# Patient Record
Sex: FEMALE | Race: OTHER | Employment: UNEMPLOYED | URBAN - METROPOLITAN AREA
[De-identification: names, ages, dates, MRNs, and addresses within clinical notes are randomized per-mention and may not be internally consistent; named-entity substitution may affect disease eponyms.]

---

## 2017-01-28 ENCOUNTER — OFFICE VISIT (OUTPATIENT)
Dept: FAMILY MEDICINE CLINIC | Age: 62
End: 2017-01-28

## 2017-01-28 VITALS
WEIGHT: 200 LBS | TEMPERATURE: 97 F | HEIGHT: 65 IN | HEART RATE: 80 BPM | RESPIRATION RATE: 16 BRPM | BODY MASS INDEX: 33.32 KG/M2 | DIASTOLIC BLOOD PRESSURE: 78 MMHG | SYSTOLIC BLOOD PRESSURE: 120 MMHG

## 2017-01-28 DIAGNOSIS — H92.03 OTALGIA, BILATERAL: Primary | ICD-10-CM

## 2017-01-28 PROCEDURE — 99213 OFFICE O/P EST LOW 20 MIN: CPT | Performed by: FAMILY MEDICINE

## 2017-01-28 RX ORDER — CEFUROXIME AXETIL 250 MG/1
250 TABLET ORAL 2 TIMES DAILY
Qty: 14 TABLET | Refills: 0 | Status: SHIPPED | OUTPATIENT
Start: 2017-01-28 | End: 2017-02-04

## 2017-01-28 ASSESSMENT — ENCOUNTER SYMPTOMS
VOICE CHANGE: 0
NAUSEA: 0
TROUBLE SWALLOWING: 0

## 2017-03-20 RX ORDER — ONDANSETRON 4 MG/1
TABLET, FILM COATED ORAL
Qty: 14 TABLET | Refills: 0 | Status: SHIPPED | OUTPATIENT
Start: 2017-03-20 | End: 2018-12-11 | Stop reason: ALTCHOICE

## 2017-06-09 ENCOUNTER — OFFICE VISIT (OUTPATIENT)
Dept: CARDIOLOGY | Age: 62
End: 2017-06-09

## 2017-06-09 VITALS
BODY MASS INDEX: 34.26 KG/M2 | WEIGHT: 205.6 LBS | DIASTOLIC BLOOD PRESSURE: 72 MMHG | RESPIRATION RATE: 14 BRPM | HEIGHT: 65 IN | OXYGEN SATURATION: 98 % | HEART RATE: 64 BPM | SYSTOLIC BLOOD PRESSURE: 116 MMHG

## 2017-06-09 DIAGNOSIS — I10 ESSENTIAL HYPERTENSION: Chronic | ICD-10-CM

## 2017-06-09 DIAGNOSIS — I48.0 PAF (PAROXYSMAL ATRIAL FIBRILLATION) (HCC): Chronic | ICD-10-CM

## 2017-06-09 DIAGNOSIS — Z01.810 PREOP CARDIOVASCULAR EXAM: Primary | ICD-10-CM

## 2017-06-09 PROCEDURE — 99214 OFFICE O/P EST MOD 30 MIN: CPT | Performed by: INTERNAL MEDICINE

## 2017-06-09 ASSESSMENT — ENCOUNTER SYMPTOMS
CHEST TIGHTNESS: 0
SHORTNESS OF BREATH: 0

## 2017-06-20 ENCOUNTER — OFFICE VISIT (OUTPATIENT)
Dept: FAMILY MEDICINE CLINIC | Age: 62
End: 2017-06-20

## 2017-06-20 VITALS
SYSTOLIC BLOOD PRESSURE: 112 MMHG | BODY MASS INDEX: 34.37 KG/M2 | RESPIRATION RATE: 16 BRPM | HEART RATE: 84 BPM | HEIGHT: 65 IN | TEMPERATURE: 98.3 F | WEIGHT: 206.3 LBS | OXYGEN SATURATION: 99 % | DIASTOLIC BLOOD PRESSURE: 76 MMHG

## 2017-06-20 DIAGNOSIS — K58.8 OTHER IRRITABLE BOWEL SYNDROME: ICD-10-CM

## 2017-06-20 DIAGNOSIS — Z01.818 PRE-OP EXAM: Primary | ICD-10-CM

## 2017-06-20 DIAGNOSIS — M54.41 CHRONIC BILATERAL LOW BACK PAIN WITH BILATERAL SCIATICA: ICD-10-CM

## 2017-06-20 DIAGNOSIS — I48.0 PAF (PAROXYSMAL ATRIAL FIBRILLATION) (HCC): ICD-10-CM

## 2017-06-20 DIAGNOSIS — G89.29 CHRONIC BILATERAL LOW BACK PAIN WITH BILATERAL SCIATICA: ICD-10-CM

## 2017-06-20 DIAGNOSIS — I10 ESSENTIAL HYPERTENSION: ICD-10-CM

## 2017-06-20 DIAGNOSIS — M54.42 CHRONIC BILATERAL LOW BACK PAIN WITH BILATERAL SCIATICA: ICD-10-CM

## 2017-06-20 PROCEDURE — 99242 OFF/OP CONSLTJ NEW/EST SF 20: CPT | Performed by: FAMILY MEDICINE

## 2017-06-20 RX ORDER — HYDROCODONE BITARTRATE AND ACETAMINOPHEN 5; 325 MG/1; MG/1
1 TABLET ORAL
COMMUNITY
Start: 2017-06-19 | End: 2017-11-27 | Stop reason: ALTCHOICE

## 2017-06-20 ASSESSMENT — ENCOUNTER SYMPTOMS
ABDOMINAL PAIN: 0
SHORTNESS OF BREATH: 0

## 2017-07-10 ENCOUNTER — OFFICE VISIT (OUTPATIENT)
Dept: FAMILY MEDICINE CLINIC | Age: 62
End: 2017-07-10

## 2017-07-10 VITALS
DIASTOLIC BLOOD PRESSURE: 78 MMHG | RESPIRATION RATE: 12 BRPM | TEMPERATURE: 98.1 F | SYSTOLIC BLOOD PRESSURE: 114 MMHG | HEART RATE: 66 BPM

## 2017-07-10 DIAGNOSIS — R31.29 MICROSCOPIC HEMATURIA: ICD-10-CM

## 2017-07-10 DIAGNOSIS — R94.31 ABNORMAL EKG: Primary | ICD-10-CM

## 2017-07-10 DIAGNOSIS — R79.1 ELEVATED INR: ICD-10-CM

## 2017-07-10 LAB
BILIRUBIN, POC: 17
BLOOD URINE, POC: 10
CLARITY, POC: ABNORMAL
COLOR, POC: ABNORMAL
GLUCOSE URINE, POC: ABNORMAL
INR BLD: 1
KETONES, POC: ABNORMAL
LEUKOCYTE EST, POC: 70
NITRITE, POC: ABNORMAL
PH, POC: 5
PROTEIN, POC: 0.15
PROTHROMBIN TIME: 10.5 SEC (ref 8.1–13.7)
SPECIFIC GRAVITY, POC: 1.03
UROBILINOGEN, POC: 17

## 2017-07-10 PROCEDURE — 99213 OFFICE O/P EST LOW 20 MIN: CPT | Performed by: FAMILY MEDICINE

## 2017-07-10 PROCEDURE — 81002 URINALYSIS NONAUTO W/O SCOPE: CPT | Performed by: FAMILY MEDICINE

## 2017-07-10 RX ORDER — NITROFURANTOIN 25; 75 MG/1; MG/1
100 CAPSULE ORAL 2 TIMES DAILY
Qty: 14 CAPSULE | Refills: 0 | Status: SHIPPED | OUTPATIENT
Start: 2017-07-10 | End: 2017-07-17

## 2017-07-10 ASSESSMENT — PATIENT HEALTH QUESTIONNAIRE - PHQ9
1. LITTLE INTEREST OR PLEASURE IN DOING THINGS: 0
SUM OF ALL RESPONSES TO PHQ QUESTIONS 1-9: 0
SUM OF ALL RESPONSES TO PHQ9 QUESTIONS 1 & 2: 0
2. FEELING DOWN, DEPRESSED OR HOPELESS: 0

## 2017-07-10 ASSESSMENT — ENCOUNTER SYMPTOMS: ABDOMINAL PAIN: 0

## 2017-07-12 ENCOUNTER — TELEPHONE (OUTPATIENT)
Dept: FAMILY MEDICINE CLINIC | Age: 62
End: 2017-07-12

## 2017-07-12 LAB — URINE CULTURE, ROUTINE: NORMAL

## 2017-07-24 ENCOUNTER — HOSPITAL ENCOUNTER (OUTPATIENT)
Dept: GENERAL RADIOLOGY | Age: 62
Discharge: HOME | End: 2017-07-24
Payer: COMMERCIAL

## 2017-07-24 DIAGNOSIS — M54.5 LOW BACK PAIN, UNSPECIFIED BACK PAIN LATERALITY, UNSPECIFIED CHRONICITY, WITH SCIATICA PRESENCE UNSPECIFIED: ICD-10-CM

## 2017-07-24 PROCEDURE — 72100 X-RAY EXAM L-S SPINE 2/3 VWS: CPT

## 2017-09-25 ENCOUNTER — HOSPITAL ENCOUNTER (OUTPATIENT)
Dept: GENERAL RADIOLOGY | Age: 62
Discharge: HOME | End: 2017-09-25
Payer: MEDICARE

## 2017-09-25 DIAGNOSIS — M54.5 LOW BACK PAIN, UNSPECIFIED BACK PAIN LATERALITY, UNSPECIFIED CHRONICITY, WITH SCIATICA PRESENCE UNSPECIFIED: ICD-10-CM

## 2017-09-25 PROCEDURE — 72100 X-RAY EXAM L-S SPINE 2/3 VWS: CPT

## 2017-11-27 ENCOUNTER — OFFICE VISIT (OUTPATIENT)
Dept: CARDIOLOGY | Age: 62
End: 2017-11-27

## 2017-11-27 VITALS
WEIGHT: 209 LBS | HEART RATE: 58 BPM | RESPIRATION RATE: 16 BRPM | BODY MASS INDEX: 34.78 KG/M2 | OXYGEN SATURATION: 96 % | SYSTOLIC BLOOD PRESSURE: 120 MMHG | DIASTOLIC BLOOD PRESSURE: 72 MMHG

## 2017-11-27 DIAGNOSIS — I48.0 PAF (PAROXYSMAL ATRIAL FIBRILLATION) (HCC): Primary | ICD-10-CM

## 2017-11-27 DIAGNOSIS — Q26.8 PULMONARY VEIN STENOSIS: ICD-10-CM

## 2017-11-27 DIAGNOSIS — I10 ESSENTIAL HYPERTENSION: ICD-10-CM

## 2017-11-27 PROCEDURE — 3017F COLORECTAL CA SCREEN DOC REV: CPT | Performed by: INTERNAL MEDICINE

## 2017-11-27 PROCEDURE — G8417 CALC BMI ABV UP PARAM F/U: HCPCS | Performed by: INTERNAL MEDICINE

## 2017-11-27 PROCEDURE — 99214 OFFICE O/P EST MOD 30 MIN: CPT | Performed by: INTERNAL MEDICINE

## 2017-11-27 PROCEDURE — 1036F TOBACCO NON-USER: CPT | Performed by: INTERNAL MEDICINE

## 2017-11-27 PROCEDURE — G8484 FLU IMMUNIZE NO ADMIN: HCPCS | Performed by: INTERNAL MEDICINE

## 2017-11-27 PROCEDURE — 3014F SCREEN MAMMO DOC REV: CPT | Performed by: INTERNAL MEDICINE

## 2017-11-27 PROCEDURE — G8427 DOCREV CUR MEDS BY ELIG CLIN: HCPCS | Performed by: INTERNAL MEDICINE

## 2017-11-27 ASSESSMENT — ENCOUNTER SYMPTOMS
APNEA: 0
CHEST TIGHTNESS: 0
SHORTNESS OF BREATH: 0

## 2017-11-27 NOTE — PROGRESS NOTES
Reason For Visit:  Chief Complaint   Patient presents with    Follow-Up from [facility]       HPI:  11/27/2017: Patient presents today for follow-up of recent hospitalization at [facility] for atrial fibrillation and then chest pain. By the time she arrived ER,AF resolved she has undergone atrial fibrillation ablation by Jaxson chi. at [facility]. Prior to that she was on flecainide. Flecainide was stopped after the ablation. She denies excessive caffeine abuse she denies smoking. She also had chest discomfort this time. There was some radiation to the jaw. No nausea no vomiting no diaphoresis. No fever or cough or chills. We'll set her up for [employer] and then  see me. Also needs Holter monitor. 6/9/2017: For preop evaluation. Needs to get back surgery by Dr. Nicole Pierce. He is scheduled for 7/14/2017 at [facility]. She has a history of atrial fibrillation. Has seen dressing in the past currently in sinus rhythm with Toprol and Xarelto. No chest pain no syncope no dizziness. Moderately obese. She acceptable risk for surgery. Xarelto on 4 July. See me in one year.     6/10/16:for fu of AF. Seen Dressing before. He started tambocor On BID. Also on toprol 100. On xarelto. no bleeding. Every now and then some short episodes. No syncopy. No HA. See in 1 year     7/17/15: Doing well. No AF episodes. Stay on [facility] 54. see in 6M     11/17/14: Was admitted to [address] [facility] with AF. Saw Tunde. Started on sotalol. On it for few weeks. In SR. Had PAF few months prior. See me in Jan.On xarelto.     9/29/14: She got recurrent episode of AF. We need to increase lopressor to 25 TID. See me in Oct.     9/19/14: DISCONTINUED HCTZ AND DECREASED LOPRESSOR TO 25MG TWICE DAILY. See me in 3 weeks.     8/19/14: Was seen by me at [id number] [place] [address] with AF.she was going to see her sister in [facility] by [email or web address] on lopressor. Had few spells of palpitation,nothing like what she had. Echo mild elevation of RVSP. We will get stress cardiolite and 24 hour holter. See me after.     Problem List:  Patient Active Problem List   Diagnosis    Irritable bowel syndrome    Hx of colonic polyps    PAF (paroxysmal atrial fibrillation) (HCC)    Hypertension    Pulmonary vein stenosis    History of open heart surgery    Idiopathic scoliosis of lumbar spine    Degenerative disc disease, lumbar    Lumbar radiculopathy       Allergies: Allergies   Allergen Reactions    Codeine     Demerol     Morphine     Sulfa Antibiotics Hives and Itching    Levofloxacin Itching and Rash       Personal History:   has a past medical history of Atrial fibrillation ([address] Chest pain; Degenerative disc disease, lumbar; Hx of colonic polyps; Hypertension; Irritable bowel syndrome; Lumbar radiculopathy; and Rapid heart rate. Social History:   reports that she has never smoked. She has never used smokeless tobacco. She reports that she does not drink alcohol or use drugs. Surgical History:  Past Surgical History:   Procedure Laterality Date    APPENDECTOMY      BACK SURGERY  07/14/2017    CARDIAC CATHETERIZATION  11/2/15    DR. [address]    HYSTERECTOMY      SPINE SURGERY      TONSILLECTOMY      TOTAL KNEE ARTHROPLASTY  2009    UPPER GASTROINTESTINAL ENDOSCOPY  1/15/13    DR. MEEKS       Family History:  family history includes Cancer in her father; Heart Disease in her father; High Blood Pressure in her father.       Current Medications:    Current Outpatient Prescriptions:     metoprolol succinate (TOPROL XL) 100 MG extended release tablet, TAKE 1 TABLET DAILY, Disp: 90 tablet, Rfl: 3    ondansetron (ZOFRAN) 4 MG tablet, TAKE ONE TABLET BY MOUTH EVERY 8 HOURS AS NEEDED FOR NAUSEA OR VOMITING, Disp: 14 tablet, Rfl: 0    Cholecalciferol (VITAMIN D3) 2000 UNITS CAPS, Take 1 capsule by mouth daily, Disp: , Rfl:     hyoscyamine (LEVSIN/SL) 0.125 MG SL tablet, PLACE ONE TABLET UNDER THE TONGUE EVERY 6 HOURS AS NEEDED FOR CRAMPING, Disp: 20 tablet, Rfl: 0    triamcinolone (KENALOG) 0.025 % has a normal mood and affect. Her behavior is normal. Judgment and thought content normal.           Assessment/Orders:     ICD-10-CM ICD-9-CM    1. PAF (paroxysmal atrial fibrillation) ([facility]) I48.0 427.31 NM Myocardial Spect Rest Exercise or Rx      Holter Monitor 24 Hour   2. Essential hypertension I10 401.9 NM Myocardial Spect Rest Exercise or Rx   3. Pulmonary vein stenosis Q26.8 747.49 NM Myocardial Spect Rest Exercise or Rx   4. History of open heart surgery Z98.890 V45.89 NM Myocardial Spect Rest Exercise or Rx       No orders of the defined types were placed in this encounter. Medications Discontinued During This Encounter   Medication Reason    HYDROcodone-acetaminophen ([address]) 5-325 MG per tablet Therapy completed       Orders Placed This Encounter   Procedures    NM Myocardial Spect Rest Exercise or Rx     Standing Status:   Future     Standing Expiration Date:   11/27/2018     Order Specific Question:   Reason for Exam?     Answer: Other     Order Specific Question:   Procedure Type     Answer:   Exercise     Order Specific Question:   Reason for exam:     Answer:   CARDIOLITE - TO BE DONE AT [facility] 12/5/17    Holter Monitor 24 Hour     TO BE DONE AT [facility] 12/5/17     Standing Status:   Future     Standing Expiration Date:   11/27/2018     Scheduling Instructions:      TO BE DONE AT [facility] 12/5/17     Order Specific Question:   Reason for Exam?     Answer:   Irregular heart rate         Plan:  1. Patient to get cardiolite stress test and 24 hour holter at [facility] 12/5/17  2. See me in 3 weeks. > I will continue to monitor patient clinically, if symptoms develop or worsen, they are to let me know ASAP or head to the nearest emergeny room. > Follow up as discussed or call office sooner if needed.  > If refills are needed after appointment contact pharmacy.       Electronically signed by: Zechariah Ang MD  11/27/2017 3:24 PM

## 2017-12-14 ENCOUNTER — HOSPITAL ENCOUNTER (OUTPATIENT)
Dept: NON INVASIVE DIAGNOSTICS | Age: 62
Discharge: HOME | End: 2017-12-14
Payer: COMMERCIAL

## 2017-12-14 DIAGNOSIS — I48.0 PAF (PAROXYSMAL ATRIAL FIBRILLATION) (HCC): ICD-10-CM

## 2017-12-14 PROCEDURE — 93225 XTRNL ECG REC<48 HRS REC: CPT

## 2017-12-14 PROCEDURE — 93226 XTRNL ECG REC<48 HR SCAN A/R: CPT

## 2017-12-15 ENCOUNTER — HOSPITAL ENCOUNTER (OUTPATIENT)
Dept: NUCLEAR MEDICINE | Age: 62
Discharge: HOME | End: 2017-12-15
Payer: COMMERCIAL

## 2017-12-15 DIAGNOSIS — I48.0 PAF (PAROXYSMAL ATRIAL FIBRILLATION) (HCC): ICD-10-CM

## 2017-12-15 DIAGNOSIS — Q26.8 PULMONARY VEIN STENOSIS: ICD-10-CM

## 2017-12-15 DIAGNOSIS — I10 ESSENTIAL HYPERTENSION: ICD-10-CM

## 2017-12-15 PROCEDURE — 93017 CV STRESS TEST TRACING ONLY: CPT

## 2017-12-15 PROCEDURE — 2580000003 HC RX 258: Performed by: INTERNAL MEDICINE

## 2017-12-15 PROCEDURE — 78452 HT MUSCLE IMAGE SPECT MULT: CPT

## 2017-12-15 PROCEDURE — A9502 TC99M TETROFOSMIN: HCPCS | Performed by: INTERNAL MEDICINE

## 2017-12-15 PROCEDURE — 6360000002 HC RX W HCPCS: Performed by: INTERNAL MEDICINE

## 2017-12-15 PROCEDURE — 3430000000 HC RX DIAGNOSTIC RADIOPHARMACEUTICAL: Performed by: INTERNAL MEDICINE

## 2017-12-15 RX ORDER — SODIUM CHLORIDE 0.9 % (FLUSH) 0.9 %
10 SYRINGE (ML) INJECTION PRN
Status: DISCONTINUED | OUTPATIENT
Start: 2017-12-15 | End: 2017-12-18 | Stop reason: HOSPADM

## 2017-12-15 RX ADMIN — TETROFOSMIN 11.6 MILLICURIE: 0.23 INJECTION, POWDER, LYOPHILIZED, FOR SOLUTION INTRAVENOUS at 11:32

## 2017-12-15 RX ADMIN — Medication 20 ML: at 13:51

## 2017-12-15 RX ADMIN — Medication 10 ML: at 11:32

## 2017-12-15 RX ADMIN — REGADENOSON 0.4 MG: 0.08 INJECTION, SOLUTION INTRAVENOUS at 13:51

## 2017-12-15 RX ADMIN — TETROFOSMIN 29.6 MILLICURIE: 0.23 INJECTION, POWDER, LYOPHILIZED, FOR SOLUTION INTRAVENOUS at 13:51

## 2017-12-15 NOTE — PROCEDURES
[address]                       [address], [address] [facility]                                CARDIAC STRESS TEST    PATIENT NAME: Anjel Verma              :        1955  MED REC NO:   [id number]                            ROOM:  ACCOUNT NO:   [de-identified]                           ADMIT DATE: 12/15/2017  PROVIDER:     Sarah Aragon MD    CARDIOVASCULAR DIAGNOSTIC DEPARTMENT    DATE OF STUDY:  12/15/2017    PHARMACOLOGICAL SPECT IMAGING    INDICATION FOR PROCEDURE:  Paroxysmal atrial fibrillation. TECHNIQUE:  An 11.6 mCi of Myoview injected. Resting images were obtained. The patient was then stressed according to standard Lexiscan protocol. A  29.6 mCi of Myoview injected and post-stress imaging was obtained. Underlying electrocardiogram is sinus rhythm. Heart rate 59 beats per  minute, blood pressure 189/79. Peak heart rate is 108. Peak blood  pressure 219/88. The patient had mild shortness of breath and facial  flushing. No chest pains. EKG demonstrates no ischemic ST-segment changes nor arrhythmias. Tomographic images reveal normal tracer uptake. TID score is 0.85. Bustillos  images reveal ejection fraction is 83%. NOTE:  This test was initially scheduled as a treadmill myocardial  perfusion; however, switched to pharmacologic as the patient could not  attain targeted heart rate due to knee pain. IMPRESSION:  1. Normal myocardial perfusion imaging with no evidence of stress-induced  ischemia. There is no evidence of prior myocardial infarction. 2.  Normal left ventricular systolic function.       Ara Florentino MD    D: 12/15/2017 17:37:16       T: 12/15/2017 18:54:14     [email or web address]  Job#: [id number]     Doc#: [id number]    CC:

## 2017-12-19 ENCOUNTER — OFFICE VISIT (OUTPATIENT)
Dept: CARDIOLOGY | Age: 62
End: 2017-12-19

## 2017-12-19 VITALS
WEIGHT: 207.4 LBS | DIASTOLIC BLOOD PRESSURE: 78 MMHG | HEART RATE: 61 BPM | OXYGEN SATURATION: 98 % | RESPIRATION RATE: 16 BRPM | SYSTOLIC BLOOD PRESSURE: 132 MMHG | BODY MASS INDEX: 34.51 KG/M2

## 2017-12-19 DIAGNOSIS — I10 ESSENTIAL HYPERTENSION: ICD-10-CM

## 2017-12-19 DIAGNOSIS — Z98.890 HISTORY OF OPEN HEART SURGERY: ICD-10-CM

## 2017-12-19 DIAGNOSIS — R07.9 ACUTE CHEST PAIN: ICD-10-CM

## 2017-12-19 DIAGNOSIS — I48.0 PAF (PAROXYSMAL ATRIAL FIBRILLATION) (HCC): Primary | ICD-10-CM

## 2017-12-19 PROCEDURE — G8427 DOCREV CUR MEDS BY ELIG CLIN: HCPCS | Performed by: INTERNAL MEDICINE

## 2017-12-19 PROCEDURE — 3017F COLORECTAL CA SCREEN DOC REV: CPT | Performed by: INTERNAL MEDICINE

## 2017-12-19 PROCEDURE — G8484 FLU IMMUNIZE NO ADMIN: HCPCS | Performed by: INTERNAL MEDICINE

## 2017-12-19 PROCEDURE — G8417 CALC BMI ABV UP PARAM F/U: HCPCS | Performed by: INTERNAL MEDICINE

## 2017-12-19 PROCEDURE — 1036F TOBACCO NON-USER: CPT | Performed by: INTERNAL MEDICINE

## 2017-12-19 PROCEDURE — 3014F SCREEN MAMMO DOC REV: CPT | Performed by: INTERNAL MEDICINE

## 2017-12-19 PROCEDURE — 99213 OFFICE O/P EST LOW 20 MIN: CPT | Performed by: INTERNAL MEDICINE

## 2017-12-19 ASSESSMENT — ENCOUNTER SYMPTOMS
SHORTNESS OF BREATH: 0
CHEST TIGHTNESS: 0
APNEA: 0

## 2017-12-19 NOTE — PROGRESS NOTES
Reason For Visit:  Chief Complaint   Patient presents with    Results     STRESS TEST/HOLTER 12/15/2017    Atrial Fibrillation       HPI:  12/19/2017: Patient presents today for Follow-up of recent hospitalization for atrial fibrillation and chest pain. Stress is negative for ischemia. She had ablation done by Dr. Ginna Laura one year ago. She used to be on flecainide and that  this was later discontinued. We will continue with the other medications except flecainide. See me in 6 months. 11/27/2017: Patient presents today for follow-up of recent hospitalization at [facility] for atrial fibrillation and then chest pain. By the time she arrived ER,AF resolved she has undergone atrial fibrillation ablation by Jaxson chi. at [facility]. Prior to that she was on flecainide. Flecainide was stopped after the ablation. She denies excessive caffeine abuse she denies smoking. She also had chest discomfort this time. There was some radiation to the jaw. No nausea no vomiting no diaphoresis. No fever or cough or chills. We'll set her up for Reginaldo Maya and then  see me. Also needs Holter monitor.     6/9/2017: For preop evaluation. Needs to get back surgery by Dr. Abigail Howard. He is scheduled for 7/14/2017 at [facility]. She has a history of atrial fibrillation. Has seen dressing in the past currently in sinus rhythm with Toprol and Xarelto. No chest pain no syncope no dizziness. Moderately obese. She acceptable risk for surgery. Xarelto on 4 July. See me in one year.     6/10/16:for fu of AF. Seen Dressing before. He started tambocor On BID. Also on toprol 100. On xarelto. no bleeding. Every now and then some short episodes. No syncopy. No HA. See in 1 year     7/17/15: Doing well. No AF episodes. Stay on [address]. see in 6M     11/17/14: Was admitted to [facility] with AF. Saw Tunde. Started on sotalol. On it for few weeks. In SR. Had PAF few months prior. See me in Jan.On xarelto.     9/29/14: She got recurrent episode of AF. We need to increase lopressor to 25 tablet, TAKE ONE TABLET BY MOUTH EVERY 8 HOURS AS NEEDED FOR NAUSEA OR VOMITING, Disp: 14 tablet, Rfl: 0    Cholecalciferol (VITAMIN D3) 2000 UNITS CAPS, Take 1 capsule by mouth daily, Disp: , Rfl:     hyoscyamine (LEVSIN/SL) 0.125 MG SL tablet, PLACE ONE TABLET UNDER THE TONGUE EVERY 6 HOURS AS NEEDED FOR CRAMPING, Disp: 20 tablet, Rfl: 0    triamcinolone (KENALOG) 0.025 % cream, Apply Topically, Disp: 1 Tube, Rfl: 0    XIFAXAN 550 MG tablet, , Disp: , Rfl:     rivaroxaban (XARELTO) 20 MG TABS tablet, Take 1 tablet by mouth daily, Disp: 90 tablet, Rfl: 3    venlafaxine (EFFEXOR-XR) 75 MG XR capsule, Take 75 mg by mouth daily. , Disp: , Rfl:       Review of Systems:  Review of Systems   Constitutional: Negative for appetite change and fatigue. Respiratory: Negative for apnea, chest tightness and shortness of breath. Cardiovascular: Positive for palpitations. Negative for chest pain and leg swelling. Neurological: Negative for dizziness, syncope, weakness, light-headedness and headaches. Hematological: Does not bruise/bleed easily. Psychiatric/Behavioral: Negative for agitation, behavioral problems and confusion. The patient is not nervous/anxious and is not hyperactive. All other systems reviewed and are negative. Objective  Vitals:    12/19/17 1355   BP: 132/78   Site: Right Arm   Position: Sitting   Cuff Size: Large Adult   Pulse: 61   Resp: 16   SpO2: 98%   Weight: 207 lb 6.4 oz (94.1 kg)         Physical Exam:  Physical Exam   Constitutional: She is oriented to person, place, and time. She appears well-developed and well-nourished. Cardiovascular: Normal rate, regular rhythm, normal heart sounds and intact distal pulses. Pulmonary/Chest: Effort normal and breath sounds normal.   Musculoskeletal: Normal range of motion. Neurological: She is alert and oriented to person, place, and time. She has normal reflexes. Skin: Skin is warm and dry.    Psychiatric: She has a normal mood and affect. Her behavior is normal. Judgment and thought content normal.           Assessment/Orders:     ICD-10-CM ICD-9-CM    1. PAF (paroxysmal atrial fibrillation) (HCC) I48.0 427.31    2. Acute chest pain R07.9 786.50 rivaroxaban (XARELTO) 20 MG TABS tablet   3. Essential hypertension I10 401.9    4. History of open heart surgery Z98.890 V45.89        No orders of the defined types were placed in this encounter. There are no discontinued medications. No orders of the defined types were placed in this encounter. Plan:  1. Patient to see me in 6 months.  > I will continue to monitor patient clinically, if symptoms develop or worsen, they are to let me know ASAP or head to the nearest emergeny room. > Follow up as discussed or call office sooner if needed.  > If refills are needed after appointment contact pharmacy.       Electronically signed by: Torito Hernandez MD  12/19/2017 2:41 PM

## 2018-03-09 RX ORDER — VENLAFAXINE HYDROCHLORIDE 75 MG/1
75 CAPSULE, EXTENDED RELEASE ORAL DAILY
Qty: 30 CAPSULE | OUTPATIENT
Start: 2018-03-09

## 2018-03-13 RX ORDER — VENLAFAXINE HYDROCHLORIDE 75 MG/1
75 CAPSULE, EXTENDED RELEASE ORAL DAILY
Qty: 7 CAPSULE | Refills: 0 | Status: SHIPPED | OUTPATIENT
Start: 2018-03-13 | End: 2020-11-02 | Stop reason: SDUPTHER

## 2018-03-20 ENCOUNTER — TELEPHONE (OUTPATIENT)
Dept: FAMILY MEDICINE CLINIC | Age: 63
End: 2018-03-20

## 2018-03-21 ENCOUNTER — TELEPHONE (OUTPATIENT)
Dept: FAMILY MEDICINE CLINIC | Age: 63
End: 2018-03-21

## 2018-04-20 ENCOUNTER — HOSPITAL ENCOUNTER (OUTPATIENT)
Dept: WOMENS IMAGING | Age: 63
Discharge: HOME | End: 2018-04-22
Payer: COMMERCIAL

## 2018-04-20 DIAGNOSIS — Z12.31 ENCOUNTER FOR SCREENING MAMMOGRAM FOR BREAST CANCER: ICD-10-CM

## 2018-04-20 PROCEDURE — 77067 SCR MAMMO BI INCL CAD: CPT

## 2018-06-02 ENCOUNTER — OFFICE VISIT (OUTPATIENT)
Dept: FAMILY MEDICINE CLINIC | Age: 63
End: 2018-06-02
Payer: COMMERCIAL

## 2018-06-02 VITALS
WEIGHT: 217.6 LBS | TEMPERATURE: 97.4 F | SYSTOLIC BLOOD PRESSURE: 116 MMHG | BODY MASS INDEX: 36.25 KG/M2 | OXYGEN SATURATION: 93 % | RESPIRATION RATE: 22 BRPM | HEART RATE: 95 BPM | HEIGHT: 65 IN | DIASTOLIC BLOOD PRESSURE: 76 MMHG

## 2018-06-02 DIAGNOSIS — L24.9 IRRITANT CONTACT DERMATITIS, UNSPECIFIED TRIGGER: Primary | ICD-10-CM

## 2018-06-02 PROCEDURE — 99213 OFFICE O/P EST LOW 20 MIN: CPT | Performed by: NURSE PRACTITIONER

## 2018-06-02 PROCEDURE — 3017F COLORECTAL CA SCREEN DOC REV: CPT | Performed by: NURSE PRACTITIONER

## 2018-06-02 PROCEDURE — G8417 CALC BMI ABV UP PARAM F/U: HCPCS | Performed by: NURSE PRACTITIONER

## 2018-06-02 PROCEDURE — 1036F TOBACCO NON-USER: CPT | Performed by: NURSE PRACTITIONER

## 2018-06-02 PROCEDURE — G8427 DOCREV CUR MEDS BY ELIG CLIN: HCPCS | Performed by: NURSE PRACTITIONER

## 2018-06-02 RX ORDER — LORATADINE 10 MG/1
10 TABLET ORAL DAILY
Qty: 30 TABLET | Refills: 0 | Status: SHIPPED | OUTPATIENT
Start: 2018-06-02 | End: 2018-07-02

## 2018-06-02 RX ORDER — RANITIDINE 150 MG/1
150 TABLET ORAL 2 TIMES DAILY
Qty: 14 TABLET | Refills: 0 | Status: SHIPPED | OUTPATIENT
Start: 2018-06-02 | End: 2018-10-08

## 2018-06-02 RX ORDER — METHYLPREDNISOLONE 4 MG/1
TABLET ORAL
Qty: 1 KIT | Refills: 0 | Status: ON HOLD | OUTPATIENT
Start: 2018-06-02 | End: 2018-07-16

## 2018-06-02 ASSESSMENT — ENCOUNTER SYMPTOMS
COUGH: 0
DIARRHEA: 0
NAUSEA: 0
SORE THROAT: 0
TROUBLE SWALLOWING: 0
NAIL CHANGES: 0
RHINORRHEA: 1
SHORTNESS OF BREATH: 0
EYE PAIN: 0
WHEEZING: 0
VOMITING: 0
ABDOMINAL DISTENTION: 0
CONSTIPATION: 0
ABDOMINAL PAIN: 0
CHEST TIGHTNESS: 0
SINUS PRESSURE: 0

## 2018-07-09 RX ORDER — ONDANSETRON 4 MG/1
TABLET, FILM COATED ORAL
Qty: 14 TABLET | Refills: 0 | OUTPATIENT
Start: 2018-07-09

## 2018-07-09 NOTE — TELEPHONE ENCOUNTER
Pt contacted Dr. Dianelys Alfred office for this, but they said to call PCP. Pharm: Alberto    Pt has OV set 7/16/18.

## 2018-07-13 ENCOUNTER — APPOINTMENT (OUTPATIENT)
Dept: CT IMAGING | Age: 63
End: 2018-07-13
Payer: COMMERCIAL

## 2018-07-13 ENCOUNTER — APPOINTMENT (OUTPATIENT)
Dept: GENERAL RADIOLOGY | Age: 63
End: 2018-07-13
Payer: COMMERCIAL

## 2018-07-13 ENCOUNTER — HOSPITAL ENCOUNTER (EMERGENCY)
Age: 63
Discharge: HOME | End: 2018-07-13
Attending: EMERGENCY MEDICINE
Payer: COMMERCIAL

## 2018-07-13 VITALS
BODY MASS INDEX: 32.78 KG/M2 | HEART RATE: 60 BPM | OXYGEN SATURATION: 98 % | WEIGHT: 197 LBS | DIASTOLIC BLOOD PRESSURE: 74 MMHG | SYSTOLIC BLOOD PRESSURE: 130 MMHG | TEMPERATURE: 97.6 F | RESPIRATION RATE: 16 BRPM

## 2018-07-13 DIAGNOSIS — T38.0X5A ADVERSE EFFECT OF CORTICOSTEROIDS, INITIAL ENCOUNTER: ICD-10-CM

## 2018-07-13 DIAGNOSIS — R10.13 ABDOMINAL PAIN, EPIGASTRIC: Primary | ICD-10-CM

## 2018-07-13 LAB
ALBUMIN SERPL-MCNC: 4.1 G/DL (ref 3.9–4.9)
ALP BLD-CCNC: 121 U/L (ref 40–130)
ALT SERPL-CCNC: 16 U/L (ref 0–33)
AMORPHOUS: ABNORMAL
AMYLASE: 36 U/L (ref 28–100)
ANION GAP SERPL CALCULATED.3IONS-SCNC: 12 MEQ/L (ref 7–13)
AST SERPL-CCNC: 18 U/L (ref 0–35)
BACTERIA: ABNORMAL /HPF
BASOPHILS ABSOLUTE: 0 K/UL (ref 0–0.2)
BASOPHILS RELATIVE PERCENT: 0.3 %
BILIRUB SERPL-MCNC: 0.4 MG/DL (ref 0–1.2)
BILIRUBIN URINE: NEGATIVE
BLOOD, URINE: ABNORMAL
BUN BLDV-MCNC: 14 MG/DL (ref 8–23)
CALCIUM SERPL-MCNC: 9.1 MG/DL (ref 8.6–10.2)
CHLORIDE BLD-SCNC: 107 MEQ/L (ref 98–107)
CLARITY: ABNORMAL
CO2: 23 MEQ/L (ref 22–29)
COLOR: YELLOW
CREAT SERPL-MCNC: 0.49 MG/DL (ref 0.5–0.9)
EOSINOPHILS ABSOLUTE: 0 K/UL (ref 0–0.7)
EOSINOPHILS RELATIVE PERCENT: 0 %
EPITHELIAL CELLS, UA: ABNORMAL /HPF
GFR AFRICAN AMERICAN: >60
GFR NON-AFRICAN AMERICAN: >60
GLOBULIN: 3 G/DL (ref 2.3–3.5)
GLUCOSE BLD-MCNC: 119 MG/DL (ref 74–109)
GLUCOSE URINE: NEGATIVE MG/DL
HCT VFR BLD CALC: 50.7 % (ref 37–47)
HEMOGLOBIN: 17.4 G/DL (ref 12–16)
KETONES, URINE: NEGATIVE MG/DL
LEUKOCYTE ESTERASE, URINE: ABNORMAL
LIPASE: 15 U/L (ref 13–60)
LYMPHOCYTES ABSOLUTE: 1.2 K/UL (ref 1–4.8)
LYMPHOCYTES RELATIVE PERCENT: 13.9 %
MCH RBC QN AUTO: 31.4 PG (ref 27–31.3)
MCHC RBC AUTO-ENTMCNC: 34.3 % (ref 33–37)
MCV RBC AUTO: 91.4 FL (ref 82–100)
MONOCYTES ABSOLUTE: 0.6 K/UL (ref 0.2–0.8)
MONOCYTES RELATIVE PERCENT: 6.5 %
NEUTROPHILS ABSOLUTE: 7.1 K/UL (ref 1.4–6.5)
NEUTROPHILS RELATIVE PERCENT: 79.3 %
NITRITE, URINE: NEGATIVE
PDW BLD-RTO: 13.2 % (ref 11.5–14.5)
PH UA: 5.5 (ref 5–9)
PLATELET # BLD: 228 K/UL (ref 130–400)
POTASSIUM SERPL-SCNC: 3.7 MEQ/L (ref 3.5–5.1)
PROTEIN UA: NEGATIVE MG/DL
RBC # BLD: 5.55 M/UL (ref 4.2–5.4)
RBC UA: ABNORMAL /HPF (ref 0–2)
RENAL EPITHELIAL, UA: ABNORMAL /HPF
SODIUM BLD-SCNC: 142 MEQ/L (ref 132–144)
SPECIFIC GRAVITY UA: 1.03 (ref 1–1.03)
TOTAL PROTEIN: 7.1 G/DL (ref 6.4–8.1)
URINE REFLEX TO CULTURE: YES
UROBILINOGEN, URINE: 0.2 E.U./DL
WBC # BLD: 8.9 K/UL (ref 4.8–10.8)
WBC UA: ABNORMAL /HPF (ref 0–5)

## 2018-07-13 PROCEDURE — 71045 X-RAY EXAM CHEST 1 VIEW: CPT

## 2018-07-13 PROCEDURE — 36415 COLL VENOUS BLD VENIPUNCTURE: CPT

## 2018-07-13 PROCEDURE — 74176 CT ABD & PELVIS W/O CONTRAST: CPT

## 2018-07-13 PROCEDURE — 6360000002 HC RX W HCPCS: Performed by: EMERGENCY MEDICINE

## 2018-07-13 PROCEDURE — 83690 ASSAY OF LIPASE: CPT

## 2018-07-13 PROCEDURE — 87086 URINE CULTURE/COLONY COUNT: CPT

## 2018-07-13 PROCEDURE — 85025 COMPLETE CBC W/AUTO DIFF WBC: CPT

## 2018-07-13 PROCEDURE — 96376 TX/PRO/DX INJ SAME DRUG ADON: CPT

## 2018-07-13 PROCEDURE — 96375 TX/PRO/DX INJ NEW DRUG ADDON: CPT

## 2018-07-13 PROCEDURE — 82150 ASSAY OF AMYLASE: CPT

## 2018-07-13 PROCEDURE — 99284 EMERGENCY DEPT VISIT MOD MDM: CPT

## 2018-07-13 PROCEDURE — 6370000000 HC RX 637 (ALT 250 FOR IP): Performed by: EMERGENCY MEDICINE

## 2018-07-13 PROCEDURE — 2580000003 HC RX 258: Performed by: EMERGENCY MEDICINE

## 2018-07-13 PROCEDURE — 80053 COMPREHEN METABOLIC PANEL: CPT

## 2018-07-13 PROCEDURE — 96374 THER/PROPH/DIAG INJ IV PUSH: CPT

## 2018-07-13 PROCEDURE — 81001 URINALYSIS AUTO W/SCOPE: CPT

## 2018-07-13 RX ORDER — ONDANSETRON 4 MG/1
4 TABLET, FILM COATED ORAL EVERY 8 HOURS PRN
Qty: 20 TABLET | Refills: 0 | Status: ON HOLD | OUTPATIENT
Start: 2018-07-13 | End: 2018-07-18

## 2018-07-13 RX ORDER — TRAMADOL HYDROCHLORIDE 50 MG/1
50 TABLET ORAL EVERY 4 HOURS PRN
Qty: 20 TABLET | Refills: 0 | Status: ON HOLD | OUTPATIENT
Start: 2018-07-13 | End: 2018-07-18

## 2018-07-13 RX ORDER — ONDANSETRON 2 MG/ML
4 INJECTION INTRAMUSCULAR; INTRAVENOUS ONCE
Status: COMPLETED | OUTPATIENT
Start: 2018-07-13 | End: 2018-07-13

## 2018-07-13 RX ORDER — SODIUM CHLORIDE 0.9 % (FLUSH) 0.9 %
3 SYRINGE (ML) INJECTION EVERY 8 HOURS
Status: DISCONTINUED | OUTPATIENT
Start: 2018-07-13 | End: 2018-07-14 | Stop reason: HOSPADM

## 2018-07-13 RX ORDER — 0.9 % SODIUM CHLORIDE 0.9 %
500 INTRAVENOUS SOLUTION INTRAVENOUS ONCE
Status: COMPLETED | OUTPATIENT
Start: 2018-07-13 | End: 2018-07-13

## 2018-07-13 RX ORDER — PANTOPRAZOLE SODIUM 20 MG/1
40 TABLET, DELAYED RELEASE ORAL DAILY
Qty: 30 TABLET | Refills: 0 | Status: SHIPPED | OUTPATIENT
Start: 2018-07-13 | End: 2018-10-08

## 2018-07-13 RX ADMIN — ONDANSETRON 4 MG: 2 INJECTION INTRAMUSCULAR; INTRAVENOUS at 21:41

## 2018-07-13 RX ADMIN — SODIUM CHLORIDE 500 ML: 9 INJECTION, SOLUTION INTRAVENOUS at 20:24

## 2018-07-13 RX ADMIN — HYDROMORPHONE HYDROCHLORIDE 0.5 MG: 1 INJECTION, SOLUTION INTRAMUSCULAR; INTRAVENOUS; SUBCUTANEOUS at 20:25

## 2018-07-13 RX ADMIN — LIDOCAINE HYDROCHLORIDE: 20 SOLUTION ORAL; TOPICAL at 21:41

## 2018-07-13 RX ADMIN — ONDANSETRON 4 MG: 2 INJECTION INTRAMUSCULAR; INTRAVENOUS at 20:24

## 2018-07-13 ASSESSMENT — PAIN DESCRIPTION - LOCATION: LOCATION: ABDOMEN

## 2018-07-13 ASSESSMENT — PAIN DESCRIPTION - PROGRESSION: CLINICAL_PROGRESSION: GRADUALLY IMPROVING

## 2018-07-13 ASSESSMENT — PAIN SCALES - GENERAL
PAINLEVEL_OUTOF10: 5
PAINLEVEL_OUTOF10: 8

## 2018-07-13 ASSESSMENT — PAIN DESCRIPTION - PAIN TYPE: TYPE: ACUTE PAIN

## 2018-07-13 NOTE — ED PROVIDER NOTES
[facility] ED  eMERGENCY dEPARTMENT eNCOUnter      Pt Name: Maggie Vergara  MRN: [id number]  [place] 1955  Date of evaluation: 7/13/2018  Provider: Lis Joshua MD    [address] [place] [address]       Chief Complaint   Patient presents with    Dizziness     nausea/feeling cold x 3 days         HISTORY OF PRESENT ILLNESS   (Location/Symptom, Timing/Onset, Context/Setting, Quality, Duration, Modifying Factors, Severity)  Note limiting factors. Maggie Vergara is a 66 y.o. female who presents to the emergency department With three-day history of lightheadedness and abdominal pain. She does have irritable bowel syndrome and has epigastric pain frequently but this is worse. She's never had pain like this before. She is status post cholecystectomy and hysterectomy. She is taking steroids and has been for over a week for poison ivy. She is nondiabetic. She does have hypertension. She's been taking fluids including water and Gatorade. She does not smoke. HPI    Nursing Notes were reviewed. REVIEW OF SYSTEMS    (2-9 systems for level 4, 10 or more for level 5)     Review of Systems     Constitutional symptoms:  no Fatigue, no fever, no chills. Lightheaded  Skin symptoms:  Negative except as documented in HPI. ENMT symptoms:  Negative except as documented in HPI. Respiratory symptoms:  Negative except as documented in HPI. Cardiovascular symptoms:  Negative except as documented in HPI. Gastrointestinal symptoms: Negative except for documented as above in the HPI, abdominal pain as above   Genitourinary symptoms:  Negative except as documented in HPI. Musculoskeletal symptoms:  Negative except as documented in HPI. Neurologic symptoms:  Negative except as documented in HPI. Remainder of 10 systems, all negative except for mentioned above      Except as noted above the remainder of the review of systems was reviewed and negative.        PAST MEDICAL HISTORY     Past Medical below findings:        Interpretation per the Radiologist below, if available at the time of this note:    XR CHEST PORTABLE    (Results Pending)   CT ABDOMEN PELVIS WO CONTRAST Additional Contrast? None    (Results Pending)         ED BEDSIDE ULTRASOUND:   Performed by ED Physician - none    LABS:  Labs Reviewed   COMPREHENSIVE METABOLIC PANEL - Abnormal; Notable for the following:        Result Value    Glucose 119 (*)     CREATININE 0.49 (*)     All other components within normal limits   CBC WITH AUTO DIFFERENTIAL - Abnormal; Notable for the following:     RBC 5.55 (*)     Hemoglobin 17.4 (*)     Hematocrit 50.7 (*)     MCH 31.4 (*)     Neutrophils # 7.1 (*)     All other components within normal limits   LIPASE   AMYLASE   URINE RT REFLEX TO CULTURE       All other labs were within normal range or not returned as of this dictation. EMERGENCY DEPARTMENT COURSE and DIFFERENTIAL DIAGNOSIS/MDM:   Vitals:    Vitals:    07/13/18 1928 07/13/18 2122   BP: (!) 140/67 130/74   Pulse: 63 58   Resp: 16 16   Temp: 97.7 °F (36.5 °C) 97.8 °F (36.6 °C)   TempSrc:  Oral   SpO2: 98% 97%   Weight: 197 lb (89.4 kg)            MDM     CT scan fails to demonstrate a acute abdominal pathology. She'll return for worsening weakening especially associated persistent fevers also close family doctor follow-up. We will go ahead and stop the steroids for now because this could be the etiology, proton pump inhibitor and Maalox also recommended. Particular attention paid to returning for fever associated with persistent abdominal pain worsening in one location    CRITICAL CARE TIME   Total Critical Care time was  minutes, excluding separately reportable procedures. There was a high probability of clinically significant/life threatening deterioration in the patient's condition which required my urgent intervention.       CONSULTS:  None    PROCEDURES:  Unless otherwise noted below, none     Procedures    FINAL IMPRESSION      1. Abdominal pain, epigastric    2. Adverse effect of corticosteroids, initial encounter          DISPOSITION/PLAN   DISPOSITION Decision To Discharge 07/13/2018 09:29:50 PM      PATIENT REFERRED TO:  Arsen Saxena MD  [facility] [phone]    In 3 days  Return for worsening abdominal pain, temp >102      DISCHARGE MEDICATIONS:  New Prescriptions    ONDANSETRON (ZOFRAN) 4 MG TABLET    Take 1 tablet by mouth every 8 hours as needed for Nausea    PANTOPRAZOLE (PROTONIX) 20 MG TABLET    Take 2 tablets by mouth daily    TRAMADOL (ULTRAM) 50 MG TABLET    Take 1 tablet by mouth every 4 hours as needed for Pain (MAY TAKE 2 TABS EVERY 6 HOURS FOR SEVERE PAIN) for up to 10 days. .          (Please note that portions of this note were completed with a voice recognition program.  Efforts were made to edit the dictations but occasionally words are mis-transcribed.)    Jhony Rodriguez MD (electronically signed)  Attending Emergency Physician          Jhony Rodriguez MD  07/13/18 7072

## 2018-07-15 LAB — URINE CULTURE, ROUTINE: NORMAL

## 2018-07-16 ENCOUNTER — APPOINTMENT (OUTPATIENT)
Dept: CT IMAGING | Age: 63
DRG: 054 | End: 2018-07-16
Payer: COMMERCIAL

## 2018-07-16 ENCOUNTER — HOSPITAL ENCOUNTER (INPATIENT)
Age: 63
LOS: 3 days | Discharge: HOME | DRG: 054 | End: 2018-07-19
Attending: INTERNAL MEDICINE | Admitting: INTERNAL MEDICINE
Payer: COMMERCIAL

## 2018-07-16 DIAGNOSIS — R51.9 ACUTE INTRACTABLE HEADACHE, UNSPECIFIED HEADACHE TYPE: ICD-10-CM

## 2018-07-16 DIAGNOSIS — R07.9 CHEST PAIN, UNSPECIFIED TYPE: Primary | ICD-10-CM

## 2018-07-16 DIAGNOSIS — G93.89 BRAIN MASS: ICD-10-CM

## 2018-07-16 LAB
ALBUMIN SERPL-MCNC: 4 G/DL (ref 3.9–4.9)
ALP BLD-CCNC: 119 U/L (ref 40–130)
ALT SERPL-CCNC: 19 U/L (ref 0–33)
ANION GAP SERPL CALCULATED.3IONS-SCNC: 13 MEQ/L (ref 7–13)
APTT: 34.3 SEC (ref 21.6–35.4)
AST SERPL-CCNC: 29 U/L (ref 0–35)
BASOPHILS ABSOLUTE: 0 K/UL (ref 0–0.2)
BASOPHILS RELATIVE PERCENT: 0.6 %
BILIRUB SERPL-MCNC: 0.8 MG/DL (ref 0–1.2)
BUN BLDV-MCNC: 13 MG/DL (ref 8–23)
CALCIUM SERPL-MCNC: 9.4 MG/DL (ref 8.6–10.2)
CHLORIDE BLD-SCNC: 104 MEQ/L (ref 98–107)
CO2: 24 MEQ/L (ref 22–29)
CREAT SERPL-MCNC: 0.59 MG/DL (ref 0.5–0.9)
EOSINOPHILS ABSOLUTE: 0.1 K/UL (ref 0–0.7)
EOSINOPHILS RELATIVE PERCENT: 0.8 %
GFR AFRICAN AMERICAN: >60
GFR NON-AFRICAN AMERICAN: >60
GLOBULIN: 3.1 G/DL (ref 2.3–3.5)
GLUCOSE BLD-MCNC: 92 MG/DL (ref 74–109)
HCT VFR BLD CALC: 54.9 % (ref 37–47)
HEMOGLOBIN: 18.6 G/DL (ref 12–16)
INR BLD: 1.2
LACTIC ACID: 1.9 MMOL/L (ref 0.5–2.2)
LIPASE: 15 U/L (ref 13–60)
LYMPHOCYTES ABSOLUTE: 2.1 K/UL (ref 1–4.8)
LYMPHOCYTES RELATIVE PERCENT: 31.5 %
MCH RBC QN AUTO: 30.9 PG (ref 27–31.3)
MCHC RBC AUTO-ENTMCNC: 34 % (ref 33–37)
MCV RBC AUTO: 90.9 FL (ref 82–100)
MONOCYTES ABSOLUTE: 0.5 K/UL (ref 0.2–0.8)
MONOCYTES RELATIVE PERCENT: 8 %
NEUTROPHILS ABSOLUTE: 3.9 K/UL (ref 1.4–6.5)
NEUTROPHILS RELATIVE PERCENT: 59.1 %
PDW BLD-RTO: 13.5 % (ref 11.5–14.5)
PLATELET # BLD: 215 K/UL (ref 130–400)
POTASSIUM SERPL-SCNC: 4.4 MEQ/L (ref 3.5–5.1)
PRO-BNP: 182 PG/ML
PROTHROMBIN TIME: 12.9 SEC (ref 9.6–12.3)
RBC # BLD: 6.04 M/UL (ref 4.2–5.4)
SODIUM BLD-SCNC: 141 MEQ/L (ref 132–144)
TOTAL CK: 55 U/L (ref 0–170)
TOTAL PROTEIN: 7.1 G/DL (ref 6.4–8.1)
TROPONIN: <0.01 NG/ML (ref 0–0.01)
WBC # BLD: 6.6 K/UL (ref 4.8–10.8)

## 2018-07-16 PROCEDURE — 6370000000 HC RX 637 (ALT 250 FOR IP): Performed by: PHYSICIAN ASSISTANT

## 2018-07-16 PROCEDURE — 96376 TX/PRO/DX INJ SAME DRUG ADON: CPT

## 2018-07-16 PROCEDURE — 83880 ASSAY OF NATRIURETIC PEPTIDE: CPT

## 2018-07-16 PROCEDURE — 96374 THER/PROPH/DIAG INJ IV PUSH: CPT

## 2018-07-16 PROCEDURE — 99285 EMERGENCY DEPT VISIT HI MDM: CPT

## 2018-07-16 PROCEDURE — 84484 ASSAY OF TROPONIN QUANT: CPT

## 2018-07-16 PROCEDURE — 82550 ASSAY OF CK (CPK): CPT

## 2018-07-16 PROCEDURE — 85730 THROMBOPLASTIN TIME PARTIAL: CPT

## 2018-07-16 PROCEDURE — 85025 COMPLETE CBC W/AUTO DIFF WBC: CPT

## 2018-07-16 PROCEDURE — 6360000002 HC RX W HCPCS: Performed by: PHYSICIAN ASSISTANT

## 2018-07-16 PROCEDURE — 87086 URINE CULTURE/COLONY COUNT: CPT

## 2018-07-16 PROCEDURE — 85610 PROTHROMBIN TIME: CPT

## 2018-07-16 PROCEDURE — 36415 COLL VENOUS BLD VENIPUNCTURE: CPT

## 2018-07-16 PROCEDURE — 96375 TX/PRO/DX INJ NEW DRUG ADDON: CPT

## 2018-07-16 PROCEDURE — 83605 ASSAY OF LACTIC ACID: CPT

## 2018-07-16 PROCEDURE — 70450 CT HEAD/BRAIN W/O DYE: CPT

## 2018-07-16 PROCEDURE — 2000000000 HC ICU R&B

## 2018-07-16 PROCEDURE — 93005 ELECTROCARDIOGRAM TRACING: CPT

## 2018-07-16 PROCEDURE — 80053 COMPREHEN METABOLIC PANEL: CPT

## 2018-07-16 PROCEDURE — 83690 ASSAY OF LIPASE: CPT

## 2018-07-16 RX ORDER — ONDANSETRON 2 MG/ML
4 INJECTION INTRAMUSCULAR; INTRAVENOUS ONCE
Status: COMPLETED | OUTPATIENT
Start: 2018-07-16 | End: 2018-07-16

## 2018-07-16 RX ORDER — SODIUM CHLORIDE 0.9 % (FLUSH) 0.9 %
10 SYRINGE (ML) INJECTION EVERY 12 HOURS SCHEDULED
Status: DISCONTINUED | OUTPATIENT
Start: 2018-07-16 | End: 2018-07-19 | Stop reason: HOSPADM

## 2018-07-16 RX ORDER — FENTANYL CITRATE 50 UG/ML
50 INJECTION, SOLUTION INTRAMUSCULAR; INTRAVENOUS ONCE
Status: COMPLETED | OUTPATIENT
Start: 2018-07-16 | End: 2018-07-16

## 2018-07-16 RX ORDER — IPRATROPIUM BROMIDE AND ALBUTEROL SULFATE 2.5; .5 MG/3ML; MG/3ML
1 SOLUTION RESPIRATORY (INHALATION) 4 TIMES DAILY
Status: DISCONTINUED | OUTPATIENT
Start: 2018-07-16 | End: 2018-07-17

## 2018-07-16 RX ORDER — SODIUM CHLORIDE 0.9 % (FLUSH) 0.9 %
10 SYRINGE (ML) INJECTION PRN
Status: DISCONTINUED | OUTPATIENT
Start: 2018-07-16 | End: 2018-07-19 | Stop reason: HOSPADM

## 2018-07-16 RX ORDER — NITROGLYCERIN 0.4 MG/1
0.4 TABLET SUBLINGUAL EVERY 5 MIN PRN
Status: DISCONTINUED | OUTPATIENT
Start: 2018-07-16 | End: 2018-07-19 | Stop reason: HOSPADM

## 2018-07-16 RX ORDER — ONDANSETRON 2 MG/ML
4 INJECTION INTRAMUSCULAR; INTRAVENOUS EVERY 6 HOURS PRN
Status: DISCONTINUED | OUTPATIENT
Start: 2018-07-16 | End: 2018-07-19 | Stop reason: HOSPADM

## 2018-07-16 RX ORDER — ASPIRIN 81 MG/1
324 TABLET, CHEWABLE ORAL ONCE
Status: COMPLETED | OUTPATIENT
Start: 2018-07-16 | End: 2018-07-16

## 2018-07-16 RX ADMIN — NITROGLYCERIN 0.4 MG: 0.4 TABLET SUBLINGUAL at 16:30

## 2018-07-16 RX ADMIN — ASPIRIN 81 MG 324 MG: 81 TABLET ORAL at 16:29

## 2018-07-16 RX ADMIN — FENTANYL CITRATE 50 MCG: 50 INJECTION, SOLUTION INTRAMUSCULAR; INTRAVENOUS at 17:26

## 2018-07-16 RX ADMIN — ONDANSETRON 4 MG: 2 INJECTION INTRAMUSCULAR; INTRAVENOUS at 17:29

## 2018-07-16 RX ADMIN — ONDANSETRON 4 MG: 2 INJECTION INTRAMUSCULAR; INTRAVENOUS at 16:58

## 2018-07-16 RX ADMIN — LIDOCAINE HYDROCHLORIDE: 20 SOLUTION ORAL; TOPICAL at 18:26

## 2018-07-16 ASSESSMENT — ENCOUNTER SYMPTOMS
COLOR CHANGE: 0
ABDOMINAL DISTENTION: 0
RHINORRHEA: 0
NAUSEA: 0
BLOOD IN STOOL: 0
EYE DISCHARGE: 0
ABDOMINAL PAIN: 0
CONSTIPATION: 0
SORE THROAT: 0
SHORTNESS OF BREATH: 0
VOMITING: 0

## 2018-07-16 ASSESSMENT — PAIN SCALES - GENERAL
PAINLEVEL_OUTOF10: 8
PAINLEVEL_OUTOF10: 0
PAINLEVEL_OUTOF10: 10

## 2018-07-16 ASSESSMENT — PAIN DESCRIPTION - ONSET: ONSET: SUDDEN

## 2018-07-16 ASSESSMENT — PAIN DESCRIPTION - FREQUENCY: FREQUENCY: CONTINUOUS

## 2018-07-16 ASSESSMENT — PAIN DESCRIPTION - LOCATION
LOCATION: CHEST;HEAD
LOCATION: HEAD;CHEST
LOCATION: HEAD;CHEST

## 2018-07-16 ASSESSMENT — PAIN DESCRIPTION - DESCRIPTORS: DESCRIPTORS: PRESSURE;HEAVINESS

## 2018-07-16 ASSESSMENT — PAIN DESCRIPTION - PAIN TYPE
TYPE: ACUTE PAIN

## 2018-07-16 NOTE — ED NOTES
Pt refused 3rd nitro at this time. Fredy advised patient c/o pain 8/10.       Kailyn Hernandez, RN  07/16/18 2236

## 2018-07-16 NOTE — H&P
TABS tablet Take 1 tablet by mouth daily 12/20/17  Yes Mely Ashraf MD   ondansetron (ZOFRAN) 4 MG tablet TAKE ONE TABLET BY MOUTH EVERY 8 HOURS AS NEEDED FOR NAUSEA OR VOMITING 3/20/17  Yes Arsen Saxena MD   hyoscyamine (LEVSIN/SL) 0.125 MG SL tablet PLACE ONE TABLET UNDER THE TONGUE EVERY 6 HOURS AS NEEDED FOR CRAMPING 10/17/16  Yes Arsen Saxena MD   pantoprazole (PROTONIX) 20 MG tablet Take 2 tablets by mouth daily 7/13/18   Jhony Rodriguez MD   ondansetron (ZOFRAN) 4 MG tablet Take 1 tablet by mouth every 8 hours as needed for Nausea 7/13/18   Jhony Rodriguez MD   hydrocortisone 2.5 % cream Apply topically 2 times daily. 6/2/18   ALETA Kathleen CNP   ranitidine (ZANTAC) 150 MG tablet Take 1 tablet by mouth 2 times daily for 7 days 6/2/18 6/9/18  ALETA Kathleen CNP   methylPREDNISolone (MEDROL DOSEPACK) 4 MG tablet Take by mouth. 6/2/18   ALETA Kathleen CNP   Cholecalciferol (VITAMIN D3) 2000 UNITS CAPS Take 1 capsule by mouth daily    Historical Provider, MD Paulino Menezes 550 MG tablet  12/15/15   Historical Provider, MD       Allergies:  Codeine; Demerol; Dye [iodides]; Morphine; Prednisone; Sulfa antibiotics; and Levofloxacin    Social History:      The patient currently lives home    TOBACCO:   reports that she has never smoked. She has never used smokeless tobacco.  ETOH:   reports that she does not drink alcohol. Family History:       Positive as follows:    Family History   Problem Relation Age of Onset    Cancer Father         COLON    High Blood Pressure Father     Heart Disease Father        REVIEW OF SYSTEMS:   Pertinent positives as noted in the HPI. All other systems reviewed and negative.     PHYSICAL EXAM:    /78   Pulse 55   Temp 98.4 °F (36.9 °C) (Oral)   Resp 12   Ht 5' 5\" (1.651 m)   Wt 197 lb (89.4 kg)   LMP  (LMP Unknown)   SpO2 96%   BMI 32.78 kg/m²     General appearance:  No apparent distress, appears stated age and cooperative. HEENT:  Normal cephalic, atraumatic without obvious deformity. Pupils equal, round, and reactive to light. Extra ocular muscles intact. Conjunctivae/corneas clear. Neck: Supple, with full range of motion. No jugular venous distention. Trachea midline. Respiratory:  Normal respiratory effort. Clear to auscultation, bilaterally without Rales/Wheezes/Rhonchi. Cardiovascular:  Regular rate and rhythm with normal S1/S2 without murmurs, rubs or gallops. Abdomen: Soft, non-tender, non-distended with normal bowel sounds. Musculoskeletal:  No clubbing, cyanosis or edema bilaterally. Full range of motion without deformity. Skin: Skin color, texture, turgor normal.  No rashes or lesions. Neurologic:  Neurovascularly intact without any focal sensory/motor deficits. Cranial nerves: II-XII intact, grossly non-focal.  Psychiatric:  Alert and oriented, thought content appropriate, normal insight  Capillary Refill: Brisk,< 3 seconds   Peripheral Pulses: +2 palpable, equal bilaterally       Labs:     Recent Labs      07/13/18 2026 07/16/18   1615   WBC  8.9  6.6   HGB  17.4*  18.6*   HCT  50.7*  54.9*   PLT  228  215     Recent Labs      07/13/18 2000 07/16/18   1615   NA  142  141   K  3.7  4.4   CL  107  104   CO2  23  24   BUN  14  13   CREATININE  0.49*  0.59   CALCIUM  9.1  9.4     Recent Labs      07/13/18 2000 07/16/18   1615   AST  18  29   ALT  16  19   BILITOT  0.4  0.8   ALKPHOS  121  119     Recent Labs      07/16/18   1615   INR  1.2     Recent Labs      07/16/18   1615   CKTOTAL  55   TROPONINI  <0.010       Urinalysis:      Lab Results   Component Value Date    NITRU Negative 07/13/2018    WBCUA 10-20 07/13/2018    BACTERIA Moderate 07/13/2018    RBCUA 0-2 07/13/2018    BLOODU SMALL 07/13/2018    SPECGRAV 1.031 07/13/2018    GLUCOSEU Negative 07/13/2018    GLUCOSEU NEG 04/11/2012       Radiology:     CXR: I have reviewed the CXR with the following interpretation: pending  EKG:   I

## 2018-07-16 NOTE — ED NOTES
Pt c/o feeling nauseas. Fredy advised. Pt states still has pain 8/10.        Steph Alanis, RN  07/16/18 0494

## 2018-07-16 NOTE — ED PROVIDER NOTES
[address] [facility] ED  eMERGENCY dEPARTMENT eNCOUnter      Pt Name: Noni Zapata  MRN: [id number]  [place] 1955  Date of evaluation: 7/16/2018  Provider: Mirna Gill PA-C    CHIEF COMPLAINT       Chief Complaint   Patient presents with    Chest Pain         HISTORY OF PRESENT ILLNESS   (Location/Symptom, Timing/Onset, Context/Setting, Quality, Duration, Modifying Factors, Severity)  Note limiting factors. Noni Zapata is a 66 y.o. female who presents to the emergency department With complaint of chest pain which patient states is been ongoing since 12:30 today. She states approximately 3:00 she was on her way to a doctor's appointment for a follow-up ER visit for allergic reaction to prednisone. She states that she began having chest pressure under her ribs which she said was circumferential around her entire chest she states she had nauseated, and felt as if something was sitting on her chest.  She states the pain started out it progressively got worse she states it never went away she's rating her current pain as a 10 out of 10 at this time that she looks very comfortable. Patient states past history of PSVT and atrial fibrillation she states she  had ablations in the past by Dr. Lakshmi Billy. HPI    Nursing Notes were reviewed. REVIEW OF SYSTEMS    (2-9 systems for level 4, 10 or more for level 5)     Review of Systems   Constitutional: Negative for activity change, appetite change, chills and fatigue. HENT: Negative for congestion, ear discharge, ear pain, nosebleeds, rhinorrhea and sore throat. Eyes: Negative for discharge. Respiratory: Negative for shortness of breath. Cardiovascular: Positive for chest pain. Negative for palpitations and leg swelling. Gastrointestinal: Negative for abdominal distention, abdominal pain, blood in stool, constipation, nausea and vomiting. Genitourinary: Negative for difficulty urinating, dysuria, flank pain and urgency. LACTIC ACID, PLASMA   TROPONIN   CK   APTT   BRAIN NATRIURETIC PEPTIDE   URINE RT REFLEX TO CULTURE       All other labs were within normal range or not returned as of this dictation. EMERGENCY DEPARTMENT COURSE and DIFFERENTIAL DIAGNOSIS/MDM:   Vitals:    Vitals:    07/16/18 1640 07/16/18 1700 07/16/18 1804 07/16/18 1929   BP: 133/78 133/68 (!) 129/55 133/78   Pulse: 54 54 60 55   Resp: 14 14 16 12   Temp:       TempSrc:       SpO2: 95% 95% 96% 96%   Weight:       Height:                 MDM  Number of Diagnoses or Management Options  Acute intractable headache, unspecified headache type:   Brain mass:   Chest pain, unspecified type:   Diagnosis management comments: Reviewed patient's she states her chest pain and headache are better. Discussed with Dr. Ely Every neurosurgeon including patient's presentation headache beginning in ED and radiology report. He wishes the patient to be admitted to ICU under hospitalist services no other orders at this time. Discussed with patient need for admission for brain lesion/mass. Definitive workup . Dr. Leal Friend and calls back discussed with Dr. Baker Asp he also agrees we should admit patient. Waiting on hospitalist pulse back. Discussed with Dr. Travis including ct report and medicatioins and discussion wiyth neurosurgery. Amount and/or Complexity of Data Reviewed  Clinical lab tests: reviewed and ordered  Tests in the radiology section of CPT®: reviewed and ordered  Discuss the patient with other providers: yes        CRITICAL CARE TIME     31 minute critical care time. CONSULTS:  IP CONSULT TO NEUROSURGERY  IP CONSULT TO HOSPITALIST  IP CONSULT TO CARDIOLOGY  IP CONSULT TO NEUROSURGERY    PROCEDURES:  Unless otherwise noted below, none     Procedures    FINAL IMPRESSION      1. Chest pain, unspecified type    2. Acute intractable headache, unspecified headache type    3.  Brain mass          DISPOSITION/PLAN   DISPOSITION Admitted 07/16/2018 07:54:55 PM      PATIENT REFERRED TO:  Jeanine Naranjo MD  [facility] [phone]            DISCHARGE MEDICATIONS:  New Prescriptions    No medications on file          (Please note that portions of this note were completed with a voice recognition program.  Efforts were made to edit the dictations but occasionally words are mis-transcribed.)    José Miguel Merritt PA-C (electronically signed)  Attending Emergency Physician         José Miguel Merritt PA-C  07/16/18 [address] [place], [place] [id number]  07/16/18 2012

## 2018-07-17 ENCOUNTER — APPOINTMENT (OUTPATIENT)
Dept: MRI IMAGING | Age: 63
DRG: 054 | End: 2018-07-17
Payer: COMMERCIAL

## 2018-07-17 PROBLEM — R07.89 OTHER CHEST PAIN: Status: ACTIVE | Noted: 2018-07-17

## 2018-07-17 LAB
ANION GAP SERPL CALCULATED.3IONS-SCNC: 9 MEQ/L (ref 7–13)
BACTERIA: ABNORMAL /HPF
BASOPHILS ABSOLUTE: 0 K/UL (ref 0–0.2)
BASOPHILS RELATIVE PERCENT: 0.6 %
BILIRUBIN URINE: NEGATIVE
BLOOD, URINE: NEGATIVE
BUN BLDV-MCNC: 17 MG/DL (ref 8–23)
CALCIUM SERPL-MCNC: 8.8 MG/DL (ref 8.6–10.2)
CHLORIDE BLD-SCNC: 107 MEQ/L (ref 98–107)
CLARITY: ABNORMAL
CO2: 26 MEQ/L (ref 22–29)
COLOR: ABNORMAL
CREAT SERPL-MCNC: 0.56 MG/DL (ref 0.5–0.9)
EOSINOPHILS ABSOLUTE: 0.1 K/UL (ref 0–0.7)
EOSINOPHILS RELATIVE PERCENT: 1.9 %
EPITHELIAL CELLS, UA: ABNORMAL /HPF
GFR AFRICAN AMERICAN: >60
GFR NON-AFRICAN AMERICAN: >60
GLUCOSE BLD-MCNC: 102 MG/DL (ref 74–109)
GLUCOSE URINE: NEGATIVE MG/DL
HCT VFR BLD CALC: 49.1 % (ref 37–47)
HEMOGLOBIN: 16.9 G/DL (ref 12–16)
KETONES, URINE: NEGATIVE MG/DL
LEUKOCYTE ESTERASE, URINE: ABNORMAL
LV EF: 60 %
LVEF MODALITY: NORMAL
LYMPHOCYTES ABSOLUTE: 1.6 K/UL (ref 1–4.8)
LYMPHOCYTES RELATIVE PERCENT: 29.1 %
MCH RBC QN AUTO: 31.4 PG (ref 27–31.3)
MCHC RBC AUTO-ENTMCNC: 34.4 % (ref 33–37)
MCV RBC AUTO: 91.2 FL (ref 82–100)
MONOCYTES ABSOLUTE: 0.5 K/UL (ref 0.2–0.8)
MONOCYTES RELATIVE PERCENT: 9.1 %
NEUTROPHILS ABSOLUTE: 3.2 K/UL (ref 1.4–6.5)
NEUTROPHILS RELATIVE PERCENT: 59.3 %
NITRITE, URINE: NEGATIVE
PDW BLD-RTO: 13.2 % (ref 11.5–14.5)
PH UA: 6 (ref 5–9)
PLATELET # BLD: 212 K/UL (ref 130–400)
POTASSIUM REFLEX MAGNESIUM: 4.1 MEQ/L (ref 3.5–5.1)
PROTEIN UA: NEGATIVE MG/DL
RBC # BLD: 5.39 M/UL (ref 4.2–5.4)
RBC UA: ABNORMAL /HPF (ref 0–2)
SODIUM BLD-SCNC: 142 MEQ/L (ref 132–144)
SPECIFIC GRAVITY UA: 1.03 (ref 1–1.03)
TROPONIN: <0.01 NG/ML (ref 0–0.01)
TROPONIN: <0.01 NG/ML (ref 0–0.01)
URINE REFLEX TO CULTURE: YES
UROBILINOGEN, URINE: 1 E.U./DL
WBC # BLD: 5.5 K/UL (ref 4.8–10.8)
WBC UA: ABNORMAL /HPF (ref 0–5)

## 2018-07-17 PROCEDURE — 70553 MRI BRAIN STEM W/O & W/DYE: CPT

## 2018-07-17 PROCEDURE — 93306 TTE W/DOPPLER COMPLETE: CPT

## 2018-07-17 PROCEDURE — 6360000002 HC RX W HCPCS: Performed by: INTERNAL MEDICINE

## 2018-07-17 PROCEDURE — 6370000000 HC RX 637 (ALT 250 FOR IP): Performed by: INTERNAL MEDICINE

## 2018-07-17 PROCEDURE — 81001 URINALYSIS AUTO W/SCOPE: CPT

## 2018-07-17 PROCEDURE — 36415 COLL VENOUS BLD VENIPUNCTURE: CPT

## 2018-07-17 PROCEDURE — 80048 BASIC METABOLIC PNL TOTAL CA: CPT

## 2018-07-17 PROCEDURE — 2580000003 HC RX 258: Performed by: PHYSICIAN ASSISTANT

## 2018-07-17 PROCEDURE — 87086 URINE CULTURE/COLONY COUNT: CPT

## 2018-07-17 PROCEDURE — 85025 COMPLETE CBC W/AUTO DIFF WBC: CPT

## 2018-07-17 PROCEDURE — 6360000004 HC RX CONTRAST MEDICATION: Performed by: NEUROLOGICAL SURGERY

## 2018-07-17 PROCEDURE — A9579 GAD-BASE MR CONTRAST NOS,1ML: HCPCS | Performed by: NEUROLOGICAL SURGERY

## 2018-07-17 PROCEDURE — 1210000000 HC MED SURG R&B

## 2018-07-17 PROCEDURE — 84484 ASSAY OF TROPONIN QUANT: CPT

## 2018-07-17 PROCEDURE — 99254 IP/OBS CNSLTJ NEW/EST MOD 60: CPT | Performed by: INTERNAL MEDICINE

## 2018-07-17 PROCEDURE — 94664 DEMO&/EVAL PT USE INHALER: CPT

## 2018-07-17 RX ORDER — ACETAMINOPHEN 325 MG/1
650 TABLET ORAL EVERY 6 HOURS PRN
Status: DISCONTINUED | OUTPATIENT
Start: 2018-07-17 | End: 2018-07-19 | Stop reason: HOSPADM

## 2018-07-17 RX ORDER — DEXAMETHASONE SODIUM PHOSPHATE 4 MG/ML
4 INJECTION, SOLUTION INTRA-ARTICULAR; INTRALESIONAL; INTRAMUSCULAR; INTRAVENOUS; SOFT TISSUE EVERY 6 HOURS
Status: DISCONTINUED | OUTPATIENT
Start: 2018-07-17 | End: 2018-07-18

## 2018-07-17 RX ORDER — IPRATROPIUM BROMIDE AND ALBUTEROL SULFATE 2.5; .5 MG/3ML; MG/3ML
1 SOLUTION RESPIRATORY (INHALATION) EVERY 4 HOURS PRN
Status: DISCONTINUED | OUTPATIENT
Start: 2018-07-17 | End: 2018-07-19 | Stop reason: HOSPADM

## 2018-07-17 RX ORDER — SODIUM CHLORIDE 0.9 % (FLUSH) 0.9 %
10 SYRINGE (ML) INJECTION 2 TIMES DAILY
Status: DISCONTINUED | OUTPATIENT
Start: 2018-07-17 | End: 2018-07-19 | Stop reason: HOSPADM

## 2018-07-17 RX ADMIN — GADOTERIDOL 20 ML: 279.3 INJECTION, SOLUTION INTRAVENOUS at 14:20

## 2018-07-17 RX ADMIN — DEXAMETHASONE SODIUM PHOSPHATE 4 MG: 4 INJECTION, SOLUTION INTRAMUSCULAR; INTRAVENOUS at 12:29

## 2018-07-17 RX ADMIN — DEXAMETHASONE SODIUM PHOSPHATE 4 MG: 4 INJECTION, SOLUTION INTRAMUSCULAR; INTRAVENOUS at 17:00

## 2018-07-17 RX ADMIN — Medication 10 ML: at 12:30

## 2018-07-17 RX ADMIN — ACETAMINOPHEN 650 MG: 325 TABLET ORAL at 12:29

## 2018-07-17 RX ADMIN — DEXAMETHASONE SODIUM PHOSPHATE 4 MG: 4 INJECTION, SOLUTION INTRAMUSCULAR; INTRAVENOUS at 23:00

## 2018-07-17 RX ADMIN — ACETAMINOPHEN 650 MG: 325 TABLET ORAL at 02:31

## 2018-07-17 ASSESSMENT — PAIN DESCRIPTION - LOCATION
LOCATION: HEAD

## 2018-07-17 ASSESSMENT — PAIN SCALES - GENERAL
PAINLEVEL_OUTOF10: 0
PAINLEVEL_OUTOF10: 6
PAINLEVEL_OUTOF10: 0
PAINLEVEL_OUTOF10: 0
PAINLEVEL_OUTOF10: 5
PAINLEVEL_OUTOF10: 0
PAINLEVEL_OUTOF10: 5
PAINLEVEL_OUTOF10: 0
PAINLEVEL_OUTOF10: 0
PAINLEVEL_OUTOF10: 6
PAINLEVEL_OUTOF10: 0

## 2018-07-17 ASSESSMENT — PAIN DESCRIPTION - ONSET
ONSET: ON-GOING
ONSET: ON-GOING

## 2018-07-17 ASSESSMENT — PAIN DESCRIPTION - PAIN TYPE
TYPE: ACUTE PAIN

## 2018-07-17 ASSESSMENT — PAIN DESCRIPTION - DESCRIPTORS
DESCRIPTORS: ACHING
DESCRIPTORS: ACHING

## 2018-07-17 ASSESSMENT — PAIN DESCRIPTION - FREQUENCY
FREQUENCY: CONTINUOUS
FREQUENCY: CONTINUOUS

## 2018-07-17 NOTE — PROGRESS NOTES
[facility] EMERGENCY [facility] AT [facility]piratory Therapy Evaluation   Current Order:  Qid DUoneb    Home Regimen: none      Ordering Physician: Oliver  Re-evaluation Date:       Diagnosis: Chest Pain   Patient Status: Stable / Unstable + Physician notified    The following MDI Criteria must be met in order to convert aerosol to MDI with spacer. If unable to meet, MDI will be converted to aerosol:  []  Patient able to demonstrate the ability to use MDI effectively  []  Patient alert and cooperative  []  Patient able to take deep breath with 5-10 second hold  []  Medication(s) available in this delivery method   []  Peak flow greater than or equal to 200 ml/min            Current Order Substituted To  (same drug, same frequency)   Aerosol to MDI [] Albuterol Sulfate 0.083% unit dose by aerosol Albuterol Sulfate MDI 2 puffs by inhalation with spacer    [] Levalbuterol 1.25 mg unit dose by aerosol Levalbuterol MDI 2 puffs by inhalation with spacer    [] Levalbuterol 0.63 mg unit dose by aerosol Levalbuterol MDI 2 puffs by inhalation with spacer    [] Ipratropium Bromide 0.02% unit dose by aerosol Ipratropium Bromide MDI 2 puffs by inhalation with spacer    [] Duoneb (Ipratropium + Albuterol) unit dose by aerosol Ipratropium MDI + Albuterol MDI 2 puffs by inhalation w/spacer   MDI to Aerosol [] Albuterol Sulfate MDI Albuterol Sulfate 0.083% unit dose by aerosol    [] Levalbuterol MDI 2 puffs by inhalation Levalbuterol 1.25 mg unit dose by aerosol    [] Ipratropium Bromide MDI by inhalation Ipratropium Bromide 0.02% unit dose by aerosol    [] Combivent (Ipratropium + Albuterol) MDI by inhalation Duoneb (Ipratropium + Albuterol) unit dose by aerosol   Treatment Assessment [Frequency/Schedule]:  Change frequency to: _______________q4prn___________________________________per Protocol, P&T, MEC      Points 0 1 2 3 4   Pulmonary Status  Non-Smoker  [x]   Smoking history   < 20 pack years  []   Smoking history  ?  20 pack years  []   Pulmonary

## 2018-07-17 NOTE — PROGRESS NOTES
2130 patient arrived in bed ICU 10 via cart, monitored on the life pack and accompanied by 1 RN and PCA. Patient transferred to bed by self without incident. Patient connected to ICU monitors. Patient maintained on room air. Patient skin intact, sllght irritation on her back from recent bout of poison ivy. Plan of care discussed with patient. Patient verbalized understanding. Orientation to room completed. Last Vitals:  Height: 5' 5\" (1.651 m), Weight: 214 lb 8.1 oz (97.3 kg), BP: 135/70    Lab Results   Component Value Date    WBC 6.6 07/16/2018    HGB 18.6 (H) 07/16/2018    HCT 54.9 (H) 07/16/2018    MCV 90.9 07/16/2018     07/16/2018     Lab Results   Component Value Date     07/16/2018    K 4.4 07/16/2018     07/16/2018    CO2 24 07/16/2018    BUN 13 07/16/2018    CREATININE 0.59 07/16/2018    GLUCOSE 92 07/16/2018    GLUCOSE 97 03/19/2018    CALCIUM 9.4 07/16/2018     2145 Dr. Rahul Schwartz in to see patient. Neuro assessment performed by physician. Negative assessment noted. Physician permitted patient to ambulate to toilet after observation of ambulation. RN to assist.      0200 patient c/o headache. Dr. Rahul Schwartz notified via electronic secure message. New orders received and implemented. 4099 patient resting with eyes closed. No distress noted.

## 2018-07-17 NOTE — CONSULTS
[facility] CONSULT NOTE    Patient: Avinash Templeton  Unit/Bed: J848/W548-36  YOB: 1955  MRN: [id number]  Acct: [de-identified]   Admitting Diagnosis: Brain mass [G93.9]  Admit Date:  7/16/2018  Hospital Day: 1    Chief Complaint: chest pressure    History of Present Illness: 66year old female with chest pain under her ribs that radiated both sides to her back. There was associated nausea and shortness of breath. These symptoms are similar to prior when she had a negative stress test.  She also had headache and was found to have a mass on CT of the head. PMHx:  Past Medical History:   Diagnosis Date    Atrial fibrillation ([address]     Chest pain     Degenerative disc disease, lumbar 1/21/2016    Hx of colonic polyps     Hypertension 2/24/2015    Irritable bowel syndrome     Lumbar radiculopathy 3/14/2016    Rapid heart rate        PSHx:  Past Surgical History:   Procedure Laterality Date    APPENDECTOMY      BACK SURGERY  07/14/2017    CARDIAC CATHETERIZATION  11/2/15    DR. [address]    HYSTERECTOMY      SPINE SURGERY      TONSILLECTOMY      TOTAL KNEE ARTHROPLASTY  2009    UPPER GASTROINTESTINAL ENDOSCOPY  1/15/13    DR. MEEKS       Social Hx:  Social History     Social History    Marital status:      Spouse name: N/A    Number of children: N/A    Years of education: N/A     Social History Main Topics    Smoking status: Never Smoker    Smokeless tobacco: Never Used    Alcohol use No    Drug use: No    Sexual activity: Not Currently     Other Topics Concern    None     Social History Narrative    None       Family Hx:  Family History   Problem Relation Age of Onset    Cancer Father         COLON    High Blood Pressure Father     Heart Disease Father        Review of Systems:   Review of Systems    Allergies:   Allergies   Allergen Reactions    Codeine     Demerol     Dye [Iodides]     Morphine     Prednisone Nausea Only     Pt states it makes her dizzy, cold sweats and nausea.       Sulfa Antibiotics Hives and Itching    Levofloxacin Itching and Rash       Current Medications:    Current Facility-Administered Medications:     acetaminophen (TYLENOL) tablet 650 mg, 650 mg, Oral, Q6H PRN, Stacey Medina MD, 650 mg at 07/17/18 1229    ipratropium-albuterol (DUONEB) nebulizer solution 1 ampule, 1 ampule, Inhalation, Q4H PRN, Linnette Guzmán MD    dexamethasone (DECADRON) injection 4 mg, 4 mg, Intravenous, Q6H, Linnette Guzmán MD, 4 mg at 07/17/18 2300    sodium chloride flush 0.9 % injection 10 mL, 10 mL, Intravenous, BID, Osvaldo Perez MD    nitroGLYCERIN (NITROSTAT) SL tablet 0.4 mg, 0.4 mg, Sublingual, Q5 Min PRN, Jack Enriquez PA-C, 0.4 mg at 07/16/18 1630    nitroglycerin (NITRO-BID) 2 % ointment 1 inch, 1 inch, Topical, Once, Elida Sheffield PA-C, Stopped at 07/16/18 1740    sodium chloride flush 0.9 % injection 10 mL, 10 mL, Intravenous, 2 times per day, SANTINO Pierce, 10 mL at 07/17/18 1230    sodium chloride flush 0.9 % injection 10 mL, 10 mL, Intravenous, PRN, SANTINO Pierce    magnesium hydroxide (MILK OF MAGNESIA) 400 MG/5ML suspension 30 mL, 30 mL, Oral, Daily PRN, SANTINO Neff    ondansetron [facility]) injection 4 mg, 4 mg, Intravenous, Q6H PRN, SANTINO Pierce    Physical Examination:    BP (!) 137/56   Pulse 83   Temp 98 °F (36.7 °C) (Oral)   Resp 19   Ht 5' 5\" (1.651 m)   Wt 213 lb 6.5 oz (96.8 kg)   LMP  (LMP Unknown)   SpO2 96%   BMI 35.51 kg/m²    Physical Exam    LABS:  CBC:   Lab Results   Component Value Date    WBC 5.5 07/17/2018    RBC 5.39 07/17/2018    RBC 5.18 03/18/2018    HGB 16.9 07/17/2018    HCT 49.1 07/17/2018    MCV 91.2 07/17/2018    MCH 31.4 07/17/2018    MCHC 34.4 07/17/2018    RDW 13.2 07/17/2018     07/17/2018    MPV 10.8 03/18/2018     CBC with Differential:    Lab Results   Component Value Date    WBC 5.5 07/17/2018    RBC 5.39 07/17/2018    RBC 5.18 03/18/2018    HGB 16.9 07/17/2018    HCT 49.1 07/17/2018     07/17/2018    MCV 91.2 07/17/2018    MCH 31.4 07/17/2018    MCHC 34.4 07/17/2018    RDW 13.2 07/17/2018    NRBC 0.0 03/18/2018    LYMPHOPCT 29.1 07/17/2018    MONOPCT 9.1 07/17/2018    EOSPCT 1.3 04/11/2012    BASOPCT 0.6 07/17/2018    MONOSABS 0.5 07/17/2018    LYMPHSABS 1.6 07/17/2018    EOSABS 0.1 07/17/2018    BASOSABS 0.0 07/17/2018     CMP:    Lab Results   Component Value Date     07/17/2018    K 4.1 07/17/2018     07/17/2018    CO2 26 07/17/2018    BUN 17 07/17/2018    CREATININE 0.56 07/17/2018    GFRAA >60.0 07/17/2018    AGRATIO 1.4 03/18/2018    LABGLOM >60.0 07/17/2018    GLUCOSE 102 07/17/2018    GLUCOSE 97 03/19/2018    PROT 7.1 07/16/2018    LABALBU 4.0 07/16/2018    LABALBU 3.8 03/18/2018    CALCIUM 8.8 07/17/2018    BILITOT 0.8 07/16/2018    ALKPHOS 119 07/16/2018    AST 29 07/16/2018    ALT 19 07/16/2018     BMP:    Lab Results   Component Value Date     07/17/2018    K 4.1 07/17/2018     07/17/2018    CO2 26 07/17/2018    BUN 17 07/17/2018    LABALBU 4.0 07/16/2018    LABALBU 3.8 03/18/2018    CREATININE 0.56 07/17/2018    CALCIUM 8.8 07/17/2018    GFRAA >60.0 07/17/2018    LABGLOM >60.0 07/17/2018    GLUCOSE 102 07/17/2018    GLUCOSE 97 03/19/2018     Magnesium:    Lab Results   Component Value Date    MG 1.7 03/18/2018     Troponin:    Lab Results   Component Value Date    TROPONINI <0.010 07/17/2018       EKG: EKG: sinus bradycardia. RBBB no ischemia    ASSESSMENT/PLAN:      Active Hospital Problems    Diagnosis Date Noted    Other chest pain [R07.89] 07/17/2018    Brain mass [G93.9] 07/16/2018   PAF     Negative enzymes and EKG, atypical chest pain. Stable rhythm currently in NSR. Holding [facility] for brain mass. Acceptable risk for surgery with no further workup. Electronically signed by Lisette Benito.  Candice Dangelo MD [facility] Director of Cardiology Services and Cardiac Catheterization

## 2018-07-17 NOTE — CONSULTS
get out of bed easily. She walks  well including regular walk, toe walk, heel walk. She has good strength in  all the major muscle groups of upper and lower extremities. NEUROLOGICAL:  Cranial nerves are intact, although I could not  her  visual fields and gross concentration, but also seem to be intact. Cranial  nerves II through XII are grossly intact, individually tested. CHEST:  Lungs are essentially clear. CARDIOVASCULAR:  Heart tones showed normal rate. There are no murmurs. EXTREMITIES:  She has good sensation in the upper and lower extremities. Deep tendon reflexes are symmetric in the biceps and knees. SKIN:  Good color. ABDOMEN:  Soft. EKG shows a new right bundle branch block, compared to EKG of 10/03/2016. CT scan of the head shows a parafalcine brain tumor with some mild  vasogenic edema, shift to the left 5 mm. I suspect this is a meningioma. IMPRESSION:  1. Chest pain, unspecified with history of atrial fibrillation and cardiac  ablations. 2.  Parafalcine left tumor, probable meningioma. The patient indicated that she may wish to be treated at the [facility], because she has had good experience with her heart treatment at  that institution. She is going to discuss this with her . In the  meantime, we should obtain MRI scan of the brain.         Lashay Gold MD    D: 07/17/2018 6:51:56       T: 07/17/2018 8:34:01     [email or web address]  Job#: [id number]     Doc#: [id number]    CC:

## 2018-07-18 LAB
EKG ATRIAL RATE: 55 BPM
EKG ATRIAL RATE: 87 BPM
EKG P AXIS: 18 DEGREES
EKG P AXIS: 69 DEGREES
EKG P-R INTERVAL: 172 MS
EKG P-R INTERVAL: 182 MS
EKG Q-T INTERVAL: 398 MS
EKG Q-T INTERVAL: 450 MS
EKG QRS DURATION: 122 MS
EKG QRS DURATION: 132 MS
EKG QTC CALCULATION (BAZETT): 430 MS
EKG QTC CALCULATION (BAZETT): 478 MS
EKG R AXIS: -10 DEGREES
EKG R AXIS: -16 DEGREES
EKG T AXIS: 23 DEGREES
EKG T AXIS: 5 DEGREES
EKG VENTRICULAR RATE: 55 BPM
EKG VENTRICULAR RATE: 87 BPM
URINE CULTURE, ROUTINE: NORMAL
URINE CULTURE, ROUTINE: NORMAL

## 2018-07-18 PROCEDURE — 2580000003 HC RX 258: Performed by: PHYSICIAN ASSISTANT

## 2018-07-18 PROCEDURE — 6360000002 HC RX W HCPCS: Performed by: INTERNAL MEDICINE

## 2018-07-18 PROCEDURE — 6360000002 HC RX W HCPCS: Performed by: NEUROLOGICAL SURGERY

## 2018-07-18 PROCEDURE — 6370000000 HC RX 637 (ALT 250 FOR IP): Performed by: INTERNAL MEDICINE

## 2018-07-18 PROCEDURE — 1210000000 HC MED SURG R&B

## 2018-07-18 PROCEDURE — 2580000003 HC RX 258: Performed by: NEUROLOGICAL SURGERY

## 2018-07-18 PROCEDURE — 93005 ELECTROCARDIOGRAM TRACING: CPT

## 2018-07-18 PROCEDURE — 99232 SBSQ HOSP IP/OBS MODERATE 35: CPT | Performed by: INTERNAL MEDICINE

## 2018-07-18 RX ORDER — FLUTICASONE PROPIONATE 50 MCG
1 SPRAY, SUSPENSION (ML) NASAL DAILY
Status: DISCONTINUED | OUTPATIENT
Start: 2018-07-18 | End: 2018-07-19 | Stop reason: HOSPADM

## 2018-07-18 RX ORDER — PANTOPRAZOLE SODIUM 40 MG/1
40 TABLET, DELAYED RELEASE ORAL DAILY
Status: DISCONTINUED | OUTPATIENT
Start: 2018-07-18 | End: 2018-07-19 | Stop reason: HOSPADM

## 2018-07-18 RX ORDER — METOPROLOL SUCCINATE 50 MG/1
100 TABLET, EXTENDED RELEASE ORAL DAILY
Status: DISCONTINUED | OUTPATIENT
Start: 2018-07-18 | End: 2018-07-19 | Stop reason: HOSPADM

## 2018-07-18 RX ORDER — ONDANSETRON 4 MG/1
4 TABLET, FILM COATED ORAL EVERY 8 HOURS PRN
Status: DISCONTINUED | OUTPATIENT
Start: 2018-07-18 | End: 2018-07-19 | Stop reason: HOSPADM

## 2018-07-18 RX ORDER — OXYCODONE HYDROCHLORIDE AND ACETAMINOPHEN 5; 325 MG/1; MG/1
1 TABLET ORAL EVERY 4 HOURS PRN
Status: DISCONTINUED | OUTPATIENT
Start: 2018-07-18 | End: 2018-07-18

## 2018-07-18 RX ORDER — CETIRIZINE HYDROCHLORIDE 10 MG/1
10 TABLET ORAL DAILY
Status: DISCONTINUED | OUTPATIENT
Start: 2018-07-18 | End: 2018-07-19 | Stop reason: HOSPADM

## 2018-07-18 RX ORDER — ACETAMINOPHEN AND CODEINE PHOSPHATE 300; 15 MG/1; MG/1
1 TABLET ORAL EVERY 6 HOURS PRN
Status: DISCONTINUED | OUTPATIENT
Start: 2018-07-18 | End: 2018-07-19 | Stop reason: HOSPADM

## 2018-07-18 RX ORDER — OXYCODONE HYDROCHLORIDE AND ACETAMINOPHEN 5; 325 MG/1; MG/1
1 TABLET ORAL EVERY 6 HOURS PRN
Status: DISCONTINUED | OUTPATIENT
Start: 2018-07-18 | End: 2018-07-19 | Stop reason: HOSPADM

## 2018-07-18 RX ORDER — VENLAFAXINE HYDROCHLORIDE 75 MG/1
75 CAPSULE, EXTENDED RELEASE ORAL DAILY
Status: DISCONTINUED | OUTPATIENT
Start: 2018-07-18 | End: 2018-07-19 | Stop reason: HOSPADM

## 2018-07-18 RX ORDER — DEXAMETHASONE SODIUM PHOSPHATE 4 MG/ML
4 INJECTION, SOLUTION INTRA-ARTICULAR; INTRALESIONAL; INTRAMUSCULAR; INTRAVENOUS; SOFT TISSUE EVERY 6 HOURS
Status: DISCONTINUED | OUTPATIENT
Start: 2018-07-18 | End: 2018-07-19 | Stop reason: HOSPADM

## 2018-07-18 RX ADMIN — METOPROLOL SUCCINATE 100 MG: 50 TABLET, EXTENDED RELEASE ORAL at 17:06

## 2018-07-18 RX ADMIN — VENLAFAXINE HYDROCHLORIDE 75 MG: 75 CAPSULE, EXTENDED RELEASE ORAL at 17:06

## 2018-07-18 RX ADMIN — HYDROCORTISONE: 25 CREAM TOPICAL at 22:08

## 2018-07-18 RX ADMIN — Medication 10 ML: at 22:09

## 2018-07-18 RX ADMIN — DEXAMETHASONE SODIUM PHOSPHATE 4 MG: 4 INJECTION, SOLUTION INTRAMUSCULAR; INTRAVENOUS at 23:23

## 2018-07-18 RX ADMIN — PANTOPRAZOLE SODIUM 40 MG: 40 TABLET, DELAYED RELEASE ORAL at 17:06

## 2018-07-18 RX ADMIN — FLUTICASONE PROPIONATE 1 SPRAY: 50 SPRAY, METERED NASAL at 22:08

## 2018-07-18 RX ADMIN — Medication 10 ML: at 10:25

## 2018-07-18 RX ADMIN — DEXAMETHASONE SODIUM PHOSPHATE 4 MG: 4 INJECTION, SOLUTION INTRAMUSCULAR; INTRAVENOUS at 06:00

## 2018-07-18 RX ADMIN — Medication 10 ML: at 22:08

## 2018-07-18 RX ADMIN — ACETAMINOPHEN 650 MG: 325 TABLET ORAL at 17:06

## 2018-07-18 RX ADMIN — CETIRIZINE HYDROCHLORIDE 10 MG: 10 TABLET, FILM COATED ORAL at 17:05

## 2018-07-18 RX ADMIN — ACETAMINOPHEN 650 MG: 325 TABLET ORAL at 00:00

## 2018-07-18 RX ADMIN — ACETAMINOPHEN 650 MG: 325 TABLET ORAL at 10:25

## 2018-07-18 RX ADMIN — DEXAMETHASONE SODIUM PHOSPHATE 4 MG: 4 INJECTION, SOLUTION INTRAMUSCULAR; INTRAVENOUS at 12:40

## 2018-07-18 RX ADMIN — HYOSCYAMINE SULFATE 125 MCG: 0.12 TABLET ORAL; SUBLINGUAL at 22:07

## 2018-07-18 RX ADMIN — DEXAMETHASONE SODIUM PHOSPHATE 4 MG: 4 INJECTION, SOLUTION INTRAMUSCULAR; INTRAVENOUS at 17:05

## 2018-07-18 ASSESSMENT — ENCOUNTER SYMPTOMS
WHEEZING: 0
STRIDOR: 0
GASTROINTESTINAL NEGATIVE: 1
NAUSEA: 0
BLOOD IN STOOL: 0
CHEST TIGHTNESS: 0
COUGH: 0
RESPIRATORY NEGATIVE: 1
SHORTNESS OF BREATH: 0
EYES NEGATIVE: 1

## 2018-07-18 ASSESSMENT — PAIN SCALES - GENERAL
PAINLEVEL_OUTOF10: 5
PAINLEVEL_OUTOF10: 0
PAINLEVEL_OUTOF10: 5
PAINLEVEL_OUTOF10: 0
PAINLEVEL_OUTOF10: 5
PAINLEVEL_OUTOF10: 8
PAINLEVEL_OUTOF10: 4
PAINLEVEL_OUTOF10: 0

## 2018-07-18 NOTE — PROGRESS NOTES
Patient: Lorrayne Cockayne  Unit/Bed: E824/E084-44  YOB: 1955  MRN: [id number] Acct: [de-identified]   Admitting Diagnosis: Brain mass [G93.9]  Admit Date:  7/16/2018  Hospital Day: 2    Current Medications:    Scheduled Meds:   dexamethasone  4 mg Intravenous Q6H    sodium chloride flush  10 mL Intravenous BID    nitroglycerin  1 inch Topical Once    sodium chloride flush  10 mL Intravenous 2 times per day     Continuous Infusions:  PRN Meds:.acetaminophen, ipratropium-albuterol, nitroGLYCERIN, sodium chloride flush, magnesium hydroxide, ondansetron  . Recent Labs      07/16/18   1615  07/17/18   0458   WBC  6.6  5.5   HGB  18.6*  16.9*   HCT  54.9*  49.1*   MCV  90.9  91.2   PLT  215  212     Recent Labs      07/16/18   1615  07/17/18   0458   NA  141  142   K  4.4  4.1   CL  104  107   CO2  24  26   BUN  13  17   CREATININE  0.59  0.56     Recent Labs      07/16/18   1615   AST  29   ALT  19   BILITOT  0.8   ALKPHOS  119     Recent Labs      07/16/18   1615   LIPASE  15     Recent Labs      07/16/18   1615   PROT  7.1   INR  1.2       Imaging Results:    Ct Abdomen Pelvis Wo Contrast Additional Contrast? None    Result Date: 7/14/2018  CT of the Abdomen and Pelvis without intravenous contrast medium History:  Abdominal pain with nausea for 3 days. Cold symptoms Technical Factors: CT imaging of the abdomen and pelvis were obtained and formatted as 5 mm contiguous axial images from the domes of the diaphragm to the symphysis pubis. Sagittal and coronal reconstructions were also obtained. Oral contrast medium:  None. Intravenous contrast medium:  None. Comparison:  None. Findings: Lungs:  Lung bases are clear. Liver:  Normal in size, and shape. 4.8 cm cyst in caudate lobe and 7 mm cyst right hepatic lobe unchanged. Bile Ducts:  Normal in caliber. Gallbladder:  Surgically absent. Pancreas:  Normal without masses, cysts, ductal dilatation or calcification.  Spleen:  Normal in size without of the adjacent right frontal lobe, with approximately 7 mL of right-to-left midline shift anteriorly. Malignant transformation is not excluded. There are no other enhancing masses identified elsewhere. Small areas of chronic appearing subcortical white matter change are present more posteriorly within the frontal lobes. There is no significant cerebral atrophy, or hemorrhage, or hydrocephalus. ENLARGING PREDOMINANTLY RIGHT FRONTAL MENINGIOMA OF THE INFERIOR ANTERIOR CRANIAL FOSSA, WITH SURROUNDING VASOGENIC EDEMA AND 7 MM OF RIGHT-TO-LEFT MIDLINE SHIFT ANTERIORLY. MALIGNANT TRANSFORMATION IS NOT EXCLUDED. /73   Pulse 76   Temp 98.2 °F (36.8 °C) (Oral)   Resp 18   Ht 5' 5\" (1.651 m)   Wt 213 lb 6.5 oz (96.8 kg)   LMP  (LMP Unknown)   SpO2 93%   BMI 35.51 kg/m²      Hospital day #2 without complaints no headaches no visual problems no weakness numbness no seizure-like activities    Afebrile vital signs stable cranial nerves II through XII nonfocal EOMIs intact no memory problems no personality changes stable more since reflex findings GCS 15. MRI of  brain reviewed        A large subfrontal meningioma with mass effect. I was asked to take over the management plan per Dr. Katharine Leija   Discussed with the patient of the MRI findings    Family meeting to occur today between 5 and 6.

## 2018-07-18 NOTE — PROGRESS NOTES
Lungs:  Lung bases are clear. Liver:  Normal in size, and shape. 4.8 cm cyst in caudate lobe and 7 mm cyst right hepatic lobe unchanged. Bile Ducts:  Normal in caliber. Gallbladder:  Surgically absent. Pancreas:  Normal without masses, cysts, ductal dilatation or calcification. Spleen:  Normal in size without masses or calcifications. No splenules. Kidneys:  Normal in size. No hydronephrosis,, or stones. 2.3 cm cyst medial upper pole left kidney unchanged. Adrenals:  Normal. Small bowel:  Normal in caliber. Appendix:  Surgically absent. Colon:  Normal in caliber. Peritoneum:  No ascites, free air, or fluid collections. Vessels: Aorta normal in course and caliber Lymph nodes:  Retroperitoneal:  No enlarged retroperitoneal lymph nodes. Mesenteric:  No enlarged mesenteric lymph nodes. Pelvic: No enlarged pelvic lymph notes. Bladder: Decompressed. Reproductive organs: Uterus surgically absent. Abdominal Wall: 10 mm fat-containing periumbilical anterior abdominal wall defect. Bones:  No bone lesions. Lumbar laminectomy with posterior placement of paired pedicle screws at the L3-L4 level. Intravertebral disc space device identified at L3-L4. Diffuse disc space narrowing with vacuum disc L5-S1, L2-L3. Cholecystectomy. Hysterectomy. Appendectomy. Stable hepatic and renal cysts. Internal fixation L3-4 as described. Other findings discussed. All CT scans at this facility use dose modulation, iterative reconstruction, and/or weight based dosing when appropriate to reduce radiation dose to as low as reasonably achievable. Ct Head Wo Contrast    Result Date: 7/16/2018  EXAMINATION: CT BRAIN WITHOUT CONTRAST CLINICAL HISTORY:  headache  nausea COMPARISONS: 10/29/2014 TECHNIQUE: Spiral scans without contrast. Multiplanar 2-D reconstructions. All CT scans at this facility use dose modulation, iterative reconstruction, and/or weight based dosing when appropriate to reduce radiation dose to as low as reasonably achievable. FINDINGS: There is a solid right frontal parafalcine 2 x 3 x 3.5 cm mass with concomitant right frontal vasogenic edema, consistent with neoplasm. There is questionably trace petechial hemorrhage within the right frontal cortical gray matter overlying the vasogenic edema. There is approximately 5 mm of right-to-left midline shift. No other brain parenchymal lesions are identified, but sensitivity of this examination is lowered without contrast. Contrast-enhanced CT scan (after premedication in this patient with reported \"iodine allergy\") or MRI may be pursued to determine if additional lesions are present. There are a few scattered nonspecific white matter hypodensities which usually are attributed to microvascular ischemic changes. The skull is unremarkable. The mastoids and middle ears are clear. The orbits and visualized portions of the paranasal sinuses show no significant pathology. RIGHT FRONTAL PARAFALCINE 3.5 CM BRAIN TUMOR WITH CONCOMITANT RIGHT FRONTAL VASOGENIC EDEMA AND RIGHT TO LEFT 5 MM MIDLINE SHIFT. Xr Chest Portable    Result Date: 7/14/2018  EXAMINATION: CHEST PORTABLE VIEW  CLINICAL HISTORY: Weakness COMPARISONS: October 29, 2016  FINDINGS: Single  views of the chest is submitted. The cardiac silhouette is of normal size configuration. The mediastinum is unremarkable. Pulmonary vascular unremarkable. Right sided trachea. No focal infiltrates. No Pneumothoraces. NO ACUTE ACTIVE CARDIOPULMONARY PROCESS    Mri Brain W Wo Contrast    Result Date: 7/17/2018  MRI BRAIN W WO CONTRAST : 7/17/2018 CLINICAL HISTORY:  MASS OR LUMP, HEAD . COMPARISON: Noncontrast head CT 7/16/2018, head without with contrast 2/17/2014.  TECHNIQUE: Multiplanar MR imaging of the pelvis performed before and after intravenous administration of approximately 20 mL of ProHance gadolinium contrast. FINDINGS: A nearly uniformly enhancing

## 2018-07-18 NOTE — PROGRESS NOTES
Hospitalist  Follow-up      Date of Service: 7/18/2018    Subjective:    Patient stated that the headache she had yesterday slightly better but she feels sinus congestion and tenderness causing her frontal headache. She has also postnasal drip no numbness or weakness on upper or lower extremities over the face           Past Medical History:   Diagnosis Date    Atrial fibrillation (HCC)     Chest pain     Degenerative disc disease, lumbar 1/21/2016    Hx of colonic polyps     Hypertension 2/24/2015    Irritable bowel syndrome     Lumbar radiculopathy 3/14/2016    Rapid heart rate      family history includes Cancer in her father; Heart Disease in her father; High Blood Pressure in her father. reports that she has never smoked. She has never used smokeless tobacco. She reports that she does not drink alcohol or use drugs. Case DW RN       Objective:  Exam:  BP (!) 153/65   Pulse 78   Temp 98.1 °F (36.7 °C) (Oral)   Resp 18   Ht 5' 5\" (1.651 m)   Wt 213 lb 6.5 oz (96.8 kg)   LMP  (LMP Unknown)   SpO2 95%   BMI 35.51 kg/m²     Physical Exam:  General appearance:  No apparent distress, appears stated age and cooperative. HEENT:  Normal cephalic, atraumatic without obvious deformity. Pupils equal, round, and reactive to light.  Extra ocular muscles intact. Conjunctivae/corneas clear. Neck: Supple, with full range of motion. No jugular venous distention. Trachea midline. Respiratory:  Normal respiratory effort. Clear to auscultation, bilaterally without Rales/Wheezes/Rhonchi. Cardiovascular:  Regular rate and rhythm with normal S1/S2 without murmurs, rubs or gallops. Abdomen: Soft, non-tender, non-distended with normal bowel sounds. Musculoskeletal:  No clubbing, cyanosis or edema bilaterally.  Full range of motion without deformity. Skin: Skin color, texture, turgor normal.  No rashes or lesions. Neurologic:  Neurovascularly intact without any focal sensory/motor deficits.  Cranial

## 2018-07-18 NOTE — PROGRESS NOTES
2015 called report to [address]. Patient on telemetry.   Called monitor back to confirm signal.

## 2018-07-18 NOTE — PROGRESS NOTES
Progress Note  Patient: Avinash Templeton  Unit/Bed: W837/X364-78  YOB: 1955  MRN: [id number]  Acct: [de-identified]   Admitting Diagnosis: Brain mass [G93.9]  Admit Date:  7/16/2018  Hospital Day: 2    Chief Complaint: Brain mass PAF    Histories:  Past Medical History:   Diagnosis Date    Atrial fibrillation ([address]     Chest pain     Degenerative disc disease, lumbar 1/21/2016    Hx of colonic polyps     Hypertension 2/24/2015    Irritable bowel syndrome     Lumbar radiculopathy 3/14/2016    Rapid heart rate      Past Surgical History:   Procedure Laterality Date    APPENDECTOMY      BACK SURGERY  07/14/2017    CARDIAC CATHETERIZATION  11/2/15     [address]    HYSTERECTOMY      SPINE SURGERY      TONSILLECTOMY      TOTAL KNEE ARTHROPLASTY  2009    UPPER GASTROINTESTINAL ENDOSCOPY  1/15/13    DR. MEEKS     Family History   Problem Relation Age of Onset    Cancer Father         COLON    High Blood Pressure Father     Heart Disease Father      Social History     Social History    Marital status:      Spouse name: N/A    Number of children: N/A    Years of education: N/A     Social History Main Topics    Smoking status: Never Smoker    Smokeless tobacco: Never Used    Alcohol use No    Drug use: No    Sexual activity: Not Currently     Other Topics Concern    None     Social History Narrative    None       Subjective/HPI no cp breathing is good. No h/a nor visula probs    EKG:    SR  Review of Systems:   Review of Systems   Constitutional: Negative. Negative for diaphoresis and fatigue. HENT: Negative. Eyes: Negative. Respiratory: Negative. Negative for cough, chest tightness, shortness of breath, wheezing and stridor. Cardiovascular: Negative. Negative for chest pain, palpitations and leg swelling. Gastrointestinal: Negative. Negative for blood in stool and nausea. Genitourinary: Negative. Musculoskeletal: Negative. Skin: Negative. EOSABS 0.1 07/17/2018    BASOSABS 0.0 07/17/2018     CMP:    Lab Results   Component Value Date     07/17/2018    K 4.1 07/17/2018     07/17/2018    CO2 26 07/17/2018    BUN 17 07/17/2018    CREATININE 0.56 07/17/2018    GFRAA >60.0 07/17/2018    AGRATIO 1.4 03/18/2018    LABGLOM >60.0 07/17/2018    GLUCOSE 102 07/17/2018    GLUCOSE 97 03/19/2018    PROT 7.1 07/16/2018    LABALBU 4.0 07/16/2018    LABALBU 3.8 03/18/2018    CALCIUM 8.8 07/17/2018    BILITOT 0.8 07/16/2018    ALKPHOS 119 07/16/2018    AST 29 07/16/2018    ALT 19 07/16/2018     BMP:    Lab Results   Component Value Date     07/17/2018    K 4.1 07/17/2018     07/17/2018    CO2 26 07/17/2018    BUN 17 07/17/2018    LABALBU 4.0 07/16/2018    LABALBU 3.8 03/18/2018    CREATININE 0.56 07/17/2018    CALCIUM 8.8 07/17/2018    GFRAA >60.0 07/17/2018    LABGLOM >60.0 07/17/2018    GLUCOSE 102 07/17/2018    GLUCOSE 97 03/19/2018     Magnesium:    Lab Results   Component Value Date    MG 1.7 03/18/2018     Troponin:    Lab Results   Component Value Date    TROPONINI <0.010 07/17/2018        Active Hospital Problems    Diagnosis Date Noted    Other chest pain [R07.89] 07/17/2018     Priority: Low    Brain mass [G93.9] 07/16/2018     Priority: Low        Assessment/Plan:  1. Brain mass-   2. PAF  3. Normal LVEF 60%  4. Pt can proceed with surgery with optimal periopertive risk  5. Resume BB  6.  Hold [address]       Electronically signed by Davin Garcia MD on 7/18/2018 at 4:42 PM

## 2018-07-19 VITALS
HEIGHT: 65 IN | OXYGEN SATURATION: 100 % | WEIGHT: 213.41 LBS | DIASTOLIC BLOOD PRESSURE: 66 MMHG | BODY MASS INDEX: 35.56 KG/M2 | HEART RATE: 61 BPM | SYSTOLIC BLOOD PRESSURE: 151 MMHG | RESPIRATION RATE: 16 BRPM | TEMPERATURE: 98.2 F

## 2018-07-19 PROBLEM — D32.0 BENIGN MENINGIOMA OF BRAIN (HCC): Status: ACTIVE | Noted: 2018-07-19

## 2018-07-19 LAB — TROPONIN: <0.01 NG/ML (ref 0–0.01)

## 2018-07-19 PROCEDURE — 6370000000 HC RX 637 (ALT 250 FOR IP): Performed by: INTERNAL MEDICINE

## 2018-07-19 PROCEDURE — 6370000000 HC RX 637 (ALT 250 FOR IP): Performed by: NEUROLOGICAL SURGERY

## 2018-07-19 PROCEDURE — 99232 SBSQ HOSP IP/OBS MODERATE 35: CPT | Performed by: INTERNAL MEDICINE

## 2018-07-19 PROCEDURE — 6360000002 HC RX W HCPCS: Performed by: NEUROLOGICAL SURGERY

## 2018-07-19 PROCEDURE — 84484 ASSAY OF TROPONIN QUANT: CPT

## 2018-07-19 PROCEDURE — 2580000003 HC RX 258: Performed by: PHYSICIAN ASSISTANT

## 2018-07-19 PROCEDURE — 36415 COLL VENOUS BLD VENIPUNCTURE: CPT

## 2018-07-19 RX ORDER — DEXAMETHASONE 0.5 MG/1
2 TABLET ORAL 3 TIMES DAILY
Qty: 30 TABLET | Refills: 0 | Status: SHIPPED | OUTPATIENT
Start: 2018-07-19 | End: 2018-07-19 | Stop reason: HOSPADM

## 2018-07-19 RX ORDER — LORAZEPAM 2 MG/ML
0.5 INJECTION INTRAMUSCULAR EVERY 4 HOURS PRN
Status: DISCONTINUED | OUTPATIENT
Start: 2018-07-19 | End: 2018-07-19

## 2018-07-19 RX ORDER — NITROGLYCERIN 0.4 MG/1
0.4 TABLET SUBLINGUAL EVERY 5 MIN PRN
Status: DISCONTINUED | OUTPATIENT
Start: 2018-07-19 | End: 2018-07-19 | Stop reason: HOSPADM

## 2018-07-19 RX ORDER — ACETAMINOPHEN AND CODEINE PHOSPHATE 300; 15 MG/1; MG/1
1 TABLET ORAL EVERY 6 HOURS PRN
Qty: 30 TABLET | Refills: 0 | Status: ON HOLD | OUTPATIENT
Start: 2018-07-19 | End: 2018-07-27 | Stop reason: HOSPADM

## 2018-07-19 RX ORDER — MORPHINE SULFATE 2 MG/ML
2 INJECTION, SOLUTION INTRAMUSCULAR; INTRAVENOUS ONCE
Status: DISCONTINUED | OUTPATIENT
Start: 2018-07-19 | End: 2018-07-19

## 2018-07-19 RX ORDER — DEXAMETHASONE 4 MG/1
4 TABLET ORAL 2 TIMES DAILY WITH MEALS
Qty: 10 TABLET | Refills: 0 | Status: ON HOLD | OUTPATIENT
Start: 2018-07-19 | End: 2018-07-27 | Stop reason: HOSPADM

## 2018-07-19 RX ORDER — FLUTICASONE PROPIONATE 50 MCG
1 SPRAY, SUSPENSION (ML) NASAL DAILY
Qty: 1 BOTTLE | Refills: 3 | Status: SHIPPED | OUTPATIENT
Start: 2018-07-20 | End: 2019-08-14 | Stop reason: SDUPTHER

## 2018-07-19 RX ADMIN — VENLAFAXINE HYDROCHLORIDE 75 MG: 75 CAPSULE, EXTENDED RELEASE ORAL at 09:27

## 2018-07-19 RX ADMIN — FLUTICASONE PROPIONATE 1 SPRAY: 50 SPRAY, METERED NASAL at 09:32

## 2018-07-19 RX ADMIN — OXYCODONE HYDROCHLORIDE AND ACETAMINOPHEN 1 TABLET: 5; 325 TABLET ORAL at 09:32

## 2018-07-19 RX ADMIN — HYOSCYAMINE SULFATE 125 MCG: 0.12 TABLET ORAL; SUBLINGUAL at 09:27

## 2018-07-19 RX ADMIN — METOPROLOL SUCCINATE 100 MG: 50 TABLET, EXTENDED RELEASE ORAL at 09:27

## 2018-07-19 RX ADMIN — PANTOPRAZOLE SODIUM 40 MG: 40 TABLET, DELAYED RELEASE ORAL at 09:27

## 2018-07-19 RX ADMIN — HYDROCORTISONE: 25 CREAM TOPICAL at 10:00

## 2018-07-19 RX ADMIN — CETIRIZINE HYDROCHLORIDE 10 MG: 10 TABLET, FILM COATED ORAL at 07:02

## 2018-07-19 RX ADMIN — Medication 10 ML: at 09:36

## 2018-07-19 RX ADMIN — DEXAMETHASONE SODIUM PHOSPHATE 4 MG: 4 INJECTION, SOLUTION INTRAMUSCULAR; INTRAVENOUS at 06:01

## 2018-07-19 ASSESSMENT — ENCOUNTER SYMPTOMS
GASTROINTESTINAL NEGATIVE: 1
NAUSEA: 0
EYES NEGATIVE: 1
BLOOD IN STOOL: 0
SHORTNESS OF BREATH: 0
RESPIRATORY NEGATIVE: 1
COUGH: 0
WHEEZING: 0
STRIDOR: 0
CHEST TIGHTNESS: 0

## 2018-07-19 ASSESSMENT — PAIN SCALES - GENERAL: PAINLEVEL_OUTOF10: 5

## 2018-07-19 NOTE — DISCHARGE SUMMARY
Hospital Medicine Discharge Summary    Patient: Carolina Moon     MRN: [id number]  Gender: female  : 1955   Age: 66 y.o. Code Status: Full Code     Admit Date: 2018   Discharge Date:   2018  Disposition:  Home or Self Care     Primary Care Provider: Srinivas Lucero MD    Admitting Physician: Samuel Subramanian MD  Discharge Physician: Dennise Walls MD       Discharge Diagnoses:  . Melissa Grebe and new finding on CAT scan 3.5 cm brain tumor with concomitant right frontal vasogenic edema and a right to left 5 mm midline shift.  No finding of neurologic deficits on clinical exam.  On IV dexamethasone  .  Chest pain and palpitation.  Troponins are negative.  History of PSVT and atrial fibrillation status post ablation therapy.  She is sinus rhythm on telemetry.  EKG showed right bundle branch block with no acute changes from previous EKG. .  Hypertension  .  Irritable bowel syndrome  . Asymptomatic bacertiuria. Urine culture is negative      Hospital Course: The patient was found to have a brain mass with some edema. She was started on dexamethasone. Neurosurgery had seen the patient and he recommended to do a brain tumor resection which is scheduled on Tuesday    Patient has history of PSVT and atrial fibrillation status post ablation therapy. Oral anticoagulation was held for the brain surgery. Patient was seen by cardiology who agreed with the plan. The patient for surgery. Patient has to continue beta blocker    Patient was complaining of headache which got better on dexamethasone. In addition, she has sinus headache and she was started on Zyrtec and Flonase and headache is better.     The patient needs to follow-up with neurosurgery to schedule her for the brain surgery on Tuesday      Discharge Medications:   Current Discharge Medication List      START taking these medications    Details   acetaminophen-codeine (TYLENOL #2) 300-15 MG per tablet Take 1 tablet by mouth every 6 hours caliber. Gallbladder:  Surgically absent. Pancreas:  Normal without masses, cysts, ductal dilatation or calcification. Spleen:  Normal in size without masses or calcifications. No splenules. Kidneys:  Normal in size. No hydronephrosis,, or stones. 2.3 cm cyst medial upper pole left kidney unchanged. Adrenals:  Normal. Small bowel:  Normal in caliber. Appendix:  Surgically absent. Colon:  Normal in caliber. Peritoneum:  No ascites, free air, or fluid collections. Vessels: Aorta normal in course and caliber Lymph nodes:  Retroperitoneal:  No enlarged retroperitoneal lymph nodes. Mesenteric:  No enlarged mesenteric lymph nodes. Pelvic: No enlarged pelvic lymph notes. Bladder: Decompressed. Reproductive organs: Uterus surgically absent. Abdominal Wall: 10 mm fat-containing periumbilical anterior abdominal wall defect. Bones:  No bone lesions. Lumbar laminectomy with posterior placement of paired pedicle screws at the L3-L4 level. Intravertebral disc space device identified at L3-L4. Diffuse disc space narrowing with vacuum disc L5-S1, L2-L3. Cholecystectomy. Hysterectomy. Appendectomy. Stable hepatic and renal cysts. Internal fixation L3-4 as described. Other findings discussed. All CT scans at this facility use dose modulation, iterative reconstruction, and/or weight based dosing when appropriate to reduce radiation dose to as low as reasonably achievable. Ct Head Wo Contrast    Result Date: 7/16/2018  EXAMINATION: CT BRAIN WITHOUT CONTRAST CLINICAL HISTORY:  headache  nausea COMPARISONS: 10/29/2014 TECHNIQUE: Spiral scans without contrast. Multiplanar 2-D reconstructions. All CT scans at this facility use dose modulation, iterative reconstruction, and/or weight based dosing when appropriate to reduce radiation dose to as low as reasonably achievable. FINDINGS: There is a solid right frontal parafalcine 2 x 3 x 3.5 cm mass with concomitant right frontal vasogenic edema, consistent with neoplasm.  There is

## 2018-07-19 NOTE — PROGRESS NOTES
07/17/2018    EOSABS 0.1 07/17/2018    BASOSABS 0.0 07/17/2018     CMP:    Lab Results   Component Value Date     07/17/2018    K 4.1 07/17/2018     07/17/2018    CO2 26 07/17/2018    BUN 17 07/17/2018    CREATININE 0.56 07/17/2018    GFRAA >60.0 07/17/2018    AGRATIO 1.4 03/18/2018    LABGLOM >60.0 07/17/2018    GLUCOSE 102 07/17/2018    GLUCOSE 97 03/19/2018    PROT 7.1 07/16/2018    LABALBU 4.0 07/16/2018    LABALBU 3.8 03/18/2018    CALCIUM 8.8 07/17/2018    BILITOT 0.8 07/16/2018    ALKPHOS 119 07/16/2018    AST 29 07/16/2018    ALT 19 07/16/2018     BMP:    Lab Results   Component Value Date     07/17/2018    K 4.1 07/17/2018     07/17/2018    CO2 26 07/17/2018    BUN 17 07/17/2018    LABALBU 4.0 07/16/2018    LABALBU 3.8 03/18/2018    CREATININE 0.56 07/17/2018    CALCIUM 8.8 07/17/2018    GFRAA >60.0 07/17/2018    LABGLOM >60.0 07/17/2018    GLUCOSE 102 07/17/2018    GLUCOSE 97 03/19/2018     Magnesium:    Lab Results   Component Value Date    MG 1.7 03/18/2018     Troponin:    Lab Results   Component Value Date    TROPONINI <0.010 07/17/2018        Active Hospital Problems    Diagnosis Date Noted    Benign meningioma of brain ([facility] [address] [D32.0] 07/19/2018     Priority: Low    Other chest pain [R07.89] 07/17/2018     Priority: Low    Brain mass [G93.9] 07/16/2018     Priority: Low        Assessment/Plan:  1. Brain mass- Home today and return for out pt surgery Next Tuesday is the plan  2. PAF - currently remains in SR. Off [address] for now until several weeks post op per Dr. Jaqueline Driscoll. Continue rate control  3. Normal LVEF 60%  4.  Resume BB       Electronically signed by Deyanira Devine MD on 7/19/2018 at 11:37 AM

## 2018-07-20 ENCOUNTER — TELEPHONE (OUTPATIENT)
Dept: FAMILY MEDICINE CLINIC | Age: 63
End: 2018-07-20

## 2018-07-20 NOTE — TELEPHONE ENCOUNTER
[facility] Transitions Initial Follow Up Call    Outreach made within 2 business days of discharge: Yes    Patient: Raman Green Patient : 1955   MRN: [id number]  Reason for Admission: chest pain unspecified  Discharge Date: 18       Spoke with: Jenny Hernandez is doing well taking her medications and thankful to be alive. She goes in 2018 to have the tumor removed from her brain by Dr. Ursula Rm. She seems in good spirits and is focusing on getting her affairs in order priror to her surgery. She has stopped her Xarelto due to the upcoming surgery. Discharge department/facility:[facility] Interactive Patient Contact:  Was patient able to fill all prescriptions: Yes  Was patient instructed to bring all medications to the follow-up visit: Yes  Is patient taking all medications as directed in the discharge summary?  Yes  Does patient understand their discharge instructions: Yes  Does patient have questions or concerns that need addressed prior to 7-14 day follow up office visit: no    Scheduled appointment with PCP within 7-14 days    Follow Up  Future Appointments  Date Time Provider Carlyn Rodriguez   2018 2:00 PM Digna Qiu  [place] [facility]   2018 9:00 AM Myles Puentes MD [facility]       Aura Khalil, [address] [place] [address] Kay Eason

## 2018-07-23 ENCOUNTER — OFFICE VISIT (OUTPATIENT)
Dept: FAMILY MEDICINE CLINIC | Age: 63
End: 2018-07-23
Payer: COMMERCIAL

## 2018-07-23 VITALS
BODY MASS INDEX: 36.15 KG/M2 | DIASTOLIC BLOOD PRESSURE: 80 MMHG | HEIGHT: 65 IN | SYSTOLIC BLOOD PRESSURE: 136 MMHG | HEART RATE: 60 BPM | WEIGHT: 217 LBS | RESPIRATION RATE: 16 BRPM | TEMPERATURE: 98.1 F

## 2018-07-23 DIAGNOSIS — I48.0 PAF (PAROXYSMAL ATRIAL FIBRILLATION) (HCC): Chronic | ICD-10-CM

## 2018-07-23 DIAGNOSIS — D32.9 MENINGIOMA (HCC): Primary | ICD-10-CM

## 2018-07-23 DIAGNOSIS — H93.11 TINNITUS OF RIGHT EAR: ICD-10-CM

## 2018-07-23 DIAGNOSIS — R23.2 HOT FLASHES: ICD-10-CM

## 2018-07-23 PROCEDURE — G8417 CALC BMI ABV UP PARAM F/U: HCPCS | Performed by: INTERNAL MEDICINE

## 2018-07-23 PROCEDURE — 1036F TOBACCO NON-USER: CPT | Performed by: INTERNAL MEDICINE

## 2018-07-23 PROCEDURE — 99213 OFFICE O/P EST LOW 20 MIN: CPT | Performed by: INTERNAL MEDICINE

## 2018-07-23 PROCEDURE — G8427 DOCREV CUR MEDS BY ELIG CLIN: HCPCS | Performed by: INTERNAL MEDICINE

## 2018-07-23 PROCEDURE — 3017F COLORECTAL CA SCREEN DOC REV: CPT | Performed by: INTERNAL MEDICINE

## 2018-07-23 PROCEDURE — 1111F DSCHRG MED/CURRENT MED MERGE: CPT | Performed by: INTERNAL MEDICINE

## 2018-07-23 ASSESSMENT — PATIENT HEALTH QUESTIONNAIRE - PHQ9
SUM OF ALL RESPONSES TO PHQ9 QUESTIONS 1 & 2: 0
2. FEELING DOWN, DEPRESSED OR HOPELESS: 0
1. LITTLE INTEREST OR PLEASURE IN DOING THINGS: 0
SUM OF ALL RESPONSES TO PHQ QUESTIONS 1-9: 0

## 2018-07-23 ASSESSMENT — ENCOUNTER SYMPTOMS
ABDOMINAL PAIN: 0
EYE PAIN: 0
SHORTNESS OF BREATH: 0
BACK PAIN: 0

## 2018-07-23 NOTE — PATIENT INSTRUCTIONS
Patient Education        Benign Brain Tumor: Care Instructions  Your Care Instructions    A benign brain tumor is the increased growth of abnormal cells inside the brain or spinal cord. A benign tumor is not cancer. It usually grows slowly and does not spread to other parts of the body. Once removed, these tumors may not come back. But a tumor can cause serious problems if it presses on areas in the brain. There are many types of benign brain tumors. Treatment depends on tumor type and location in the brain. Your doctor may want to watch a small tumor to see how fast it grows. You may get regular tests to watch the growth of the tumor. Benign brain tumors that cause problems may be treated with radiation, surgery, medicines, or a combination of these treatments. Follow-up care is a key part of your treatment and safety. Be sure to make and go to all appointments, and call your doctor if you are having problems. It's also a good idea to know your test results and keep a list of the medicines you take. How can you care for yourself at home? · Take your medicines exactly as prescribed. Call your doctor if you have any problems with your medicine. · Get some physical activity every day, but do not get too tired. · Share your feelings. Stress and tension affect our emotions. By expressing your feelings to others, you may be able to understand and cope with them. · Consider joining a support group. Sharing your experiences with other people who have the same problem may help you learn more and cope better. · Get help if you need it. Discuss your concerns with your doctor, counselor, or other health professional.  · Be kind to your body and mind. Getting enough sleep, eating a healthy diet, and taking time to do things you enjoy can contribute to an overall feeling of balance in your life and help reduce stress. When should you call for help? Call 911 anytime you think you may need emergency care.  For example,

## 2018-07-24 ENCOUNTER — ANESTHESIA EVENT (OUTPATIENT)
Dept: OPERATING ROOM | Age: 63
DRG: 025 | End: 2018-07-24
Payer: COMMERCIAL

## 2018-07-24 ENCOUNTER — HOSPITAL ENCOUNTER (INPATIENT)
Age: 63
LOS: 4 days | Discharge: HOME | DRG: 025 | End: 2018-07-28
Attending: NEUROLOGICAL SURGERY | Admitting: NEUROLOGICAL SURGERY
Payer: COMMERCIAL

## 2018-07-24 ENCOUNTER — APPOINTMENT (OUTPATIENT)
Dept: CT IMAGING | Age: 63
DRG: 025 | End: 2018-07-24
Attending: NEUROLOGICAL SURGERY
Payer: COMMERCIAL

## 2018-07-24 ENCOUNTER — ANESTHESIA (OUTPATIENT)
Dept: OPERATING ROOM | Age: 63
DRG: 025 | End: 2018-07-24
Payer: COMMERCIAL

## 2018-07-24 VITALS — SYSTOLIC BLOOD PRESSURE: 132 MMHG | TEMPERATURE: 94.1 F | DIASTOLIC BLOOD PRESSURE: 65 MMHG | OXYGEN SATURATION: 98 %

## 2018-07-24 DIAGNOSIS — G93.89 BRAIN MASS: ICD-10-CM

## 2018-07-24 DIAGNOSIS — D32.0 BENIGN MENINGIOMA OF BRAIN (HCC): Primary | ICD-10-CM

## 2018-07-24 PROBLEM — D32.9 MENINGIOMA (HCC): Status: ACTIVE | Noted: 2018-07-24

## 2018-07-24 LAB
ABO/RH: NORMAL
ALBUMIN SERPL-MCNC: 3.5 G/DL (ref 3.9–4.9)
ALP BLD-CCNC: 101 U/L (ref 40–130)
ALT SERPL-CCNC: 62 U/L (ref 0–33)
ANION GAP SERPL CALCULATED.3IONS-SCNC: 15 MEQ/L (ref 7–13)
ANTIBODY SCREEN: NORMAL
APTT: 20.8 SEC (ref 21.6–35.4)
AST SERPL-CCNC: 78 U/L (ref 0–35)
BASOPHILS ABSOLUTE: 0 K/UL (ref 0–0.2)
BASOPHILS RELATIVE PERCENT: 0.1 %
BILIRUB SERPL-MCNC: 0.8 MG/DL (ref 0–1.2)
BUN BLDV-MCNC: 18 MG/DL (ref 8–23)
CALCIUM SERPL-MCNC: 8.6 MG/DL (ref 8.6–10.2)
CHLORIDE BLD-SCNC: 99 MEQ/L (ref 98–107)
CO2: 24 MEQ/L (ref 22–29)
CREAT SERPL-MCNC: 0.62 MG/DL (ref 0.5–0.9)
EOSINOPHILS ABSOLUTE: 0 K/UL (ref 0–0.7)
EOSINOPHILS RELATIVE PERCENT: 0 %
GFR AFRICAN AMERICAN: >60
GFR NON-AFRICAN AMERICAN: >60
GLOBULIN: 2.4 G/DL (ref 2.3–3.5)
GLUCOSE BLD-MCNC: 194 MG/DL (ref 74–109)
HCT VFR BLD CALC: 48.5 % (ref 37–47)
HEMOGLOBIN: 16.2 G/DL (ref 12–16)
INR BLD: 1.1
LYMPHOCYTES ABSOLUTE: 1 K/UL (ref 1–4.8)
LYMPHOCYTES RELATIVE PERCENT: 5.1 %
MCH RBC QN AUTO: 30.9 PG (ref 27–31.3)
MCHC RBC AUTO-ENTMCNC: 33.3 % (ref 33–37)
MCV RBC AUTO: 92.9 FL (ref 82–100)
MONOCYTES ABSOLUTE: 1.1 K/UL (ref 0.2–0.8)
MONOCYTES RELATIVE PERCENT: 6.1 %
NEUTROPHILS ABSOLUTE: 16.6 K/UL (ref 1.4–6.5)
NEUTROPHILS RELATIVE PERCENT: 88.7 %
PDW BLD-RTO: 13.9 % (ref 11.5–14.5)
PLATELET # BLD: 233 K/UL (ref 130–400)
POTASSIUM SERPL-SCNC: 4.6 MEQ/L (ref 3.5–5.1)
PROTHROMBIN TIME: 11.1 SEC (ref 9.6–12.3)
RBC # BLD: 5.23 M/UL (ref 4.2–5.4)
SODIUM BLD-SCNC: 138 MEQ/L (ref 132–144)
TOTAL PROTEIN: 5.9 G/DL (ref 6.4–8.1)
WBC # BLD: 18.7 K/UL (ref 4.8–10.8)

## 2018-07-24 PROCEDURE — 00N00ZZ RELEASE BRAIN, OPEN APPROACH: ICD-10-PCS | Performed by: NEUROLOGICAL SURGERY

## 2018-07-24 PROCEDURE — 00B10ZZ EXCISION OF CEREBRAL MENINGES, OPEN APPROACH: ICD-10-PCS | Performed by: NEUROLOGICAL SURGERY

## 2018-07-24 PROCEDURE — C1762 CONN TISS, HUMAN(INC FASCIA): HCPCS | Performed by: NEUROLOGICAL SURGERY

## 2018-07-24 PROCEDURE — 2500000003 HC RX 250 WO HCPCS: Performed by: NURSE ANESTHETIST, CERTIFIED REGISTERED

## 2018-07-24 PROCEDURE — 6370000000 HC RX 637 (ALT 250 FOR IP): Performed by: NEUROLOGICAL SURGERY

## 2018-07-24 PROCEDURE — 86850 RBC ANTIBODY SCREEN: CPT

## 2018-07-24 PROCEDURE — 80053 COMPREHEN METABOLIC PANEL: CPT

## 2018-07-24 PROCEDURE — 3700000000 HC ANESTHESIA ATTENDED CARE: Performed by: NEUROLOGICAL SURGERY

## 2018-07-24 PROCEDURE — 86923 COMPATIBILITY TEST ELECTRIC: CPT

## 2018-07-24 PROCEDURE — C1713 ANCHOR/SCREW BN/BN,TIS/BN: HCPCS | Performed by: NEUROLOGICAL SURGERY

## 2018-07-24 PROCEDURE — 6360000002 HC RX W HCPCS: Performed by: NURSE ANESTHETIST, CERTIFIED REGISTERED

## 2018-07-24 PROCEDURE — A4648 IMPLANTABLE TISSUE MARKER: HCPCS | Performed by: NEUROLOGICAL SURGERY

## 2018-07-24 PROCEDURE — 3600000014 HC SURGERY LEVEL 4 ADDTL 15MIN: Performed by: NEUROLOGICAL SURGERY

## 2018-07-24 PROCEDURE — 85730 THROMBOPLASTIN TIME PARTIAL: CPT

## 2018-07-24 PROCEDURE — 2720000010 HC SURG SUPPLY STERILE: Performed by: NEUROLOGICAL SURGERY

## 2018-07-24 PROCEDURE — 86901 BLOOD TYPING SEROLOGIC RH(D): CPT

## 2018-07-24 PROCEDURE — 70450 CT HEAD/BRAIN W/O DYE: CPT

## 2018-07-24 PROCEDURE — 6360000002 HC RX W HCPCS: Performed by: NEUROLOGICAL SURGERY

## 2018-07-24 PROCEDURE — 88341 IMHCHEM/IMCYTCHM EA ADD ANTB: CPT

## 2018-07-24 PROCEDURE — 88329 PATH CONSLTJ DRG SURG: CPT

## 2018-07-24 PROCEDURE — 2580000003 HC RX 258

## 2018-07-24 PROCEDURE — 36415 COLL VENOUS BLD VENIPUNCTURE: CPT

## 2018-07-24 PROCEDURE — 2500000003 HC RX 250 WO HCPCS

## 2018-07-24 PROCEDURE — 88342 IMHCHEM/IMCYTCHM 1ST ANTB: CPT

## 2018-07-24 PROCEDURE — 3700000001 HC ADD 15 MINUTES (ANESTHESIA): Performed by: NEUROLOGICAL SURGERY

## 2018-07-24 PROCEDURE — 2709999900 HC NON-CHARGEABLE SUPPLY: Performed by: NEUROLOGICAL SURGERY

## 2018-07-24 PROCEDURE — 2780000010 HC IMPLANT OTHER: Performed by: NEUROLOGICAL SURGERY

## 2018-07-24 PROCEDURE — 2500000003 HC RX 250 WO HCPCS: Performed by: NEUROLOGICAL SURGERY

## 2018-07-24 PROCEDURE — 2000000000 HC ICU R&B

## 2018-07-24 PROCEDURE — 88331 PATH CONSLTJ SURG 1 BLK 1SPC: CPT

## 2018-07-24 PROCEDURE — 85610 PROTHROMBIN TIME: CPT

## 2018-07-24 PROCEDURE — 2580000003 HC RX 258: Performed by: NEUROLOGICAL SURGERY

## 2018-07-24 PROCEDURE — 88307 TISSUE EXAM BY PATHOLOGIST: CPT

## 2018-07-24 PROCEDURE — 3600000004 HC SURGERY LEVEL 4 BASE: Performed by: NEUROLOGICAL SURGERY

## 2018-07-24 PROCEDURE — 85025 COMPLETE CBC W/AUTO DIFF WBC: CPT

## 2018-07-24 PROCEDURE — 86900 BLOOD TYPING SEROLOGIC ABO: CPT

## 2018-07-24 PROCEDURE — 2500000003 HC RX 250 WO HCPCS: Performed by: ANESTHESIOLOGY

## 2018-07-24 PROCEDURE — 2580000003 HC RX 258: Performed by: ANESTHESIOLOGY

## 2018-07-24 PROCEDURE — S0028 INJECTION, FAMOTIDINE, 20 MG: HCPCS | Performed by: NEUROLOGICAL SURGERY

## 2018-07-24 PROCEDURE — 0NU00JZ SUPPLEMENT SKULL WITH SYNTHETIC SUBSTITUTE, OPEN APPROACH: ICD-10-PCS | Performed by: NEUROLOGICAL SURGERY

## 2018-07-24 DEVICE — IMPLANTABLE DEVICE: Type: IMPLANTABLE DEVICE | Site: BRAIN | Status: FUNCTIONAL

## 2018-07-24 DEVICE — SEALANT SURG 13 YR DURA AUTOSPRAY ADHERUS NUS109] SURGICAL ONE]: Type: IMPLANTABLE DEVICE | Site: BRAIN | Status: FUNCTIONAL

## 2018-07-24 DEVICE — LOW PROFILE PLATE, STRAIGHT
Type: IMPLANTABLE DEVICE | Site: BRAIN | Status: FUNCTIONAL
Brand: UNIVERSAL NEURO 2, QUIKFLAP

## 2018-07-24 RX ORDER — FUROSEMIDE 10 MG/ML
INJECTION INTRAMUSCULAR; INTRAVENOUS PRN
Status: DISCONTINUED | OUTPATIENT
Start: 2018-07-24 | End: 2018-07-24 | Stop reason: SDUPTHER

## 2018-07-24 RX ORDER — MORPHINE SULFATE 2 MG/ML
2 INJECTION, SOLUTION INTRAMUSCULAR; INTRAVENOUS
Status: DISCONTINUED | OUTPATIENT
Start: 2018-07-24 | End: 2018-07-24

## 2018-07-24 RX ORDER — MORPHINE SULFATE 4 MG/ML
4 INJECTION, SOLUTION INTRAMUSCULAR; INTRAVENOUS
Status: DISCONTINUED | OUTPATIENT
Start: 2018-07-24 | End: 2018-07-24

## 2018-07-24 RX ORDER — DEXTROSE, SODIUM CHLORIDE, AND POTASSIUM CHLORIDE 5; .45; .15 G/100ML; G/100ML; G/100ML
INJECTION INTRAVENOUS CONTINUOUS
Status: DISCONTINUED | OUTPATIENT
Start: 2018-07-24 | End: 2018-07-26

## 2018-07-24 RX ORDER — ONDANSETRON 2 MG/ML
INJECTION INTRAMUSCULAR; INTRAVENOUS PRN
Status: DISCONTINUED | OUTPATIENT
Start: 2018-07-24 | End: 2018-07-24 | Stop reason: SDUPTHER

## 2018-07-24 RX ORDER — SODIUM CHLORIDE, SODIUM LACTATE, POTASSIUM CHLORIDE, CALCIUM CHLORIDE 600; 310; 30; 20 MG/100ML; MG/100ML; MG/100ML; MG/100ML
INJECTION, SOLUTION INTRAVENOUS CONTINUOUS
Status: DISCONTINUED | OUTPATIENT
Start: 2018-07-24 | End: 2018-07-24

## 2018-07-24 RX ORDER — DEXAMETHASONE SODIUM PHOSPHATE 10 MG/ML
INJECTION INTRAMUSCULAR; INTRAVENOUS PRN
Status: DISCONTINUED | OUTPATIENT
Start: 2018-07-24 | End: 2018-07-24 | Stop reason: SDUPTHER

## 2018-07-24 RX ORDER — EPHEDRINE SULFATE 50 MG/ML
INJECTION, SOLUTION INTRAVENOUS PRN
Status: DISCONTINUED | OUTPATIENT
Start: 2018-07-24 | End: 2018-07-24 | Stop reason: SDUPTHER

## 2018-07-24 RX ORDER — GLYCOPYRROLATE 1 MG/5 ML
SYRINGE (ML) INTRAVENOUS PRN
Status: DISCONTINUED | OUTPATIENT
Start: 2018-07-24 | End: 2018-07-24 | Stop reason: SDUPTHER

## 2018-07-24 RX ORDER — LIDOCAINE HYDROCHLORIDE 10 MG/ML
1 INJECTION, SOLUTION EPIDURAL; INFILTRATION; INTRACAUDAL; PERINEURAL ONCE
Status: COMPLETED | OUTPATIENT
Start: 2018-07-24 | End: 2018-07-24

## 2018-07-24 RX ORDER — SODIUM CHLORIDE 0.9 % (FLUSH) 0.9 %
10 SYRINGE (ML) INJECTION PRN
Status: DISCONTINUED | OUTPATIENT
Start: 2018-07-24 | End: 2018-07-28 | Stop reason: HOSPADM

## 2018-07-24 RX ORDER — PANTOPRAZOLE SODIUM 40 MG/1
40 TABLET, DELAYED RELEASE ORAL DAILY
Status: DISCONTINUED | OUTPATIENT
Start: 2018-07-24 | End: 2018-07-28 | Stop reason: HOSPADM

## 2018-07-24 RX ORDER — REMIFENTANIL HYDROCHLORIDE 1 MG/ML
INJECTION, POWDER, LYOPHILIZED, FOR SOLUTION INTRAVENOUS PRN
Status: DISCONTINUED | OUTPATIENT
Start: 2018-07-24 | End: 2018-07-24 | Stop reason: SDUPTHER

## 2018-07-24 RX ORDER — LABETALOL HYDROCHLORIDE 5 MG/ML
10 INJECTION, SOLUTION INTRAVENOUS
Status: DISCONTINUED | OUTPATIENT
Start: 2018-07-24 | End: 2018-07-28 | Stop reason: HOSPADM

## 2018-07-24 RX ORDER — MANNITOL 20 G/100ML
INJECTION, SOLUTION INTRAVENOUS PRN
Status: DISCONTINUED | OUTPATIENT
Start: 2018-07-24 | End: 2018-07-24 | Stop reason: SDUPTHER

## 2018-07-24 RX ORDER — METOCLOPRAMIDE HYDROCHLORIDE 5 MG/ML
10 INJECTION INTRAMUSCULAR; INTRAVENOUS
Status: DISCONTINUED | OUTPATIENT
Start: 2018-07-24 | End: 2018-07-24 | Stop reason: HOSPADM

## 2018-07-24 RX ORDER — DEXAMETHASONE SODIUM PHOSPHATE 4 MG/ML
4 INJECTION, SOLUTION INTRA-ARTICULAR; INTRALESIONAL; INTRAMUSCULAR; INTRAVENOUS; SOFT TISSUE EVERY 6 HOURS
Status: DISCONTINUED | OUTPATIENT
Start: 2018-07-24 | End: 2018-07-25

## 2018-07-24 RX ORDER — REMIFENTANIL HYDROCHLORIDE 1 MG/ML
INJECTION, POWDER, LYOPHILIZED, FOR SOLUTION INTRAVENOUS CONTINUOUS PRN
Status: DISCONTINUED | OUTPATIENT
Start: 2018-07-24 | End: 2018-07-24 | Stop reason: SDUPTHER

## 2018-07-24 RX ORDER — HYDRALAZINE HYDROCHLORIDE 20 MG/ML
INJECTION INTRAMUSCULAR; INTRAVENOUS PRN
Status: DISCONTINUED | OUTPATIENT
Start: 2018-07-24 | End: 2018-07-24 | Stop reason: SDUPTHER

## 2018-07-24 RX ORDER — BISACODYL 10 MG
10 SUPPOSITORY, RECTAL RECTAL DAILY PRN
Status: DISCONTINUED | OUTPATIENT
Start: 2018-07-24 | End: 2018-07-28 | Stop reason: HOSPADM

## 2018-07-24 RX ORDER — ONDANSETRON 2 MG/ML
4 INJECTION INTRAMUSCULAR; INTRAVENOUS
Status: DISCONTINUED | OUTPATIENT
Start: 2018-07-24 | End: 2018-07-24 | Stop reason: HOSPADM

## 2018-07-24 RX ORDER — PROPOFOL 10 MG/ML
INJECTION, EMULSION INTRAVENOUS PRN
Status: DISCONTINUED | OUTPATIENT
Start: 2018-07-24 | End: 2018-07-24 | Stop reason: SDUPTHER

## 2018-07-24 RX ORDER — SODIUM CHLORIDE 0.9 % (FLUSH) 0.9 %
10 SYRINGE (ML) INJECTION EVERY 12 HOURS SCHEDULED
Status: DISCONTINUED | OUTPATIENT
Start: 2018-07-24 | End: 2018-07-28 | Stop reason: HOSPADM

## 2018-07-24 RX ORDER — METOPROLOL SUCCINATE 100 MG/1
100 TABLET, EXTENDED RELEASE ORAL DAILY
Status: DISCONTINUED | OUTPATIENT
Start: 2018-07-24 | End: 2018-07-28 | Stop reason: HOSPADM

## 2018-07-24 RX ORDER — DIPHENHYDRAMINE HYDROCHLORIDE 50 MG/ML
12.5 INJECTION INTRAMUSCULAR; INTRAVENOUS
Status: DISCONTINUED | OUTPATIENT
Start: 2018-07-24 | End: 2018-07-24 | Stop reason: HOSPADM

## 2018-07-24 RX ORDER — PHENYTOIN SODIUM 50 MG/ML
100 INJECTION, SOLUTION INTRAMUSCULAR; INTRAVENOUS EVERY 8 HOURS
Status: DISCONTINUED | OUTPATIENT
Start: 2018-07-25 | End: 2018-07-25

## 2018-07-24 RX ORDER — CEPHALEXIN 500 MG/1
500 CAPSULE ORAL EVERY 8 HOURS SCHEDULED
Status: DISCONTINUED | OUTPATIENT
Start: 2018-07-25 | End: 2018-07-28 | Stop reason: HOSPADM

## 2018-07-24 RX ORDER — ONDANSETRON 2 MG/ML
4 INJECTION INTRAMUSCULAR; INTRAVENOUS EVERY 6 HOURS PRN
Status: DISCONTINUED | OUTPATIENT
Start: 2018-07-24 | End: 2018-07-28 | Stop reason: HOSPADM

## 2018-07-24 RX ORDER — VENLAFAXINE HYDROCHLORIDE 75 MG/1
75 CAPSULE, EXTENDED RELEASE ORAL DAILY
Status: DISCONTINUED | OUTPATIENT
Start: 2018-07-24 | End: 2018-07-28 | Stop reason: HOSPADM

## 2018-07-24 RX ORDER — FENTANYL CITRATE 50 UG/ML
50 INJECTION, SOLUTION INTRAMUSCULAR; INTRAVENOUS EVERY 10 MIN PRN
Status: DISCONTINUED | OUTPATIENT
Start: 2018-07-24 | End: 2018-07-24 | Stop reason: HOSPADM

## 2018-07-24 RX ORDER — 0.9 % SODIUM CHLORIDE 0.9 %
500 INTRAVENOUS SOLUTION INTRAVENOUS ONCE
Status: DISCONTINUED | OUTPATIENT
Start: 2018-07-24 | End: 2018-07-25

## 2018-07-24 RX ORDER — MAGNESIUM HYDROXIDE 1200 MG/15ML
LIQUID ORAL CONTINUOUS PRN
Status: COMPLETED | OUTPATIENT
Start: 2018-07-24 | End: 2018-07-24

## 2018-07-24 RX ORDER — FENTANYL CITRATE 50 UG/ML
50 INJECTION, SOLUTION INTRAMUSCULAR; INTRAVENOUS
Status: DISCONTINUED | OUTPATIENT
Start: 2018-07-24 | End: 2018-07-28 | Stop reason: HOSPADM

## 2018-07-24 RX ORDER — LABETALOL HYDROCHLORIDE 5 MG/ML
INJECTION, SOLUTION INTRAVENOUS PRN
Status: DISCONTINUED | OUTPATIENT
Start: 2018-07-24 | End: 2018-07-24 | Stop reason: SDUPTHER

## 2018-07-24 RX ORDER — LIDOCAINE HYDROCHLORIDE 20 MG/ML
INJECTION, SOLUTION INFILTRATION; PERINEURAL PRN
Status: DISCONTINUED | OUTPATIENT
Start: 2018-07-24 | End: 2018-07-24 | Stop reason: SDUPTHER

## 2018-07-24 RX ORDER — HYDRALAZINE HYDROCHLORIDE 20 MG/ML
10 INJECTION INTRAMUSCULAR; INTRAVENOUS
Status: DISCONTINUED | OUTPATIENT
Start: 2018-07-24 | End: 2018-07-24 | Stop reason: RX

## 2018-07-24 RX ORDER — FLUTICASONE PROPIONATE 50 MCG
1 SPRAY, SUSPENSION (ML) NASAL DAILY
Status: DISCONTINUED | OUTPATIENT
Start: 2018-07-24 | End: 2018-07-28 | Stop reason: HOSPADM

## 2018-07-24 RX ORDER — ROCURONIUM BROMIDE 10 MG/ML
INJECTION, SOLUTION INTRAVENOUS PRN
Status: DISCONTINUED | OUTPATIENT
Start: 2018-07-24 | End: 2018-07-24 | Stop reason: SDUPTHER

## 2018-07-24 RX ORDER — ACETAMINOPHEN 325 MG/1
650 TABLET ORAL EVERY 4 HOURS PRN
Status: DISCONTINUED | OUTPATIENT
Start: 2018-07-24 | End: 2018-07-26

## 2018-07-24 RX ORDER — FENTANYL CITRATE 50 UG/ML
INJECTION, SOLUTION INTRAMUSCULAR; INTRAVENOUS PRN
Status: DISCONTINUED | OUTPATIENT
Start: 2018-07-24 | End: 2018-07-24 | Stop reason: SDUPTHER

## 2018-07-24 RX ORDER — GINSENG 100 MG
CAPSULE ORAL PRN
Status: DISCONTINUED | OUTPATIENT
Start: 2018-07-24 | End: 2018-07-24 | Stop reason: HOSPADM

## 2018-07-24 RX ADMIN — POTASSIUM CHLORIDE, DEXTROSE MONOHYDRATE AND SODIUM CHLORIDE: 150; 5; 450 INJECTION, SOLUTION INTRAVENOUS at 17:33

## 2018-07-24 RX ADMIN — HYDRALAZINE HYDROCHLORIDE 10 MG: 20 INJECTION INTRAMUSCULAR; INTRAVENOUS at 16:52

## 2018-07-24 RX ADMIN — REMIFENTANIL HYDROCHLORIDE 49.2 MCG: 1 INJECTION, POWDER, LYOPHILIZED, FOR SOLUTION INTRAVENOUS at 11:40

## 2018-07-24 RX ADMIN — CEFAZOLIN SODIUM 2 G: 2 SOLUTION INTRAVENOUS at 10:55

## 2018-07-24 RX ADMIN — CEFAZOLIN SODIUM 2 G: 2 SOLUTION INTRAVENOUS at 22:38

## 2018-07-24 RX ADMIN — REMIFENTANIL HYDROCHLORIDE 49.2 MCG: 1 INJECTION, POWDER, LYOPHILIZED, FOR SOLUTION INTRAVENOUS at 13:10

## 2018-07-24 RX ADMIN — MANNITOL 25 G: 20 INJECTION, SOLUTION INTRAVENOUS at 11:15

## 2018-07-24 RX ADMIN — EPHEDRINE SULFATE 10 MG: 50 INJECTION, SOLUTION INTRAMUSCULAR; INTRAVENOUS; SUBCUTANEOUS at 15:40

## 2018-07-24 RX ADMIN — SODIUM CHLORIDE, POTASSIUM CHLORIDE, SODIUM LACTATE AND CALCIUM CHLORIDE: 600; 310; 30; 20 INJECTION, SOLUTION INTRAVENOUS at 09:36

## 2018-07-24 RX ADMIN — PROPOFOL 200 MG: 10 INJECTION, EMULSION INTRAVENOUS at 11:02

## 2018-07-24 RX ADMIN — LABETALOL HYDROCHLORIDE 10 MG: 5 INJECTION, SOLUTION INTRAVENOUS at 16:32

## 2018-07-24 RX ADMIN — DEXAMETHASONE SODIUM PHOSPHATE 4 MG: 4 INJECTION, SOLUTION INTRAMUSCULAR; INTRAVENOUS at 17:34

## 2018-07-24 RX ADMIN — LABETALOL HYDROCHLORIDE 20 MG: 5 INJECTION, SOLUTION INTRAVENOUS at 16:41

## 2018-07-24 RX ADMIN — DEXAMETHASONE SODIUM PHOSPHATE 10 MG: 10 INJECTION INTRAMUSCULAR; INTRAVENOUS at 11:15

## 2018-07-24 RX ADMIN — FAMOTIDINE 20 MG: 10 INJECTION, SOLUTION INTRAVENOUS at 20:11

## 2018-07-24 RX ADMIN — REMIFENTANIL HYDROCHLORIDE 49.2 MCG: 1 INJECTION, POWDER, LYOPHILIZED, FOR SOLUTION INTRAVENOUS at 11:35

## 2018-07-24 RX ADMIN — LABETALOL HYDROCHLORIDE 5 MG: 5 INJECTION, SOLUTION INTRAVENOUS at 16:29

## 2018-07-24 RX ADMIN — FENTANYL CITRATE 50 MCG: 50 INJECTION, SOLUTION INTRAMUSCULAR; INTRAVENOUS at 20:12

## 2018-07-24 RX ADMIN — VENLAFAXINE HYDROCHLORIDE 75 MG: 75 CAPSULE, EXTENDED RELEASE ORAL at 20:11

## 2018-07-24 RX ADMIN — Medication 0.2 MG: at 14:50

## 2018-07-24 RX ADMIN — ROCURONIUM BROMIDE 10 MG: 10 INJECTION INTRAVENOUS at 11:25

## 2018-07-24 RX ADMIN — HYDRALAZINE HYDROCHLORIDE 5 MG: 20 INJECTION INTRAMUSCULAR; INTRAVENOUS at 16:43

## 2018-07-24 RX ADMIN — LIDOCAINE HYDROCHLORIDE 0.2 ML: 10 INJECTION, SOLUTION EPIDURAL; INFILTRATION; INTRACAUDAL; PERINEURAL at 09:35

## 2018-07-24 RX ADMIN — FENTANYL CITRATE 50 MCG: 50 INJECTION, SOLUTION INTRAMUSCULAR; INTRAVENOUS at 17:16

## 2018-07-24 RX ADMIN — ROCURONIUM BROMIDE 40 MG: 10 INJECTION INTRAVENOUS at 11:02

## 2018-07-24 RX ADMIN — EPHEDRINE SULFATE 10 MG: 50 INJECTION, SOLUTION INTRAMUSCULAR; INTRAVENOUS; SUBCUTANEOUS at 15:30

## 2018-07-24 RX ADMIN — FENTANYL CITRATE 50 MCG: 50 INJECTION, SOLUTION INTRAMUSCULAR; INTRAVENOUS at 17:00

## 2018-07-24 RX ADMIN — REMIFENTANIL HYDROCHLORIDE 0.1 MCG/KG/MIN: 1 INJECTION, POWDER, LYOPHILIZED, FOR SOLUTION INTRAVENOUS at 11:10

## 2018-07-24 RX ADMIN — CEFAZOLIN SODIUM 2 G: 2 SOLUTION INTRAVENOUS at 14:58

## 2018-07-24 RX ADMIN — REMIFENTANIL HYDROCHLORIDE 49.2 MCG: 1 INJECTION, POWDER, LYOPHILIZED, FOR SOLUTION INTRAVENOUS at 11:33

## 2018-07-24 RX ADMIN — FUROSEMIDE 20 MG: 10 INJECTION, SOLUTION INTRAVENOUS at 11:15

## 2018-07-24 RX ADMIN — LABETALOL HYDROCHLORIDE 10 MG: 5 INJECTION, SOLUTION INTRAVENOUS at 16:37

## 2018-07-24 RX ADMIN — Medication 0.2 MG: at 15:25

## 2018-07-24 RX ADMIN — Medication 10 ML: at 20:12

## 2018-07-24 RX ADMIN — ACETAMINOPHEN 650 MG: 325 TABLET ORAL at 20:17

## 2018-07-24 RX ADMIN — FENTANYL CITRATE 50 MCG: 50 INJECTION, SOLUTION INTRAMUSCULAR; INTRAVENOUS at 22:30

## 2018-07-24 RX ADMIN — FENTANYL CITRATE 50 MCG: 50 INJECTION, SOLUTION INTRAMUSCULAR; INTRAVENOUS at 11:02

## 2018-07-24 RX ADMIN — Medication 0.2 MG: at 14:27

## 2018-07-24 RX ADMIN — PANTOPRAZOLE SODIUM 40 MG: 40 TABLET, DELAYED RELEASE ORAL at 20:11

## 2018-07-24 RX ADMIN — REMIFENTANIL HYDROCHLORIDE 49.2 MCG: 1 INJECTION, POWDER, LYOPHILIZED, FOR SOLUTION INTRAVENOUS at 12:15

## 2018-07-24 RX ADMIN — HYDRALAZINE HYDROCHLORIDE 5 MG: 20 INJECTION INTRAMUSCULAR; INTRAVENOUS at 16:49

## 2018-07-24 RX ADMIN — SODIUM CHLORIDE, POTASSIUM CHLORIDE, SODIUM LACTATE AND CALCIUM CHLORIDE: 600; 310; 30; 20 INJECTION, SOLUTION INTRAVENOUS at 11:05

## 2018-07-24 RX ADMIN — FENTANYL CITRATE 50 MCG: 50 INJECTION, SOLUTION INTRAMUSCULAR; INTRAVENOUS at 18:02

## 2018-07-24 RX ADMIN — LIDOCAINE HYDROCHLORIDE 100 MG: 20 INJECTION, SOLUTION INFILTRATION; PERINEURAL at 11:02

## 2018-07-24 RX ADMIN — REMIFENTANIL HYDROCHLORIDE 49.2 MCG: 1 INJECTION, POWDER, LYOPHILIZED, FOR SOLUTION INTRAVENOUS at 12:00

## 2018-07-24 RX ADMIN — ONDANSETRON 4 MG: 2 INJECTION INTRAMUSCULAR; INTRAVENOUS at 22:38

## 2018-07-24 RX ADMIN — REMIFENTANIL HYDROCHLORIDE 49.2 MCG: 1 INJECTION, POWDER, LYOPHILIZED, FOR SOLUTION INTRAVENOUS at 12:35

## 2018-07-24 RX ADMIN — SUGAMMADEX 200 MG: 100 INJECTION, SOLUTION INTRAVENOUS at 16:38

## 2018-07-24 RX ADMIN — FENTANYL CITRATE 50 MCG: 50 INJECTION, SOLUTION INTRAMUSCULAR; INTRAVENOUS at 11:40

## 2018-07-24 RX ADMIN — ONDANSETRON HYDROCHLORIDE 4 MG: 2 INJECTION, SOLUTION INTRAMUSCULAR; INTRAVENOUS at 15:45

## 2018-07-24 RX ADMIN — SODIUM CHLORIDE, POTASSIUM CHLORIDE, SODIUM LACTATE AND CALCIUM CHLORIDE: 600; 310; 30; 20 INJECTION, SOLUTION INTRAVENOUS at 15:35

## 2018-07-24 RX ADMIN — PHENYTOIN SODIUM 1000 MG: 50 INJECTION INTRAMUSCULAR; INTRAVENOUS at 18:47

## 2018-07-24 ASSESSMENT — PULMONARY FUNCTION TESTS
PIF_VALUE: 19
PIF_VALUE: 20
PIF_VALUE: 20
PIF_VALUE: 21
PIF_VALUE: 20
PIF_VALUE: 20
PIF_VALUE: 21
PIF_VALUE: 19
PIF_VALUE: 36
PIF_VALUE: 19
PIF_VALUE: 20
PIF_VALUE: 20
PIF_VALUE: 22
PIF_VALUE: 20
PIF_VALUE: 20
PIF_VALUE: 1
PIF_VALUE: 20
PIF_VALUE: 21
PIF_VALUE: 20
PIF_VALUE: 22
PIF_VALUE: 20
PIF_VALUE: 20
PIF_VALUE: 19
PIF_VALUE: 20
PIF_VALUE: 20
PIF_VALUE: 19
PIF_VALUE: 21
PIF_VALUE: 19
PIF_VALUE: 20
PIF_VALUE: 21
PIF_VALUE: 19
PIF_VALUE: 20
PIF_VALUE: 1
PIF_VALUE: 20
PIF_VALUE: 19
PIF_VALUE: 20
PIF_VALUE: 4
PIF_VALUE: 20
PIF_VALUE: 20
PIF_VALUE: 19
PIF_VALUE: 21
PIF_VALUE: 19
PIF_VALUE: 20
PIF_VALUE: 22
PIF_VALUE: 21
PIF_VALUE: 20
PIF_VALUE: 19
PIF_VALUE: 20
PIF_VALUE: 21
PIF_VALUE: 21
PIF_VALUE: 19
PIF_VALUE: 21
PIF_VALUE: 20
PIF_VALUE: 21
PIF_VALUE: 21
PIF_VALUE: 20
PIF_VALUE: 21
PIF_VALUE: 19
PIF_VALUE: 21
PIF_VALUE: 19
PIF_VALUE: 10
PIF_VALUE: 19
PIF_VALUE: 21
PIF_VALUE: 21
PIF_VALUE: 19
PIF_VALUE: 21
PIF_VALUE: 19
PIF_VALUE: 20
PIF_VALUE: 19
PIF_VALUE: 20
PIF_VALUE: 21
PIF_VALUE: 19
PIF_VALUE: 21
PIF_VALUE: 20
PIF_VALUE: 20
PIF_VALUE: 19
PIF_VALUE: 21
PIF_VALUE: 20
PIF_VALUE: 21
PIF_VALUE: 19
PIF_VALUE: 20
PIF_VALUE: 20
PIF_VALUE: 21
PIF_VALUE: 21
PIF_VALUE: 20
PIF_VALUE: 19
PIF_VALUE: 21
PIF_VALUE: 19
PIF_VALUE: 20
PIF_VALUE: 19
PIF_VALUE: 21
PIF_VALUE: 19
PIF_VALUE: 20
PIF_VALUE: 20
PIF_VALUE: 21
PIF_VALUE: 20
PIF_VALUE: 19
PIF_VALUE: 20
PIF_VALUE: 19
PIF_VALUE: 21
PIF_VALUE: 19
PIF_VALUE: 20
PIF_VALUE: 21
PIF_VALUE: 19
PIF_VALUE: 21
PIF_VALUE: 20
PIF_VALUE: 20
PIF_VALUE: 21
PIF_VALUE: 19
PIF_VALUE: 20
PIF_VALUE: 21
PIF_VALUE: 20
PIF_VALUE: 21
PIF_VALUE: 21
PIF_VALUE: 20
PIF_VALUE: 20
PIF_VALUE: 21
PIF_VALUE: 23
PIF_VALUE: 20
PIF_VALUE: 21
PIF_VALUE: 20
PIF_VALUE: 20
PIF_VALUE: 19
PIF_VALUE: 20
PIF_VALUE: 0
PIF_VALUE: 0
PIF_VALUE: 21
PIF_VALUE: 21
PIF_VALUE: 20
PIF_VALUE: 20
PIF_VALUE: 21
PIF_VALUE: 20
PIF_VALUE: 20
PIF_VALUE: 21
PIF_VALUE: 20
PIF_VALUE: 21
PIF_VALUE: 19
PIF_VALUE: 20
PIF_VALUE: 20
PIF_VALUE: 21
PIF_VALUE: 20
PIF_VALUE: 21
PIF_VALUE: 20
PIF_VALUE: 21
PIF_VALUE: 20
PIF_VALUE: 19
PIF_VALUE: 21
PIF_VALUE: 19
PIF_VALUE: 19
PIF_VALUE: 20
PIF_VALUE: 21
PIF_VALUE: 20
PIF_VALUE: 20
PIF_VALUE: 21
PIF_VALUE: 20
PIF_VALUE: 19
PIF_VALUE: 20
PIF_VALUE: 20
PIF_VALUE: 21
PIF_VALUE: 20
PIF_VALUE: 19
PIF_VALUE: 21
PIF_VALUE: 20
PIF_VALUE: 21
PIF_VALUE: 21
PIF_VALUE: 19
PIF_VALUE: 20
PIF_VALUE: 19
PIF_VALUE: 20
PIF_VALUE: 21
PIF_VALUE: 21
PIF_VALUE: 20
PIF_VALUE: 19
PIF_VALUE: 21
PIF_VALUE: 20
PIF_VALUE: 19
PIF_VALUE: 20
PIF_VALUE: 3
PIF_VALUE: 20
PIF_VALUE: 19
PIF_VALUE: 21
PIF_VALUE: 23
PIF_VALUE: 4
PIF_VALUE: 22
PIF_VALUE: 20
PIF_VALUE: 20
PIF_VALUE: 19
PIF_VALUE: 19
PIF_VALUE: 22
PIF_VALUE: 20
PIF_VALUE: 19
PIF_VALUE: 4
PIF_VALUE: 20
PIF_VALUE: 21
PIF_VALUE: 19
PIF_VALUE: 20
PIF_VALUE: 22
PIF_VALUE: 21
PIF_VALUE: 20
PIF_VALUE: 19
PIF_VALUE: 21
PIF_VALUE: 20
PIF_VALUE: 21
PIF_VALUE: 21
PIF_VALUE: 20
PIF_VALUE: 1
PIF_VALUE: 20
PIF_VALUE: 19
PIF_VALUE: 21
PIF_VALUE: 19
PIF_VALUE: 21
PIF_VALUE: 20
PIF_VALUE: 19
PIF_VALUE: 11
PIF_VALUE: 20
PIF_VALUE: 21
PIF_VALUE: 20
PIF_VALUE: 39
PIF_VALUE: 21
PIF_VALUE: 20
PIF_VALUE: 19
PIF_VALUE: 21
PIF_VALUE: 20
PIF_VALUE: 19
PIF_VALUE: 19
PIF_VALUE: 21
PIF_VALUE: 19
PIF_VALUE: 21
PIF_VALUE: 20
PIF_VALUE: 21
PIF_VALUE: 20
PIF_VALUE: 20
PIF_VALUE: 4
PIF_VALUE: 19
PIF_VALUE: 20
PIF_VALUE: 21
PIF_VALUE: 19
PIF_VALUE: 19
PIF_VALUE: 21
PIF_VALUE: 20
PIF_VALUE: 26
PIF_VALUE: 19
PIF_VALUE: 21
PIF_VALUE: 20
PIF_VALUE: 21
PIF_VALUE: 20
PIF_VALUE: 20
PIF_VALUE: 22
PIF_VALUE: 21
PIF_VALUE: 20
PIF_VALUE: 19
PIF_VALUE: 19
PIF_VALUE: 20
PIF_VALUE: 21
PIF_VALUE: 20
PIF_VALUE: 21
PIF_VALUE: 22
PIF_VALUE: 20
PIF_VALUE: 21
PIF_VALUE: 20
PIF_VALUE: 21
PIF_VALUE: 20
PIF_VALUE: 19
PIF_VALUE: 19
PIF_VALUE: 21
PIF_VALUE: 19
PIF_VALUE: 21
PIF_VALUE: 20
PIF_VALUE: 21
PIF_VALUE: 19
PIF_VALUE: 19
PIF_VALUE: 20
PIF_VALUE: 20
PIF_VALUE: 21
PIF_VALUE: 20
PIF_VALUE: 20
PIF_VALUE: 2
PIF_VALUE: 21
PIF_VALUE: 20
PIF_VALUE: 19
PIF_VALUE: 20
PIF_VALUE: 21
PIF_VALUE: 19

## 2018-07-24 ASSESSMENT — PAIN SCALES - GENERAL
PAINLEVEL_OUTOF10: 10
PAINLEVEL_OUTOF10: 0
PAINLEVEL_OUTOF10: 10
PAINLEVEL_OUTOF10: 10
PAINLEVEL_OUTOF10: 0
PAINLEVEL_OUTOF10: 10
PAINLEVEL_OUTOF10: 10

## 2018-07-24 NOTE — ANESTHESIA PRE PROCEDURE
Department of Anesthesiology  Preprocedure Note       Name:  Raman Green   Age:  66 y.o.  :  1955                                          MRN:  [id number]         Date:  2018      Surgeon: Vane Hamilton):  Myles Puentes MD    Procedure: Procedure(s):  CRANIOTOMY, 2 1/2 HOURS,NEEDS SONOPET (2ND CASE) (PAT ON ADMIT, PT IN-HOSPITAL MAY NOT NEED ALL TESTING)    Medications prior to admission:   Prior to Admission medications    Medication Sig Start Date End Date Taking? Authorizing Provider   acetaminophen-codeine (TYLENOL #2) 300-15 MG per tablet Take 1 tablet by mouth every 6 hours as needed (headache) for up to 10 days. . 18 Yes Emily Connor MD   fluticasone Olimpia Line) 50 MCG/ACT nasal spray 1 spray by Nasal route daily 18  Yes Emily Connor MD   dexamethasone (DECADRON) 4 MG tablet Take 1 tablet by mouth 2 times daily (with meals) for 5 days 18 Yes ALETA Ibanez Res, CNP   pantoprazole (PROTONIX) 20 MG tablet Take 2 tablets by mouth daily 18  Yes Ellie Blank MD   hydrocortisone 2.5 % cream Apply topically 2 times daily.  18  Yes ALETA Connor CNP   ranitidine (ZANTAC) 150 MG tablet Take 1 tablet by mouth 2 times daily for 7 days 18 Yes ALETA Connor CNP   metoprolol succinate (TOPROL XL) 100 MG extended release tablet TAKE 1 TABLET DAILY 18  Yes Karen Muller MD   venlafaxine (EFFEXOR XR) 75 MG extended release capsule Take 1 capsule by mouth daily 3/13/18  Yes Maggie Dyer MD   ondansetron (ZOFRAN) 4 MG tablet TAKE ONE TABLET BY MOUTH EVERY 8 HOURS AS NEEDED FOR NAUSEA OR VOMITING 3/20/17  Yes Maggie Dyer MD   hyoscyamine (LEVSIN/SL) 0.125 MG SL tablet PLACE ONE TABLET UNDER THE TONGUE EVERY 6 HOURS AS NEEDED FOR CRAMPING 10/17/16  Yes Maggie Dyer MD       Current medications:    Current Facility-Administered Medications   Medication Dose Route Frequency Provider Last Rate Last Applicable):  No results found for: LABABO, LABRH    Anesthesia Evaluation    Airway: Mallampati: II  TM distance: >3 FB   Neck ROM: full  Mouth opening: > = 3 FB Dental: normal exam         Pulmonary:Negative Pulmonary ROS breath sounds clear to auscultation                             Cardiovascular:  Exercise tolerance: good (>4 METS),   (+) hypertension:,       ECG reviewed  Rhythm: regular    Echocardiogram reviewed         Beta Blocker:  Dose within 24 Hrs         Neuro/Psych:   (+) neuromuscular disease:,              ROS comment: Meningioma GI/Hepatic/Renal: Neg GI/Hepatic/Renal ROS            Endo/Other: Negative Endo/Other ROS                    Abdominal:           Vascular: negative vascular ROS. Anesthesia Plan      general     ASA 3     (?postop vent support and ICU admission)  Induction: intravenous. arterial line  MIPS: Postoperative opioids intended and Prophylactic antiemetics administered. Anesthetic plan and risks discussed with patient. Use of blood products discussed with patient whom consented to blood products.    Plan discussed with CRNA and surgical team.    Attending anesthesiologist reviewed and agrees with Pre Eval content              Umberto Davis MD   7/24/2018

## 2018-07-24 NOTE — H&P (VIEW-ONLY)
Hospital Medicine History & Physical      PCP: Landy Briscoe MD    Date of Admission: 7/16/2018    Date of Service: 7/16/18      Chief Complaint:  Chest pain      History Of Present Illness:  66 y.o. female who presented to [facility] ED for complaints of chest pain which started at approximately 1230 this afternoon. She state that she was on here was to a follow up appointment at her doctors office for an allergic reaction to prednisone when she started to experience chest pressure under her ribs which went around the entire chest.  She rated that pain as 10/10. The patient has a history of PSVT and A-fib. She also states that she had a head ache which started earlier this morning. Denies palp ha sob rash anx n v d f    Past Medical History:          Diagnosis Date    Atrial fibrillation (HCC)     Chest pain     Degenerative disc disease, lumbar 1/21/2016    Hx of colonic polyps     Hypertension 2/24/2015    Irritable bowel syndrome     Lumbar radiculopathy 3/14/2016    Rapid heart rate        Past Surgical History:          Procedure Laterality Date    APPENDECTOMY      BACK SURGERY  07/14/2017    CARDIAC [address]HETERIZATION  11/2/15    [address]    HYSTERECTOMY      SPINE SURGERY      TONSILLECTOMY      TOTAL KNEE ARTHROPLASTY  2009    UPPER GASTROINTESTINAL ENDOSCOPY  1/15/13    DR. MEEKS       Medications Prior to Admission:      Prior to Admission medications    Medication Sig Start Date End Date Taking? Authorizing Provider   traMADol (ULTRAM) 50 MG tablet Take 1 tablet by mouth every 4 hours as needed for Pain (MAY TAKE 2 TABS EVERY 6 HOURS FOR SEVERE PAIN) for up to 10 days. . 7/13/18 7/23/18 Yes John Valles MD   metoprolol succinate (TOPROL XL) 100 MG extended release tablet TAKE 1 TABLET DAILY 6/1/18  Yes Suzanne Sharma MD   venlafaxine (EFFEXOR XR) 75 MG extended release capsule Take 1 capsule by mouth daily 3/13/18  Yes Landy Briscoe MD   rivaroxaban (Mitali Daperin) 20 MG age and cooperative. HEENT:  Normal cephalic, atraumatic without obvious deformity. Pupils equal, round, and reactive to light. Extra ocular muscles intact. Conjunctivae/corneas clear. Neck: Supple, with full range of motion. No jugular venous distention. Trachea midline. Respiratory:  Normal respiratory effort. Clear to auscultation, bilaterally without Rales/Wheezes/Rhonchi. Cardiovascular:  Regular rate and rhythm with normal S1/S2 without murmurs, rubs or gallops. Abdomen: Soft, non-tender, non-distended with normal bowel sounds. Musculoskeletal:  No clubbing, cyanosis or edema bilaterally. Full range of motion without deformity. Skin: Skin color, texture, turgor normal.  No rashes or lesions. Neurologic:  Neurovascularly intact without any focal sensory/motor deficits. Cranial nerves: II-XII intact, grossly non-focal.  Psychiatric:  Alert and oriented, thought content appropriate, normal insight  Capillary Refill: Brisk,< 3 seconds   Peripheral Pulses: +2 palpable, equal bilaterally       Labs:     Recent Labs      07/13/18 2026 07/16/18   1615   WBC  8.9  6.6   HGB  17.4*  18.6*   HCT  50.7*  54.9*   PLT  228  215     Recent Labs      07/13/18 2000 07/16/18   1615   NA  142  141   K  3.7  4.4   CL  107  104   CO2  23  24   BUN  14  13   CREATININE  0.49*  0.59   CALCIUM  9.1  9.4     Recent Labs      07/13/18 2000 07/16/18   1615   AST  18  29   ALT  16  19   BILITOT  0.4  0.8   ALKPHOS  121  119     Recent Labs      07/16/18   1615   INR  1.2     Recent Labs      07/16/18   1615   CKTOTAL  55   TROPONINI  <0.010       Urinalysis:      Lab Results   Component Value Date    NITRU Negative 07/13/2018    WBCUA 10-20 07/13/2018    BACTERIA Moderate 07/13/2018    RBCUA 0-2 07/13/2018    BLOODU SMALL 07/13/2018    SPECGRAV 1.031 07/13/2018    GLUCOSEU Negative 07/13/2018    GLUCOSEU NEG 04/11/2012       Radiology:     CXR: I have reviewed the CXR with the following interpretation: pending  EKG:   I have reviewed the EKG with the following interpretation: pending    CT Head WO Contrast   Final Result   RIGHT FRONTAL PARAFALCINE 3.5 CM BRAIN TUMOR WITH CONCOMITANT RIGHT FRONTAL VASOGENIC EDEMA AND RIGHT TO LEFT 5 MM MIDLINE SHIFT. ASSESSMENT:    Active Hospital Problems    Diagnosis Date Noted    Brain mass [G93.9] 07/16/2018       PLAN:        DVT Prophylaxis: lovenox  Diet:    Code Status: Prior    PT/OT Eval Status: eval and tx    1. Brain mass-will admit to ICU and consult Dr. Geronimo Almaguer. Will obtain mri of brain  2. Chest pain-will consult Dr. Eddie Schwartz and obtain serial cardiac enzymes and a 2D Echo       SANTINO Bustos    Thank you Juan Belcher MD for the opportunity to be involved in this patient's care. If you have any questions or concerns please feel free to contact me. Attending's Note:  Pt was seen and evaluated and discussed care with PA. I agree with the above assessment and plan. Headache that started  - Occipital headache extending up to the parietal region. No nausea or vomiting. No history of cancer in the past. No neurological symptoms or deficits. Questionable balance problem has been going on for couple of months. On assessment no balance problem was detected. CT scan showed large mass in the frontal region. Neurosurgery consulted. MRI ordered for tomorrow. Chest pain pain occurred today. History of A. fib. Cardiology consulted. EKG normal troponins negative.     Waldo Upton, [address] [employer]

## 2018-07-25 ENCOUNTER — APPOINTMENT (OUTPATIENT)
Dept: CT IMAGING | Age: 63
DRG: 025 | End: 2018-07-25
Attending: NEUROLOGICAL SURGERY
Payer: COMMERCIAL

## 2018-07-25 LAB
ANION GAP SERPL CALCULATED.3IONS-SCNC: 13 MEQ/L (ref 7–13)
APTT: 22 SEC (ref 21.6–35.4)
BASOPHILS ABSOLUTE: 0 K/UL (ref 0–0.2)
BASOPHILS RELATIVE PERCENT: 0.3 %
BUN BLDV-MCNC: 14 MG/DL (ref 8–23)
CALCIUM SERPL-MCNC: 8.7 MG/DL (ref 8.6–10.2)
CHLORIDE BLD-SCNC: 101 MEQ/L (ref 98–107)
CO2: 24 MEQ/L (ref 22–29)
CREAT SERPL-MCNC: 0.6 MG/DL (ref 0.5–0.9)
EOSINOPHILS ABSOLUTE: 0 K/UL (ref 0–0.7)
EOSINOPHILS RELATIVE PERCENT: 0 %
GFR AFRICAN AMERICAN: >60
GFR NON-AFRICAN AMERICAN: >60
GLUCOSE BLD-MCNC: 155 MG/DL (ref 74–109)
HCT VFR BLD CALC: 45.7 % (ref 37–47)
HEMOGLOBIN: 15.5 G/DL (ref 12–16)
INR BLD: 1.1
LYMPHOCYTES ABSOLUTE: 0.7 K/UL (ref 1–4.8)
LYMPHOCYTES RELATIVE PERCENT: 4 %
MCH RBC QN AUTO: 31.3 PG (ref 27–31.3)
MCHC RBC AUTO-ENTMCNC: 33.9 % (ref 33–37)
MCV RBC AUTO: 92.4 FL (ref 82–100)
MONOCYTES ABSOLUTE: 1.6 K/UL (ref 0.2–0.8)
MONOCYTES RELATIVE PERCENT: 8.9 %
NEUTROPHILS ABSOLUTE: 15.6 K/UL (ref 1.4–6.5)
NEUTROPHILS RELATIVE PERCENT: 86.8 %
PDW BLD-RTO: 14 % (ref 11.5–14.5)
PLATELET # BLD: 205 K/UL (ref 130–400)
POTASSIUM REFLEX MAGNESIUM: 4.8 MEQ/L (ref 3.5–5.1)
PROTHROMBIN TIME: 11.3 SEC (ref 9.6–12.3)
RBC # BLD: 4.95 M/UL (ref 4.2–5.4)
SLIDE REVIEW: ABNORMAL
SODIUM BLD-SCNC: 138 MEQ/L (ref 132–144)
WBC # BLD: 17.9 K/UL (ref 4.8–10.8)

## 2018-07-25 PROCEDURE — 85025 COMPLETE CBC W/AUTO DIFF WBC: CPT

## 2018-07-25 PROCEDURE — 6370000000 HC RX 637 (ALT 250 FOR IP): Performed by: NEUROLOGICAL SURGERY

## 2018-07-25 PROCEDURE — 2580000003 HC RX 258: Performed by: NEUROLOGICAL SURGERY

## 2018-07-25 PROCEDURE — 97535 SELF CARE MNGMENT TRAINING: CPT

## 2018-07-25 PROCEDURE — 97163 PT EVAL HIGH COMPLEX 45 MIN: CPT

## 2018-07-25 PROCEDURE — 70450 CT HEAD/BRAIN W/O DYE: CPT

## 2018-07-25 PROCEDURE — 85730 THROMBOPLASTIN TIME PARTIAL: CPT

## 2018-07-25 PROCEDURE — 2500000003 HC RX 250 WO HCPCS: Performed by: NEUROLOGICAL SURGERY

## 2018-07-25 PROCEDURE — 85610 PROTHROMBIN TIME: CPT

## 2018-07-25 PROCEDURE — 80048 BASIC METABOLIC PNL TOTAL CA: CPT

## 2018-07-25 PROCEDURE — 36415 COLL VENOUS BLD VENIPUNCTURE: CPT

## 2018-07-25 PROCEDURE — S0028 INJECTION, FAMOTIDINE, 20 MG: HCPCS | Performed by: NEUROLOGICAL SURGERY

## 2018-07-25 PROCEDURE — 2000000000 HC ICU R&B

## 2018-07-25 PROCEDURE — 2700000000 HC OXYGEN THERAPY PER DAY

## 2018-07-25 PROCEDURE — 6360000002 HC RX W HCPCS: Performed by: NEUROLOGICAL SURGERY

## 2018-07-25 RX ORDER — ACETAMINOPHEN 80 MG
TABLET,CHEWABLE ORAL ONCE
Status: COMPLETED | OUTPATIENT
Start: 2018-07-25 | End: 2018-07-25

## 2018-07-25 RX ORDER — HYDROCODONE BITARTRATE AND ACETAMINOPHEN 5; 325 MG/1; MG/1
0.5 TABLET ORAL EVERY 6 HOURS PRN
Status: DISCONTINUED | OUTPATIENT
Start: 2018-07-25 | End: 2018-07-28 | Stop reason: HOSPADM

## 2018-07-25 RX ORDER — PHENYTOIN SODIUM 100 MG/1
100 CAPSULE, EXTENDED RELEASE ORAL 2 TIMES DAILY
Status: DISCONTINUED | OUTPATIENT
Start: 2018-07-25 | End: 2018-07-28 | Stop reason: HOSPADM

## 2018-07-25 RX ORDER — ACETAMINOPHEN AND CODEINE PHOSPHATE 300; 15 MG/1; MG/1
1 TABLET ORAL EVERY 6 HOURS PRN
Status: DISCONTINUED | OUTPATIENT
Start: 2018-07-25 | End: 2018-07-25 | Stop reason: CLARIF

## 2018-07-25 RX ORDER — DEXAMETHASONE SODIUM PHOSPHATE 4 MG/ML
4 INJECTION, SOLUTION INTRA-ARTICULAR; INTRALESIONAL; INTRAMUSCULAR; INTRAVENOUS; SOFT TISSUE EVERY 6 HOURS
Status: DISCONTINUED | OUTPATIENT
Start: 2018-07-25 | End: 2018-07-26

## 2018-07-25 RX ADMIN — DEXAMETHASONE SODIUM PHOSPHATE 4 MG: 4 INJECTION, SOLUTION INTRAMUSCULAR; INTRAVENOUS at 17:37

## 2018-07-25 RX ADMIN — FENTANYL CITRATE 50 MCG: 50 INJECTION, SOLUTION INTRAMUSCULAR; INTRAVENOUS at 11:11

## 2018-07-25 RX ADMIN — BENZOCAINE AND MENTHOL 1 LOZENGE: 15; 3.6 LOZENGE ORAL at 17:46

## 2018-07-25 RX ADMIN — DEXAMETHASONE SODIUM PHOSPHATE 4 MG: 4 INJECTION, SOLUTION INTRAMUSCULAR; INTRAVENOUS at 23:33

## 2018-07-25 RX ADMIN — Medication: at 22:19

## 2018-07-25 RX ADMIN — FENTANYL CITRATE 50 MCG: 50 INJECTION, SOLUTION INTRAMUSCULAR; INTRAVENOUS at 03:13

## 2018-07-25 RX ADMIN — CEPHALEXIN 500 MG: 500 CAPSULE ORAL at 14:22

## 2018-07-25 RX ADMIN — CEFAZOLIN SODIUM 2 G: 2 SOLUTION INTRAVENOUS at 14:22

## 2018-07-25 RX ADMIN — Medication 10 ML: at 21:00

## 2018-07-25 RX ADMIN — PANTOPRAZOLE SODIUM 40 MG: 40 TABLET, DELAYED RELEASE ORAL at 08:05

## 2018-07-25 RX ADMIN — FENTANYL CITRATE 50 MCG: 50 INJECTION, SOLUTION INTRAMUSCULAR; INTRAVENOUS at 05:33

## 2018-07-25 RX ADMIN — FENTANYL CITRATE 50 MCG: 50 INJECTION, SOLUTION INTRAMUSCULAR; INTRAVENOUS at 14:16

## 2018-07-25 RX ADMIN — POTASSIUM CHLORIDE, DEXTROSE MONOHYDRATE AND SODIUM CHLORIDE: 150; 5; 450 INJECTION, SOLUTION INTRAVENOUS at 16:43

## 2018-07-25 RX ADMIN — CEPHALEXIN 500 MG: 500 CAPSULE ORAL at 06:20

## 2018-07-25 RX ADMIN — ONDANSETRON 4 MG: 2 INJECTION INTRAMUSCULAR; INTRAVENOUS at 12:34

## 2018-07-25 RX ADMIN — Medication 10 ML: at 08:05

## 2018-07-25 RX ADMIN — FAMOTIDINE 20 MG: 10 INJECTION, SOLUTION INTRAVENOUS at 08:04

## 2018-07-25 RX ADMIN — BENZOCAINE AND MENTHOL 1 LOZENGE: 15; 3.6 LOZENGE ORAL at 08:05

## 2018-07-25 RX ADMIN — HYDROCODONE BITARTRATE AND ACETAMINOPHEN 0.5 TABLET: 5; 325 TABLET ORAL at 21:29

## 2018-07-25 RX ADMIN — FENTANYL CITRATE 50 MCG: 50 INJECTION, SOLUTION INTRAMUSCULAR; INTRAVENOUS at 00:38

## 2018-07-25 RX ADMIN — CEFAZOLIN SODIUM 2 G: 2 SOLUTION INTRAVENOUS at 06:20

## 2018-07-25 RX ADMIN — DEXAMETHASONE SODIUM PHOSPHATE 4 MG: 4 INJECTION, SOLUTION INTRAMUSCULAR; INTRAVENOUS at 06:20

## 2018-07-25 RX ADMIN — PHENYTOIN SODIUM 100 MG: 50 INJECTION INTRAMUSCULAR; INTRAVENOUS at 00:38

## 2018-07-25 RX ADMIN — CEPHALEXIN 500 MG: 500 CAPSULE ORAL at 21:30

## 2018-07-25 RX ADMIN — DEXAMETHASONE SODIUM PHOSPHATE 4 MG: 4 INJECTION, SOLUTION INTRAMUSCULAR; INTRAVENOUS at 11:11

## 2018-07-25 RX ADMIN — PHENYTOIN SODIUM 100 MG: 100 CAPSULE ORAL at 21:30

## 2018-07-25 RX ADMIN — FENTANYL CITRATE 50 MCG: 50 INJECTION, SOLUTION INTRAMUSCULAR; INTRAVENOUS at 19:18

## 2018-07-25 RX ADMIN — VENLAFAXINE HYDROCHLORIDE 75 MG: 75 CAPSULE, EXTENDED RELEASE ORAL at 08:07

## 2018-07-25 RX ADMIN — FENTANYL CITRATE 50 MCG: 50 INJECTION, SOLUTION INTRAMUSCULAR; INTRAVENOUS at 08:04

## 2018-07-25 RX ADMIN — FENTANYL CITRATE 50 MCG: 50 INJECTION, SOLUTION INTRAMUSCULAR; INTRAVENOUS at 16:38

## 2018-07-25 RX ADMIN — DEXAMETHASONE SODIUM PHOSPHATE 4 MG: 4 INJECTION, SOLUTION INTRAMUSCULAR; INTRAVENOUS at 00:37

## 2018-07-25 RX ADMIN — PHENYTOIN SODIUM 100 MG: 50 INJECTION INTRAMUSCULAR; INTRAVENOUS at 08:04

## 2018-07-25 ASSESSMENT — PAIN SCALES - GENERAL
PAINLEVEL_OUTOF10: 0
PAINLEVEL_OUTOF10: 10
PAINLEVEL_OUTOF10: 1
PAINLEVEL_OUTOF10: 8
PAINLEVEL_OUTOF10: 0
PAINLEVEL_OUTOF10: 8
PAINLEVEL_OUTOF10: 9
PAINLEVEL_OUTOF10: 0
PAINLEVEL_OUTOF10: 8
PAINLEVEL_OUTOF10: 0
PAINLEVEL_OUTOF10: 8
PAINLEVEL_OUTOF10: 0
PAINLEVEL_OUTOF10: 8
PAINLEVEL_OUTOF10: 9
PAINLEVEL_OUTOF10: 0
PAINLEVEL_OUTOF10: 10
PAINLEVEL_OUTOF10: 10

## 2018-07-25 NOTE — PROGRESS NOTES
1900 Update received at bedside, assumed care of patient. Discussed pain management plan with patient and day shift nurse, will start PO pain medication as patient may possibly transfer tomorrow. Bed locked, low, alarm on. Call light is in reach. Patient denies any pain at this time. 2000 Shift assessment completed, see notes. VSS. Low grade temp 99.2. Neurological assessment is WNL. Patient is sleeping comfortably. She is more mobile today than last night and is moving more independently and able to turn herself in bed. Denies any needs at this time. 2100 Medicated patient per MAR. Started Norco for pain management, will monitor for patient tolerance. 2200 Patient sleeping comfortably. VSS. Ice packs are still applied to head incision. 2300 Medicated patient per MAR.    0000 Reassessment completed, see notes. VSS. No neuro changes noted. 0100 VSS Resting comfortably. 0200 Patient c/o pain and asked when she could have pain medication. She stated that her head \"doesn't really hurt\". Next dose is due at 3:30. Medicated patient for nausea. 0300 Pain medication per MAR.    0400 Reassessment completed, see notes. VSS. No neuro changes noted. 0500 I/O completed and on chart. Lab at bedside. 0600 Will report off to day shift starting at 0700.

## 2018-07-25 NOTE — PLAN OF CARE
Problem: Pain:  Goal: Pain level will decrease  Pain level will decrease    Outcome: Ongoing    Goal: Control of acute pain  Control of acute pain   Outcome: Ongoing    Goal: Control of chronic pain  Control of chronic pain   Outcome: Ongoing      Problem: Discharge Planning:  Goal: Participates in care planning  Participates in care planning   Outcome: Ongoing      Problem: Anxiety/Stress:  Goal: Level of anxiety will decrease  Level of anxiety will decrease   Outcome: Ongoing      Problem: Aspiration:  Goal: Absence of aspiration  Absence of aspiration   Outcome: Ongoing      Problem:  Bowel Function - Altered:  Goal: Bowel elimination is within specified parameters  Bowel elimination is within specified parameters   Outcome: Ongoing      Problem: Cardiac Output - Decreased:  Goal: Hemodynamic stability will improve  Hemodynamic stability will improve   Outcome: Ongoing      Problem: Fluid Volume - Imbalance:  Goal: Absence of imbalanced fluid volume signs and symptoms  Absence of imbalanced fluid volume signs and symptoms   Outcome: Ongoing      Problem: Gas Exchange - Impaired:  Goal: Levels of oxygenation will improve  Levels of oxygenation will improve   Outcome: Ongoing      Problem: Mental Status - Impaired:  Goal: Mental status will be restored to baseline  Mental status will be restored to baseline   Outcome: Ongoing      Problem: Nutrition Deficit:  Goal: Ability to achieve adequate nutritional intake will improve  Ability to achieve adequate nutritional intake will improve   Outcome: Ongoing      Problem: Pain:  Goal: Pain level will decrease  Pain level will decrease    Outcome: Ongoing      Problem: Serum Glucose Level - Abnormal:  Goal: Ability to maintain appropriate glucose levels will improve to within specified parameters  Ability to maintain appropriate glucose levels will improve to within specified parameters   Outcome: Ongoing      Problem: Risk for Impaired Skin Integrity  Goal: Tissue integrity - skin and mucous membranes  Structural intactness and normal physiological function of skin and  mucous membranes.    Outcome: Ongoing

## 2018-07-25 NOTE — PROGRESS NOTES
from the domes of the diaphragm to the symphysis pubis. Sagittal and coronal reconstructions were also obtained. Oral contrast medium:  None. Intravenous contrast medium:  None. Comparison:  None. Findings: Lungs:  Lung bases are clear. Liver:  Normal in size, and shape. 4.8 cm cyst in caudate lobe and 7 mm cyst right hepatic lobe unchanged. Bile Ducts:  Normal in caliber. Gallbladder:  Surgically absent. Pancreas:  Normal without masses, cysts, ductal dilatation or calcification. Spleen:  Normal in size without masses or calcifications. No splenules. Kidneys:  Normal in size. No hydronephrosis,, or stones. 2.3 cm cyst medial upper pole left kidney unchanged. Adrenals:  Normal. Small bowel:  Normal in caliber. Appendix:  Surgically absent. Colon:  Normal in caliber. Peritoneum:  No ascites, free air, or fluid collections. Vessels: Aorta normal in course and caliber Lymph nodes:  Retroperitoneal:  No enlarged retroperitoneal lymph nodes. Mesenteric:  No enlarged mesenteric lymph nodes. Pelvic: No enlarged pelvic lymph notes. Bladder: Decompressed. Reproductive organs: Uterus surgically absent. Abdominal Wall: 10 mm fat-containing periumbilical anterior abdominal wall defect. Bones:  No bone lesions. Lumbar laminectomy with posterior placement of paired pedicle screws at the L3-L4 level. Intravertebral disc space device identified at L3-L4. Diffuse disc space narrowing with vacuum disc L5-S1, L2-L3. Cholecystectomy. Hysterectomy. Appendectomy. Stable hepatic and renal cysts. Internal fixation L3-4 as described. Other findings discussed. All CT scans at this facility use dose modulation, iterative reconstruction, and/or weight based dosing when appropriate to reduce radiation dose to as low as reasonably achievable.      Ct Head Wo Contrast    Result Date: 7/24/2018  CT HEAD WO CONTRAST CLINICAL HISTORY: Postoperative craniotomy, follow-up Comparison: July 16, 2018 TECHNIQUE: Multiple unenhanced serial axial Contrast    Result Date: 7/17/2018  MRI BRAIN W WO CONTRAST : 7/17/2018 CLINICAL HISTORY:  MASS OR LUMP, HEAD . COMPARISON: Noncontrast head CT 7/16/2018, head without with contrast 2/17/2014. TECHNIQUE: Multiplanar MR imaging of the pelvis performed before and after intravenous administration of approximately 20 mL of ProHance gadolinium contrast. FINDINGS: A nearly uniformly enhancing lobulated solid extra-axial 3.5 x 3 x 2 cm mass arising from the inferior aspect of the anterior cranial fossa midline is consistent with a meningioma with a broad-based dural tail. This has enlarged from approximately 2.5 x 1.5 cm from the prior study. There has mild to moderate surrounding vasogenic edema of the adjacent right frontal lobe, with approximately 7 mL of right-to-left midline shift anteriorly. Malignant transformation is not excluded. There are no other enhancing masses identified elsewhere. Small areas of chronic appearing subcortical white matter change are present more posteriorly within the frontal lobes. There is no significant cerebral atrophy, or hemorrhage, or hydrocephalus. ENLARGING PREDOMINANTLY RIGHT FRONTAL MENINGIOMA OF THE INFERIOR ANTERIOR CRANIAL FOSSA, WITH SURROUNDING VASOGENIC EDEMA AND 7 MM OF RIGHT-TO-LEFT MIDLINE SHIFT ANTERIORLY. MALIGNANT TRANSFORMATION IS NOT EXCLUDED. /63   Pulse 57   Temp 98 °F (36.7 °C) (Oral)   Resp 15   Ht 5' 5\" (1.651 m)   Wt 217 lb (98.4 kg)   LMP  (LMP Unknown)   SpO2 95%   BMI 36.11 kg/m²     Status post craniotomy for ol factory groove meningioma resection. Complaints of pain from the incision. Afebrile vital signs stable awake alert attentive speech is normal EOMIs intact vision is intact following commands. Lab is good CAT scan CT with satisfaction but still with edema    Overall doing well, regular diet, ambulate with the PTOT and continue her to stay in ICU today.   Continue with Decadron

## 2018-07-26 LAB
ANION GAP SERPL CALCULATED.3IONS-SCNC: 11 MEQ/L (ref 7–13)
BASOPHILS ABSOLUTE: 0 K/UL (ref 0–0.2)
BASOPHILS RELATIVE PERCENT: 0.2 %
BLOOD BANK DISPENSE STATUS: NORMAL
BLOOD BANK DISPENSE STATUS: NORMAL
BLOOD BANK PRODUCT CODE: NORMAL
BLOOD BANK PRODUCT CODE: NORMAL
BPU ID: NORMAL
BPU ID: NORMAL
BUN BLDV-MCNC: 12 MG/DL (ref 8–23)
CALCIUM SERPL-MCNC: 8.5 MG/DL (ref 8.6–10.2)
CHLORIDE BLD-SCNC: 102 MEQ/L (ref 98–107)
CO2: 23 MEQ/L (ref 22–29)
CREAT SERPL-MCNC: 0.47 MG/DL (ref 0.5–0.9)
DESCRIPTION BLOOD BANK: NORMAL
DESCRIPTION BLOOD BANK: NORMAL
EOSINOPHILS ABSOLUTE: 0 K/UL (ref 0–0.7)
EOSINOPHILS RELATIVE PERCENT: 0 %
GFR AFRICAN AMERICAN: >60
GFR NON-AFRICAN AMERICAN: >60
GLUCOSE BLD-MCNC: 150 MG/DL (ref 74–109)
HCT VFR BLD CALC: 43.9 % (ref 37–47)
HEMOGLOBIN: 14.8 G/DL (ref 12–16)
LYMPHOCYTES ABSOLUTE: 0.8 K/UL (ref 1–4.8)
LYMPHOCYTES RELATIVE PERCENT: 5 %
MCH RBC QN AUTO: 31.1 PG (ref 27–31.3)
MCHC RBC AUTO-ENTMCNC: 33.6 % (ref 33–37)
MCV RBC AUTO: 92.5 FL (ref 82–100)
MONOCYTES ABSOLUTE: 1.2 K/UL (ref 0.2–0.8)
MONOCYTES RELATIVE PERCENT: 7.6 %
NEUTROPHILS ABSOLUTE: 14.1 K/UL (ref 1.4–6.5)
NEUTROPHILS RELATIVE PERCENT: 87.2 %
PDW BLD-RTO: 13.9 % (ref 11.5–14.5)
PLATELET # BLD: 195 K/UL (ref 130–400)
POTASSIUM REFLEX MAGNESIUM: 4.3 MEQ/L (ref 3.5–5.1)
RBC # BLD: 4.75 M/UL (ref 4.2–5.4)
SODIUM BLD-SCNC: 136 MEQ/L (ref 132–144)
WBC # BLD: 16.2 K/UL (ref 4.8–10.8)

## 2018-07-26 PROCEDURE — 2580000003 HC RX 258: Performed by: NEUROLOGICAL SURGERY

## 2018-07-26 PROCEDURE — 36415 COLL VENOUS BLD VENIPUNCTURE: CPT

## 2018-07-26 PROCEDURE — 6370000000 HC RX 637 (ALT 250 FOR IP): Performed by: NEUROLOGICAL SURGERY

## 2018-07-26 PROCEDURE — 80048 BASIC METABOLIC PNL TOTAL CA: CPT

## 2018-07-26 PROCEDURE — 85025 COMPLETE CBC W/AUTO DIFF WBC: CPT

## 2018-07-26 PROCEDURE — 1210000000 HC MED SURG R&B

## 2018-07-26 PROCEDURE — 6360000002 HC RX W HCPCS: Performed by: NEUROLOGICAL SURGERY

## 2018-07-26 RX ORDER — DOCUSATE SODIUM 100 MG/1
100 CAPSULE, LIQUID FILLED ORAL DAILY
Status: DISCONTINUED | OUTPATIENT
Start: 2018-07-26 | End: 2018-07-28 | Stop reason: HOSPADM

## 2018-07-26 RX ORDER — ACETAMINOPHEN 325 MG/1
650 TABLET ORAL EVERY 8 HOURS PRN
Status: DISCONTINUED | OUTPATIENT
Start: 2018-07-26 | End: 2018-07-28 | Stop reason: HOSPADM

## 2018-07-26 RX ORDER — BACITRACIN, NEOMYCIN, POLYMYXIN B 400; 3.5; 5 [USP'U]/G; MG/G; [USP'U]/G
OINTMENT TOPICAL 2 TIMES DAILY
Status: DISCONTINUED | OUTPATIENT
Start: 2018-07-26 | End: 2018-07-28 | Stop reason: HOSPADM

## 2018-07-26 RX ORDER — DEXAMETHASONE SODIUM PHOSPHATE 4 MG/ML
2 INJECTION, SOLUTION INTRA-ARTICULAR; INTRALESIONAL; INTRAMUSCULAR; INTRAVENOUS; SOFT TISSUE EVERY 6 HOURS
Status: DISCONTINUED | OUTPATIENT
Start: 2018-07-26 | End: 2018-07-27

## 2018-07-26 RX ADMIN — Medication 10 ML: at 10:00

## 2018-07-26 RX ADMIN — BACITRACIN ZINC, NEOMYCIN, POLYMYXIN B 1 TUBE: 400; 3.5; 5 OINTMENT TOPICAL at 10:00

## 2018-07-26 RX ADMIN — FLUTICASONE PROPIONATE 1 SPRAY: 50 SPRAY, METERED NASAL at 09:08

## 2018-07-26 RX ADMIN — DOCUSATE SODIUM 100 MG: 100 CAPSULE, LIQUID FILLED ORAL at 11:05

## 2018-07-26 RX ADMIN — HYDROCODONE BITARTRATE AND ACETAMINOPHEN 0.5 TABLET: 5; 325 TABLET ORAL at 15:42

## 2018-07-26 RX ADMIN — Medication 10 ML: at 19:52

## 2018-07-26 RX ADMIN — ONDANSETRON 4 MG: 2 INJECTION INTRAMUSCULAR; INTRAVENOUS at 02:22

## 2018-07-26 RX ADMIN — ACETAMINOPHEN 650 MG: 325 TABLET ORAL at 01:41

## 2018-07-26 RX ADMIN — DEXAMETHASONE SODIUM PHOSPHATE 2 MG: 4 INJECTION, SOLUTION INTRAMUSCULAR; INTRAVENOUS at 11:05

## 2018-07-26 RX ADMIN — CEPHALEXIN 500 MG: 500 CAPSULE ORAL at 22:08

## 2018-07-26 RX ADMIN — PANTOPRAZOLE SODIUM 40 MG: 40 TABLET, DELAYED RELEASE ORAL at 08:27

## 2018-07-26 RX ADMIN — PHENYTOIN SODIUM 100 MG: 100 CAPSULE ORAL at 08:27

## 2018-07-26 RX ADMIN — DEXAMETHASONE SODIUM PHOSPHATE 2 MG: 4 INJECTION, SOLUTION INTRAMUSCULAR; INTRAVENOUS at 21:09

## 2018-07-26 RX ADMIN — METOPROLOL SUCCINATE 100 MG: 100 TABLET, EXTENDED RELEASE ORAL at 08:27

## 2018-07-26 RX ADMIN — BACITRACIN ZINC, NEOMYCIN, POLYMYXIN B 1 TUBE: 400; 3.5; 5 OINTMENT TOPICAL at 22:08

## 2018-07-26 RX ADMIN — PHENYTOIN SODIUM 100 MG: 100 CAPSULE ORAL at 23:26

## 2018-07-26 RX ADMIN — HYDROCODONE BITARTRATE AND ACETAMINOPHEN 0.5 TABLET: 5; 325 TABLET ORAL at 03:18

## 2018-07-26 RX ADMIN — CEPHALEXIN 500 MG: 500 CAPSULE ORAL at 13:25

## 2018-07-26 RX ADMIN — VENLAFAXINE HYDROCHLORIDE 75 MG: 75 CAPSULE, EXTENDED RELEASE ORAL at 08:27

## 2018-07-26 RX ADMIN — DEXAMETHASONE SODIUM PHOSPHATE 4 MG: 4 INJECTION, SOLUTION INTRAMUSCULAR; INTRAVENOUS at 05:31

## 2018-07-26 RX ADMIN — CEPHALEXIN 500 MG: 500 CAPSULE ORAL at 05:31

## 2018-07-26 RX ADMIN — ONDANSETRON 4 MG: 2 INJECTION INTRAMUSCULAR; INTRAVENOUS at 19:56

## 2018-07-26 RX ADMIN — BENZOCAINE AND MENTHOL 1 LOZENGE: 15; 3.6 LOZENGE ORAL at 08:27

## 2018-07-26 ASSESSMENT — PAIN SCALES - GENERAL
PAINLEVEL_OUTOF10: 0
PAINLEVEL_OUTOF10: 0
PAINLEVEL_OUTOF10: 8
PAINLEVEL_OUTOF10: 0
PAINLEVEL_OUTOF10: 0
PAINLEVEL_OUTOF10: 7
PAINLEVEL_OUTOF10: 2
PAINLEVEL_OUTOF10: 0
PAINLEVEL_OUTOF10: 8
PAINLEVEL_OUTOF10: 0
PAINLEVEL_OUTOF10: 9
PAINLEVEL_OUTOF10: 8
PAINLEVEL_OUTOF10: 0
PAINLEVEL_OUTOF10: 8
PAINLEVEL_OUTOF10: 0

## 2018-07-26 ASSESSMENT — PAIN DESCRIPTION - PAIN TYPE: TYPE: SURGICAL PAIN

## 2018-07-26 ASSESSMENT — PAIN DESCRIPTION - ORIENTATION: ORIENTATION: ANTERIOR

## 2018-07-26 ASSESSMENT — PAIN DESCRIPTION - LOCATION: LOCATION: HEAD

## 2018-07-26 NOTE — PROGRESS NOTES
AM assessments as documented. Intermittent surgical incision discomfort. Pt using ice bag on area. She states that this is adequate to control discomfort. Dressing was removed by Dr. Kelle Garcia this AM. Cleansed with saline, bacitracin and DSD reapplied by myself. Pt tolerated this well. Mcleod and IVF were also discontinued per order. Pt tolerated this well and understands to call for assist if she wants OOB. Call light in reach.    Electronically signed by Carlos Manuel Toledo RN on 7/26/2018 at 12:10 PM

## 2018-07-26 NOTE — PROGRESS NOTES
Patient: Lee Guevara  Unit/Bed: IC12/IC12-01  YOB: 1955  MRN: [id number] Acct: [de-identified]   Admitting Diagnosis: Meningioma ([address] [D32.9]  Admit Date:  7/24/2018  Hospital Day: 2    Current Medications:    Scheduled Meds:   dexamethasone  2 mg Oral Q6H    neomycin-bacitracin-polymyxin   Topical BID    docusate sodium  100 mg Oral Daily    phenytoin  100 mg Oral BID    fluticasone  1 spray Nasal Daily    metoprolol succinate  100 mg Oral Daily    pantoprazole  40 mg Oral Daily    venlafaxine  75 mg Oral Daily    sodium chloride flush  10 mL Intravenous 2 times per day    cephALEXin  500 mg Oral 3 times per day     Continuous Infusions:  PRN Meds:.acetaminophen, benzocaine-menthol, HYDROcodone-acetaminophen, hyoscyamine, sodium chloride flush, magnesium hydroxide, bisacodyl, ondansetron, labetalol, fentanNYL  . Recent Labs      07/24/18 1738  07/25/18 0457  07/26/18   0452   WBC  18.7*  17.9*  16.2*   HGB  16.2*  15.5  14.8   HCT  48.5*  45.7  43.9   MCV  92.9  92.4  92.5   PLT  233  205  195     Recent Labs      07/24/18 1738  07/25/18 0457  07/26/18   0452   NA  138  138  136   K  4.6  4.8  4.3   CL  99  101  102   CO2  24  24  23   BUN  18  14  12   CREATININE  0.62  0.60  0.47*     Recent Labs      07/24/18 1738   AST  78*   ALT  62*   BILITOT  0.8   ALKPHOS  101     No results for input(s): LIPASE, AMYLASE in the last 72 hours. Recent Labs      07/24/18   1738  07/25/18 0457   PROT  5.9*   --    INR  1.1  1.1       Imaging Results:    Ct Abdomen Pelvis Wo Contrast Additional Contrast? None    Result Date: 7/14/2018  CT of the Abdomen and Pelvis without intravenous contrast medium History:  Abdominal pain with nausea for 3 days. Cold symptoms Technical Factors: CT imaging of the abdomen and pelvis were obtained and formatted as 5 mm contiguous axial images from the domes of the diaphragm to the symphysis pubis.   Sagittal and coronal reconstructions were also scattered areas of increased attenuation subjacent, overlying the left and right frontal lobes and within the interhemispheric fissure which may represent that of postoperative changes, blood. The cisterns are patent. There are bifrontotemporal soft tissues changes and subcutaneous air most likely postoperative. INTERVAL RESECTION OF THE PREVIOUSLY SEEN SOLID EXTRA-AXIAL MASS ARISING FROM THE INFERIOR ASPECT OF THE ANTERIOR CRANIAL FOSSA. THERE IS ASSOCIATED POSTOPERATIVE PNEUMOCEPHALY OVERLYING THE FRONTAL LOBES AND WITHIN THE INTERHEMISPHERIC FISSURE AS WELL AS WITHIN THE ANTERIOR ASPECT OF THE LEFT MIDDLE CRANIAL FOSSA. THERE ARE AREAS OF INCREASED ATTENUATION OVERLYING BOTH LEFT AND RIGHT FRONTAL LOBES AND WITHIN THE INTERHEMISPHERIC FISSURE MOST LIKELY THAT OF BLOOD, RELATED TO POSTOPERATIVE STATE. AREA OF LOW-ATTENUATION, PROBABLE VASOGENIC EDEMA PERSISTS WITHIN THE RIGHT SUBCORTICAL FRONTAL LOBE. MIDLINE SHIFT TO THE LEFT OF APPROXIMATELY 6 TO 7 MM. UNCHANGED. OTHER FINDINGS DETAILED ABOVE. All CT scans at this facility use dose modulation, iterative reconstruction, and/or weight based dosing when appropriate to reduce radiation dose to as low as reasonably achievable. Ct Head Wo Contrast    Result Date: 7/16/2018  EXAMINATION: CT BRAIN WITHOUT CONTRAST CLINICAL HISTORY:  headache  nausea COMPARISONS: 10/29/2014 TECHNIQUE: Spiral scans without contrast. Multiplanar 2-D reconstructions. All CT scans at this facility use dose modulation, iterative reconstruction, and/or weight based dosing when appropriate to reduce radiation dose to as low as reasonably achievable. FINDINGS: There is a solid right frontal parafalcine 2 x 3 x 3.5 cm mass with concomitant right frontal vasogenic edema, consistent with neoplasm. There is questionably trace petechial hemorrhage within the right frontal cortical gray matter overlying the vasogenic edema. There is approximately 5 mm of right-to-left midline shift.  No other

## 2018-07-26 NOTE — PROGRESS NOTES
Pt was assisted to [facility] and returned to recliner. She tolerated this well and denies complaints.  Electronically signed by Alan Harrison RN on 7/26/2018 at 2:28 PM

## 2018-07-26 NOTE — PROGRESS NOTES
Report called to [address]. Pt called her  and notified him. Transport here. Pt to [facility] via bed with personal belongings.  Electronically signed by Omer Elise RN on 7/26/2018 at 7:00 PM

## 2018-07-27 PROCEDURE — G8987 SELF CARE CURRENT STATUS: HCPCS

## 2018-07-27 PROCEDURE — 1210000000 HC MED SURG R&B

## 2018-07-27 PROCEDURE — 97165 OT EVAL LOW COMPLEX 30 MIN: CPT

## 2018-07-27 PROCEDURE — 6370000000 HC RX 637 (ALT 250 FOR IP): Performed by: NEUROLOGICAL SURGERY

## 2018-07-27 PROCEDURE — 6360000002 HC RX W HCPCS: Performed by: NEUROLOGICAL SURGERY

## 2018-07-27 PROCEDURE — 6360000002 HC RX W HCPCS: Performed by: NURSE PRACTITIONER

## 2018-07-27 PROCEDURE — G8989 SELF CARE D/C STATUS: HCPCS

## 2018-07-27 PROCEDURE — 2580000003 HC RX 258: Performed by: NEUROLOGICAL SURGERY

## 2018-07-27 PROCEDURE — 97535 SELF CARE MNGMENT TRAINING: CPT

## 2018-07-27 PROCEDURE — G8988 SELF CARE GOAL STATUS: HCPCS

## 2018-07-27 RX ORDER — HYDROCODONE BITARTRATE AND ACETAMINOPHEN 5; 325 MG/1; MG/1
0.5 TABLET ORAL EVERY 6 HOURS PRN
Qty: 30 TABLET | Refills: 0 | Status: SHIPPED | OUTPATIENT
Start: 2018-07-27 | End: 2018-07-27

## 2018-07-27 RX ORDER — SENNA AND DOCUSATE SODIUM 50; 8.6 MG/1; MG/1
1 TABLET, FILM COATED ORAL 2 TIMES DAILY
Qty: 60 TABLET | Refills: 0 | Status: SHIPPED | OUTPATIENT
Start: 2018-07-27 | End: 2018-08-26

## 2018-07-27 RX ORDER — SENNA AND DOCUSATE SODIUM 50; 8.6 MG/1; MG/1
1 TABLET, FILM COATED ORAL 2 TIMES DAILY
Qty: 60 TABLET | Refills: 0 | Status: SHIPPED | OUTPATIENT
Start: 2018-07-27 | End: 2018-07-27

## 2018-07-27 RX ORDER — PHENYTOIN SODIUM 100 MG/1
100 CAPSULE, EXTENDED RELEASE ORAL 2 TIMES DAILY
Qty: 60 CAPSULE | Refills: 0 | Status: SHIPPED | OUTPATIENT
Start: 2018-07-27 | End: 2018-07-27

## 2018-07-27 RX ORDER — PHENYTOIN SODIUM 100 MG/1
100 CAPSULE, EXTENDED RELEASE ORAL 2 TIMES DAILY
Qty: 60 CAPSULE | Refills: 0 | Status: SHIPPED | OUTPATIENT
Start: 2018-07-27 | End: 2018-10-08

## 2018-07-27 RX ORDER — CEPHALEXIN 500 MG/1
500 CAPSULE ORAL EVERY 8 HOURS SCHEDULED
Qty: 21 CAPSULE | Refills: 0 | Status: SHIPPED | OUTPATIENT
Start: 2018-07-27 | End: 2018-07-27

## 2018-07-27 RX ORDER — CEPHALEXIN 500 MG/1
500 CAPSULE ORAL EVERY 8 HOURS SCHEDULED
Qty: 21 CAPSULE | Refills: 0 | Status: SHIPPED | OUTPATIENT
Start: 2018-07-27 | End: 2018-07-28 | Stop reason: HOSPADM

## 2018-07-27 RX ORDER — HYDROCODONE BITARTRATE AND ACETAMINOPHEN 5; 325 MG/1; MG/1
0.5 TABLET ORAL EVERY 6 HOURS PRN
Qty: 30 TABLET | Refills: 0 | Status: SHIPPED | OUTPATIENT
Start: 2018-07-27 | End: 2018-08-03

## 2018-07-27 RX ORDER — DEXAMETHASONE 1 MG
1 TABLET ORAL 2 TIMES DAILY
Qty: 20 TABLET | Refills: 0 | Status: SHIPPED | OUTPATIENT
Start: 2018-07-27 | End: 2018-08-06

## 2018-07-27 RX ORDER — DEXAMETHASONE 1 MG
1 TABLET ORAL EVERY 6 HOURS SCHEDULED
Status: DISCONTINUED | OUTPATIENT
Start: 2018-07-27 | End: 2018-07-28 | Stop reason: HOSPADM

## 2018-07-27 RX ADMIN — DEXAMETHASONE 1 MG: 1 TABLET ORAL at 19:59

## 2018-07-27 RX ADMIN — METOPROLOL SUCCINATE 100 MG: 100 TABLET, EXTENDED RELEASE ORAL at 08:55

## 2018-07-27 RX ADMIN — PANTOPRAZOLE SODIUM 40 MG: 40 TABLET, DELAYED RELEASE ORAL at 08:55

## 2018-07-27 RX ADMIN — CEPHALEXIN 500 MG: 500 CAPSULE ORAL at 14:34

## 2018-07-27 RX ADMIN — PHENYTOIN SODIUM 100 MG: 100 CAPSULE ORAL at 08:55

## 2018-07-27 RX ADMIN — VENLAFAXINE HYDROCHLORIDE 75 MG: 75 CAPSULE, EXTENDED RELEASE ORAL at 08:55

## 2018-07-27 RX ADMIN — DEXAMETHASONE 1 MG: 1 TABLET ORAL at 14:33

## 2018-07-27 RX ADMIN — HYDROCODONE BITARTRATE AND ACETAMINOPHEN 0.5 TABLET: 5; 325 TABLET ORAL at 00:27

## 2018-07-27 RX ADMIN — BACITRACIN ZINC, NEOMYCIN, POLYMYXIN B 1 TUBE: 400; 3.5; 5 OINTMENT TOPICAL at 19:59

## 2018-07-27 RX ADMIN — CEPHALEXIN 500 MG: 500 CAPSULE ORAL at 06:39

## 2018-07-27 RX ADMIN — PHENYTOIN SODIUM 100 MG: 100 CAPSULE ORAL at 19:59

## 2018-07-27 RX ADMIN — ACETAMINOPHEN 650 MG: 325 TABLET ORAL at 21:57

## 2018-07-27 RX ADMIN — Medication 10 ML: at 08:55

## 2018-07-27 RX ADMIN — DOCUSATE SODIUM 100 MG: 100 CAPSULE, LIQUID FILLED ORAL at 08:54

## 2018-07-27 RX ADMIN — DEXAMETHASONE SODIUM PHOSPHATE 2 MG: 4 INJECTION, SOLUTION INTRAMUSCULAR; INTRAVENOUS at 03:02

## 2018-07-27 RX ADMIN — BACITRACIN ZINC, NEOMYCIN, POLYMYXIN B 1 TUBE: 400; 3.5; 5 OINTMENT TOPICAL at 08:55

## 2018-07-27 RX ADMIN — Medication 10 ML: at 20:00

## 2018-07-27 RX ADMIN — HYDROCODONE BITARTRATE AND ACETAMINOPHEN 0.5 TABLET: 5; 325 TABLET ORAL at 09:00

## 2018-07-27 RX ADMIN — CEPHALEXIN 500 MG: 500 CAPSULE ORAL at 21:54

## 2018-07-27 RX ADMIN — ACETAMINOPHEN 650 MG: 325 TABLET ORAL at 14:37

## 2018-07-27 RX ADMIN — DEXAMETHASONE 1 MG: 1 TABLET ORAL at 08:55

## 2018-07-27 RX ADMIN — FLUTICASONE PROPIONATE 1 SPRAY: 50 SPRAY, METERED NASAL at 10:28

## 2018-07-27 ASSESSMENT — PAIN SCALES - GENERAL
PAINLEVEL_OUTOF10: 9
PAINLEVEL_OUTOF10: 3
PAINLEVEL_OUTOF10: 5
PAINLEVEL_OUTOF10: 3
PAINLEVEL_OUTOF10: 6
PAINLEVEL_OUTOF10: 9

## 2018-07-27 ASSESSMENT — PAIN DESCRIPTION - LOCATION: LOCATION: HEAD

## 2018-07-27 NOTE — PROGRESS NOTES
strength at home including standing to wash dishes, folding laundry and other light household tasks. Educated on use of [employer] at home if she continues to feel off balance during ambulation. Pt. reports G understanding. Pt. states her spouse will do the driving and most cooking. Pt. educated to cook only easy stovetop foods until her balance feels to return to normal. Pt. states she understands all education and feels comfortable with her return home. Treatment Plan will consist of:   [] ADL Training   [] Strengthening   [] Endurance   [] Transfer Training   []  DME ed      [] HEP  [] Manual Therapy   [] AROM/PROM    [] Coordination   [] Cognitive Training   []Safety training   [x] Other : Home management training    Goals:   No further OT goals at this time; pt. education completed for home management and pt. reports understanding. Patient Goal: \"Go home\"  Discussed and agreed upon: [x] Yes   [] No         Comments:     Assessment/Discharge Disposition:  Assessment: Pt. is a 66year old female from home who presents to [facility] OT s/p craniotomy. Pt. demonstrates G awareness of deficits and G endurance for ADLs. Pt. demo'd G understanding of OT education and has no further skilled OT needs at this time.    Performance deficits / Impairments: Decreased balance  Prognosis: Good  No Skilled OT: Independent with functional mobility, Independent with ADL's  History: Pt's medical history moderately complex  Exam: Pt has one functional deficit currently impacting her function  Assistance / Modification: Pt. required CGA but had no device available; with a device she will likely be independent    Prognosis:  [x] Good   []Fair   [] Poor     Barriers to Improvement:  None    Six Click Score  How much help for putting on and taking off regular lower body clothing?: None  How much help for Bathing?: None  How much help for Toileting?: None  How much help for putting on and taking off regular upper body clothing?: None  How much help for taking care of personal grooming?: None  How much help for eating meals?: None  AM-PAC Inpatient Daily Activity Raw Score: 24  AM-PAC Inpatient ADL T-Scale Score : 57.54  ADL Inpatient CMS 0-100% Score: 0    Recommended DME:  [] W/W   [] Vearl Gia   [] Rollator   [] W/C   [] Grab Bars  [] Shower Chair   []Dressing AD [x]  [facility]  [] Other:  BSC recommended only if pt. wants to stay on the first floor at home where her bathroom is upstairs. Plan:Times per week: 1 visit (initiated and completed immediately following eval), no further OT indicated,      G-Codes:  OT G-codes  Functional Assessment Tool Used: Clinical observation  Functional Limitation: Self care  Self Care Current Status (): At least 1 percent but less than 20 percent impaired, limited or restricted  Self Care Goal Status (): At least 1 percent but less than 20 percent impaired, limited or restricted  Self Care Discharge Status (): At least 1 percent but less than 20 percent impaired, limited or restricted    Time in:  1345  Time out:  1408  Timed treatment minutes:  8  Total treatment time/minutes:  23    Electronically signed by:     CATRACHO Soliz  7/27/2018, 2:27 PM

## 2018-07-28 VITALS
DIASTOLIC BLOOD PRESSURE: 58 MMHG | WEIGHT: 222.66 LBS | RESPIRATION RATE: 16 BRPM | HEIGHT: 65 IN | TEMPERATURE: 98.2 F | OXYGEN SATURATION: 98 % | SYSTOLIC BLOOD PRESSURE: 114 MMHG | HEART RATE: 58 BPM | BODY MASS INDEX: 37.1 KG/M2

## 2018-07-28 PROCEDURE — 6370000000 HC RX 637 (ALT 250 FOR IP): Performed by: NEUROLOGICAL SURGERY

## 2018-07-28 PROCEDURE — 6360000002 HC RX W HCPCS: Performed by: NURSE PRACTITIONER

## 2018-07-28 PROCEDURE — 2580000003 HC RX 258: Performed by: NEUROLOGICAL SURGERY

## 2018-07-28 PROCEDURE — 2580000003 HC RX 258

## 2018-07-28 RX ORDER — MAGNESIUM HYDROXIDE 1200 MG/15ML
LIQUID ORAL
Status: COMPLETED
Start: 2018-07-28 | End: 2018-07-28

## 2018-07-28 RX ADMIN — PHENYTOIN SODIUM 100 MG: 100 CAPSULE ORAL at 08:30

## 2018-07-28 RX ADMIN — METOPROLOL SUCCINATE 100 MG: 100 TABLET, EXTENDED RELEASE ORAL at 08:31

## 2018-07-28 RX ADMIN — Medication 10 ML: at 08:31

## 2018-07-28 RX ADMIN — DEXAMETHASONE 1 MG: 1 TABLET ORAL at 01:10

## 2018-07-28 RX ADMIN — ACETAMINOPHEN 650 MG: 325 TABLET ORAL at 08:30

## 2018-07-28 RX ADMIN — BACITRACIN ZINC, NEOMYCIN, POLYMYXIN B 1 TUBE: 400; 3.5; 5 OINTMENT TOPICAL at 11:12

## 2018-07-28 RX ADMIN — FLUTICASONE PROPIONATE 1 SPRAY: 50 SPRAY, METERED NASAL at 11:12

## 2018-07-28 RX ADMIN — SODIUM CHLORIDE 1000 ML: 900 IRRIGANT IRRIGATION at 11:13

## 2018-07-28 RX ADMIN — CEPHALEXIN 500 MG: 500 CAPSULE ORAL at 05:52

## 2018-07-28 RX ADMIN — DOCUSATE SODIUM 100 MG: 100 CAPSULE, LIQUID FILLED ORAL at 08:31

## 2018-07-28 RX ADMIN — PANTOPRAZOLE SODIUM 40 MG: 40 TABLET, DELAYED RELEASE ORAL at 08:30

## 2018-07-28 RX ADMIN — VENLAFAXINE HYDROCHLORIDE 75 MG: 75 CAPSULE, EXTENDED RELEASE ORAL at 08:31

## 2018-07-28 RX ADMIN — DEXAMETHASONE 1 MG: 1 TABLET ORAL at 05:52

## 2018-07-28 ASSESSMENT — PAIN SCALES - GENERAL
PAINLEVEL_OUTOF10: 0
PAINLEVEL_OUTOF10: 5

## 2018-07-28 NOTE — DISCHARGE SUMMARY
Discharge summary  This patient Carolann Ayers was admitted to the hospital on 7/24/2018  after undergoing Procedure(s) (LRB):  CRANIOTOMY (N/A) without complications that morning. Hospital course  Patient tolerated surgical procedure well and there was no complications. Patient progressed adequtly through their recovery during hospital stay including PT and rehabilitation. Patient was then D/C on  to Home  in stable condition. Patient was instructed on the use of pain medications, the signs and symptoms of infection, and was given our number to call should they have any questions or concerns following discharge. MRI BRAIN W WO CONTRAST : 7/17/2018       CLINICAL HISTORY:  MASS OR LUMP, HEAD .       COMPARISON: Noncontrast head CT 7/16/2018, head without with contrast 2/17/2014.       TECHNIQUE: Multiplanar MR imaging of the pelvis performed before and after intravenous administration of approximately 20 mL of ProHance gadolinium contrast.           FINDINGS:        A nearly uniformly enhancing lobulated solid extra-axial 3.5 x 3 x 2 cm mass arising from the inferior aspect of the anterior cranial fossa midline is consistent with a meningioma with a broad-based dural tail.       This has enlarged from approximately 2.5 x 1.5 cm from the prior study.       There has mild to moderate surrounding vasogenic edema of the adjacent right frontal lobe, with approximately 7 mL of right-to-left midline shift anteriorly. Malignant transformation is not excluded.       There are no other enhancing masses identified elsewhere.       Small areas of chronic appearing subcortical white matter change are present more posteriorly within the frontal lobes.  There is no significant cerebral atrophy, or hemorrhage, or hydrocephalus.                   Impression       ENLARGING PREDOMINANTLY RIGHT FRONTAL MENINGIOMA OF THE INFERIOR ANTERIOR CRANIAL FOSSA, WITH SURROUNDING VASOGENIC EDEMA AND 7 MM OF RIGHT-TO-LEFT MIDLINE

## 2018-07-30 ENCOUNTER — TELEPHONE (OUTPATIENT)
Dept: FAMILY MEDICINE CLINIC | Age: 63
End: 2018-07-30

## 2018-08-08 ENCOUNTER — OFFICE VISIT (OUTPATIENT)
Dept: FAMILY MEDICINE CLINIC | Age: 63
End: 2018-08-08
Payer: COMMERCIAL

## 2018-08-08 VITALS
WEIGHT: 213.3 LBS | HEART RATE: 72 BPM | SYSTOLIC BLOOD PRESSURE: 130 MMHG | DIASTOLIC BLOOD PRESSURE: 88 MMHG | RESPIRATION RATE: 14 BRPM | HEIGHT: 65 IN | TEMPERATURE: 98.1 F | BODY MASS INDEX: 35.54 KG/M2

## 2018-08-08 DIAGNOSIS — D32.9 MENINGIOMA (HCC): ICD-10-CM

## 2018-08-08 DIAGNOSIS — Z09 HOSPITAL DISCHARGE FOLLOW-UP: Primary | ICD-10-CM

## 2018-08-08 DIAGNOSIS — I10 ESSENTIAL HYPERTENSION: ICD-10-CM

## 2018-08-08 DIAGNOSIS — I48.0 PAF (PAROXYSMAL ATRIAL FIBRILLATION) (HCC): ICD-10-CM

## 2018-08-08 PROCEDURE — 99495 TRANSJ CARE MGMT MOD F2F 14D: CPT | Performed by: FAMILY MEDICINE

## 2018-08-08 PROCEDURE — 1111F DSCHRG MED/CURRENT MED MERGE: CPT | Performed by: FAMILY MEDICINE

## 2018-08-08 NOTE — PROGRESS NOTES
fibrillation ([address]     Brain tumor ([facility] [address] [address] 07/16/2018    Chest pain     Degenerative disc disease, lumbar 1/21/2016    Hx of colonic polyps     Hypertension 2/24/2015    Irritable bowel syndrome     Lumbar radiculopathy 3/14/2016    Rapid heart rate      Vitals:    08/08/18 1637   BP: 130/88   Pulse: 72   Resp: 14   Temp: 98.1 °F (36.7 °C)   Weight: 213 lb 4.8 oz (96.8 kg)   Height: 5' 5\" (1.651 m)     Body mass index is 35.49 kg/m². Wt Readings from Last 3 Encounters:   08/08/18 213 lb 4.8 oz (96.8 kg)   07/27/18 222 lb 10.6 oz (101 kg)   07/23/18 217 lb (98.4 kg)     BP Readings from Last 3 Encounters:   08/08/18 130/88   07/28/18 (!) 114/58   07/24/18 132/65        Physical Exam:  General Appearance: well-developed and well nourished, in no acute distress and alert  Skin: warm and dry, no rash or erythema  Neck: neck supple and non tender without mass, no thyromegaly or thyroid nodules, no cervical lymphadenopathy   Pulmonary/Chest: clear to auscultation bilaterally- no wheezes, rales or rhonchi, normal air movement, no respiratory distress  Cardiovascular: normal rate, normal S1 and S2, no gallops, no carotid bruits and systolic murmur present- 1/6 throughout the precordium  Abdomen: soft, non-tender, non-distended, normal bowel sounds, no masses or organomegaly  Neurologic: gait and coordination normal and speech normal    Assessment/Plan:       Diagnosis Orders   1. Hospital discharge follow-up     2. Meningioma ([address]     3. PAF (paroxysmal atrial fibrillation) ([address]     4.  Essential hypertension       Continue current medications  F/u with neurosurgery as already planned in few weeks   Fasting blood work in October and f/u after above   Medical Decision Making: mild complexity

## 2018-09-04 RX ORDER — METOPROLOL SUCCINATE 100 MG/1
TABLET, EXTENDED RELEASE ORAL
Qty: 90 TABLET | Refills: 3 | Status: SHIPPED | OUTPATIENT
Start: 2018-09-04 | End: 2019-09-01 | Stop reason: SDUPTHER

## 2018-09-07 ENCOUNTER — OFFICE VISIT (OUTPATIENT)
Dept: CARDIOLOGY CLINIC | Age: 63
End: 2018-09-07
Payer: COMMERCIAL

## 2018-09-07 VITALS
HEART RATE: 62 BPM | SYSTOLIC BLOOD PRESSURE: 122 MMHG | RESPIRATION RATE: 12 BRPM | WEIGHT: 213 LBS | DIASTOLIC BLOOD PRESSURE: 82 MMHG | BODY MASS INDEX: 35.49 KG/M2 | HEIGHT: 65 IN

## 2018-09-07 DIAGNOSIS — Z98.890 HISTORY OF OPEN HEART SURGERY: ICD-10-CM

## 2018-09-07 DIAGNOSIS — I48.0 PAF (PAROXYSMAL ATRIAL FIBRILLATION) (HCC): Primary | Chronic | ICD-10-CM

## 2018-09-07 DIAGNOSIS — I10 ESSENTIAL HYPERTENSION: Chronic | ICD-10-CM

## 2018-09-07 PROCEDURE — 3017F COLORECTAL CA SCREEN DOC REV: CPT | Performed by: INTERNAL MEDICINE

## 2018-09-07 PROCEDURE — G8417 CALC BMI ABV UP PARAM F/U: HCPCS | Performed by: INTERNAL MEDICINE

## 2018-09-07 PROCEDURE — 1036F TOBACCO NON-USER: CPT | Performed by: INTERNAL MEDICINE

## 2018-09-07 PROCEDURE — G8427 DOCREV CUR MEDS BY ELIG CLIN: HCPCS | Performed by: INTERNAL MEDICINE

## 2018-09-07 PROCEDURE — 99214 OFFICE O/P EST MOD 30 MIN: CPT | Performed by: INTERNAL MEDICINE

## 2018-09-07 ASSESSMENT — ENCOUNTER SYMPTOMS
ANAL BLEEDING: 0
VOMITING: 0
ABDOMINAL DISTENTION: 0
BLOOD IN STOOL: 0
FACIAL SWELLING: 0
SHORTNESS OF BREATH: 0
TROUBLE SWALLOWING: 0
APNEA: 0
CHEST TIGHTNESS: 0
COLOR CHANGE: 0
NAUSEA: 0
WHEEZING: 0
VOICE CHANGE: 0

## 2018-09-07 NOTE — PROGRESS NOTES
abdominal distention, anal bleeding, blood in stool, nausea and vomiting. Genitourinary: Negative for decreased urine volume and dysuria. Musculoskeletal: Negative for gait problem and myalgias. Skin: Negative for color change, pallor, rash and wound. Neurological: Negative for dizziness, syncope, facial asymmetry, weakness, light-headedness, numbness and headaches. Hematological: Does not bruise/bleed easily. Psychiatric/Behavioral: Negative for agitation, behavioral problems, confusion, hallucinations and suicidal ideas. The patient is not nervous/anxious. All other systems reviewed and are negative. Review of System is negative except for as mentioned above. Physical Examination:    /82   Pulse 62   Resp 12   Ht 5' 5\" (1.651 m)   Wt 213 lb (96.6 kg)   LMP  (LMP Unknown)   BMI 35.45 kg/m²    Physical Exam   Constitutional: She appears healthy. No distress. HENT:   Nose: Nose normal.   Mouth/Throat: Dentition is normal. Oropharynx is clear. Eyes: Pupils are equal, round, and reactive to light. Conjunctivae are normal.   Neck: Normal range of motion and thyroid normal. Neck supple. Cardiovascular: Regular rhythm, S1 normal, S2 normal, normal heart sounds, intact distal pulses and normal pulses. PMI is not displaced. No murmur heard. Pulmonary/Chest: She has no wheezes. She has no rales. She exhibits no tenderness. Abdominal: Soft. Bowel sounds are normal. She exhibits no distension and no mass. There is no splenomegaly or hepatomegaly. There is no tenderness. No hernia. Neurological: She is alert and oriented to person, place, and time. She has normal motor skills. Gait normal.   Skin: Skin is warm and dry. No cyanosis. No jaundice. Nails show no clubbing.        LABS:  CBC:   Lab Results   Component Value Date    WBC 16.2 07/26/2018    RBC 4.75 07/26/2018    RBC 5.18 03/18/2018    HGB 14.8 07/26/2018    HCT 43.9 07/26/2018    MCV 92.5 07/26/2018    MCH 31.1 07/26/2018    MCHC 33.6 07/26/2018    RDW 13.9 07/26/2018     07/26/2018    MPV 10.8 03/18/2018     Lipids:  Lab Results   Component Value Date    CHOL 168 10/30/2016    CHOL 201 (H) 06/28/2015    CHOL 173 02/14/2015     Lab Results   Component Value Date    TRIG 128 10/30/2016    TRIG 181 06/28/2015    TRIG 132 02/14/2015     Lab Results   Component Value Date    HDL 55 10/30/2016    HDL 54 06/28/2015    HDL 52 02/14/2015     Lab Results   Component Value Date    LDLCALC 87 10/30/2016    LDLCALC 111 06/28/2015    LDLCALC 95 02/14/2015     No results found for: LABVLDL, VLDL  Lab Results   Component Value Date    CHOLHDLRATIO 3.3 12/27/2012     CMP:    Lab Results   Component Value Date     07/26/2018    K 4.3 07/26/2018     07/26/2018    CO2 23 07/26/2018    BUN 12 07/26/2018    CREATININE 0.47 07/26/2018    GFRAA >60.0 07/26/2018    AGRATIO 1.4 03/18/2018    LABGLOM >60.0 07/26/2018    GLUCOSE 150 07/26/2018    GLUCOSE 97 03/19/2018    PROT 5.9 07/24/2018    LABALBU 3.5 07/24/2018    LABALBU 3.8 03/18/2018    CALCIUM 8.5 07/26/2018    BILITOT 0.8 07/24/2018    ALKPHOS 101 07/24/2018    AST 78 07/24/2018    ALT 62 07/24/2018     BMP:    Lab Results   Component Value Date     07/26/2018    K 4.3 07/26/2018     07/26/2018    CO2 23 07/26/2018    BUN 12 07/26/2018    LABALBU 3.5 07/24/2018    LABALBU 3.8 03/18/2018    CREATININE 0.47 07/26/2018    CALCIUM 8.5 07/26/2018    GFRAA >60.0 07/26/2018    LABGLOM >60.0 07/26/2018    GLUCOSE 150 07/26/2018    GLUCOSE 97 03/19/2018     Magnesium:    Lab Results   Component Value Date    MG 1.7 03/18/2018     TSH:  Lab Results   Component Value Date    TSH 2.030 06/28/2015       Patient Active Problem List   Diagnosis    Irritable bowel syndrome    Hx of colonic polyps    PAF (paroxysmal atrial fibrillation) (HCC)    Hypertension    Pulmonary vein stenosis    History of open heart surgery    Idiopathic scoliosis of lumbar spine    Degenerative disc disease, lumbar    Lumbar radiculopathy    Brain mass    Other chest pain    Benign meningioma of brain (HCC)    Meningioma (HCC)       There are no discontinued medications. Modified Medications    No medications on file       No orders of the defined types were placed in this encounter. Assessment:    1. PAF (paroxysmal atrial fibrillation) ([facility] [address]    2. Essential hypertension    3. History of open heart surgery       Plan:   Stay on same medications. Patient to see me in 3 months. This note was partially generated using Dragon voice recognition system, and there may be some incorrect words, spellings, punctuation that were not noticed in checking the note before saving.         Electronically signed by Karen Muller MD on 9/10/2018 at 1:39 PM

## 2018-09-19 PROCEDURE — 6360000002 HC RX W HCPCS: Performed by: RADIOLOGY

## 2018-09-19 PROCEDURE — 6360000004 HC RX CONTRAST MEDICATION: Performed by: NEUROLOGICAL SURGERY

## 2018-09-19 PROCEDURE — 2580000003 HC RX 258: Performed by: NEUROLOGICAL SURGERY

## 2018-09-19 PROCEDURE — 70470 CT HEAD/BRAIN W/O & W/DYE: CPT

## 2018-09-20 ENCOUNTER — HOSPITAL ENCOUNTER (OUTPATIENT)
Dept: CT IMAGING | Age: 63
Discharge: HOME | End: 2018-09-21
Payer: COMMERCIAL

## 2018-09-20 VITALS — HEIGHT: 65 IN | BODY MASS INDEX: 35.32 KG/M2 | WEIGHT: 212 LBS

## 2018-09-20 DIAGNOSIS — R51.9 ACUTE INTRACTABLE HEADACHE, UNSPECIFIED HEADACHE TYPE: ICD-10-CM

## 2018-09-20 PROCEDURE — 6360000002 HC RX W HCPCS: Performed by: RADIOLOGY

## 2018-09-20 PROCEDURE — 70470 CT HEAD/BRAIN W/O & W/DYE: CPT

## 2018-09-20 PROCEDURE — 6360000004 HC RX CONTRAST MEDICATION: Performed by: NEUROLOGICAL SURGERY

## 2018-09-20 PROCEDURE — 2580000003 HC RX 258: Performed by: NEUROLOGICAL SURGERY

## 2018-09-20 RX ORDER — DIPHENHYDRAMINE HYDROCHLORIDE 50 MG/ML
50 INJECTION INTRAMUSCULAR; INTRAVENOUS ONCE
Status: COMPLETED | OUTPATIENT
Start: 2018-09-20 | End: 2018-09-20

## 2018-09-20 RX ORDER — SODIUM CHLORIDE 0.9 % (FLUSH) 0.9 %
10 SYRINGE (ML) INJECTION
Status: COMPLETED | OUTPATIENT
Start: 2018-09-20 | End: 2018-09-20

## 2018-09-20 RX ADMIN — Medication 10 ML: at 15:07

## 2018-09-20 RX ADMIN — IOPAMIDOL 50 ML: 755 INJECTION, SOLUTION INTRAVENOUS at 15:07

## 2018-09-20 RX ADMIN — DIPHENHYDRAMINE HYDROCHLORIDE 50 MG: 50 INJECTION, SOLUTION INTRAMUSCULAR; INTRAVENOUS at 14:40

## 2018-09-20 NOTE — PROGRESS NOTES
Pt to be premedicated with benadryl 50mg IV once per Dr. Zuleika Peters, 15 min prior to brain CT with/without contrast, telephone order with readback. Electronically signed by Leo Rodríguez RN on 9/20/2018 at 2:12 PM

## 2018-10-01 DIAGNOSIS — I10 ESSENTIAL HYPERTENSION: ICD-10-CM

## 2018-10-01 LAB
ALBUMIN SERPL-MCNC: 3.9 G/DL (ref 3.9–4.9)
ALP BLD-CCNC: 117 U/L (ref 40–130)
ALT SERPL-CCNC: 14 U/L (ref 0–33)
ANION GAP SERPL CALCULATED.3IONS-SCNC: 17 MEQ/L (ref 7–13)
AST SERPL-CCNC: 18 U/L (ref 0–35)
BASOPHILS ABSOLUTE: 0.1 K/UL (ref 0–0.2)
BASOPHILS RELATIVE PERCENT: 1 %
BILIRUB SERPL-MCNC: 0.6 MG/DL (ref 0–1.2)
BUN BLDV-MCNC: 14 MG/DL (ref 8–23)
CALCIUM SERPL-MCNC: 9.1 MG/DL (ref 8.6–10.2)
CHLORIDE BLD-SCNC: 104 MEQ/L (ref 98–107)
CHOLESTEROL, TOTAL: 171 MG/DL (ref 0–199)
CO2: 22 MEQ/L (ref 22–29)
CREAT SERPL-MCNC: 0.51 MG/DL (ref 0.5–0.9)
EOSINOPHILS ABSOLUTE: 0.1 K/UL (ref 0–0.7)
EOSINOPHILS RELATIVE PERCENT: 1.2 %
GFR AFRICAN AMERICAN: >60
GFR NON-AFRICAN AMERICAN: >60
GLOBULIN: 2.8 G/DL (ref 2.3–3.5)
GLUCOSE BLD-MCNC: 95 MG/DL (ref 74–109)
HCT VFR BLD CALC: 49 % (ref 37–47)
HDLC SERPL-MCNC: 56 MG/DL (ref 40–59)
HEMOGLOBIN: 16.5 G/DL (ref 12–16)
LDL CHOLESTEROL CALCULATED: 92 MG/DL (ref 0–129)
LYMPHOCYTES ABSOLUTE: 1.6 K/UL (ref 1–4.8)
LYMPHOCYTES RELATIVE PERCENT: 29.8 %
MCH RBC QN AUTO: 31.6 PG (ref 27–31.3)
MCHC RBC AUTO-ENTMCNC: 33.7 % (ref 33–37)
MCV RBC AUTO: 93.8 FL (ref 82–100)
MONOCYTES ABSOLUTE: 0.4 K/UL (ref 0.2–0.8)
MONOCYTES RELATIVE PERCENT: 7.3 %
NEUTROPHILS ABSOLUTE: 3.3 K/UL (ref 1.4–6.5)
NEUTROPHILS RELATIVE PERCENT: 60.7 %
PDW BLD-RTO: 13.5 % (ref 11.5–14.5)
PLATELET # BLD: 242 K/UL (ref 130–400)
POTASSIUM SERPL-SCNC: 3.8 MEQ/L (ref 3.5–5.1)
RBC # BLD: 5.23 M/UL (ref 4.2–5.4)
SODIUM BLD-SCNC: 143 MEQ/L (ref 132–144)
TOTAL PROTEIN: 6.7 G/DL (ref 6.4–8.1)
TRIGL SERPL-MCNC: 116 MG/DL (ref 0–200)
WBC # BLD: 5.5 K/UL (ref 4.8–10.8)

## 2018-10-08 ENCOUNTER — OFFICE VISIT (OUTPATIENT)
Dept: FAMILY MEDICINE CLINIC | Age: 63
End: 2018-10-08
Payer: COMMERCIAL

## 2018-10-08 VITALS
HEIGHT: 65 IN | OXYGEN SATURATION: 99 % | DIASTOLIC BLOOD PRESSURE: 88 MMHG | SYSTOLIC BLOOD PRESSURE: 130 MMHG | WEIGHT: 212.7 LBS | TEMPERATURE: 97 F | RESPIRATION RATE: 14 BRPM | BODY MASS INDEX: 35.44 KG/M2 | HEART RATE: 56 BPM

## 2018-10-08 DIAGNOSIS — D75.1 POLYCYTHEMIA: ICD-10-CM

## 2018-10-08 DIAGNOSIS — I48.0 PAF (PAROXYSMAL ATRIAL FIBRILLATION) (HCC): ICD-10-CM

## 2018-10-08 DIAGNOSIS — D32.0 BENIGN MENINGIOMA OF BRAIN (HCC): ICD-10-CM

## 2018-10-08 DIAGNOSIS — Z00.00 HEALTH MAINTENANCE EXAMINATION: Primary | ICD-10-CM

## 2018-10-08 DIAGNOSIS — K58.0 IRRITABLE BOWEL SYNDROME WITH DIARRHEA: ICD-10-CM

## 2018-10-08 PROCEDURE — 99396 PREV VISIT EST AGE 40-64: CPT | Performed by: FAMILY MEDICINE

## 2018-10-08 PROCEDURE — G8484 FLU IMMUNIZE NO ADMIN: HCPCS | Performed by: FAMILY MEDICINE

## 2018-10-08 ASSESSMENT — ENCOUNTER SYMPTOMS
BLOOD IN STOOL: 0
SHORTNESS OF BREATH: 0

## 2018-11-07 ENCOUNTER — APPOINTMENT (OUTPATIENT)
Dept: GENERAL RADIOLOGY | Age: 63
End: 2018-11-07
Payer: COMMERCIAL

## 2018-11-07 ENCOUNTER — HOSPITAL ENCOUNTER (EMERGENCY)
Age: 63
Discharge: HOME | End: 2018-11-07
Payer: COMMERCIAL

## 2018-11-07 VITALS
WEIGHT: 200 LBS | RESPIRATION RATE: 14 BRPM | BODY MASS INDEX: 33.32 KG/M2 | HEIGHT: 65 IN | OXYGEN SATURATION: 100 % | DIASTOLIC BLOOD PRESSURE: 63 MMHG | SYSTOLIC BLOOD PRESSURE: 135 MMHG | HEART RATE: 63 BPM | TEMPERATURE: 97.6 F

## 2018-11-07 DIAGNOSIS — R09.1 PLEURISY: Primary | ICD-10-CM

## 2018-11-07 LAB
ALBUMIN SERPL-MCNC: 3.8 G/DL (ref 3.9–4.9)
ALP BLD-CCNC: 120 U/L (ref 40–130)
ALT SERPL-CCNC: 15 U/L (ref 0–33)
ANION GAP SERPL CALCULATED.3IONS-SCNC: 13 MEQ/L (ref 7–13)
AST SERPL-CCNC: 18 U/L (ref 0–35)
BASOPHILS ABSOLUTE: 0.1 K/UL (ref 0–0.2)
BASOPHILS RELATIVE PERCENT: 0.9 %
BILIRUB SERPL-MCNC: 0.4 MG/DL (ref 0–1.2)
BUN BLDV-MCNC: 12 MG/DL (ref 8–23)
CALCIUM SERPL-MCNC: 9.5 MG/DL (ref 8.6–10.2)
CHLORIDE BLD-SCNC: 106 MEQ/L (ref 98–107)
CO2: 25 MEQ/L (ref 22–29)
CREAT SERPL-MCNC: 0.65 MG/DL (ref 0.5–0.9)
EKG ATRIAL RATE: 68 BPM
EKG P AXIS: 22 DEGREES
EKG P-R INTERVAL: 174 MS
EKG Q-T INTERVAL: 430 MS
EKG QRS DURATION: 126 MS
EKG QTC CALCULATION (BAZETT): 457 MS
EKG R AXIS: -14 DEGREES
EKG T AXIS: 3 DEGREES
EKG VENTRICULAR RATE: 68 BPM
EOSINOPHILS ABSOLUTE: 0.1 K/UL (ref 0–0.7)
EOSINOPHILS RELATIVE PERCENT: 1.4 %
GFR AFRICAN AMERICAN: >60
GFR NON-AFRICAN AMERICAN: >60
GLOBULIN: 2.8 G/DL (ref 2.3–3.5)
GLUCOSE BLD-MCNC: 95 MG/DL (ref 74–109)
HCT VFR BLD CALC: 48.7 % (ref 37–47)
HEMOGLOBIN: 16.3 G/DL (ref 12–16)
LYMPHOCYTES ABSOLUTE: 1.7 K/UL (ref 1–4.8)
LYMPHOCYTES RELATIVE PERCENT: 21.6 %
MCH RBC QN AUTO: 31.3 PG (ref 27–31.3)
MCHC RBC AUTO-ENTMCNC: 33.4 % (ref 33–37)
MCV RBC AUTO: 93.4 FL (ref 82–100)
MONOCYTES ABSOLUTE: 0.5 K/UL (ref 0.2–0.8)
MONOCYTES RELATIVE PERCENT: 6.3 %
NEUTROPHILS ABSOLUTE: 5.3 K/UL (ref 1.4–6.5)
NEUTROPHILS RELATIVE PERCENT: 69.8 %
PDW BLD-RTO: 13.1 % (ref 11.5–14.5)
PLATELET # BLD: 215 K/UL (ref 130–400)
POTASSIUM SERPL-SCNC: 3.9 MEQ/L (ref 3.5–5.1)
RBC # BLD: 5.21 M/UL (ref 4.2–5.4)
SODIUM BLD-SCNC: 144 MEQ/L (ref 132–144)
TOTAL CK: 65 U/L (ref 0–170)
TOTAL PROTEIN: 6.6 G/DL (ref 6.4–8.1)
TROPONIN: <0.01 NG/ML (ref 0–0.01)
WBC # BLD: 7.6 K/UL (ref 4.8–10.8)

## 2018-11-07 PROCEDURE — 84484 ASSAY OF TROPONIN QUANT: CPT

## 2018-11-07 PROCEDURE — 80053 COMPREHEN METABOLIC PANEL: CPT

## 2018-11-07 PROCEDURE — 82550 ASSAY OF CK (CPK): CPT

## 2018-11-07 PROCEDURE — 93005 ELECTROCARDIOGRAM TRACING: CPT

## 2018-11-07 PROCEDURE — 96374 THER/PROPH/DIAG INJ IV PUSH: CPT

## 2018-11-07 PROCEDURE — 94640 AIRWAY INHALATION TREATMENT: CPT

## 2018-11-07 PROCEDURE — 6370000000 HC RX 637 (ALT 250 FOR IP): Performed by: PERSONAL EMERGENCY RESPONSE ATTENDANT

## 2018-11-07 PROCEDURE — 71045 X-RAY EXAM CHEST 1 VIEW: CPT

## 2018-11-07 PROCEDURE — 6360000002 HC RX W HCPCS: Performed by: PHYSICIAN ASSISTANT

## 2018-11-07 PROCEDURE — 85025 COMPLETE CBC W/AUTO DIFF WBC: CPT

## 2018-11-07 PROCEDURE — 36415 COLL VENOUS BLD VENIPUNCTURE: CPT

## 2018-11-07 PROCEDURE — 99285 EMERGENCY DEPT VISIT HI MDM: CPT

## 2018-11-07 RX ORDER — ALBUTEROL SULFATE 90 UG/1
2 AEROSOL, METERED RESPIRATORY (INHALATION) EVERY 6 HOURS PRN
Status: DISCONTINUED | OUTPATIENT
Start: 2018-11-07 | End: 2018-11-07 | Stop reason: HOSPADM

## 2018-11-07 RX ORDER — KETOROLAC TROMETHAMINE 15 MG/ML
15 INJECTION, SOLUTION INTRAMUSCULAR; INTRAVENOUS ONCE
Status: COMPLETED | OUTPATIENT
Start: 2018-11-07 | End: 2018-11-07

## 2018-11-07 RX ORDER — IPRATROPIUM BROMIDE AND ALBUTEROL SULFATE 2.5; .5 MG/3ML; MG/3ML
1 SOLUTION RESPIRATORY (INHALATION) ONCE
Status: COMPLETED | OUTPATIENT
Start: 2018-11-07 | End: 2018-11-07

## 2018-11-07 RX ADMIN — ALBUTEROL SULFATE 2 PUFF: 90 AEROSOL, METERED RESPIRATORY (INHALATION) at 20:17

## 2018-11-07 RX ADMIN — KETOROLAC TROMETHAMINE 15 MG: 15 INJECTION INTRAMUSCULAR; INTRAVENOUS at 18:25

## 2018-11-07 RX ADMIN — IPRATROPIUM BROMIDE AND ALBUTEROL SULFATE 1 AMPULE: .5; 3 SOLUTION RESPIRATORY (INHALATION) at 19:31

## 2018-11-07 ASSESSMENT — ENCOUNTER SYMPTOMS
RHINORRHEA: 0
ABDOMINAL PAIN: 0
BACK PAIN: 0
SHORTNESS OF BREATH: 0
ABDOMINAL DISTENTION: 0
SORE THROAT: 0
CONSTIPATION: 0
DIARRHEA: 0
VOMITING: 0
COLOR CHANGE: 0
EYE DISCHARGE: 0
NAUSEA: 0

## 2018-11-07 ASSESSMENT — PAIN DESCRIPTION - ORIENTATION
ORIENTATION: LEFT
ORIENTATION: LEFT;UPPER

## 2018-11-07 ASSESSMENT — PAIN DESCRIPTION - DESCRIPTORS
DESCRIPTORS: PRESSURE
DESCRIPTORS: STABBING;HEAVINESS

## 2018-11-07 ASSESSMENT — PAIN DESCRIPTION - LOCATION
LOCATION: CHEST
LOCATION: CHEST;RIB CAGE

## 2018-11-07 ASSESSMENT — PAIN SCALES - GENERAL
PAINLEVEL_OUTOF10: 8
PAINLEVEL_OUTOF10: 8
PAINLEVEL_OUTOF10: 5

## 2018-11-07 ASSESSMENT — PAIN DESCRIPTION - FREQUENCY
FREQUENCY: CONTINUOUS
FREQUENCY: CONTINUOUS

## 2018-11-07 ASSESSMENT — PAIN DESCRIPTION - ONSET: ONSET: ON-GOING

## 2018-11-07 ASSESSMENT — PAIN DESCRIPTION - PAIN TYPE: TYPE: ACUTE PAIN

## 2018-11-07 NOTE — ED PROVIDER NOTES
[address] [facility] ED  eMERGENCY dEPARTMENT eNCOUnter      Pt Name: Susan Felix  MRN: [id number]  Puragfkaylie 1955  Date of evaluation: 11/7/2018  Provider: Sarah Quijano Uvalda Sulaiman       Chief Complaint   Patient presents with    Chest Pain     2 weeks ago had flu symptoms in [place]. Last 3 days been feeling worse. Hot/cold chills, pain in left rib and in left breast, SOB, runny nose, HA         HISTORY OF PRESENT ILLNESS   (Location/Symptom, Timing/Onset,Context/Setting, Quality, Duration, Modifying Factors, Severity)  Note limiting factors. Susan Felix is a 66 y.o. female who presents to the emergency department With a complaint of left-sided chest pain which patient states started approximately 3 days ago she states been intermittent coming and going, but has been present more consistent over last 24 hours. She states approximately one week ago she had viral type symptoms when she was visiting in [place] body aches chills but this seemed to improve and went away. She states 3 days ago she began having left-sided chest pain isolated to the lateral aspect others no cough there is no shortness of breath no nausea vomiting or dizziness she denies any acute injury. She states nothing seems to aggravate the pain and there is no alleviation of pain. She states last evening she was able to get comfortable and she was able to sleep all night long without any pain when she woke up this morning the pain was present again. She rates as an 8 out of 10 she also complains of a headache which is present the same time which she also rates. She denies any facial droop no numbness or tingling or weakness to her extremities. [facility]    NursingNotes were reviewed. REVIEW OF SYSTEMS    (2-9 systems for level 4, 10 or more for level 5)     Review of Systems   Constitutional: Positive for chills. Negative for activity change, appetite change, fatigue and fever.    HENT: Negative for distension and no mass. There is no tenderness. There is no guarding. Musculoskeletal: She exhibits no edema or deformity. Neurological: She is oriented to person, place, and time. Coordination normal.   Skin: Skin is warm and dry. Psychiatric: She has a normal mood and affect. DIAGNOSTIC RESULTS     EKG: All EKG's are interpreted by the Emergency Department Physician who either signs or Co-signsthis chart in the absence of a cardiologist.    EKG shows normal sinus rhythm with a right bundle branch block at 60 bpm there is T-wave inversions in leads 3, V1, V3, no acute ectopy, QTC is 430 ms. When compared to a previous EKG of July 18, 2018 T-wave inversion no longer evident in anterior leads    RADIOLOGY:   Non-plain filmimages such as CT, Ultrasound and MRI are read by the radiologist. Plain radiographic images are visualized and preliminarily interpreted by the emergency physician with the below findings:        Interpretation per the Radiologist below, if available at the time ofthis note:    XR CHEST PORTABLE    (Results Pending)         ED BEDSIDE ULTRASOUND:   Performed by ED Physician - none    LABS:  Labs Reviewed   COMPREHENSIVE METABOLIC PANEL - Abnormal; Notable for the following:        Result Value    Alb 3.8 (*)     All other components within normal limits   CBC WITH AUTO DIFFERENTIAL - Abnormal; Notable for the following:     Hemoglobin 16.3 (*)     Hematocrit 48.7 (*)     All other components within normal limits   TROPONIN   CK       All other labs were within normal range or not returned as of this dictation. EMERGENCY DEPARTMENT COURSE and DIFFERENTIAL DIAGNOSIS/MDM:   Vitals:    Vitals:    11/07/18 1719 11/07/18 1905   BP: 127/76 123/82   Pulse: 74 64   Resp: 20 16   Temp: 98.2 °F (36.8 °C)    TempSrc: Oral    SpO2: 100% 100%   Weight: 200 lb (90.7 kg)    Height: 5' 5\" (1.651 m)             MDM    Chest shows no acute process. Blood work is unremarkable.   On reassessment patient

## 2018-11-08 PROCEDURE — 93010 ELECTROCARDIOGRAM REPORT: CPT | Performed by: INTERNAL MEDICINE

## 2018-11-12 ENCOUNTER — OFFICE VISIT (OUTPATIENT)
Dept: FAMILY MEDICINE CLINIC | Age: 63
End: 2018-11-12
Payer: COMMERCIAL

## 2018-11-12 VITALS
OXYGEN SATURATION: 99 % | HEIGHT: 65 IN | WEIGHT: 209 LBS | HEART RATE: 64 BPM | TEMPERATURE: 96 F | RESPIRATION RATE: 16 BRPM | BODY MASS INDEX: 34.82 KG/M2 | DIASTOLIC BLOOD PRESSURE: 80 MMHG | SYSTOLIC BLOOD PRESSURE: 130 MMHG

## 2018-11-12 DIAGNOSIS — M94.0 COSTOCHONDRITIS, ACUTE: ICD-10-CM

## 2018-11-12 DIAGNOSIS — J20.9 ACUTE BRONCHITIS, UNSPECIFIED ORGANISM: Primary | ICD-10-CM

## 2018-11-12 PROCEDURE — 3017F COLORECTAL CA SCREEN DOC REV: CPT | Performed by: FAMILY MEDICINE

## 2018-11-12 PROCEDURE — G8427 DOCREV CUR MEDS BY ELIG CLIN: HCPCS | Performed by: FAMILY MEDICINE

## 2018-11-12 PROCEDURE — G8484 FLU IMMUNIZE NO ADMIN: HCPCS | Performed by: FAMILY MEDICINE

## 2018-11-12 PROCEDURE — 1036F TOBACCO NON-USER: CPT | Performed by: FAMILY MEDICINE

## 2018-11-12 PROCEDURE — 99213 OFFICE O/P EST LOW 20 MIN: CPT | Performed by: FAMILY MEDICINE

## 2018-11-12 PROCEDURE — G8417 CALC BMI ABV UP PARAM F/U: HCPCS | Performed by: FAMILY MEDICINE

## 2018-11-12 RX ORDER — PREDNISONE 10 MG/1
TABLET ORAL
Qty: 30 TABLET | Refills: 0 | Status: SHIPPED | OUTPATIENT
Start: 2018-11-12 | End: 2018-11-13 | Stop reason: ALTCHOICE

## 2018-11-12 RX ORDER — BENZONATATE 200 MG/1
200 CAPSULE ORAL 3 TIMES DAILY PRN
Qty: 30 CAPSULE | Refills: 0 | Status: SHIPPED | OUTPATIENT
Start: 2018-11-12 | End: 2018-11-22

## 2018-11-12 RX ORDER — AZITHROMYCIN 250 MG/1
250 TABLET, FILM COATED ORAL SEE ADMIN INSTRUCTIONS
Qty: 6 TABLET | Refills: 0 | Status: SHIPPED | OUTPATIENT
Start: 2018-11-12 | End: 2018-11-17

## 2018-11-12 ASSESSMENT — ENCOUNTER SYMPTOMS
WHEEZING: 0
ABDOMINAL PAIN: 0

## 2018-11-13 ENCOUNTER — OFFICE VISIT (OUTPATIENT)
Dept: GASTROENTEROLOGY | Age: 63
End: 2018-11-13
Payer: COMMERCIAL

## 2018-11-13 VITALS
WEIGHT: 210.8 LBS | DIASTOLIC BLOOD PRESSURE: 82 MMHG | SYSTOLIC BLOOD PRESSURE: 118 MMHG | BODY MASS INDEX: 35.12 KG/M2 | HEIGHT: 65 IN | TEMPERATURE: 97.5 F

## 2018-11-13 DIAGNOSIS — R10.30 LOWER ABDOMINAL PAIN: ICD-10-CM

## 2018-11-13 DIAGNOSIS — R19.7 DIARRHEA, UNSPECIFIED TYPE: Primary | ICD-10-CM

## 2018-11-13 DIAGNOSIS — R19.7 DIARRHEA, UNSPECIFIED TYPE: ICD-10-CM

## 2018-11-13 LAB
C-REACTIVE PROTEIN: 1.4 MG/L (ref 0–5)
SEDIMENTATION RATE, ERYTHROCYTE: 7 MM (ref 0–30)
TSH REFLEX: 0.73 UIU/ML (ref 0.27–4.2)

## 2018-11-13 PROCEDURE — 1036F TOBACCO NON-USER: CPT | Performed by: INTERNAL MEDICINE

## 2018-11-13 PROCEDURE — G8417 CALC BMI ABV UP PARAM F/U: HCPCS | Performed by: INTERNAL MEDICINE

## 2018-11-13 PROCEDURE — 99204 OFFICE O/P NEW MOD 45 MIN: CPT | Performed by: INTERNAL MEDICINE

## 2018-11-13 PROCEDURE — 3017F COLORECTAL CA SCREEN DOC REV: CPT | Performed by: INTERNAL MEDICINE

## 2018-11-13 PROCEDURE — G8427 DOCREV CUR MEDS BY ELIG CLIN: HCPCS | Performed by: INTERNAL MEDICINE

## 2018-11-13 PROCEDURE — G8484 FLU IMMUNIZE NO ADMIN: HCPCS | Performed by: INTERNAL MEDICINE

## 2018-11-13 ASSESSMENT — ENCOUNTER SYMPTOMS
EYE DISCHARGE: 0
APNEA: 0
SHORTNESS OF BREATH: 0
EYE ITCHING: 0
VOMITING: 0
CONSTIPATION: 0
BACK PAIN: 0
COUGH: 0
DIARRHEA: 1
ABDOMINAL PAIN: 1
EYE PAIN: 0

## 2018-11-13 NOTE — PROGRESS NOTES
leg swelling. Gastrointestinal: Positive for abdominal pain and diarrhea. Negative for constipation and vomiting. Endocrine: Negative for heat intolerance. Genitourinary: Negative for difficulty urinating and dysuria. Musculoskeletal: Negative for arthralgias and back pain. Skin: Negative for pallor and rash. Neurological: Negative for dizziness and headaches. Hematological: Negative for adenopathy. Psychiatric/Behavioral: Negative for agitation and behavioral problems. Vital Signs:  /82   Temp 97.5 °F (36.4 °C)   Ht 5' 5\" (1.651 m)   Wt 210 lb 12.8 oz (95.6 kg)   LMP  (LMP Unknown)   BMI 35.08 kg/m²     Physical Exam:     Physical Exam   Constitutional: She is oriented to person, place, and time. She appears well-developed and well-nourished. HENT:   Head: Normocephalic and atraumatic. Eyes: Pupils are equal, round, and reactive to light. Conjunctivae are normal. No scleral icterus. Neck: Normal range of motion. Neck supple. Cardiovascular: Normal rate. Pulmonary/Chest: Effort normal. She has no wheezes. Abdominal: Soft. Bowel sounds are normal. There is no tenderness. There is no guarding. Musculoskeletal: Normal range of motion. She exhibits no edema. Neurological: She is alert and oriented to person, place, and time. Skin: Skin is warm and dry. Labs:       Lab Results   Component Value Date    AST 18 11/07/2018    ALT 15 11/07/2018    LIPASE 15 07/16/2018    AMYLASE 36 07/13/2018       Lab Results   Component Value Date     11/07/2018    K 3.9 11/07/2018     11/07/2018    BUN 12 11/07/2018    CREATININE 0.65 11/07/2018    GLUCOSE 95 11/07/2018       Lab Results   Component Value Date    INR 1.1 07/25/2018       Lab Results   Component Value Date    TSH 2.030 06/28/2015    CALCIUM 9.5 11/07/2018       Patient's lab tests and radiology tests were reviewed. Previous available endoscopy and pathology results were reviewed.  I reviewed [employer]

## 2018-11-15 DIAGNOSIS — R19.7 DIARRHEA, UNSPECIFIED TYPE: ICD-10-CM

## 2018-11-15 DIAGNOSIS — R10.30 LOWER ABDOMINAL PAIN: ICD-10-CM

## 2018-11-16 LAB
C DIFFICILE TOXIN, EIA: NORMAL
CELIAC PANEL: 10 UNITS (ref 0–19)
CRYPTOSPORIDIUM ANTIGEN STOOL: NORMAL
GI BACTERIAL PATHOGENS BY PCR: NORMAL
GIARDIA ANTIGEN STOOL: NORMAL

## 2018-12-11 ENCOUNTER — OFFICE VISIT (OUTPATIENT)
Dept: GASTROENTEROLOGY | Age: 63
End: 2018-12-11
Payer: COMMERCIAL

## 2018-12-11 VITALS
HEART RATE: 60 BPM | HEIGHT: 65 IN | BODY MASS INDEX: 34.99 KG/M2 | TEMPERATURE: 96.3 F | OXYGEN SATURATION: 99 % | DIASTOLIC BLOOD PRESSURE: 82 MMHG | SYSTOLIC BLOOD PRESSURE: 128 MMHG | WEIGHT: 210 LBS

## 2018-12-11 DIAGNOSIS — R19.7 DIARRHEA, UNSPECIFIED TYPE: Primary | ICD-10-CM

## 2018-12-11 PROCEDURE — G8417 CALC BMI ABV UP PARAM F/U: HCPCS | Performed by: INTERNAL MEDICINE

## 2018-12-11 PROCEDURE — G8427 DOCREV CUR MEDS BY ELIG CLIN: HCPCS | Performed by: INTERNAL MEDICINE

## 2018-12-11 PROCEDURE — 3017F COLORECTAL CA SCREEN DOC REV: CPT | Performed by: INTERNAL MEDICINE

## 2018-12-11 PROCEDURE — G8484 FLU IMMUNIZE NO ADMIN: HCPCS | Performed by: INTERNAL MEDICINE

## 2018-12-11 PROCEDURE — 99215 OFFICE O/P EST HI 40 MIN: CPT | Performed by: INTERNAL MEDICINE

## 2018-12-11 PROCEDURE — 1036F TOBACCO NON-USER: CPT | Performed by: INTERNAL MEDICINE

## 2018-12-11 RX ORDER — SODIUM, POTASSIUM,MAG SULFATES 17.5-3.13G
SOLUTION, RECONSTITUTED, ORAL ORAL
Qty: 1 BOTTLE | Refills: 0 | Status: SHIPPED | OUTPATIENT
Start: 2018-12-11 | End: 2019-06-12 | Stop reason: ALTCHOICE

## 2018-12-11 ASSESSMENT — ENCOUNTER SYMPTOMS
DIARRHEA: 1
EYE DISCHARGE: 0
APNEA: 0
BACK PAIN: 0
VOMITING: 0
EYE PAIN: 0
COUGH: 0
SHORTNESS OF BREATH: 0
EYE ITCHING: 0
CONSTIPATION: 0

## 2019-01-09 ENCOUNTER — OFFICE VISIT (OUTPATIENT)
Dept: CARDIOLOGY CLINIC | Age: 64
End: 2019-01-09
Payer: COMMERCIAL

## 2019-01-09 VITALS
BODY MASS INDEX: 34.69 KG/M2 | HEART RATE: 55 BPM | HEIGHT: 65 IN | SYSTOLIC BLOOD PRESSURE: 122 MMHG | DIASTOLIC BLOOD PRESSURE: 86 MMHG | OXYGEN SATURATION: 97 % | RESPIRATION RATE: 18 BRPM | WEIGHT: 208.2 LBS

## 2019-01-09 DIAGNOSIS — I48.0 PAF (PAROXYSMAL ATRIAL FIBRILLATION) (HCC): ICD-10-CM

## 2019-01-09 DIAGNOSIS — I10 ESSENTIAL HYPERTENSION: Primary | ICD-10-CM

## 2019-01-09 PROCEDURE — G8484 FLU IMMUNIZE NO ADMIN: HCPCS | Performed by: INTERNAL MEDICINE

## 2019-01-09 PROCEDURE — G8417 CALC BMI ABV UP PARAM F/U: HCPCS | Performed by: INTERNAL MEDICINE

## 2019-01-09 PROCEDURE — 3017F COLORECTAL CA SCREEN DOC REV: CPT | Performed by: INTERNAL MEDICINE

## 2019-01-09 PROCEDURE — G8427 DOCREV CUR MEDS BY ELIG CLIN: HCPCS | Performed by: INTERNAL MEDICINE

## 2019-01-09 PROCEDURE — 99213 OFFICE O/P EST LOW 20 MIN: CPT | Performed by: INTERNAL MEDICINE

## 2019-01-09 PROCEDURE — 1036F TOBACCO NON-USER: CPT | Performed by: INTERNAL MEDICINE

## 2019-01-09 ASSESSMENT — ENCOUNTER SYMPTOMS
DIARRHEA: 0
VOMITING: 0
APNEA: 0
COLOR CHANGE: 0
NAUSEA: 0
CHEST TIGHTNESS: 0
SHORTNESS OF BREATH: 0
BLOOD IN STOOL: 0

## 2019-02-14 ENCOUNTER — OFFICE VISIT (OUTPATIENT)
Dept: FAMILY MEDICINE CLINIC | Age: 64
End: 2019-02-14
Payer: COMMERCIAL

## 2019-02-14 VITALS
DIASTOLIC BLOOD PRESSURE: 80 MMHG | TEMPERATURE: 98.8 F | SYSTOLIC BLOOD PRESSURE: 128 MMHG | HEART RATE: 88 BPM | WEIGHT: 210 LBS | HEIGHT: 65 IN | RESPIRATION RATE: 16 BRPM | BODY MASS INDEX: 34.99 KG/M2

## 2019-02-14 DIAGNOSIS — J01.10 ACUTE NON-RECURRENT FRONTAL SINUSITIS: Primary | ICD-10-CM

## 2019-02-14 DIAGNOSIS — R68.83 CHILLS: ICD-10-CM

## 2019-02-14 LAB
INFLUENZA A ANTIBODY: NORMAL
INFLUENZA B ANTIBODY: NORMAL

## 2019-02-14 PROCEDURE — 3017F COLORECTAL CA SCREEN DOC REV: CPT | Performed by: FAMILY MEDICINE

## 2019-02-14 PROCEDURE — G8417 CALC BMI ABV UP PARAM F/U: HCPCS | Performed by: FAMILY MEDICINE

## 2019-02-14 PROCEDURE — G8484 FLU IMMUNIZE NO ADMIN: HCPCS | Performed by: FAMILY MEDICINE

## 2019-02-14 PROCEDURE — G8427 DOCREV CUR MEDS BY ELIG CLIN: HCPCS | Performed by: FAMILY MEDICINE

## 2019-02-14 PROCEDURE — 99213 OFFICE O/P EST LOW 20 MIN: CPT | Performed by: FAMILY MEDICINE

## 2019-02-14 PROCEDURE — 87804 INFLUENZA ASSAY W/OPTIC: CPT | Performed by: FAMILY MEDICINE

## 2019-02-14 PROCEDURE — 1036F TOBACCO NON-USER: CPT | Performed by: FAMILY MEDICINE

## 2019-02-14 RX ORDER — CLARITHROMYCIN 500 MG/1
500 TABLET, COATED ORAL 2 TIMES DAILY
Qty: 20 TABLET | Refills: 0 | Status: SHIPPED | OUTPATIENT
Start: 2019-02-14 | End: 2019-02-24

## 2019-02-14 ASSESSMENT — ENCOUNTER SYMPTOMS
SHORTNESS OF BREATH: 0
COUGH: 0
SINUS PRESSURE: 1
RESPIRATORY NEGATIVE: 1
HOARSE VOICE: 1
SORE THROAT: 1
SWOLLEN GLANDS: 0
GASTROINTESTINAL NEGATIVE: 1

## 2019-02-14 ASSESSMENT — PATIENT HEALTH QUESTIONNAIRE - PHQ9
SUM OF ALL RESPONSES TO PHQ QUESTIONS 1-9: 0
2. FEELING DOWN, DEPRESSED OR HOPELESS: 0
SUM OF ALL RESPONSES TO PHQ9 QUESTIONS 1 & 2: 0
1. LITTLE INTEREST OR PLEASURE IN DOING THINGS: 0
SUM OF ALL RESPONSES TO PHQ QUESTIONS 1-9: 0

## 2019-03-07 ENCOUNTER — APPOINTMENT (OUTPATIENT)
Dept: CT IMAGING | Age: 64
End: 2019-03-07
Payer: COMMERCIAL

## 2019-03-07 ENCOUNTER — HOSPITAL ENCOUNTER (EMERGENCY)
Age: 64
Discharge: HOME | End: 2019-03-08
Payer: COMMERCIAL

## 2019-03-07 DIAGNOSIS — R11.0 NAUSEA: ICD-10-CM

## 2019-03-07 DIAGNOSIS — R19.7 DIARRHEA, UNSPECIFIED TYPE: ICD-10-CM

## 2019-03-07 DIAGNOSIS — R10.10 PAIN OF UPPER ABDOMEN: Primary | ICD-10-CM

## 2019-03-07 DIAGNOSIS — K76.89 HEPATIC CYST: ICD-10-CM

## 2019-03-07 PROCEDURE — 85025 COMPLETE CBC W/AUTO DIFF WBC: CPT

## 2019-03-07 PROCEDURE — 87086 URINE CULTURE/COLONY COUNT: CPT

## 2019-03-07 PROCEDURE — 80053 COMPREHEN METABOLIC PANEL: CPT

## 2019-03-07 PROCEDURE — 36415 COLL VENOUS BLD VENIPUNCTURE: CPT

## 2019-03-07 PROCEDURE — 2580000003 HC RX 258: Performed by: PHYSICIAN ASSISTANT

## 2019-03-07 PROCEDURE — 83690 ASSAY OF LIPASE: CPT

## 2019-03-07 PROCEDURE — 96374 THER/PROPH/DIAG INJ IV PUSH: CPT

## 2019-03-07 PROCEDURE — 6360000002 HC RX W HCPCS: Performed by: PHYSICIAN ASSISTANT

## 2019-03-07 PROCEDURE — 99284 EMERGENCY DEPT VISIT MOD MDM: CPT

## 2019-03-07 PROCEDURE — 81001 URINALYSIS AUTO W/SCOPE: CPT

## 2019-03-07 RX ORDER — KETOROLAC TROMETHAMINE 30 MG/ML
30 INJECTION, SOLUTION INTRAMUSCULAR; INTRAVENOUS ONCE
Status: COMPLETED | OUTPATIENT
Start: 2019-03-07 | End: 2019-03-07

## 2019-03-07 RX ORDER — 0.9 % SODIUM CHLORIDE 0.9 %
1000 INTRAVENOUS SOLUTION INTRAVENOUS ONCE
Status: COMPLETED | OUTPATIENT
Start: 2019-03-07 | End: 2019-03-08

## 2019-03-07 RX ORDER — SODIUM CHLORIDE 0.9 % (FLUSH) 0.9 %
3 SYRINGE (ML) INJECTION EVERY 8 HOURS
Status: DISCONTINUED | OUTPATIENT
Start: 2019-03-07 | End: 2019-03-08 | Stop reason: HOSPADM

## 2019-03-07 RX ADMIN — SODIUM CHLORIDE 1000 ML: 9 INJECTION, SOLUTION INTRAVENOUS at 23:48

## 2019-03-07 RX ADMIN — KETOROLAC TROMETHAMINE 30 MG: 30 INJECTION, SOLUTION INTRAMUSCULAR; INTRAVENOUS at 23:49

## 2019-03-07 ASSESSMENT — PAIN DESCRIPTION - DESCRIPTORS: DESCRIPTORS: SHARP

## 2019-03-07 ASSESSMENT — PAIN SCALES - GENERAL
PAINLEVEL_OUTOF10: 9
PAINLEVEL_OUTOF10: 8

## 2019-03-07 ASSESSMENT — PAIN DESCRIPTION - ORIENTATION: ORIENTATION: MID

## 2019-03-07 ASSESSMENT — PAIN DESCRIPTION - PAIN TYPE: TYPE: ACUTE PAIN

## 2019-03-07 ASSESSMENT — PAIN DESCRIPTION - LOCATION: LOCATION: ABDOMEN

## 2019-03-08 ENCOUNTER — APPOINTMENT (OUTPATIENT)
Dept: CT IMAGING | Age: 64
End: 2019-03-08
Payer: COMMERCIAL

## 2019-03-08 VITALS
TEMPERATURE: 98.2 F | WEIGHT: 200 LBS | DIASTOLIC BLOOD PRESSURE: 64 MMHG | OXYGEN SATURATION: 99 % | HEART RATE: 56 BPM | BODY MASS INDEX: 33.32 KG/M2 | RESPIRATION RATE: 18 BRPM | HEIGHT: 65 IN | SYSTOLIC BLOOD PRESSURE: 137 MMHG

## 2019-03-08 LAB
ALBUMIN SERPL-MCNC: 4.2 G/DL (ref 3.5–4.6)
ALP BLD-CCNC: 125 U/L (ref 40–130)
ALT SERPL-CCNC: 15 U/L (ref 0–33)
ANION GAP SERPL CALCULATED.3IONS-SCNC: 12 MEQ/L (ref 9–15)
AST SERPL-CCNC: 21 U/L (ref 0–35)
BACTERIA: ABNORMAL /HPF
BASOPHILS ABSOLUTE: 0.1 K/UL (ref 0–0.2)
BASOPHILS RELATIVE PERCENT: 1.1 %
BILIRUB SERPL-MCNC: 0.4 MG/DL (ref 0.2–0.7)
BILIRUBIN URINE: NEGATIVE
BLOOD, URINE: ABNORMAL
BUN BLDV-MCNC: 11 MG/DL (ref 8–23)
CALCIUM SERPL-MCNC: 9.3 MG/DL (ref 8.5–9.9)
CASTS: ABNORMAL /LPF
CHLORIDE BLD-SCNC: 103 MEQ/L (ref 95–107)
CLARITY: CLEAR
CO2: 25 MEQ/L (ref 20–31)
COLOR: YELLOW
CREAT SERPL-MCNC: 0.52 MG/DL (ref 0.5–0.9)
EOSINOPHILS ABSOLUTE: 0.1 K/UL (ref 0–0.7)
EOSINOPHILS RELATIVE PERCENT: 1.1 %
EPITHELIAL CELLS, UA: ABNORMAL /HPF (ref 0–5)
GFR AFRICAN AMERICAN: >60
GFR NON-AFRICAN AMERICAN: >60
GLOBULIN: 2.7 G/DL (ref 2.3–3.5)
GLUCOSE BLD-MCNC: 98 MG/DL (ref 70–99)
GLUCOSE URINE: NEGATIVE MG/DL
HCT VFR BLD CALC: 47.9 % (ref 37–47)
HEMOGLOBIN: 16.7 G/DL (ref 12–16)
HYALINE CASTS: ABNORMAL /HPF (ref 0–5)
KETONES, URINE: ABNORMAL MG/DL
LEUKOCYTE ESTERASE, URINE: ABNORMAL
LIPASE: 14 U/L (ref 12–95)
LYMPHOCYTES ABSOLUTE: 2.2 K/UL (ref 1–4.8)
LYMPHOCYTES RELATIVE PERCENT: 26 %
MCH RBC QN AUTO: 31.9 PG (ref 27–31.3)
MCHC RBC AUTO-ENTMCNC: 34.9 % (ref 33–37)
MCV RBC AUTO: 91.4 FL (ref 82–100)
MONOCYTES ABSOLUTE: 0.7 K/UL (ref 0.2–0.8)
MONOCYTES RELATIVE PERCENT: 8.4 %
MUCUS: PRESENT
NEUTROPHILS ABSOLUTE: 5.4 K/UL (ref 1.4–6.5)
NEUTROPHILS RELATIVE PERCENT: 63.4 %
NITRITE, URINE: NEGATIVE
PDW BLD-RTO: 13.2 % (ref 11.5–14.5)
PH UA: 5 (ref 5–9)
PLATELET # BLD: 237 K/UL (ref 130–400)
POTASSIUM SERPL-SCNC: 3.8 MEQ/L (ref 3.4–4.9)
PROTEIN UA: NEGATIVE MG/DL
RBC # BLD: 5.24 M/UL (ref 4.2–5.4)
RBC UA: ABNORMAL /HPF (ref 0–5)
SODIUM BLD-SCNC: 140 MEQ/L (ref 135–144)
SPECIFIC GRAVITY UA: 1.03 (ref 1–1.03)
TOTAL PROTEIN: 6.9 G/DL (ref 6.3–8)
URINE REFLEX TO CULTURE: YES
UROBILINOGEN, URINE: 0.2 E.U./DL
WBC # BLD: 8.5 K/UL (ref 4.8–10.8)
WBC UA: ABNORMAL /HPF (ref 0–5)

## 2019-03-08 PROCEDURE — 74176 CT ABD & PELVIS W/O CONTRAST: CPT

## 2019-03-08 RX ORDER — DICYCLOMINE HYDROCHLORIDE 10 MG/1
10 CAPSULE ORAL EVERY 6 HOURS PRN
Qty: 20 CAPSULE | Refills: 0 | Status: SHIPPED | OUTPATIENT
Start: 2019-03-08 | End: 2019-06-24

## 2019-03-08 RX ORDER — NAPROXEN 375 MG/1
375 TABLET ORAL 2 TIMES DAILY WITH MEALS
Qty: 14 TABLET | Refills: 0 | Status: SHIPPED | OUTPATIENT
Start: 2019-03-08 | End: 2019-06-12 | Stop reason: ALTCHOICE

## 2019-03-08 RX ORDER — ONDANSETRON 4 MG/1
4 TABLET, FILM COATED ORAL EVERY 8 HOURS PRN
Qty: 12 TABLET | Refills: 0 | Status: SHIPPED | OUTPATIENT
Start: 2019-03-08 | End: 2019-05-01

## 2019-03-08 ASSESSMENT — PAIN SCALES - GENERAL
PAINLEVEL_OUTOF10: 6
PAINLEVEL_OUTOF10: 4

## 2019-03-08 ASSESSMENT — PAIN DESCRIPTION - PAIN TYPE
TYPE: ACUTE PAIN
TYPE: ACUTE PAIN

## 2019-03-08 ASSESSMENT — ENCOUNTER SYMPTOMS
PHOTOPHOBIA: 0
APNEA: 0
ABDOMINAL DISTENTION: 0
SHORTNESS OF BREATH: 0
VOMITING: 0
ABDOMINAL PAIN: 1
DIARRHEA: 1
COUGH: 0
BACK PAIN: 1
VOICE CHANGE: 0
ANAL BLEEDING: 0
EYE DISCHARGE: 0
EYE PAIN: 0

## 2019-03-08 ASSESSMENT — PAIN DESCRIPTION - LOCATION
LOCATION: ABDOMEN
LOCATION: ABDOMEN

## 2019-03-08 ASSESSMENT — PAIN DESCRIPTION - DESCRIPTORS
DESCRIPTORS: SHARP
DESCRIPTORS: CRAMPING

## 2019-03-08 ASSESSMENT — PAIN DESCRIPTION - ORIENTATION: ORIENTATION: MID

## 2019-03-09 LAB — URINE CULTURE, ROUTINE: NORMAL

## 2019-03-14 ENCOUNTER — OFFICE VISIT (OUTPATIENT)
Dept: GASTROENTEROLOGY | Age: 64
End: 2019-03-14
Payer: COMMERCIAL

## 2019-03-14 VITALS
OXYGEN SATURATION: 97 % | WEIGHT: 211 LBS | HEART RATE: 68 BPM | DIASTOLIC BLOOD PRESSURE: 68 MMHG | HEIGHT: 65 IN | SYSTOLIC BLOOD PRESSURE: 126 MMHG | BODY MASS INDEX: 35.16 KG/M2 | TEMPERATURE: 98.6 F

## 2019-03-14 DIAGNOSIS — R10.30 LOWER ABDOMINAL PAIN: ICD-10-CM

## 2019-03-14 DIAGNOSIS — K59.1 FUNCTIONAL DIARRHEA: Primary | ICD-10-CM

## 2019-03-14 PROCEDURE — G8427 DOCREV CUR MEDS BY ELIG CLIN: HCPCS | Performed by: INTERNAL MEDICINE

## 2019-03-14 PROCEDURE — 3017F COLORECTAL CA SCREEN DOC REV: CPT | Performed by: INTERNAL MEDICINE

## 2019-03-14 PROCEDURE — G8417 CALC BMI ABV UP PARAM F/U: HCPCS | Performed by: INTERNAL MEDICINE

## 2019-03-14 PROCEDURE — G8484 FLU IMMUNIZE NO ADMIN: HCPCS | Performed by: INTERNAL MEDICINE

## 2019-03-14 PROCEDURE — 99214 OFFICE O/P EST MOD 30 MIN: CPT | Performed by: INTERNAL MEDICINE

## 2019-03-14 PROCEDURE — 1036F TOBACCO NON-USER: CPT | Performed by: INTERNAL MEDICINE

## 2019-03-14 RX ORDER — SODIUM, POTASSIUM,MAG SULFATES 17.5-3.13G
SOLUTION, RECONSTITUTED, ORAL ORAL
Qty: 1 BOTTLE | Refills: 0 | Status: SHIPPED | OUTPATIENT
Start: 2019-03-14 | End: 2019-05-01

## 2019-03-14 RX ORDER — CHOLESTYRAMINE 4 G/9G
1 POWDER, FOR SUSPENSION ORAL 2 TIMES DAILY
Qty: 60 PACKET | Refills: 1 | Status: SHIPPED | OUTPATIENT
Start: 2019-03-14 | End: 2019-06-12 | Stop reason: ALTCHOICE

## 2019-03-14 RX ORDER — HYOSCYAMINE SULFATE EXTENDED-RELEASE 0.38 MG/1
375 TABLET ORAL EVERY 12 HOURS PRN
Qty: 60 TABLET | Refills: 3 | Status: SHIPPED | OUTPATIENT
Start: 2019-03-14 | End: 2019-08-02 | Stop reason: SDUPTHER

## 2019-04-03 ENCOUNTER — OUTSIDE SERVICES (OUTPATIENT)
Dept: GASTROENTEROLOGY | Age: 64
End: 2019-04-03
Payer: COMMERCIAL

## 2019-04-03 DIAGNOSIS — K59.1 FUNCTIONAL DIARRHEA: Primary | ICD-10-CM

## 2019-04-03 DIAGNOSIS — R10.30 LOWER ABDOMINAL PAIN: ICD-10-CM

## 2019-04-03 PROCEDURE — 45380 COLONOSCOPY AND BIOPSY: CPT | Performed by: INTERNAL MEDICINE

## 2019-04-03 NOTE — OP NOTE
Colonoscopy Procedure Note      Patient: Ermelinda Mooney  : 1955    Procedure: Colonoscopy with biopsy    Date:  4/3/2019    Surgeon:  Tonja Nicholas MD    Referring Physician:  Wanda Cabello MD    Preoperative Diagnosis:  Unexplained long-standing diarrhea    Postoperative Diagnosis:  Normal colonic mucosa throughout, small internal hemorrhoids    Consent:  The patient or their legal guardian has signed a consent, and is aware of the potential risks, benefits, alternatives, and potential complications of this procedure. These include, but are not limited to hemorrhage, bleeding, post procedural pain, perforation, phlebitis, aspiration, hypotension, hypoxia, cardiovascular events such as arryhthmia, and possibly death. Additionally, the possibility of missed colonic polyps and interval colon cancer was discussed in the consent. Anesthesia:  MAC    Procedure: An informed consent was obtained from the patient after explanation of indications, benefits, possible risks and complications of the procedure. The patient was then taken to the endoscopy suite, placed in the left lateral decubitus position, and the above IV anesthesia was administered. A digital rectal examination was performed and revealed negative without mass, lesions or tenderness. The Olympus video colonoscope was placed in the patient's rectum under digital direction and advanced to the cecum. The cecum was identified by characteristic anatomy and confirmed with presence ileo-cecal valve, appendical orifice and transillumination of right lower quadrant. Photo documentation of cecum was performed. The prep was good. The scope was then withdrawn back through the cecum, ascending, transverse, descending, sigmoid colon, and rectum.   Careful circumferential examination of the mucosa in these areas demonstrated:    FINDINGS: Random colon biopsies were obtained throughout the colon  Cecum: Normal.  Ascending Colon: Normal.  Hepatic Flexure: Normal.  Transverse Colon:Normal.  Splenic Flexure: Normal.  Descending Colon: Normal.  Sigmoid Colon: Normal.  Rectum: Normal.  Retroflexed Views: Rectum did show small internal hemorrhoids. Patient tolerated the procedure well and was taken to the PACU in good condition. EBL: None  Complication: None  Specimen Sent: Yes    Impression:  normal colonic mucosa throughout    Recommendations:   - Await pathology. - Continue with current management plan given significant improvement with Questran and Levbid  - Repeat colonoscopy in 10 years.     Amos Hayden MD  [facility]  4/3/2019

## 2019-04-11 ENCOUNTER — OFFICE VISIT (OUTPATIENT)
Dept: FAMILY MEDICINE CLINIC | Age: 64
End: 2019-04-11
Payer: COMMERCIAL

## 2019-04-11 VITALS
RESPIRATION RATE: 12 BRPM | HEIGHT: 65 IN | DIASTOLIC BLOOD PRESSURE: 80 MMHG | BODY MASS INDEX: 35.29 KG/M2 | SYSTOLIC BLOOD PRESSURE: 128 MMHG | OXYGEN SATURATION: 98 % | WEIGHT: 211.8 LBS | TEMPERATURE: 95 F | HEART RATE: 62 BPM

## 2019-04-11 DIAGNOSIS — D32.0 BENIGN MENINGIOMA OF BRAIN (HCC): ICD-10-CM

## 2019-04-11 DIAGNOSIS — R55 SYNCOPE, UNSPECIFIED SYNCOPE TYPE: ICD-10-CM

## 2019-04-11 DIAGNOSIS — I48.0 PAF (PAROXYSMAL ATRIAL FIBRILLATION) (HCC): ICD-10-CM

## 2019-04-11 DIAGNOSIS — R55 SYNCOPE, UNSPECIFIED SYNCOPE TYPE: Primary | ICD-10-CM

## 2019-04-11 LAB
ALBUMIN SERPL-MCNC: 4.1 G/DL (ref 3.5–4.6)
ALP BLD-CCNC: 130 U/L (ref 40–130)
ALT SERPL-CCNC: 14 U/L (ref 0–33)
ANION GAP SERPL CALCULATED.3IONS-SCNC: 16 MEQ/L (ref 9–15)
AST SERPL-CCNC: 17 U/L (ref 0–35)
BASOPHILS ABSOLUTE: 0.1 K/UL (ref 0–0.2)
BASOPHILS RELATIVE PERCENT: 1 %
BILIRUB SERPL-MCNC: 0.3 MG/DL (ref 0.2–0.7)
BUN BLDV-MCNC: 20 MG/DL (ref 8–23)
CALCIUM SERPL-MCNC: 9.4 MG/DL (ref 8.5–9.9)
CHLORIDE BLD-SCNC: 107 MEQ/L (ref 95–107)
CHOLESTEROL, TOTAL: 168 MG/DL (ref 0–199)
CO2: 23 MEQ/L (ref 20–31)
CREAT SERPL-MCNC: 0.54 MG/DL (ref 0.5–0.9)
EOSINOPHILS ABSOLUTE: 0.1 K/UL (ref 0–0.7)
EOSINOPHILS RELATIVE PERCENT: 1.5 %
GFR AFRICAN AMERICAN: >60
GFR NON-AFRICAN AMERICAN: >60
GLOBULIN: 2.6 G/DL (ref 2.3–3.5)
GLUCOSE BLD-MCNC: 94 MG/DL (ref 70–99)
HCT VFR BLD CALC: 49.8 % (ref 37–47)
HDLC SERPL-MCNC: 53 MG/DL (ref 40–59)
HEMOGLOBIN: 16.6 G/DL (ref 12–16)
LDL CHOLESTEROL CALCULATED: 94 MG/DL (ref 0–129)
LYMPHOCYTES ABSOLUTE: 2.1 K/UL (ref 1–4.8)
LYMPHOCYTES RELATIVE PERCENT: 39.5 %
MCH RBC QN AUTO: 30.6 PG (ref 27–31.3)
MCHC RBC AUTO-ENTMCNC: 33.4 % (ref 33–37)
MCV RBC AUTO: 91.6 FL (ref 82–100)
MONOCYTES ABSOLUTE: 0.4 K/UL (ref 0.2–0.8)
MONOCYTES RELATIVE PERCENT: 7.7 %
NEUTROPHILS ABSOLUTE: 2.6 K/UL (ref 1.4–6.5)
NEUTROPHILS RELATIVE PERCENT: 50.3 %
PDW BLD-RTO: 13.3 % (ref 11.5–14.5)
PLATELET # BLD: 236 K/UL (ref 130–400)
POTASSIUM SERPL-SCNC: 4.3 MEQ/L (ref 3.4–4.9)
RBC # BLD: 5.43 M/UL (ref 4.2–5.4)
SODIUM BLD-SCNC: 146 MEQ/L (ref 135–144)
TOTAL PROTEIN: 6.7 G/DL (ref 6.3–8)
TRIGL SERPL-MCNC: 107 MG/DL (ref 0–150)
TSH SERPL DL<=0.05 MIU/L-ACNC: 1.49 UIU/ML (ref 0.44–3.86)
WBC # BLD: 5.2 K/UL (ref 4.8–10.8)

## 2019-04-11 PROCEDURE — 3017F COLORECTAL CA SCREEN DOC REV: CPT | Performed by: FAMILY MEDICINE

## 2019-04-11 PROCEDURE — G8427 DOCREV CUR MEDS BY ELIG CLIN: HCPCS | Performed by: FAMILY MEDICINE

## 2019-04-11 PROCEDURE — G8417 CALC BMI ABV UP PARAM F/U: HCPCS | Performed by: FAMILY MEDICINE

## 2019-04-11 PROCEDURE — 93000 ELECTROCARDIOGRAM COMPLETE: CPT | Performed by: FAMILY MEDICINE

## 2019-04-11 PROCEDURE — 1036F TOBACCO NON-USER: CPT | Performed by: FAMILY MEDICINE

## 2019-04-11 PROCEDURE — 99214 OFFICE O/P EST MOD 30 MIN: CPT | Performed by: FAMILY MEDICINE

## 2019-04-11 ASSESSMENT — ENCOUNTER SYMPTOMS
WHEEZING: 0
SORE THROAT: 0
SINUS PRESSURE: 0

## 2019-04-11 NOTE — PROGRESS NOTES
Subjective:      Patient ID: Gail Schaffer is a 60 y.o. female    Loss of Consciousness   This is a new problem. The current episode started in the past 7 days. She lost consciousness for a period of less than 1 minute. Nothing aggravates the symptoms. Pertinent negatives include no weakness. Her past medical history is significant for arrhythmia. Other   Pertinent negatives include no neck pain, rash, sore throat or weakness. Here with syncopal episode which occurred 2 days ago. Felt some nausea and severe dizziness which last about 10 min. May have passed out for less than a minute. Felt sharp ear pain which has resolved. No sob. No chest pain or palpitations. Was not confused after event and has not had another event since then. Review of Systems   Constitutional: Negative for appetite change and unexpected weight change. HENT: Negative for sinus pressure and sore throat. Respiratory: Negative for wheezing. Cardiovascular: Positive for syncope. Negative for leg swelling. Musculoskeletal: Negative for neck pain. Skin: Negative for rash. Neurological: Negative for weakness.      Reviewed allergy, medical, social, surgical, family and med list changes and updated   Files     Social History     Socioeconomic History    Marital status:      Spouse name: None    Number of children: None    Years of education: None    Highest education level: None   Occupational History    None   Social Needs    Financial resource strain: None    Food insecurity:     Worry: None     Inability: None    Transportation needs:     Medical: None     Non-medical: None   Tobacco Use    Smoking status: Never Smoker    Smokeless tobacco: Never Used   Substance and Sexual Activity    Alcohol use: No     Alcohol/week: 0.0 oz    Drug use: No    Sexual activity: Not Currently   Lifestyle    Physical activity:     Days per week: None     Minutes per session: None    Stress: None Relationships    Social connections:     Talks on phone: None     Gets together: None     Attends Sabianism service: None     Active member of club or organization: None     Attends meetings of clubs or organizations: None     Relationship status: None    Intimate partner violence:     Fear of current or ex partner: None     Emotionally abused: None     Physically abused: None     Forced sexual activity: None   Other Topics Concern    None   Social History Narrative    None     Current Outpatient Medications   Medication Sig Dispense Refill    Na Sulfate-K Sulfate-Mg Sulf 17.5-3.13-1.6 GM/177ML SOLN As directed 1 Bottle 0    cholestyramine (QUESTRAN) 4 g packet Take 1 packet by mouth 2 times daily 60 packet 1    hyoscyamine (LEVBID) 375 MCG extended release tablet Take 1 tablet by mouth every 12 hours as needed for Cramping 60 tablet 3    dicyclomine (BENTYL) 10 MG capsule Take 1 capsule by mouth every 6 hours as needed (cramps) 20 capsule 0    ondansetron (ZOFRAN) 4 MG tablet Take 1 tablet by mouth every 8 hours as needed for Nausea 12 tablet 0    Na Sulfate-K Sulfate-Mg Sulf 17.5-3.13-1.6 GM/177ML SOLN As directed 1 Bottle 0    Rivaroxaban (XARELTO PO) Take by mouth      metoprolol succinate (TOPROL XL) 100 MG extended release tablet TAKE 1 TABLET DAILY 90 tablet 3    fluticasone (FLONASE) 50 MCG/ACT nasal spray 1 spray by Nasal route daily 1 Bottle 3    venlafaxine (EFFEXOR XR) 75 MG extended release capsule Take 1 capsule by mouth daily 7 capsule 0    naproxen (NAPROSYN) 375 MG tablet Take 1 tablet by mouth 2 times daily (with meals) for 7 days 14 tablet 0     No current facility-administered medications for this visit.       Family History   Problem Relation Age of Onset    Cancer Father         COLON    High Blood Pressure Father     Heart Disease Father      Past Medical History:   Diagnosis Date    [facility]     Brain tumor ([facility] [address] 07/16/2018    Chest pain     Degenerative disc disease, lumbar 1/21/2016    Hx of colonic polyps     Hypertension 2/24/2015    Irritable bowel syndrome     Lumbar radiculopathy 3/14/2016    Rapid heart rate      Objective:   /80   Pulse 62   Temp 95 °F (35 °C)   Resp 12   Ht 5' 5\" (1.651 m)   Wt 211 lb 12.8 oz (96.1 kg)   LMP  (LMP Unknown)   SpO2 98%   BMI 35.25 kg/m²     Physical Exam  Heent: T.M's normal Nares patent. EOM'S intact. Pupillary reaction directly and                Consensually to light normal.  No photophobia. Tongue mid line. No facial               Asymmetry. Neck:   No rigidity. No masses. No thyroid asymmetry. No bruits  Lungs:  Clear equal breath sounds bilat. No wheezes or rales   Heart:   Rate reg. No murmur  Neuro:  C.N 2-12 intact. No focal deficits. Cerebellar function intact  Assessment:       Diagnosis Orders   1. Syncope, unspecified syncope type     2. Benign meningioma of brain ([address]     3.  PAF (paroxysmal atrial fibrillation) ([facility] [address]           Plan:      Mri of brain is already been ordered by neurosurgery so will call over and get done sooner  Than July which had been planned

## 2019-04-17 ENCOUNTER — HOSPITAL ENCOUNTER (OUTPATIENT)
Dept: MRI IMAGING | Age: 64
Discharge: HOME | End: 2019-04-19
Payer: COMMERCIAL

## 2019-04-17 DIAGNOSIS — D32.9 MENINGIOMA (HCC): ICD-10-CM

## 2019-04-17 PROCEDURE — 6360000004 HC RX CONTRAST MEDICATION: Performed by: NEUROLOGICAL SURGERY

## 2019-04-17 PROCEDURE — A9579 GAD-BASE MR CONTRAST NOS,1ML: HCPCS | Performed by: NEUROLOGICAL SURGERY

## 2019-04-17 PROCEDURE — 70553 MRI BRAIN STEM W/O & W/DYE: CPT

## 2019-04-17 RX ADMIN — GADOTERIDOL 20 ML: 279.3 INJECTION, SOLUTION INTRAVENOUS at 15:30

## 2019-05-01 ENCOUNTER — OFFICE VISIT (OUTPATIENT)
Dept: FAMILY MEDICINE CLINIC | Age: 64
End: 2019-05-01
Payer: COMMERCIAL

## 2019-05-01 VITALS
SYSTOLIC BLOOD PRESSURE: 112 MMHG | DIASTOLIC BLOOD PRESSURE: 70 MMHG | HEART RATE: 56 BPM | TEMPERATURE: 97.5 F | BODY MASS INDEX: 35.16 KG/M2 | HEIGHT: 65 IN | WEIGHT: 211 LBS | OXYGEN SATURATION: 91 % | RESPIRATION RATE: 12 BRPM

## 2019-05-01 DIAGNOSIS — R11.0 NAUSEA: ICD-10-CM

## 2019-05-01 DIAGNOSIS — R42 DIZZINESS: ICD-10-CM

## 2019-05-01 DIAGNOSIS — R42 LIGHTHEADEDNESS: ICD-10-CM

## 2019-05-01 DIAGNOSIS — R10.13 EPIGASTRIC PAIN: Primary | ICD-10-CM

## 2019-05-01 PROCEDURE — 93000 ELECTROCARDIOGRAM COMPLETE: CPT | Performed by: NURSE PRACTITIONER

## 2019-05-01 PROCEDURE — G8417 CALC BMI ABV UP PARAM F/U: HCPCS | Performed by: NURSE PRACTITIONER

## 2019-05-01 PROCEDURE — 1036F TOBACCO NON-USER: CPT | Performed by: NURSE PRACTITIONER

## 2019-05-01 PROCEDURE — 3017F COLORECTAL CA SCREEN DOC REV: CPT | Performed by: NURSE PRACTITIONER

## 2019-05-01 PROCEDURE — 99213 OFFICE O/P EST LOW 20 MIN: CPT | Performed by: NURSE PRACTITIONER

## 2019-05-01 PROCEDURE — G8427 DOCREV CUR MEDS BY ELIG CLIN: HCPCS | Performed by: NURSE PRACTITIONER

## 2019-05-01 RX ORDER — ONDANSETRON 4 MG/1
4 TABLET, ORALLY DISINTEGRATING ORAL EVERY 8 HOURS PRN
Qty: 15 TABLET | Refills: 0 | Status: SHIPPED | OUTPATIENT
Start: 2019-05-01 | End: 2019-05-06

## 2019-05-01 ASSESSMENT — ENCOUNTER SYMPTOMS
CONSTIPATION: 0
VOMITING: 0
DIARRHEA: 0
SHORTNESS OF BREATH: 0
NAUSEA: 1
ABDOMINAL PAIN: 1

## 2019-05-01 NOTE — PROGRESS NOTES
Subjective  Jess Cardozo, 60 y.o. female presents today with:  Chief Complaint   Patient presents with    Abdominal Pain     Pt c/o epigastric pain, nausea, dizziness, lightheadedness X 2 weeks. Stabbing pain rated as a 9/10 with no aggravating factors. Treatments tried: her IBS medications with no relief. She denies chest pain, SOB, headaches, leg swelling, vomiting, diarrhea, and constipation. HPI    Patient presents with epigastric pain. Has history of IBS abdominal pain, but this is different. Patient has been getting lightheadedness, dizziness, nausea, and abdominal pain. Patient just saw Dr. Mika Garcia 4/11 for similar symptoms except then she had a syncopal episode. Has not had a syncopal episode since then. Patient reports that out of nowhere she gets dizzy and lightheaded. Describes the dizziness as just feeling lightheaded. Has not vomited, no diarrhea, no constipation. Dizziness comes out of nowhere, without relation to movement, happened today and patient was just sitting watching tv. Admits to having hot and cold flashes, but denies fever. Denies CP/SOB. Denies headaches    No history of DM    Denies epigastric pain and dizziness at the moment. States the epigastric pain occurs when the dizziness occurs.     Past Medical History:   Diagnosis Date    [facility] fibrillation ([facility] [address]     Brain tumor ([facility] [address] 07/16/2018    Chest pain     Degenerative disc disease, lumbar 1/21/2016    Hx of colonic polyps     Hypertension 2/24/2015    Irritable bowel syndrome     Lumbar radiculopathy 3/14/2016    Rapid heart rate        Allergies   Allergen Reactions    Codeine     Demerol     Dye [Iodides]     Morphine     Sulfa Antibiotics Hives and Itching    Levofloxacin Itching and Rash       Current Outpatient Medications on File Prior to Visit   Medication Sig Dispense Refill    cholestyramine (QUESTRAN) 4 g packet Take 1 packet by mouth 2 times daily 60 packet 1    hyoscyamine (LEVBID) 375 MCG extended release tablet Take 1 tablet by mouth every 12 hours as needed for Cramping 60 tablet 3    dicyclomine (BENTYL) 10 MG capsule Take 1 capsule by mouth every 6 hours as needed (cramps) 20 capsule 0    Na Sulfate-K Sulfate-Mg Sulf 17.5-3.13-1.6 GM/177ML SOLN As directed 1 Bottle 0    Rivaroxaban (XARELTO PO) Take by mouth      metoprolol succinate (TOPROL XL) 100 MG extended release tablet TAKE 1 TABLET DAILY 90 tablet 3    fluticasone (FLONASE) 50 MCG/ACT nasal spray 1 spray by Nasal route daily 1 Bottle 3    venlafaxine (EFFEXOR XR) 75 MG extended release capsule Take 1 capsule by mouth daily 7 capsule 0    naproxen (NAPROSYN) 375 MG tablet Take 1 tablet by mouth 2 times daily (with meals) for 7 days 14 tablet 0     No current facility-administered medications on file prior to visit. Review of Systems   Constitutional: Negative for chills and fever. Hot/cold flashes   Respiratory: Negative for shortness of breath. Cardiovascular: Negative for chest pain. Gastrointestinal: Positive for abdominal pain (epigastric) and nausea. Negative for constipation, diarrhea and vomiting. Neurological: Positive for dizziness and light-headedness. Objective    Vitals:    05/01/19 1610 05/01/19 1837 05/01/19 1838 05/01/19 1839   BP: 110/70 110/74 112/76 112/70   Site: Right Upper Arm Right Upper Arm Right Upper Arm Right Upper Arm   Position: Sitting Supine Sitting Standing   Cuff Size: Medium Adult Medium Adult Medium Adult Medium Adult   Pulse: 62 95 56 56   Resp: 12      Temp: 97.5 °F (36.4 °C)      TempSrc: Oral      SpO2: 91%      Weight: 211 lb (95.7 kg)      Height: 5' 5\" (1.651 m)          Physical Exam   Constitutional: She is oriented to person, place, and time. Vital signs are normal. She appears well-developed and well-nourished. No distress. HENT:   Head: Normocephalic and atraumatic.    Ears:    Nose: Nose normal.   Mouth/Throat: No oropharyngeal exudate, posterior oropharyngeal edema or posterior oropharyngeal erythema. Eyes: Conjunctivae are normal. Right eye exhibits no discharge. Left eye exhibits no discharge. No scleral icterus. Cardiovascular: Normal rate, regular rhythm and normal heart sounds. Exam reveals no gallop and no friction rub. No murmur heard. Pulmonary/Chest: Effort normal and breath sounds normal. No stridor. No respiratory distress. She has no decreased breath sounds. She has no wheezes. She has no rhonchi. Abdominal: Soft. Normal appearance and bowel sounds are normal. There is tenderness in the epigastric area. There is no rigidity, no rebound, no guarding, no tenderness at McBurney's point and negative Hudson's sign. Lymphadenopathy:     She has no cervical adenopathy. Neurological: She is alert and oriented to person, place, and time. She has normal strength. No cranial nerve deficit. Skin: Skin is warm and dry. Vitals reviewed. Assessment & Plan     Renee Smith was seen today for abdominal pain and dizziness. Diagnoses and all orders for this visit:    Epigastric pain    Dizziness  -     EKG 12 lead unit performed    Lightheadedness  -     EKG 12 lead unit performed    Nausea  -     ondansetron (ZOFRAN ODT) 4 MG disintegrating tablet; Take 1 tablet by mouth every 8 hours as needed for Nausea or Vomiting    Consulted with Dr. Jerri Gastelum regarding patient. Patient recently had brain MRI, which was stable. Patient encouraged to follow up with Neurology STAT. Also consulted Dr. Jerri Gastelum regarding ECG, advised for patient to follow up with Cardiology ASAP. Patient stated she would call tomorrow to get in sooner than already scheduled appointments. Discussed signs and symptoms which require immediate follow-up in ED/call to 911. Understanding verbalized. Return if symptoms worsen or fail to improve, for follow up with PCP.      Side effects and adverse effects of any medication prescribed today, as well as treatment plan/rationale, follow-up care, and result expectations have been discussed with the patient. Expresses understanding and desires to proceed with treatment plan. I have reviewed and updated the electronic medical record.     ALETA Roberts - REGINA

## 2019-05-09 ENCOUNTER — HOSPITAL ENCOUNTER (OUTPATIENT)
Dept: WOMENS IMAGING | Age: 64
Discharge: HOME | End: 2019-05-11
Payer: COMMERCIAL

## 2019-05-09 DIAGNOSIS — Z12.31 ENCOUNTER FOR SCREENING MAMMOGRAM FOR BREAST CANCER: ICD-10-CM

## 2019-05-09 PROCEDURE — 77063 BREAST TOMOSYNTHESIS BI: CPT

## 2019-05-21 ENCOUNTER — OFFICE VISIT (OUTPATIENT)
Dept: GASTROENTEROLOGY | Age: 64
End: 2019-05-21
Payer: COMMERCIAL

## 2019-05-21 VITALS
BODY MASS INDEX: 34.99 KG/M2 | HEART RATE: 62 BPM | SYSTOLIC BLOOD PRESSURE: 116 MMHG | TEMPERATURE: 97 F | DIASTOLIC BLOOD PRESSURE: 70 MMHG | OXYGEN SATURATION: 96 % | HEIGHT: 65 IN | WEIGHT: 210 LBS

## 2019-05-21 DIAGNOSIS — G47.00 INSOMNIA, UNSPECIFIED TYPE: ICD-10-CM

## 2019-05-21 DIAGNOSIS — K58.0 IRRITABLE BOWEL SYNDROME WITH DIARRHEA: Primary | ICD-10-CM

## 2019-05-21 PROCEDURE — G8417 CALC BMI ABV UP PARAM F/U: HCPCS | Performed by: NURSE PRACTITIONER

## 2019-05-21 PROCEDURE — 1036F TOBACCO NON-USER: CPT | Performed by: NURSE PRACTITIONER

## 2019-05-21 PROCEDURE — 3017F COLORECTAL CA SCREEN DOC REV: CPT | Performed by: NURSE PRACTITIONER

## 2019-05-21 PROCEDURE — 99213 OFFICE O/P EST LOW 20 MIN: CPT | Performed by: NURSE PRACTITIONER

## 2019-05-21 PROCEDURE — G8427 DOCREV CUR MEDS BY ELIG CLIN: HCPCS | Performed by: NURSE PRACTITIONER

## 2019-05-21 RX ORDER — TRAZODONE HYDROCHLORIDE 50 MG/1
50 TABLET ORAL NIGHTLY
Qty: 30 TABLET | Refills: 0 | Status: SHIPPED | OUTPATIENT
Start: 2019-05-21 | End: 2019-08-02 | Stop reason: SDUPTHER

## 2019-05-21 NOTE — PROGRESS NOTES
Center    Please note this report has been partially produced using speech recognitionsoftware  and may cause contain errors related to that system including grammar, punctuation and spelling as well as words andphrases that may seem inappropriate. If there are questions or concerns please feel free to contact me to clarify.

## 2019-06-06 ENCOUNTER — APPOINTMENT (OUTPATIENT)
Dept: CT IMAGING | Age: 64
End: 2019-06-06
Payer: COMMERCIAL

## 2019-06-06 ENCOUNTER — APPOINTMENT (OUTPATIENT)
Dept: GENERAL RADIOLOGY | Age: 64
End: 2019-06-06
Payer: COMMERCIAL

## 2019-06-06 ENCOUNTER — HOSPITAL ENCOUNTER (EMERGENCY)
Age: 64
Discharge: ANOTHER HOSPITAL | End: 2019-06-06
Attending: EMERGENCY MEDICINE
Payer: COMMERCIAL

## 2019-06-06 VITALS
HEART RATE: 91 BPM | WEIGHT: 200 LBS | BODY MASS INDEX: 33.28 KG/M2 | DIASTOLIC BLOOD PRESSURE: 90 MMHG | SYSTOLIC BLOOD PRESSURE: 151 MMHG | OXYGEN SATURATION: 99 % | RESPIRATION RATE: 18 BRPM | TEMPERATURE: 97.9 F

## 2019-06-06 DIAGNOSIS — S30.1XXA ABDOMINAL HEMATOMA: ICD-10-CM

## 2019-06-06 DIAGNOSIS — R58 INTERNAL HEMORRHAGE: Primary | ICD-10-CM

## 2019-06-06 DIAGNOSIS — R10.12 LEFT UPPER QUADRANT PAIN: ICD-10-CM

## 2019-06-06 LAB
ALBUMIN SERPL-MCNC: 4.9 G/DL (ref 3.5–4.6)
ALP BLD-CCNC: 144 U/L (ref 40–130)
ALT SERPL-CCNC: 18 U/L (ref 0–33)
ANION GAP SERPL CALCULATED.3IONS-SCNC: 18 MEQ/L (ref 9–15)
AST SERPL-CCNC: 20 U/L (ref 0–35)
BACTERIA: NEGATIVE /HPF
BASOPHILS ABSOLUTE: 0 K/UL (ref 0–0.2)
BASOPHILS RELATIVE PERCENT: 0.3 %
BILIRUB SERPL-MCNC: 0.3 MG/DL (ref 0.2–0.7)
BILIRUBIN URINE: NEGATIVE
BLOOD, URINE: ABNORMAL
BUN BLDV-MCNC: 17 MG/DL (ref 8–23)
CALCIUM SERPL-MCNC: 10.4 MG/DL (ref 8.5–9.9)
CHLORIDE BLD-SCNC: 100 MEQ/L (ref 95–107)
CLARITY: CLEAR
CO2: 22 MEQ/L (ref 20–31)
COLOR: YELLOW
CREAT SERPL-MCNC: 0.54 MG/DL (ref 0.5–0.9)
EKG ATRIAL RATE: 50 BPM
EKG P AXIS: 46 DEGREES
EKG P-R INTERVAL: 184 MS
EKG Q-T INTERVAL: 484 MS
EKG QRS DURATION: 128 MS
EKG QTC CALCULATION (BAZETT): 441 MS
EKG R AXIS: 4 DEGREES
EKG T AXIS: 0 DEGREES
EKG VENTRICULAR RATE: 50 BPM
EOSINOPHILS ABSOLUTE: 0 K/UL (ref 0–0.7)
EOSINOPHILS RELATIVE PERCENT: 0.1 %
EPITHELIAL CELLS, UA: ABNORMAL /HPF (ref 0–5)
GFR AFRICAN AMERICAN: >60
GFR NON-AFRICAN AMERICAN: >60
GLOBULIN: 3.2 G/DL (ref 2.3–3.5)
GLUCOSE BLD-MCNC: 115 MG/DL (ref 70–99)
GLUCOSE URINE: NEGATIVE MG/DL
HCT VFR BLD CALC: 53.3 % (ref 37–47)
HEMOGLOBIN: 18.4 G/DL (ref 12–16)
HYALINE CASTS: ABNORMAL /HPF (ref 0–5)
KETONES, URINE: NEGATIVE MG/DL
LEUKOCYTE ESTERASE, URINE: NEGATIVE
LIPASE: 50 U/L (ref 12–95)
LYMPHOCYTES ABSOLUTE: 2.3 K/UL (ref 1–4.8)
LYMPHOCYTES RELATIVE PERCENT: 25.1 %
MCH RBC QN AUTO: 32 PG (ref 27–31.3)
MCHC RBC AUTO-ENTMCNC: 34.5 % (ref 33–37)
MCV RBC AUTO: 92.8 FL (ref 82–100)
MONOCYTES ABSOLUTE: 0.7 K/UL (ref 0.2–0.8)
MONOCYTES RELATIVE PERCENT: 7.3 %
NEUTROPHILS ABSOLUTE: 6.3 K/UL (ref 1.4–6.5)
NEUTROPHILS RELATIVE PERCENT: 67.2 %
NITRITE, URINE: NEGATIVE
PDW BLD-RTO: 13.7 % (ref 11.5–14.5)
PH UA: 6.5 (ref 5–9)
PLATELET # BLD: 237 K/UL (ref 130–400)
POTASSIUM REFLEX MAGNESIUM: 4.6 MEQ/L (ref 3.4–4.9)
PROTEIN UA: NEGATIVE MG/DL
RBC # BLD: 5.75 M/UL (ref 4.2–5.4)
RBC UA: ABNORMAL /HPF (ref 0–5)
SODIUM BLD-SCNC: 140 MEQ/L (ref 135–144)
SPECIFIC GRAVITY UA: 1.02 (ref 1–1.03)
TOTAL PROTEIN: 8.1 G/DL (ref 6.3–8)
TROPONIN: <0.01 NG/ML (ref 0–0.01)
UROBILINOGEN, URINE: 1 E.U./DL
WBC # BLD: 9.3 K/UL (ref 4.8–10.8)
WBC UA: ABNORMAL /HPF (ref 0–5)

## 2019-06-06 PROCEDURE — 80053 COMPREHEN METABOLIC PANEL: CPT

## 2019-06-06 PROCEDURE — 36415 COLL VENOUS BLD VENIPUNCTURE: CPT

## 2019-06-06 PROCEDURE — 96376 TX/PRO/DX INJ SAME DRUG ADON: CPT

## 2019-06-06 PROCEDURE — 2580000003 HC RX 258: Performed by: EMERGENCY MEDICINE

## 2019-06-06 PROCEDURE — 93005 ELECTROCARDIOGRAM TRACING: CPT | Performed by: EMERGENCY MEDICINE

## 2019-06-06 PROCEDURE — 81001 URINALYSIS AUTO W/SCOPE: CPT

## 2019-06-06 PROCEDURE — 74176 CT ABD & PELVIS W/O CONTRAST: CPT

## 2019-06-06 PROCEDURE — 6360000004 HC RX CONTRAST MEDICATION: Performed by: EMERGENCY MEDICINE

## 2019-06-06 PROCEDURE — 71046 X-RAY EXAM CHEST 2 VIEWS: CPT

## 2019-06-06 PROCEDURE — 83690 ASSAY OF LIPASE: CPT

## 2019-06-06 PROCEDURE — 93010 ELECTROCARDIOGRAM REPORT: CPT | Performed by: INTERNAL MEDICINE

## 2019-06-06 PROCEDURE — 6360000002 HC RX W HCPCS: Performed by: EMERGENCY MEDICINE

## 2019-06-06 PROCEDURE — 96375 TX/PRO/DX INJ NEW DRUG ADDON: CPT

## 2019-06-06 PROCEDURE — 84484 ASSAY OF TROPONIN QUANT: CPT

## 2019-06-06 PROCEDURE — 71270 CT THORAX DX C-/C+: CPT

## 2019-06-06 PROCEDURE — 74177 CT ABD & PELVIS W/CONTRAST: CPT

## 2019-06-06 PROCEDURE — 6370000000 HC RX 637 (ALT 250 FOR IP): Performed by: EMERGENCY MEDICINE

## 2019-06-06 PROCEDURE — 85025 COMPLETE CBC W/AUTO DIFF WBC: CPT

## 2019-06-06 PROCEDURE — 99285 EMERGENCY DEPT VISIT HI MDM: CPT

## 2019-06-06 PROCEDURE — 96365 THER/PROPH/DIAG IV INF INIT: CPT

## 2019-06-06 PROCEDURE — 2500000003 HC RX 250 WO HCPCS: Performed by: EMERGENCY MEDICINE

## 2019-06-06 RX ORDER — PROCHLORPERAZINE EDISYLATE 5 MG/ML
5 INJECTION INTRAMUSCULAR; INTRAVENOUS ONCE
Status: COMPLETED | OUTPATIENT
Start: 2019-06-06 | End: 2019-06-06

## 2019-06-06 RX ORDER — SODIUM CHLORIDE 9 MG/ML
INJECTION, SOLUTION INTRAVENOUS CONTINUOUS
Status: DISCONTINUED | OUTPATIENT
Start: 2019-06-06 | End: 2019-06-06 | Stop reason: HOSPADM

## 2019-06-06 RX ORDER — DIPHENHYDRAMINE HCL 25 MG
25 TABLET ORAL
Status: COMPLETED | OUTPATIENT
Start: 2019-06-06 | End: 2019-06-06

## 2019-06-06 RX ORDER — FENTANYL CITRATE 50 UG/ML
100 INJECTION, SOLUTION INTRAMUSCULAR; INTRAVENOUS ONCE
Status: COMPLETED | OUTPATIENT
Start: 2019-06-06 | End: 2019-06-06

## 2019-06-06 RX ORDER — 0.9 % SODIUM CHLORIDE 0.9 %
1000 INTRAVENOUS SOLUTION INTRAVENOUS ONCE
Status: COMPLETED | OUTPATIENT
Start: 2019-06-06 | End: 2019-06-06

## 2019-06-06 RX ORDER — 0.9 % SODIUM CHLORIDE 0.9 %
1000 INTRAVENOUS SOLUTION INTRAVENOUS ONCE
Status: DISCONTINUED | OUTPATIENT
Start: 2019-06-06 | End: 2019-06-06

## 2019-06-06 RX ADMIN — FENTANYL CITRATE 100 MCG: 50 INJECTION, SOLUTION INTRAMUSCULAR; INTRAVENOUS at 04:50

## 2019-06-06 RX ADMIN — FAMOTIDINE 20 MG: 10 INJECTION, SOLUTION INTRAVENOUS at 06:22

## 2019-06-06 RX ADMIN — DIPHENHYDRAMINE HCL 25 MG: 25 TABLET ORAL at 04:50

## 2019-06-06 RX ADMIN — SODIUM CHLORIDE 1000 ML: 9 INJECTION, SOLUTION INTRAVENOUS at 04:56

## 2019-06-06 RX ADMIN — TRANEXAMIC ACID 1000 MG: 1 INJECTION, SOLUTION INTRAVENOUS at 06:26

## 2019-06-06 RX ADMIN — FENTANYL CITRATE 100 MCG: 50 INJECTION, SOLUTION INTRAMUSCULAR; INTRAVENOUS at 06:26

## 2019-06-06 RX ADMIN — PROCHLORPERAZINE EDISYLATE 5 MG: 5 INJECTION INTRAMUSCULAR; INTRAVENOUS at 04:49

## 2019-06-06 RX ADMIN — IOPAMIDOL 100 ML: 755 INJECTION, SOLUTION INTRAVENOUS at 06:19

## 2019-06-06 ASSESSMENT — PAIN DESCRIPTION - DESCRIPTORS: DESCRIPTORS: SQUEEZING;SHARP

## 2019-06-06 ASSESSMENT — PAIN SCALES - GENERAL
PAINLEVEL_OUTOF10: 10
PAINLEVEL_OUTOF10: 10

## 2019-06-06 ASSESSMENT — PAIN DESCRIPTION - ORIENTATION: ORIENTATION: MID;UPPER

## 2019-06-06 ASSESSMENT — PAIN DESCRIPTION - PAIN TYPE: TYPE: ACUTE PAIN

## 2019-06-06 ASSESSMENT — PAIN DESCRIPTION - FREQUENCY: FREQUENCY: INTERMITTENT

## 2019-06-06 ASSESSMENT — PAIN DESCRIPTION - LOCATION: LOCATION: ABDOMEN

## 2019-06-06 NOTE — ED TRIAGE NOTES
Pt presents via [place] [facility] with c/o abd pain, n/v.  Onset approx 4hrs. Pt a&o x4. resp even. Skin p/w/d. Pt afebrile. Pt reports epigastric pain, nausea. No active emesis noted. Denies diarrhea. Pt currently prescribed prednisone. Hx IBS.

## 2019-06-06 NOTE — ED NOTES
Bed:  [facility] [place]  Expected date: 6/6/19  Expected time: 3:38 AM  Means of arrival:   Comments:  dariran twnshp 64 F, epigastric pain 10/10, Leonel VALLEJO, RN  06/06/19 3779

## 2019-06-06 NOTE — ED PROVIDER NOTES
 Financial resource strain: None    Food insecurity:     Worry: None     Inability: None    Transportation needs:     Medical: None     Non-medical: None   Tobacco Use    Smoking status: Never Smoker    Smokeless tobacco: Never Used   Substance and Sexual Activity    Alcohol use: No     Alcohol/week: 0.0 oz    Drug use: No    Sexual activity: Not Currently   Lifestyle    Physical activity:     Days per week: None     Minutes per session: None    Stress: None   Relationships    Social connections:     Talks on phone: None     Gets together: None     Attends Jew service: None     Active member of club or organization: None     Attends meetings of clubs or organizations: None     Relationship status: None    Intimate partner violence:     Fear of current or ex partner: None     Emotionally abused: None     Physically abused: None     Forced sexual activity: None   Other Topics Concern    None   Social History Narrative    None       SCREENINGS           PHYSICAL EXAM    (up to 7 for level 4, 8 or more for level 5)     ED Triage Vitals   Enc Vitals Group      BP 06/06/19 0406 (!) 176/112      Pulse 06/06/19 0406 51      Resp 06/06/19 0406 20      Temp 06/06/19 0406 97.6 °F (36.4 °C)      Temp Source 06/06/19 0406 Oral      SpO2 06/06/19 0406 100 %      Weight 06/06/19 0403 200 lb (90.7 kg)      Height --       Head Circumference --       Peak Flow --       Pain Score --       Pain Loc --       Pain Edu? --       Excl. in [address]? --      Physical Exam    GENERAL APPEARANCE: Awake and alert. Cooperative. Standing up at bedside  HEAD: Normocephalic. Atraumatic. No redness or erythema  EYES: Sclera anicteric. Pupils reactive to light  ENT: Tolerates saliva. No trismus. Moist mucous membranes,   NECK: Supple. Trachea midline. full range of motion,   CARDIO: RRR. Bilateral,radial pulses 2+ and equal.  No rubs or gallops  LUNGS: Respirations unlabored. CTAB. No rhonchi wheezing or rales  ABDOMEN: Soft. Non-distended. Non-tender. Active bowel sounds no rebound or guarding no pulsatile masses  EXTREMITIES: No acute deformities. No cyanosis, full range of motion all 4 extremities  SKIN: Warm and dry. No petechiae/ purpura no jaundice or cyanosis  NEUROLOGICAL: No gross facial drooping. Moves all 4 extremities spontaneously. PSYCHIATRIC: Normal mood. Anxious in pain affect  BACK: No CVA tenderness, no midline spinal process tenderness. DIAGNOSTIC RESULTS     EKG (Per Emergency Physician):     Sinus bradycardia right bundle branch block and ventricular rate 50 bpm KS interval 184 ms qrs duration 128 ms QTc 440 ms R axis 4    RADIOLOGY (Per Emergency Physician): Interpretation per the Radiologist below, if available at the time of this note:  Ct Abdomen Pelvis Wo Contrast Additional Contrast? None    Result Date: 6/6/2019  EXAMINATION:  CT SCAN ABDOMEN AND PELVIS WITHOUT AT 0503 HOURS AND CT SCAN CHEST, ABDOMEN AND PELVIS WITH [facility]: CLINICAL HISTORY:  ABDOMINAL PAIN, PRESUMED HEMATOMA SEEN ON INITIAL NONCONTRAST STUDY. COMPARISON:  CT abdomen/pelvis, March 7, 2019. TECHNIQUE:  This is a combined dictation of studies of the abdomen and pelvis without contrast done at 0503 hours with subsequent CT scan chest, abdomen and pelvis with 100 cc of Isovue 370. Images in the axial, sagittal and coronal planes for both studies were performed. FINDINGS:  On the noncontrast study at 0503 hours, there was an area of ill-defined soft tissue density extending from the lower anterior mediastinum to the upper abdomen anterior to the medial segment and lateral segment of the left lobe with some mass effect. It measures 15.5 x 3.3 x 13.5 in the transverse AP and cephalocaudad dimension. It measures approximately 54 Hounsfield units suggesting that this is blood and/or hematoma.  Subsequently following the intravenous administration of contrast, repeat CT scan of the chest, abdomen and pelvis was performed ABOVE. A SINGLE VESSEL POSTERIOR TO THE STERNUM IS A HEMATOMA AND THERE ARE FINDINGS OF ACTIVE EXTRAVASATION, ACTIVE BLEEDING. 2. THE FINDINGS OF AREAS ATELECTASIS, PATCHY GROUNDGLASS INFILTRATES DESCRIBED ABOVE WITH A LEFT PLEURAL EFFUSION. 3. OTHER FINDINGS, AS DETAILED ABOVE. All CT scans at this facility use dose modulation, iterative reconstruction, and/or weight based dosing when appropriate to reduce radiation dose to as low as reasonably achievable. Xr Chest Standard (2 Vw)    Result Date: 6/6/2019  EXAMINATION: Chest x-ray, 2 view HISTORY: Epigastric pain TECHNIQUE: Frontal and lateral views of the chest COMPARISON: Chest radiograph from November 7 2018 FINDINGS: Atherosclerotic calcification of the thoracic aorta. Cardiomediastinal silhouette is within normal limits. Small left pleural effusion. No pneumothorax or focal consolidation. Osseous structures of the thorax are within normal limits. Partially visualized postsurgical changes of the cervical spine     Small left pleural effusion. Ct Chest W Wo Contrast    Result Date: 6/6/2019  EXAMINATION:  CT SCAN ABDOMEN AND PELVIS WITHOUT AT 0503 HOURS AND CT SCAN CHEST, ABDOMEN AND PELVIS WITH CONTRAST AT 0609 HOURS: CLINICAL HISTORY:  ABDOMINAL PAIN, PRESUMED HEMATOMA SEEN ON INITIAL NONCONTRAST STUDY. COMPARISON:  CT abdomen/pelvis, March 7, 2019. TECHNIQUE:  This is a combined dictation of studies of the abdomen and pelvis without contrast done at 0503 hours with subsequent CT scan chest, abdomen and pelvis with 100 cc of Isovue 370. Images in the axial, sagittal and coronal planes for both studies were performed. FINDINGS:  On the noncontrast study at 0503 hours, there was an area of ill-defined soft tissue density extending from the lower anterior mediastinum to the upper abdomen anterior to the medial segment and lateral segment of the left lobe with some mass effect. It measures 15.5 x 3.3 x 13.5 in the transverse AP and cephalocaudad dimension. It measures approximately 54 Hounsfield units suggesting that this is blood and/or hematoma. Subsequently following the intravenous administration of contrast, repeat CT scan of the chest, abdomen and pelvis was performed at 0609 hours. Again, note is made of a soft tissue density, probable hematoma, probable spontaneous as described above. There are now findings of an enhancing single vessel extending from the sternum/anterior abdominal wall into the hematoma with findings of active extravasation of contrast, bleeding. The chest shows areas of patchy groundglass infiltrates in the left upper lobe and in both bases, left greater than right, with an associated left pleural effusion. No pneumothoraces. No significant periaortic, pretracheal, parahilar or subcarinal adenopathy. There are several small nodules seen within the thyroid of approximately 7 mm. The gallbladder surgically absent. The spleen, pancreas and adrenals are unremarkable. The right kidney shows no perinephric stranding. No masses. No hydronephrosis. The left kidney again shows a partially exophytic mass at the superior pole measuring 2.9 cm, unchanged, probable simple hepatic cyst. No hydronephrosis. The bladder is partially decompressed. Large and small bowel show no sign of obstruction. The appendix is surgically absent. No diverticulitis. The uterus is surgically absent. No free air. Trace amount of free fluid in the pelvis, most likely physiologic. Visualized abdominal aorta is of normal size and caliber. No significant retroperitoneal adenopathy. Visualized osseous structures show patient has undergone pedicle screw and ubtch of the L3 and L4 levels with interbody fusion. The liver again shows an area of low-attenuation at the superolateral aspect of the right lobe measuring 1.4 cm, as well as a cyst anterior to the intrahepatic portion of the inferior vena cava, measuring approximately 3.9 cm, unchanged.  No other focal parenchymal abnormalities or intrahepatic biliary dilatation. 1. THERE ARE FINDINGS OF WHAT APPEARS TO BE A SPONTANEOUS HEMATOMA ARISING FROM THE LOWER ANTERIOR MEDIASTINUM EXTENDING INTO THE UPPER ABDOMEN AND SUPERIOR TO THE LIVER, AS DESCRIBED ABOVE. A SINGLE VESSEL POSTERIOR TO THE STERNUM IS A HEMATOMA AND THERE ARE FINDINGS OF ACTIVE EXTRAVASATION, ACTIVE BLEEDING. 2. THE FINDINGS OF AREAS ATELECTASIS, PATCHY GROUNDGLASS INFILTRATES DESCRIBED ABOVE WITH A LEFT PLEURAL EFFUSION. 3. OTHER FINDINGS, AS DETAILED ABOVE. All CT scans at this facility use dose modulation, iterative reconstruction, and/or weight based dosing when appropriate to reduce radiation dose to as low as reasonably achievable. Ct Abdomen Pelvis W Iv Contrast Additional Contrast? None    Result Date: 6/6/2019  EXAMINATION:  CT SCAN ABDOMEN AND PELVIS WITHOUT AT 0503 HOURS AND CT SCAN CHEST, ABDOMEN AND PELVIS WITH CONTRAST AT 0609 HOURS: CLINICAL HISTORY:  ABDOMINAL PAIN, PRESUMED HEMATOMA SEEN ON INITIAL NONCONTRAST STUDY. COMPARISON:  CT abdomen/pelvis, March 7, 2019. TECHNIQUE:  This is a combined dictation of studies of the abdomen and pelvis without contrast done at 0503 hours with subsequent CT scan chest, abdomen and pelvis with 100 cc of Isovue 370. Images in the axial, sagittal and coronal planes for both studies were performed. FINDINGS:  On the noncontrast study at 0503 hours, there was an area of ill-defined soft tissue density extending from the lower anterior mediastinum to the upper abdomen anterior to the medial segment and lateral segment of the left lobe with some mass effect. It measures 15.5 x 3.3 x 13.5 in the transverse AP and cephalocaudad dimension. It measures approximately 54 Hounsfield units suggesting that this is blood and/or hematoma. Subsequently following the intravenous administration of contrast, repeat CT scan of the chest, abdomen and pelvis was performed at 0609 hours.  Again, note is made of a soft tissue density, probable around 11:30 last night. Sharp stabbing. Presents the emergency department to be evaluated for this. Patient was found to have a large hematoma. Presumed actively bleeding. Patient's on Xarelto. Spoke to our surgeon on-call states he did not have a reversal agent for Xarelto. Recommended transferring the patient to a high level of care. Patient had no trauma        REVAL:   Patient resting comfortably at this time. TXA was transfused. Awaiting final reports. CRITICAL CARE TIME   Total Critical Care time was 35 minutes, excluding separately reportable procedures. There was a high probability of clinically significant/life threatening deterioration in the patient's condition which required my urgent intervention. CONSULTS:  None    PROCEDURES:  Unless otherwise noted below, none     Procedures    ATTESTATIONS  Vital signs and nursing notes reviewed. If included as part of this work-up, I interpreted the ECG andreviewed all diagnostic imaging as well as provided preliminary interpretation of plain film imaging as noted. As warranted and when indicated,  I reviewed the PDMP as  applicable. FINAL IMPRESSION      1. Internal hemorrhage    2. Left upper quadrant pain    3. Abdominal hematoma      Internal hematoma on Xarelto    DISPOSITION/PLAN   DISPOSITION        PATIENT REFERREDTO:  No follow-up provider specified.     DISCHARGE MEDICATIONS:  Discharge Medication List as of 6/6/2019  7:18 AM                   Summation      Patient Course: See HPI and MDM       ED Medications administered this visit:    Medications   0.9 % sodium chloride bolus (0 mLs Intravenous Stopped 6/6/19 0600)   fentaNYL (SUBLIMAZE) injection 100 mcg (100 mcg Intravenous Given 6/6/19 0450)   prochlorperazine (COMPAZINE) injection 5 mg (5 mg Intravenous Given 6/6/19 0679)   diphenhydrAMINE (BENADRYL) tablet 25 mg (25 mg Oral Given 6/6/19 0450)   tranexamic acid (CYKLOKAPRON) 1,000 mg in dextrose 5 % 100 mL IVPB (0 mg

## 2019-06-12 ENCOUNTER — TELEPHONE (OUTPATIENT)
Dept: FAMILY MEDICINE CLINIC | Age: 64
End: 2019-06-12

## 2019-06-12 ENCOUNTER — OFFICE VISIT (OUTPATIENT)
Dept: CARDIOLOGY CLINIC | Age: 64
End: 2019-06-12
Payer: COMMERCIAL

## 2019-06-12 VITALS
BODY MASS INDEX: 34.66 KG/M2 | WEIGHT: 208 LBS | OXYGEN SATURATION: 98 % | SYSTOLIC BLOOD PRESSURE: 132 MMHG | DIASTOLIC BLOOD PRESSURE: 84 MMHG | RESPIRATION RATE: 20 BRPM | HEIGHT: 65 IN | HEART RATE: 67 BPM

## 2019-06-12 DIAGNOSIS — I10 ESSENTIAL HYPERTENSION: ICD-10-CM

## 2019-06-12 DIAGNOSIS — I48.0 PAF (PAROXYSMAL ATRIAL FIBRILLATION) (HCC): Primary | ICD-10-CM

## 2019-06-12 DIAGNOSIS — Z98.890 HISTORY OF OPEN HEART SURGERY: ICD-10-CM

## 2019-06-12 DIAGNOSIS — K92.0 GASTROINTESTINAL HEMORRHAGE WITH HEMATEMESIS: ICD-10-CM

## 2019-06-12 PROCEDURE — G8417 CALC BMI ABV UP PARAM F/U: HCPCS | Performed by: INTERNAL MEDICINE

## 2019-06-12 PROCEDURE — 1036F TOBACCO NON-USER: CPT | Performed by: INTERNAL MEDICINE

## 2019-06-12 PROCEDURE — 3017F COLORECTAL CA SCREEN DOC REV: CPT | Performed by: INTERNAL MEDICINE

## 2019-06-12 PROCEDURE — 99214 OFFICE O/P EST MOD 30 MIN: CPT | Performed by: INTERNAL MEDICINE

## 2019-06-12 PROCEDURE — G8427 DOCREV CUR MEDS BY ELIG CLIN: HCPCS | Performed by: INTERNAL MEDICINE

## 2019-06-12 RX ORDER — OXYCODONE HYDROCHLORIDE 5 MG/1
5 TABLET ORAL
COMMUNITY
Start: 2019-06-10 | End: 2019-06-15

## 2019-06-12 RX ORDER — HYOSCYAMINE SULFATE 0.125 MG
0.12 TABLET ORAL
COMMUNITY
Start: 2019-06-10 | End: 2019-06-24

## 2019-06-12 RX ORDER — ONDANSETRON 4 MG/1
4 TABLET, ORALLY DISINTEGRATING ORAL
COMMUNITY
Start: 2019-06-10 | End: 2019-10-29 | Stop reason: SDUPTHER

## 2019-06-12 ASSESSMENT — ENCOUNTER SYMPTOMS
VOMITING: 0
CHEST TIGHTNESS: 0
BLOOD IN STOOL: 0
DIARRHEA: 0
APNEA: 0
NAUSEA: 0
SHORTNESS OF BREATH: 0

## 2019-06-12 NOTE — TELEPHONE ENCOUNTER
Pt says she just got released from the hospital Monday (6/10/2019) afternoon. On Wednesday (6/5/2019), she went in by ambulance and they found an abdominal bleed under her left breast from her xarelto blood thinning medication. She stopped it and her cardiologist told her to stop taking it for 30 days and PT does not want to take again. Today, 6/12/2019, Pt is still in pain from the bleed, cold and Pt is usually hot, diarrhea, feels a twitching, stabbing in the right area above her breast. She has a hematoma under the right breast.  PT is wondering if she should go to the walk in clinic, ER, or schedule an ov. She was told to follow up with you, but I let her know your schedule is booked.

## 2019-06-23 ASSESSMENT — ENCOUNTER SYMPTOMS
FACIAL SWELLING: 0
COLOR CHANGE: 0
ABDOMINAL DISTENTION: 0
ANAL BLEEDING: 0
TROUBLE SWALLOWING: 0
VOICE CHANGE: 0
WHEEZING: 0

## 2019-06-24 ENCOUNTER — OFFICE VISIT (OUTPATIENT)
Dept: FAMILY MEDICINE CLINIC | Age: 64
End: 2019-06-24
Payer: COMMERCIAL

## 2019-06-24 VITALS
OXYGEN SATURATION: 98 % | RESPIRATION RATE: 10 BRPM | TEMPERATURE: 95.2 F | SYSTOLIC BLOOD PRESSURE: 116 MMHG | BODY MASS INDEX: 34.45 KG/M2 | WEIGHT: 206.8 LBS | HEART RATE: 74 BPM | HEIGHT: 65 IN | DIASTOLIC BLOOD PRESSURE: 72 MMHG

## 2019-06-24 DIAGNOSIS — S30.1XXA ABDOMINAL WALL HEMATOMA, INITIAL ENCOUNTER: ICD-10-CM

## 2019-06-24 DIAGNOSIS — D64.9 ANEMIA, UNSPECIFIED TYPE: ICD-10-CM

## 2019-06-24 DIAGNOSIS — R93.89 ABNORMAL CT OF THE CHEST: ICD-10-CM

## 2019-06-24 DIAGNOSIS — S30.1XXA ABDOMINAL WALL HEMATOMA, INITIAL ENCOUNTER: Primary | ICD-10-CM

## 2019-06-24 LAB
BASOPHILS ABSOLUTE: 0 K/UL (ref 0–0.2)
BASOPHILS RELATIVE PERCENT: 1.1 %
EOSINOPHILS ABSOLUTE: 0.2 K/UL (ref 0–0.7)
EOSINOPHILS RELATIVE PERCENT: 3.3 %
HCT VFR BLD CALC: 43.6 % (ref 37–47)
HEMOGLOBIN: 14.7 G/DL (ref 12–16)
LYMPHOCYTES ABSOLUTE: 1.1 K/UL (ref 1–4.8)
LYMPHOCYTES RELATIVE PERCENT: 25.3 %
MCH RBC QN AUTO: 32.6 PG (ref 27–31.3)
MCHC RBC AUTO-ENTMCNC: 33.7 % (ref 33–37)
MCV RBC AUTO: 96.7 FL (ref 82–100)
MONOCYTES ABSOLUTE: 0.4 K/UL (ref 0.2–0.8)
MONOCYTES RELATIVE PERCENT: 8.5 %
NEUTROPHILS ABSOLUTE: 2.8 K/UL (ref 1.4–6.5)
NEUTROPHILS RELATIVE PERCENT: 61.8 %
PDW BLD-RTO: 16.2 % (ref 11.5–14.5)
PLATELET # BLD: 270 K/UL (ref 130–400)
RBC # BLD: 4.51 M/UL (ref 4.2–5.4)
WBC # BLD: 4.5 K/UL (ref 4.8–10.8)

## 2019-06-24 PROCEDURE — 3017F COLORECTAL CA SCREEN DOC REV: CPT | Performed by: FAMILY MEDICINE

## 2019-06-24 PROCEDURE — 1036F TOBACCO NON-USER: CPT | Performed by: FAMILY MEDICINE

## 2019-06-24 PROCEDURE — G8427 DOCREV CUR MEDS BY ELIG CLIN: HCPCS | Performed by: FAMILY MEDICINE

## 2019-06-24 PROCEDURE — 99213 OFFICE O/P EST LOW 20 MIN: CPT | Performed by: FAMILY MEDICINE

## 2019-06-24 PROCEDURE — 1111F DSCHRG MED/CURRENT MED MERGE: CPT | Performed by: FAMILY MEDICINE

## 2019-06-24 PROCEDURE — G8417 CALC BMI ABV UP PARAM F/U: HCPCS | Performed by: FAMILY MEDICINE

## 2019-06-24 ASSESSMENT — ENCOUNTER SYMPTOMS: ABDOMINAL PAIN: 0

## 2019-06-24 NOTE — PROGRESS NOTES
Subjective:      Patient ID: Toan Lujan is a 60 y.o. female    HPI  Here in follow up from [facility] er visit and metro visit for spontaneous hematoma. Blood counts remained stable so discharged to home. Has appt with cardiology next week as xarelto on hold. Pain much better. No sob. No vomiting    Review of Systems   Constitutional: Negative for fever and unexpected weight change. Gastrointestinal: Negative for abdominal pain. Neurological: Negative for dizziness.      Reviewed allergy, medical, social, surgical, family and med list changes and updated   Files     Social History     Socioeconomic History    Marital status:      Spouse name: None    Number of children: None    Years of education: None    Highest education level: None   Occupational History    None   Social Needs    Financial resource strain: None    Food insecurity:     Worry: None     Inability: None    Transportation needs:     Medical: None     Non-medical: None   Tobacco Use    Smoking status: Never Smoker    Smokeless tobacco: Never Used   Substance and Sexual Activity    Alcohol use: No     Alcohol/week: 0.0 oz    Drug use: No    Sexual activity: Not Currently   Lifestyle    Physical activity:     Days per week: None     Minutes per session: None    Stress: None   Relationships    Social connections:     Talks on phone: None     Gets together: None     Attends Latter-day service: None     Active member of club or organization: None     Attends meetings of clubs or organizations: None     Relationship status: None    Intimate partner violence:     Fear of current or ex partner: None     Emotionally abused: None     Physically abused: None     Forced sexual activity: None   Other Topics Concern    None   Social History Narrative    None     Current Outpatient Medications   Medication Sig Dispense Refill    ondansetron (ZOFRAN-ODT) 4 MG disintegrating tablet Take 4 mg by mouth      traZODone (DESYREL) 50 MG tablet Take 1 tablet by mouth nightly 30 tablet 0    hyoscyamine (LEVBID) 375 MCG extended release tablet Take 1 tablet by mouth every 12 hours as needed for Cramping 60 tablet 3    metoprolol succinate (TOPROL XL) 100 MG extended release tablet TAKE 1 TABLET DAILY 90 tablet 3    fluticasone (FLONASE) 50 MCG/ACT nasal spray 1 spray by Nasal route daily 1 Bottle 3    venlafaxine (EFFEXOR XR) 75 MG extended release capsule Take 1 capsule by mouth daily 7 capsule 0     No current facility-administered medications for this visit. Family History   Problem Relation Age of Onset    Cancer Father         COLON    High Blood Pressure Father     Heart Disease Father      Past Medical History:   Diagnosis Date    At[facility] [address] [address]     Brain tumor ([facility] [address] 07/16/2018    Chest pain     Degenerative disc disease, lumbar 1/21/2016    Hx of colonic polyps     Hypertension 2/24/2015    Irritable bowel syndrome     Lumbar radiculopathy 3/14/2016    Rapid heart rate      Objective:   /72   Pulse 74   Temp 95.2 °F (35.1 °C) (Tympanic)   Resp 10   Ht 5' 5\" (1.651 m)   Wt 206 lb 12.8 oz (93.8 kg)   LMP  (LMP Unknown)   SpO2 98%   BMI 34.41 kg/m²     Physical Exam  Lungs:Clear  Equal b.s bilaterally  Heart:  Rate reg     No murmur  Back:       No  CVA tenderness  Abdomen: B.S present   Soft            No Tenderness         No  Rebound                    No hepatosplenomegaly. No masses. Gen:      No acute distress  Skin:      No rashes   Assessment:       Diagnosis Orders   1. Abdominal wall hematoma, initial encounter  CBC Auto Differential    CT ABDOMEN PELVIS WO CONTRAST Additional Contrast? Radiologist Recommendation   2. Abnormal CT of the chest  CBC Auto Differential    CT CHEST WO CONTRAST   3.  Anemia, unspecified type  CBC Auto Differential         Plan:       Orders Placed This Encounter   Procedures    CT CHEST WO CONTRAST     Standing Status:   Future     Standing Expiration Date:

## 2019-06-25 ENCOUNTER — OFFICE VISIT (OUTPATIENT)
Dept: GASTROENTEROLOGY | Age: 64
End: 2019-06-25
Payer: COMMERCIAL

## 2019-06-25 VITALS
BODY MASS INDEX: 34.32 KG/M2 | HEIGHT: 65 IN | WEIGHT: 206 LBS | TEMPERATURE: 97.2 F | HEART RATE: 59 BPM | SYSTOLIC BLOOD PRESSURE: 130 MMHG | DIASTOLIC BLOOD PRESSURE: 76 MMHG | OXYGEN SATURATION: 98 %

## 2019-06-25 DIAGNOSIS — K58.0 IRRITABLE BOWEL SYNDROME WITH DIARRHEA: ICD-10-CM

## 2019-06-25 DIAGNOSIS — K92.9 FUNCTIONAL GASTROINTESTINAL DISORDER: Primary | ICD-10-CM

## 2019-06-25 PROCEDURE — 99213 OFFICE O/P EST LOW 20 MIN: CPT | Performed by: NURSE PRACTITIONER

## 2019-06-25 PROCEDURE — G8427 DOCREV CUR MEDS BY ELIG CLIN: HCPCS | Performed by: NURSE PRACTITIONER

## 2019-06-25 PROCEDURE — 1036F TOBACCO NON-USER: CPT | Performed by: NURSE PRACTITIONER

## 2019-06-25 PROCEDURE — 3017F COLORECTAL CA SCREEN DOC REV: CPT | Performed by: NURSE PRACTITIONER

## 2019-06-25 PROCEDURE — G8417 CALC BMI ABV UP PARAM F/U: HCPCS | Performed by: NURSE PRACTITIONER

## 2019-06-25 NOTE — PROGRESS NOTES
Gastroenterology Clinic Follow up Visit    [facility]  [id number]  Chief Complaint   Patient presents with    Follow-up     Background:64 y.o. female last seen in GI clinic on 5/21/19 with complaints of abdominal pain and loose stool. Questran helping. Poor sleep. Describes her symptoms as flares. Questran and Levbid to help with IBS symptoms. Trazodone added to help with functional GI issues and sleep. Interval change: Patient presents today with some improvement of symptoms. She reports her sleep has significantly improved with trazodone. She reports most days she will have a soft formed bowel movement. She takes 1 pack of Questran daily. However, patient experienced a 4-day episode of loose stool. He reports abdominal pain before a bowel movement, pain relieved after BM. FD guard helps with nausea. Denies GERD symptoms or dysphagia. Denies weight loss or loss of appetite. Denies melena, hematochezia, or hematemesis. Denies chest pain, shortness of breath, or palpitations. Review of Systems   All other systems reviewed and are negative. Past medical history, past surgical history, medication list, social and familyhistory reviewed    Blood pressure 130/76, pulse 59, temperature 97.2 °F (36.2 °C), height 5' 5\" (1.651 m), weight 206 lb (93.4 kg), SpO2 98 %, not currently breastfeeding. Physical Exam   Constitutional: She is oriented to person, place, and time. She appears well-developed and well-nourished. No distress. HENT:   Head: Normocephalic and atraumatic. Eyes: Pupils are equal, round, and reactive to light. Conjunctivae and EOM are normal. No scleral icterus. Neck: Normal range of motion. Neck supple. Cardiovascular: Normal rate, regular rhythm and normal heart sounds. No murmur heard. Pulmonary/Chest: Effort normal and breath sounds normal. No respiratory distress. She has no wheezes. She has no rales. She exhibits no tenderness. Abdominal: Soft.  Bowel sounds are normal. She exhibits no distension and no mass. There is no tenderness. There is no rebound and no guarding. Central obesity    Musculoskeletal: Normal range of motion. Lymphadenopathy:     She has no cervical adenopathy. Neurological: She is alert and oriented to person, place, and time. Skin: Skin is warm and dry. No rash noted. She is not diaphoretic. No erythema. No pallor. Psychiatric: She has a normal mood and affect. Her behavior is normal. Judgment and thought content normal.   Vitals reviewed. Laboratory, Pathology, Radiology reviewed in detail with relevantimportant investigations summarized below:    Recent Labs     06/24/19  1237 06/06/19  0445   WBC 4.5* 9.3   HGB 14.7 18.4*   HCT 43.6 53.3*   MCV 96.7 92.8    237     Lab Results   Component Value Date    ALT 18 06/06/2019    AST 20 06/06/2019    ALKPHOS 144 (H) 06/06/2019    BILITOT 0.3 06/06/2019     Ct Abdomen Pelvis Wo Contrast Additional Contrast? None  Result Date: 6/12/2019  1. THERE ARE FINDINGS OF WHAT APPEARS TO BE A SPONTANEOUS HEMATOMA ARISING FROM THE LOWER ANTERIOR MEDIASTINUM EXTENDING INTO THE UPPER ABDOMEN AND SUPERIOR TO THE LIVER, AS DESCRIBED ABOVE. A SINGLE VESSEL POSTERIOR TO THE STERNUM IS A HEMATOMA AND THERE ARE FINDINGS OF ACTIVE EXTRAVASATION, ACTIVE BLEEDING. 2. THE FINDINGS OF AREAS ATELECTASIS, PATCHY GROUNDGLASS INFILTRATES DESCRIBED ABOVE WITH A LEFT PLEURAL EFFUSION. 3. OTHER FINDINGS, AS DETAILED ABOVE. Ct Abdomen Pelvis W Iv Contrast Additional Contrast? None  Result Date: 6/12/2019  1. THERE ARE FINDINGS OF WHAT APPEARS TO BE A SPONTANEOUS HEMATOMA ARISING FROM THE LOWER ANTERIOR MEDIASTINUM EXTENDING INTO THE UPPER ABDOMEN AND SUPERIOR TO THE LIVER, AS DESCRIBED ABOVE. A SINGLE VESSEL POSTERIOR TO THE STERNUM IS A HEMATOMA AND THERE ARE FINDINGS OF ACTIVE EXTRAVASATION, ACTIVE BLEEDING.  2. THE FINDINGS OF AREAS ATELECTASIS, PATCHY GROUNDGLASS INFILTRATES DESCRIBED ABOVE WITH A LEFT PLEURAL EFFUSION. 3. OTHER FINDINGS, AS DETAILED ABOVE. Endoscopic investigations: Colonoscopy 4/3/19- Normal colonic mucosa throughout, small internal hemorrhoids      Assessment and Plan:  Kendra Dmitry Mohamud 60 y.o. female with IBS-D. Patient's symptoms improved with Questran. However, does experience flares of loose stool. Patient encouraged to increase Questran 1 packet up to 3 times daily as needed to help with loose stool. Patient encouraged to try Align probiotics. Low FODMAP diet discussed, but patient not keen on this. Will continue FD guard, samples provided. Will continue trazodone at night. Patient encouraged to continue antistress techniques as discussed at her previous visit. Return if symptoms worsen or fail to improve. Onelia Tillman, [facility]    Please note this report has been partially produced using speech recognitionsoftware  and may cause contain errors related to that system including grammar, punctuation and spelling as well as words andphrases that may seem inappropriate. If there are questions or concerns please feel free to contact me to clarify.

## 2019-06-28 ENCOUNTER — APPOINTMENT (OUTPATIENT)
Dept: CT IMAGING | Age: 64
End: 2019-06-28
Payer: COMMERCIAL

## 2019-06-28 ENCOUNTER — HOSPITAL ENCOUNTER (OUTPATIENT)
Dept: CT IMAGING | Age: 64
Discharge: HOME | End: 2019-06-30
Payer: COMMERCIAL

## 2019-06-28 VITALS — WEIGHT: 206 LBS | HEIGHT: 65 IN | BODY MASS INDEX: 34.32 KG/M2

## 2019-06-28 DIAGNOSIS — R93.89 ABNORMAL CT OF THE CHEST: ICD-10-CM

## 2019-06-28 DIAGNOSIS — S30.1XXA ABDOMINAL WALL HEMATOMA, INITIAL ENCOUNTER: ICD-10-CM

## 2019-06-28 PROCEDURE — 74176 CT ABD & PELVIS W/O CONTRAST: CPT

## 2019-06-28 PROCEDURE — 71250 CT THORAX DX C-: CPT

## 2019-07-06 ENCOUNTER — OFFICE VISIT (OUTPATIENT)
Dept: FAMILY MEDICINE CLINIC | Age: 64
End: 2019-07-06
Payer: COMMERCIAL

## 2019-07-06 VITALS
OXYGEN SATURATION: 97 % | TEMPERATURE: 98 F | WEIGHT: 209 LBS | RESPIRATION RATE: 16 BRPM | DIASTOLIC BLOOD PRESSURE: 76 MMHG | BODY MASS INDEX: 34.82 KG/M2 | HEART RATE: 60 BPM | SYSTOLIC BLOOD PRESSURE: 120 MMHG | HEIGHT: 65 IN

## 2019-07-06 DIAGNOSIS — E04.2 MULTIPLE THYROID NODULES: ICD-10-CM

## 2019-07-06 DIAGNOSIS — J90 PLEURAL EFFUSION ON LEFT: ICD-10-CM

## 2019-07-06 DIAGNOSIS — S30.1XXD ABDOMINAL WALL HEMATOMA, SUBSEQUENT ENCOUNTER: Primary | ICD-10-CM

## 2019-07-06 PROCEDURE — 99213 OFFICE O/P EST LOW 20 MIN: CPT | Performed by: FAMILY MEDICINE

## 2019-07-06 PROCEDURE — G8417 CALC BMI ABV UP PARAM F/U: HCPCS | Performed by: FAMILY MEDICINE

## 2019-07-06 PROCEDURE — 3017F COLORECTAL CA SCREEN DOC REV: CPT | Performed by: FAMILY MEDICINE

## 2019-07-06 PROCEDURE — 1036F TOBACCO NON-USER: CPT | Performed by: FAMILY MEDICINE

## 2019-07-06 PROCEDURE — G8427 DOCREV CUR MEDS BY ELIG CLIN: HCPCS | Performed by: FAMILY MEDICINE

## 2019-07-06 RX ORDER — AZITHROMYCIN 250 MG/1
250 TABLET, FILM COATED ORAL SEE ADMIN INSTRUCTIONS
Qty: 6 TABLET | Refills: 0 | Status: SHIPPED | OUTPATIENT
Start: 2019-07-06 | End: 2019-07-11

## 2019-07-06 ASSESSMENT — ENCOUNTER SYMPTOMS
ABDOMINAL PAIN: 0
SHORTNESS OF BREATH: 0
ABDOMINAL DISTENTION: 0

## 2019-07-15 ENCOUNTER — OFFICE VISIT (OUTPATIENT)
Dept: CARDIOLOGY CLINIC | Age: 64
End: 2019-07-15
Payer: COMMERCIAL

## 2019-07-15 VITALS
WEIGHT: 204 LBS | BODY MASS INDEX: 33.99 KG/M2 | RESPIRATION RATE: 16 BRPM | OXYGEN SATURATION: 100 % | DIASTOLIC BLOOD PRESSURE: 84 MMHG | HEIGHT: 65 IN | SYSTOLIC BLOOD PRESSURE: 122 MMHG | HEART RATE: 61 BPM

## 2019-07-15 DIAGNOSIS — I10 ESSENTIAL HYPERTENSION: ICD-10-CM

## 2019-07-15 DIAGNOSIS — Z98.890 HISTORY OF OPEN HEART SURGERY: ICD-10-CM

## 2019-07-15 DIAGNOSIS — I48.0 PAF (PAROXYSMAL ATRIAL FIBRILLATION) (HCC): Primary | ICD-10-CM

## 2019-07-15 PROBLEM — D62 ACUTE BLOOD LOSS ANEMIA: Status: ACTIVE | Noted: 2019-06-07

## 2019-07-15 PROCEDURE — 3017F COLORECTAL CA SCREEN DOC REV: CPT | Performed by: INTERNAL MEDICINE

## 2019-07-15 PROCEDURE — 99214 OFFICE O/P EST MOD 30 MIN: CPT | Performed by: INTERNAL MEDICINE

## 2019-07-15 PROCEDURE — G8417 CALC BMI ABV UP PARAM F/U: HCPCS | Performed by: INTERNAL MEDICINE

## 2019-07-15 PROCEDURE — 1036F TOBACCO NON-USER: CPT | Performed by: INTERNAL MEDICINE

## 2019-07-15 PROCEDURE — G8427 DOCREV CUR MEDS BY ELIG CLIN: HCPCS | Performed by: INTERNAL MEDICINE

## 2019-07-15 ASSESSMENT — ENCOUNTER SYMPTOMS
TROUBLE SWALLOWING: 0
VOMITING: 0
ABDOMINAL DISTENTION: 0
CHEST TIGHTNESS: 0
SHORTNESS OF BREATH: 0
COLOR CHANGE: 0
ANAL BLEEDING: 0
WHEEZING: 0
APNEA: 0
EYES NEGATIVE: 1
VOICE CHANGE: 0
BLOOD IN STOOL: 0
NAUSEA: 0
FACIAL SWELLING: 0
GASTROINTESTINAL NEGATIVE: 1

## 2019-07-15 NOTE — PROGRESS NOTES
months.     11/27/2017: Patient presents today for follow-up of recent hospitalization at [facility] for atrial fibrillation and then chest pain. By the time she arrived ER,AF resolved she has undergone atrial fibrillation ablation by Jaxson chi. at [facility]. Prior to that she was on flecainide. Flecainide was stopped after the ablation. She denies excessive caffeine abuse she denies smoking. She also had chest discomfort this time. There was some radiation to the jaw. No nausea no vomiting no diaphoresis. No fever or cough or chills. We'll set her up for Lexiscan and then  see me. Also needs Holter monitor.     6/9/2017: For preop evaluation. Needs to get back surgery by Dr. Esequiel Dia. He is scheduled for 7/14/2017 at [facility]. She has a history of atrial fibrillation. Has seen dressing in the past currently in sinus rhythm with Toprol and Xarelto. No chest pain no syncope no dizziness. Moderately obese. She acceptable risk for surgery. Xarelto on 4 July. See me in one year.     6/10/16:for fu of AF. Seen Dressing before. He started tambocor On BID. Also on toprol 100. On xarelto. no bleeding. Every now and then some short episodes. No syncopy. No HA. See in 1 year     7/17/15: Doing well. No AF episodes. Stay on [address]. see in 6M     11/17/14: Was admitted to [facility] with AF. Saw Tunde. Started on sotalol. On it for few weeks. In SR. Had PAF few months prior. See me in Jan.On xarelto.     9/29/14: She got recurrent episode of AF. We need to increase lopressor to 25 TID. See me in Oct.     9/19/14: DISCONTINUED HCTZ AND DECREASED LOPRESSOR TO 25MG TWICE DAILY. See me in 3 weeks.     8/19/14: Was seen by me at [facility] with AF.she was going to see her sister in [facility] by [facility] on lopressor. Had few spells of palpitation,nothing like what she had. Echo mild elevation of RVSP. We will get stress cardiolite and 24 hour holter. See me after.           Past Medical History:   Diagnosis Date    Atrial fibrillation ([address]     Brain tumor

## 2019-07-26 ENCOUNTER — HOSPITAL ENCOUNTER (EMERGENCY)
Age: 64
Discharge: HOME | End: 2019-07-26
Payer: COMMERCIAL

## 2019-07-26 ENCOUNTER — APPOINTMENT (OUTPATIENT)
Dept: GENERAL RADIOLOGY | Age: 64
End: 2019-07-26
Payer: COMMERCIAL

## 2019-07-26 VITALS
HEIGHT: 65 IN | WEIGHT: 207 LBS | RESPIRATION RATE: 18 BRPM | DIASTOLIC BLOOD PRESSURE: 57 MMHG | HEART RATE: 60 BPM | BODY MASS INDEX: 34.49 KG/M2 | TEMPERATURE: 98.7 F | OXYGEN SATURATION: 96 % | SYSTOLIC BLOOD PRESSURE: 129 MMHG

## 2019-07-26 DIAGNOSIS — R00.2 PALPITATIONS: Primary | ICD-10-CM

## 2019-07-26 DIAGNOSIS — R07.89 CHEST PRESSURE: ICD-10-CM

## 2019-07-26 LAB
ALBUMIN SERPL-MCNC: 4.2 G/DL (ref 3.5–4.6)
ALP BLD-CCNC: 111 U/L (ref 40–130)
ALT SERPL-CCNC: 17 U/L (ref 0–33)
ANION GAP SERPL CALCULATED.3IONS-SCNC: 15 MEQ/L (ref 9–15)
AST SERPL-CCNC: 21 U/L (ref 0–35)
BASOPHILS ABSOLUTE: 0.1 K/UL (ref 0–0.2)
BASOPHILS RELATIVE PERCENT: 1 %
BILIRUB SERPL-MCNC: 0.3 MG/DL (ref 0.2–0.7)
BUN BLDV-MCNC: 13 MG/DL (ref 8–23)
CALCIUM SERPL-MCNC: 9.5 MG/DL (ref 8.5–9.9)
CHLORIDE BLD-SCNC: 103 MEQ/L (ref 95–107)
CO2: 25 MEQ/L (ref 20–31)
CREAT SERPL-MCNC: 0.65 MG/DL (ref 0.5–0.9)
EKG ATRIAL RATE: 58 BPM
EKG P AXIS: 64 DEGREES
EKG P-R INTERVAL: 176 MS
EKG Q-T INTERVAL: 452 MS
EKG QRS DURATION: 132 MS
EKG QTC CALCULATION (BAZETT): 443 MS
EKG R AXIS: -11 DEGREES
EKG T AXIS: -4 DEGREES
EKG VENTRICULAR RATE: 58 BPM
EOSINOPHILS ABSOLUTE: 0.1 K/UL (ref 0–0.7)
EOSINOPHILS RELATIVE PERCENT: 1.1 %
GFR AFRICAN AMERICAN: >60
GFR NON-AFRICAN AMERICAN: >60
GLOBULIN: 2.7 G/DL (ref 2.3–3.5)
GLUCOSE BLD-MCNC: 102 MG/DL (ref 70–99)
HCT VFR BLD CALC: 46.2 % (ref 37–47)
HEMOGLOBIN: 16 G/DL (ref 12–16)
LYMPHOCYTES ABSOLUTE: 2.1 K/UL (ref 1–4.8)
LYMPHOCYTES RELATIVE PERCENT: 29.9 %
MAGNESIUM: 1.8 MG/DL (ref 1.7–2.4)
MCH RBC QN AUTO: 31.8 PG (ref 27–31.3)
MCHC RBC AUTO-ENTMCNC: 34.7 % (ref 33–37)
MCV RBC AUTO: 91.7 FL (ref 82–100)
MONOCYTES ABSOLUTE: 0.6 K/UL (ref 0.2–0.8)
MONOCYTES RELATIVE PERCENT: 8.9 %
NEUTROPHILS ABSOLUTE: 4.2 K/UL (ref 1.4–6.5)
NEUTROPHILS RELATIVE PERCENT: 59.1 %
PDW BLD-RTO: 13.9 % (ref 11.5–14.5)
PLATELET # BLD: 212 K/UL (ref 130–400)
POTASSIUM SERPL-SCNC: 3.9 MEQ/L (ref 3.4–4.9)
RBC # BLD: 5.04 M/UL (ref 4.2–5.4)
SODIUM BLD-SCNC: 143 MEQ/L (ref 135–144)
TOTAL CK: 80 U/L (ref 0–170)
TOTAL PROTEIN: 6.9 G/DL (ref 6.3–8)
TROPONIN: <0.01 NG/ML (ref 0–0.01)
TSH SERPL DL<=0.05 MIU/L-ACNC: 1.76 UIU/ML (ref 0.44–3.86)
WBC # BLD: 7.2 K/UL (ref 4.8–10.8)

## 2019-07-26 PROCEDURE — 85025 COMPLETE CBC W/AUTO DIFF WBC: CPT

## 2019-07-26 PROCEDURE — 99285 EMERGENCY DEPT VISIT HI MDM: CPT

## 2019-07-26 PROCEDURE — 84484 ASSAY OF TROPONIN QUANT: CPT

## 2019-07-26 PROCEDURE — 93005 ELECTROCARDIOGRAM TRACING: CPT

## 2019-07-26 PROCEDURE — 82550 ASSAY OF CK (CPK): CPT

## 2019-07-26 PROCEDURE — 80053 COMPREHEN METABOLIC PANEL: CPT

## 2019-07-26 PROCEDURE — 83735 ASSAY OF MAGNESIUM: CPT

## 2019-07-26 PROCEDURE — 36415 COLL VENOUS BLD VENIPUNCTURE: CPT

## 2019-07-26 PROCEDURE — 71045 X-RAY EXAM CHEST 1 VIEW: CPT

## 2019-07-26 PROCEDURE — 84443 ASSAY THYROID STIM HORMONE: CPT

## 2019-07-26 ASSESSMENT — ENCOUNTER SYMPTOMS
ANAL BLEEDING: 0
ABDOMINAL DISTENTION: 0
EYE DISCHARGE: 0
PHOTOPHOBIA: 0
BLOOD IN STOOL: 0
VOICE CHANGE: 0
SHORTNESS OF BREATH: 1
APNEA: 0
COUGH: 0
VOMITING: 0
NAUSEA: 0

## 2019-07-26 ASSESSMENT — PAIN DESCRIPTION - DESCRIPTORS: DESCRIPTORS: THROBBING

## 2019-07-26 ASSESSMENT — PAIN SCALES - GENERAL: PAINLEVEL_OUTOF10: 5

## 2019-07-26 ASSESSMENT — PAIN DESCRIPTION - PAIN TYPE: TYPE: ACUTE PAIN

## 2019-07-26 ASSESSMENT — PAIN DESCRIPTION - LOCATION: LOCATION: CHEST

## 2019-07-26 ASSESSMENT — PAIN DESCRIPTION - ORIENTATION: ORIENTATION: MID

## 2019-07-26 ASSESSMENT — PAIN DESCRIPTION - FREQUENCY: FREQUENCY: CONTINUOUS

## 2019-07-27 NOTE — ED PROVIDER NOTES
[facility] ED  eMERGENCY dEPARTMENT eNCOUnter      Pt Name: Genesis Bañuelos  MRN: [id number]  Armsronaldgfkaylie 1955  Date of evaluation: 7/26/2019  Provider: Brit Levin Dr       Chief Complaint   Patient presents with    Palpitations     patient states heart palpations chest heavines started 4 days ago. HX of afib         HISTORY OF PRESENT ILLNESS   (Location/Symptom, Timing/Onset,Context/Setting, Quality, Duration, Modifying Factors, Severity)  Note limiting factors. Genesis Bañuelos is a 60 y.o. female who presents to the emergency department she has a 4-day history of chest pressure shortness of breath and elevated heart rate. She states she has been in and out of atrial fibrillation and she only has the shortness of breath with the elevated heart rate. She was seen in urgent care center brings the EKG with her artifacts are notable. Patient denies fever chills nausea vomiting chest pain back pain rectal bleeding. She is not currently on Xarelto as she had a GI bleed recently. She sees Dr. Curt Cordova for cardiology symptoms mild to moderate severity nothing improves or worsens her symptoms. HPI    NursingNotes were reviewed. REVIEW OF SYSTEMS    (2-9 systems for level 4, 10 or more for level 5)     Review of Systems   Constitutional: Negative for activity change, appetite change, chills, fever and unexpected weight change. HENT: Negative for congestion, ear discharge, nosebleeds and voice change. Eyes: Negative for photophobia and discharge. Respiratory: Positive for shortness of breath. Negative for apnea and cough. Cardiovascular: Positive for palpitations. Gastrointestinal: Negative for abdominal distention, anal bleeding, blood in stool, nausea and vomiting. Endocrine: Negative for cold intolerance, heat intolerance and polyphagia. Genitourinary: Negative for hematuria. Musculoskeletal: Negative for joint swelling and neck pain. not returned as of this dictation. EMERGENCY DEPARTMENT COURSE and DIFFERENTIAL DIAGNOSIS/MDM:   Vitals:    Vitals:    07/26/19 2115 07/26/19 2223   BP: (!) 129/97 (!) 129/57   Pulse: 62 60   Resp: 20 18   Temp: 98.7 °F (37.1 °C)    TempSrc: Oral    SpO2:  96%   Weight: 207 lb (93.9 kg)    Height: 5' 5\" (1.651 m)             MDM  Number of Diagnoses or Management Options  Chest pressure:   Palpitations:   Diagnosis management comments: Patient brings EKG with artifacts from urgent care. Current EKG sinus bradycardia rate 58 right bundle branch noted she is had a right bundle branch block noted in the past.  Patient states she is to have her thyroid checked we will add a TSH. García Dials Patient wants disposition to home. She is to follow-up with her cardiologist Dr. Ching Hernandez on Monday. Return to ER if any symptoms worsen or new symptoms develop. Can still sinus bradycardia in the ER no recurrence of symptoms in ER. delgado dotson to disposition home. Amount and/or Complexity of Data Reviewed  Clinical lab tests: reviewed and ordered  Tests in the radiology section of CPT®: reviewed and ordered  Discuss the patient with other providers: yes        CRITICAL CARE TIME          CONSULTS:  None    PROCEDURES:  Unless otherwise noted below, none     Procedures    FINAL IMPRESSION      1. Palpitations    2. Chest pressure          DISPOSITION/PLAN   DISPOSITION Decision To Discharge 07/26/2019 11:05:35 PM      PATIENT REFERRED TO:  No follow-up provider specified.     DISCHARGE MEDICATIONS:  New Prescriptions    No medications on file          (Please note that portions of this note were completed with a voice recognition program.  Efforts were made to edit the dictations but occasionally words are mis-transcribed.)    Hayes Oacmpo PA-C (electronically signed)  Attending Emergency Physician       Hayes Ocampo PA-C  07/26/19 [id number]

## 2019-08-01 ENCOUNTER — HOSPITAL ENCOUNTER (OUTPATIENT)
Dept: ULTRASOUND IMAGING | Age: 64
Discharge: HOME | End: 2019-08-03
Payer: COMMERCIAL

## 2019-08-01 DIAGNOSIS — E04.2 MULTIPLE THYROID NODULES: ICD-10-CM

## 2019-08-01 PROCEDURE — 76536 US EXAM OF HEAD AND NECK: CPT

## 2019-08-02 ENCOUNTER — OFFICE VISIT (OUTPATIENT)
Dept: CARDIOLOGY CLINIC | Age: 64
End: 2019-08-02
Payer: COMMERCIAL

## 2019-08-02 VITALS
HEIGHT: 65 IN | RESPIRATION RATE: 12 BRPM | HEART RATE: 61 BPM | SYSTOLIC BLOOD PRESSURE: 112 MMHG | BODY MASS INDEX: 34.49 KG/M2 | DIASTOLIC BLOOD PRESSURE: 80 MMHG | WEIGHT: 207 LBS

## 2019-08-02 DIAGNOSIS — I48.0 PAF (PAROXYSMAL ATRIAL FIBRILLATION) (HCC): Primary | ICD-10-CM

## 2019-08-02 DIAGNOSIS — R94.31 ABNORMAL FINDING ON EKG: ICD-10-CM

## 2019-08-02 DIAGNOSIS — R00.1 SINUS BRADYCARDIA: ICD-10-CM

## 2019-08-02 DIAGNOSIS — Z98.890 HISTORY OF OPEN HEART SURGERY: ICD-10-CM

## 2019-08-02 DIAGNOSIS — I10 ESSENTIAL HYPERTENSION: ICD-10-CM

## 2019-08-02 DIAGNOSIS — Z98.890 S/P ABLATION OF ATRIAL FIBRILLATION: ICD-10-CM

## 2019-08-02 DIAGNOSIS — Z86.79 S/P ABLATION OF ATRIAL FIBRILLATION: ICD-10-CM

## 2019-08-02 DIAGNOSIS — Z09 HOSPITAL DISCHARGE FOLLOW-UP: ICD-10-CM

## 2019-08-02 DIAGNOSIS — I45.10 RBBB (RIGHT BUNDLE BRANCH BLOCK): ICD-10-CM

## 2019-08-02 PROCEDURE — G8427 DOCREV CUR MEDS BY ELIG CLIN: HCPCS | Performed by: INTERNAL MEDICINE

## 2019-08-02 PROCEDURE — G8417 CALC BMI ABV UP PARAM F/U: HCPCS | Performed by: INTERNAL MEDICINE

## 2019-08-02 PROCEDURE — 1036F TOBACCO NON-USER: CPT | Performed by: INTERNAL MEDICINE

## 2019-08-02 PROCEDURE — 99213 OFFICE O/P EST LOW 20 MIN: CPT | Performed by: INTERNAL MEDICINE

## 2019-08-02 PROCEDURE — 3017F COLORECTAL CA SCREEN DOC REV: CPT | Performed by: INTERNAL MEDICINE

## 2019-08-02 ASSESSMENT — ENCOUNTER SYMPTOMS
CHEST TIGHTNESS: 0
APNEA: 0
DIARRHEA: 0
NAUSEA: 0
ABDOMINAL PAIN: 0
SHORTNESS OF BREATH: 0
ABDOMINAL DISTENTION: 0
VOMITING: 0
COLOR CHANGE: 0
ANAL BLEEDING: 0
COUGH: 0
BLOOD IN STOOL: 0

## 2019-08-05 RX ORDER — TRAZODONE HYDROCHLORIDE 50 MG/1
50 TABLET ORAL NIGHTLY
Qty: 30 TABLET | Refills: 0 | Status: SHIPPED | OUTPATIENT
Start: 2019-08-05 | End: 2019-11-05 | Stop reason: SDUPTHER

## 2019-08-05 RX ORDER — HYOSCYAMINE SULFATE EXTENDED-RELEASE 0.38 MG/1
375 TABLET ORAL EVERY 12 HOURS PRN
Qty: 60 TABLET | Refills: 3 | Status: SHIPPED | OUTPATIENT
Start: 2019-08-05 | End: 2019-11-05 | Stop reason: SDUPTHER

## 2019-08-09 ASSESSMENT — ENCOUNTER SYMPTOMS
WHEEZING: 0
FACIAL SWELLING: 0
TROUBLE SWALLOWING: 0
VOICE CHANGE: 0

## 2019-08-14 ENCOUNTER — OFFICE VISIT (OUTPATIENT)
Dept: FAMILY MEDICINE CLINIC | Age: 64
End: 2019-08-14
Payer: COMMERCIAL

## 2019-08-14 VITALS
OXYGEN SATURATION: 97 % | HEIGHT: 65 IN | SYSTOLIC BLOOD PRESSURE: 130 MMHG | BODY MASS INDEX: 33.72 KG/M2 | DIASTOLIC BLOOD PRESSURE: 84 MMHG | TEMPERATURE: 98.4 F | WEIGHT: 202.4 LBS | HEART RATE: 53 BPM

## 2019-08-14 DIAGNOSIS — H61.23 BILATERAL IMPACTED CERUMEN: ICD-10-CM

## 2019-08-14 DIAGNOSIS — H66.90 ACUTE OTITIS MEDIA, UNSPECIFIED OTITIS MEDIA TYPE: Primary | ICD-10-CM

## 2019-08-14 PROCEDURE — 69209 REMOVE IMPACTED EAR WAX UNI: CPT | Performed by: NURSE PRACTITIONER

## 2019-08-14 PROCEDURE — 99213 OFFICE O/P EST LOW 20 MIN: CPT | Performed by: NURSE PRACTITIONER

## 2019-08-14 PROCEDURE — G8417 CALC BMI ABV UP PARAM F/U: HCPCS | Performed by: NURSE PRACTITIONER

## 2019-08-14 PROCEDURE — 3017F COLORECTAL CA SCREEN DOC REV: CPT | Performed by: NURSE PRACTITIONER

## 2019-08-14 PROCEDURE — G8427 DOCREV CUR MEDS BY ELIG CLIN: HCPCS | Performed by: NURSE PRACTITIONER

## 2019-08-14 PROCEDURE — 1036F TOBACCO NON-USER: CPT | Performed by: NURSE PRACTITIONER

## 2019-08-14 PROCEDURE — 69210 REMOVE IMPACTED EAR WAX UNI: CPT | Performed by: NURSE PRACTITIONER

## 2019-08-14 RX ORDER — FLUTICASONE PROPIONATE 50 MCG
2 SPRAY, SUSPENSION (ML) NASAL DAILY
Qty: 1 BOTTLE | Refills: 3 | Status: SHIPPED | OUTPATIENT
Start: 2019-08-14

## 2019-08-14 RX ORDER — CEFDINIR 300 MG/1
300 CAPSULE ORAL 2 TIMES DAILY
Qty: 20 CAPSULE | Refills: 0 | Status: SHIPPED | OUTPATIENT
Start: 2019-08-14 | End: 2019-08-24

## 2019-08-14 ASSESSMENT — ENCOUNTER SYMPTOMS
WHEEZING: 0
SINUS PAIN: 1
SHORTNESS OF BREATH: 0
COUGH: 0
SORE THROAT: 0
SINUS PRESSURE: 1
RHINORRHEA: 1

## 2019-08-21 ENCOUNTER — HOSPITAL ENCOUNTER (OUTPATIENT)
Dept: CT IMAGING | Age: 64
Discharge: HOME | End: 2019-08-23
Payer: COMMERCIAL

## 2019-08-21 DIAGNOSIS — J90 PLEURAL EFFUSION ON LEFT: ICD-10-CM

## 2019-08-21 DIAGNOSIS — S30.1XXD ABDOMINAL WALL HEMATOMA, SUBSEQUENT ENCOUNTER: ICD-10-CM

## 2019-08-21 PROCEDURE — 74176 CT ABD & PELVIS W/O CONTRAST: CPT

## 2019-08-21 PROCEDURE — 71250 CT THORAX DX C-: CPT

## 2019-08-28 ENCOUNTER — OFFICE VISIT (OUTPATIENT)
Dept: FAMILY MEDICINE CLINIC | Age: 64
End: 2019-08-28
Payer: COMMERCIAL

## 2019-08-28 VITALS
HEART RATE: 62 BPM | TEMPERATURE: 96.9 F | RESPIRATION RATE: 16 BRPM | WEIGHT: 204.8 LBS | SYSTOLIC BLOOD PRESSURE: 118 MMHG | DIASTOLIC BLOOD PRESSURE: 72 MMHG | OXYGEN SATURATION: 97 % | HEIGHT: 65 IN | BODY MASS INDEX: 34.12 KG/M2

## 2019-08-28 DIAGNOSIS — E04.2 MULTIPLE THYROID NODULES: Primary | ICD-10-CM

## 2019-08-28 DIAGNOSIS — J90 PLEURAL EFFUSION ON LEFT: ICD-10-CM

## 2019-08-28 DIAGNOSIS — S30.1XXA ABDOMINAL WALL HEMATOMA, INITIAL ENCOUNTER: ICD-10-CM

## 2019-08-28 PROCEDURE — 3017F COLORECTAL CA SCREEN DOC REV: CPT | Performed by: FAMILY MEDICINE

## 2019-08-28 PROCEDURE — G8417 CALC BMI ABV UP PARAM F/U: HCPCS | Performed by: FAMILY MEDICINE

## 2019-08-28 PROCEDURE — 1036F TOBACCO NON-USER: CPT | Performed by: FAMILY MEDICINE

## 2019-08-28 PROCEDURE — 99213 OFFICE O/P EST LOW 20 MIN: CPT | Performed by: FAMILY MEDICINE

## 2019-08-28 PROCEDURE — G8427 DOCREV CUR MEDS BY ELIG CLIN: HCPCS | Performed by: FAMILY MEDICINE

## 2019-08-28 ASSESSMENT — ENCOUNTER SYMPTOMS: SHORTNESS OF BREATH: 0

## 2019-08-28 NOTE — PROGRESS NOTES
Current Outpatient Medications   Medication Sig Dispense Refill    fluticasone (FLONASE) 50 MCG/ACT nasal spray 2 sprays by Nasal route daily 1 Bottle 3    hyoscyamine (LEVBID) 375 MCG extended release tablet Take 1 tablet by mouth every 12 hours as needed for Cramping 60 tablet 3    traZODone (DESYREL) 50 MG tablet Take 1 tablet by mouth nightly 30 tablet 0    ondansetron (ZOFRAN-ODT) 4 MG disintegrating tablet Take 4 mg by mouth      metoprolol succinate (TOPROL XL) 100 MG extended release tablet TAKE 1 TABLET DAILY 90 tablet 3    venlafaxine (EFFEXOR XR) 75 MG extended release capsule Take 1 capsule by mouth daily 7 capsule 0     No current facility-administered medications for this visit. Family History   Problem Relation Age of Onset    Cancer Father         COLON    High Blood Pressure Father     Heart Disease Father      Past Medical History:   Diagnosis Date    [facility] [address]     Brain tumor ([facility] [address] 07/16/2018    Chest pain     Degenerative disc disease, lumbar 1/21/2016    Hx of colonic polyps     Hypertension 2/24/2015    Irritable bowel syndrome     Lumbar radiculopathy 3/14/2016    Rapid heart rate      Objective:   /72   Pulse 62   Temp 96.9 °F (36.1 °C) (Tympanic)   Resp 16   Ht 5' 5\" (1.651 m)   Wt 204 lb 12.8 oz (92.9 kg)   LMP  (LMP Unknown)   SpO2 97%   BMI 34.08 kg/m²     Physical Exam  Lungs:Clear  Equal b.s bilaterally  Heart:  Rate reg   1/6 syst murmur across precordium   Back:       No  CVA tenderness  Abdomen: B.S present   Soft           No  Tenderness          No Rebound                    No hepatosplenomegaly. No masses. Gen:      No acute distress  Skin:      No rashes   Assessment:       Diagnosis Orders   1. Multiple thyroid nodules  AFL - Adeel Estrada MD, Otolaryngology, [facility]   2. Pleural effusion on left     3.  Abdominal wall hematoma, initial encounter           Plan:      Orders Placed This Encounter   Procedures Cornell Lawton MD, Otolaryngology, [facility]     Referral Priority:   Routine     Referral Type:   Eval and Treat     Referral Reason:   Specialty Services Required     Referred to Provider:   Klarissa Crawley MD     Requested Specialty:   Otolaryngology     Number of Visits Requested:   1   f/u with cardiology as already planned  F/u prn or per maintenance

## 2019-08-28 NOTE — PROGRESS NOTES
Subjective:      Patient ID: Kanchan Dickinson is a 60 y.o. female. HPI  Treatment Adherence:   Medication compliance:  {Desc; compliance:5303::\"compliant most of the time\"}  Diet compliance:  {Desc; compliance:5303::\"compliant most of the time\"}  Weight trend: {INCREASING/DECREASING/STABLE:43643}  Current exercise: {EXERCISE TYRK:940413764}  Barriers: {Barriers to success:62347}    Hypertension:  Home blood pressure monitoring: {NO/YES:2945452803::\"No\"}. She {is/is not:9024} adherent to a low sodium diet. Patient {denies/complains:30702} {Symptoms of Hypertension, Denies:50694}. Antihypertensive medication side effects: {Hypertension med side effects:5728::\"no medication side effects noted\"}. Use of agents associated with hypertension: {bp agents assoc with hypertension:511::\"none\"}. Sodium (mEq/L)   Date Value   07/26/2019 143    BUN (mg/dL)   Date Value   07/26/2019 13    Glucose (mg/dL)   Date Value   07/26/2019 102 (H)   03/19/2018 97      Potassium (mEq/L)   Date Value   07/26/2019 3.9     Potassium reflex Magnesium (mEq/L)   Date Value   06/06/2019 4.6    CREATININE (mg/dL)   Date Value   07/26/2019 0.65         Hyperlipidemia:  No new myalgias or GI upset on {RP HYPERLIPIDEMIA MEDS:19195}.      Lab Results   Component Value Date    CHOL 168 04/11/2019    TRIG 107 04/11/2019    HDL 53 04/11/2019    LDLCALC 94 04/11/2019     Lab Results   Component Value Date    ALT 17 07/26/2019    AST 21 07/26/2019        Review of Systems    Objective:   Physical Exam    Assessment:      ***      Plan:      ***        Amanda Clifford MA

## 2019-09-04 RX ORDER — METOPROLOL SUCCINATE 100 MG/1
TABLET, EXTENDED RELEASE ORAL
Qty: 90 TABLET | Refills: 3 | Status: SHIPPED | OUTPATIENT
Start: 2019-09-04 | End: 2020-08-31

## 2019-09-06 ENCOUNTER — OFFICE VISIT (OUTPATIENT)
Dept: CARDIOLOGY CLINIC | Age: 64
End: 2019-09-06
Payer: COMMERCIAL

## 2019-09-06 VITALS
HEART RATE: 72 BPM | RESPIRATION RATE: 18 BRPM | SYSTOLIC BLOOD PRESSURE: 122 MMHG | DIASTOLIC BLOOD PRESSURE: 84 MMHG | HEIGHT: 65 IN | WEIGHT: 206 LBS | BODY MASS INDEX: 34.32 KG/M2

## 2019-09-06 DIAGNOSIS — I48.0 PAF (PAROXYSMAL ATRIAL FIBRILLATION) (HCC): ICD-10-CM

## 2019-09-06 DIAGNOSIS — R06.02 SOB (SHORTNESS OF BREATH): ICD-10-CM

## 2019-09-06 DIAGNOSIS — R07.89 OTHER CHEST PAIN: Primary | ICD-10-CM

## 2019-09-06 DIAGNOSIS — I10 ESSENTIAL HYPERTENSION: ICD-10-CM

## 2019-09-06 PROCEDURE — 1036F TOBACCO NON-USER: CPT | Performed by: INTERNAL MEDICINE

## 2019-09-06 PROCEDURE — 99213 OFFICE O/P EST LOW 20 MIN: CPT | Performed by: INTERNAL MEDICINE

## 2019-09-06 PROCEDURE — G8427 DOCREV CUR MEDS BY ELIG CLIN: HCPCS | Performed by: INTERNAL MEDICINE

## 2019-09-06 PROCEDURE — G8417 CALC BMI ABV UP PARAM F/U: HCPCS | Performed by: INTERNAL MEDICINE

## 2019-09-06 PROCEDURE — 3017F COLORECTAL CA SCREEN DOC REV: CPT | Performed by: INTERNAL MEDICINE

## 2019-09-06 ASSESSMENT — ENCOUNTER SYMPTOMS
SHORTNESS OF BREATH: 0
CHEST TIGHTNESS: 0
APNEA: 0
BLOOD IN STOOL: 0
VOMITING: 0
NAUSEA: 0
DIARRHEA: 0

## 2019-09-06 NOTE — PROGRESS NOTES
craniotomy for meningioma. Surgery was uneventful. Back on Xarelto. She has history of atrial fibrillation and had ablation by Dr. Renee Jiménez a year and a half ago. Doing well. No chest chest pain congestive heart failure symptoms or syncope. No ankle edema. No claudication. No headaches nausea vomiting. See me in 6 months.     12/19/2017: Patient presents today for Follow-up of recent hospitalization for atrial fibrillation and chest pain. Stress is negative for ischemia. She had ablation done by Dr. Renee Jiménez one year ago. She used to be on flecainide and that  this was later discontinued. We will continue with the other medications except flecainide. See me in 6 months.     11/27/2017: Patient presents today for follow-up of recent hospitalization at [facility] for atrial fibrillation and then chest pain. By the time she arrived ER,AF resolved she has undergone atrial fibrillation ablation by Jaxson chi. at [facility]. Prior to that she was on flecainide. Flecainide was stopped after the ablation. She denies excessive caffeine abuse she denies smoking. She also had chest discomfort this time. There was some radiation to the jaw. No nausea no vomiting no diaphoresis. No fever or cough or chills. We'll set her up for Lexiscan and then  see me. Also needs Holter monitor.     6/9/2017: For preop evaluation. Needs to get back surgery by Dr. Prosper Machado. He is scheduled for 7/14/2017 at [facility]. She has a history of atrial fibrillation. Has seen dressing in the past currently in sinus rhythm with Toprol and Xarelto. No chest pain no syncope no dizziness. Moderately obese. She acceptable risk for surgery. Xarelto on 4 July. See me in one year.     6/10/16:for fu of AF. Seen Dressing before. He started tambocor On BID. Also on toprol 100. On xarelto. no bleeding. Every now and then some short episodes. No syncopy. No HA. See in 1 year     7/17/15: Doing well. No AF episodes. Stay on [address]. see in 6M     11/17/14: Was admitted to [facility] with AF. Saw Bobbyssi. Started on sotalol. On it for few weeks. In SR. Had PAF few months prior. See me in Jan.On xarelto.     9/29/14: She got recurrent episode of AF. We need to increase lopressor to 25 TID. See me in Oct.     9/19/14: DISCONTINUED HCTZ AND DECREASED LOPRESSOR TO 25MG TWICE DAILY. See me in 3 weeks.     8/19/14: Was seen by me at [address] with AF.she was going to see her sister in [facility] on lopressor. Had few spells of palpitation,nothing like what she had. Echo mild elevation of RVSP. We will get stress cardiolite and 24 hour holter. See me after.         Past Medical History:   Diagnosis Date    [facility] ([facility] [address]     Brain tumor ([facility] [address] 07/16/2018    Chest pain     Degenerative disc disease, lumbar 1/21/2016    Hx of colonic polyps     Hypertension 2/24/2015    Irritable bowel syndrome     Lumbar radiculopathy 3/14/2016    Rapid heart rate        Past Surgical History:   Procedure Laterality Date    APPENDECTOMY      BACK SURGERY  07/14/2017    CARDIAC CATHETERIZATION  11/2/15    DR. PALMER    HYSTERECTOMY      ID CRANIECT EXCIS SKULL BONE SIDDHARTH N/A 7/24/2018    CRANIOTOMY performed by Carmen Delgado MD at [address] [place] [address] TOTAL KNEE ARTHROPLASTY  2009    UPPER GASTROINTESTINAL ENDOSCOPY  1/15/13    DR. MEEKS       Family History   Problem Relation Age of Onset    Cancer Father         COLON    High Blood Pressure Father     Heart Disease Father        Social History     Socioeconomic History    Marital status:      Spouse name: None    Number of children: None    Years of education: None    Highest education level: None   Occupational History    None   Social Needs    Financial resource strain: None    Food insecurity:     Worry: None     Inability: None    Transportation needs:     Medical: None     Non-medical: None   Tobacco Use    Smoking status: Never Smoker    Smokeless tobacco: Never Used   Substance and Sexual Activity  Alcohol use: No     Alcohol/week: 0.0 standard drinks    Drug use: No    Sexual activity: Not Currently   Lifestyle    Physical activity:     Days per week: None     Minutes per session: None    Stress: None   Relationships    Social connections:     Talks on phone: None     Gets together: None     Attends Taoist service: None     Active member of club or organization: None     Attends meetings of clubs or organizations: None     Relationship status: None    Intimate partner violence:     Fear of current or ex partner: None     Emotionally abused: None     Physically abused: None     Forced sexual activity: None   Other Topics Concern    None   Social History Narrative    None       Allergies   Allergen Reactions    Codeine     Demerol     Dye [Iodides] Nausea Only    Morphine     Sulfa Antibiotics Hives and Itching    Levofloxacin Itching and Rash       Current Outpatient Medications   Medication Sig Dispense Refill    metoprolol succinate (TOPROL XL) 100 MG extended release tablet TAKE 1 TABLET DAILY 90 tablet 3    fluticasone (FLONASE) 50 MCG/ACT nasal spray 2 sprays by Nasal route daily 1 Bottle 3    hyoscyamine (LEVBID) 375 MCG extended release tablet Take 1 tablet by mouth every 12 hours as needed for Cramping 60 tablet 3    traZODone (DESYREL) 50 MG tablet Take 1 tablet by mouth nightly 30 tablet 0    ondansetron (ZOFRAN-ODT) 4 MG disintegrating tablet Take 4 mg by mouth      venlafaxine (EFFEXOR XR) 75 MG extended release capsule Take 1 capsule by mouth daily 7 capsule 0     No current facility-administered medications for this visit. Review of Systems:   Review of Systems   Constitutional: Negative for diaphoresis and fatigue. HENT: Negative for nosebleeds. Respiratory: Negative for apnea, chest tightness and shortness of breath. Cardiovascular: Positive for palpitations. Negative for chest pain and leg swelling.    Gastrointestinal: Negative for blood in stool,

## 2019-09-12 ENCOUNTER — OFFICE VISIT (OUTPATIENT)
Dept: FAMILY MEDICINE CLINIC | Age: 64
End: 2019-09-12
Payer: COMMERCIAL

## 2019-09-12 VITALS
OXYGEN SATURATION: 97 % | SYSTOLIC BLOOD PRESSURE: 124 MMHG | HEIGHT: 65 IN | RESPIRATION RATE: 14 BRPM | TEMPERATURE: 96.8 F | WEIGHT: 206 LBS | HEART RATE: 56 BPM | BODY MASS INDEX: 34.32 KG/M2 | DIASTOLIC BLOOD PRESSURE: 78 MMHG

## 2019-09-12 DIAGNOSIS — H69.83 ETD (EUSTACHIAN TUBE DYSFUNCTION), BILATERAL: ICD-10-CM

## 2019-09-12 DIAGNOSIS — J02.9 ACUTE PHARYNGITIS, UNSPECIFIED ETIOLOGY: ICD-10-CM

## 2019-09-12 DIAGNOSIS — J02.9 ACUTE PHARYNGITIS, UNSPECIFIED ETIOLOGY: Primary | ICD-10-CM

## 2019-09-12 LAB — S PYO AG THROAT QL: NORMAL

## 2019-09-12 PROCEDURE — 1036F TOBACCO NON-USER: CPT | Performed by: FAMILY MEDICINE

## 2019-09-12 PROCEDURE — G8427 DOCREV CUR MEDS BY ELIG CLIN: HCPCS | Performed by: FAMILY MEDICINE

## 2019-09-12 PROCEDURE — 99213 OFFICE O/P EST LOW 20 MIN: CPT | Performed by: FAMILY MEDICINE

## 2019-09-12 PROCEDURE — 3017F COLORECTAL CA SCREEN DOC REV: CPT | Performed by: FAMILY MEDICINE

## 2019-09-12 PROCEDURE — G8417 CALC BMI ABV UP PARAM F/U: HCPCS | Performed by: FAMILY MEDICINE

## 2019-09-12 PROCEDURE — 87880 STREP A ASSAY W/OPTIC: CPT | Performed by: FAMILY MEDICINE

## 2019-09-12 RX ORDER — AZITHROMYCIN 250 MG/1
250 TABLET, FILM COATED ORAL SEE ADMIN INSTRUCTIONS
Qty: 6 TABLET | Refills: 0 | Status: SHIPPED | OUTPATIENT
Start: 2019-09-12 | End: 2019-09-17

## 2019-09-12 ASSESSMENT — ENCOUNTER SYMPTOMS: ABDOMINAL PAIN: 0

## 2019-09-15 LAB — THROAT CULTURE: NORMAL

## 2019-10-29 RX ORDER — ONDANSETRON 4 MG/1
4 TABLET, ORALLY DISINTEGRATING ORAL EVERY 8 HOURS PRN
Qty: 15 TABLET | Refills: 0 | Status: SHIPPED | OUTPATIENT
Start: 2019-10-29 | End: 2020-01-07 | Stop reason: SDUPTHER

## 2019-11-05 RX ORDER — HYOSCYAMINE SULFATE EXTENDED-RELEASE 0.38 MG/1
375 TABLET ORAL EVERY 12 HOURS PRN
Qty: 60 TABLET | Refills: 3 | Status: SHIPPED | OUTPATIENT
Start: 2019-11-05 | End: 2020-05-04

## 2019-11-05 RX ORDER — TRAZODONE HYDROCHLORIDE 50 MG/1
50 TABLET ORAL NIGHTLY
Qty: 30 TABLET | Refills: 0 | Status: SHIPPED | OUTPATIENT
Start: 2019-11-05 | End: 2021-03-11 | Stop reason: SDUPTHER

## 2019-12-06 PROBLEM — Z88.5 ALLERGY STATUS TO NARCOTIC AGENT STATUS: Status: ACTIVE | Noted: 2018-05-11

## 2019-12-06 PROBLEM — M26.609 TMJ (TEMPOROMANDIBULAR JOINT SYNDROME): Status: ACTIVE | Noted: 2019-12-06

## 2019-12-06 PROBLEM — I45.10 RIGHT BUNDLE BRANCH BLOCK (RBBB) ON ELECTROCARDIOGRAPHY: Status: ACTIVE | Noted: 2019-12-06

## 2019-12-06 PROBLEM — Z79.01 LONG TERM (CURRENT) USE OF ANTICOAGULANTS: Status: ACTIVE | Noted: 2018-05-11

## 2019-12-06 PROBLEM — E04.1 THYROID NODULE: Status: ACTIVE | Noted: 2019-12-06

## 2019-12-06 PROBLEM — Z88.2 ALLERGY STATUS TO SULFONAMIDES STATUS: Status: ACTIVE | Noted: 2018-05-11

## 2019-12-06 PROBLEM — Z96.652 PRESENCE OF ARTIFICIAL KNEE JOINT, LEFT: Status: ACTIVE | Noted: 2018-05-11

## 2019-12-06 PROBLEM — R07.89 ATYPICAL CHEST PAIN: Status: ACTIVE | Noted: 2018-03-19

## 2019-12-06 RX ORDER — TROSPIUM CHLORIDE ER 60 MG/1
CAPSULE ORAL
COMMUNITY
Start: 2019-10-01 | End: 2020-04-01

## 2019-12-06 RX ORDER — ESTRADIOL 0.05 MG/D
FILM, EXTENDED RELEASE TRANSDERMAL
COMMUNITY
Start: 2019-09-11 | End: 2022-01-11 | Stop reason: SDUPTHER

## 2019-12-06 RX ORDER — NAPROXEN 375 MG/1
TABLET ORAL
COMMUNITY
Start: 2019-03-07 | End: 2020-04-01

## 2019-12-06 RX ORDER — DICYCLOMINE HCL 20 MG
TABLET ORAL
COMMUNITY
Start: 2015-01-29 | End: 2020-04-01

## 2019-12-06 RX ORDER — AZELAIC ACID 0.15 G/G
GEL TOPICAL
COMMUNITY
Start: 2015-10-09 | End: 2020-04-01

## 2019-12-12 ENCOUNTER — HOSPITAL ENCOUNTER (EMERGENCY)
Age: 64
Discharge: HOME | End: 2019-12-13
Payer: COMMERCIAL

## 2019-12-12 DIAGNOSIS — R51.9 ACUTE NONINTRACTABLE HEADACHE, UNSPECIFIED HEADACHE TYPE: Primary | ICD-10-CM

## 2019-12-12 DIAGNOSIS — R00.2 PALPITATIONS: ICD-10-CM

## 2019-12-12 LAB
EKG ATRIAL RATE: 69 BPM
EKG P AXIS: 36 DEGREES
EKG P-R INTERVAL: 182 MS
EKG Q-T INTERVAL: 434 MS
EKG QRS DURATION: 132 MS
EKG QTC CALCULATION (BAZETT): 465 MS
EKG R AXIS: -20 DEGREES
EKG T AXIS: 9 DEGREES
EKG VENTRICULAR RATE: 69 BPM

## 2019-12-12 PROCEDURE — 99285 EMERGENCY DEPT VISIT HI MDM: CPT

## 2019-12-12 PROCEDURE — 93005 ELECTROCARDIOGRAM TRACING: CPT | Performed by: PERSONAL EMERGENCY RESPONSE ATTENDANT

## 2019-12-12 ASSESSMENT — PAIN DESCRIPTION - PAIN TYPE: TYPE: ACUTE PAIN

## 2019-12-12 ASSESSMENT — PAIN DESCRIPTION - INTENSITY: RATING_2: 8

## 2019-12-12 ASSESSMENT — PAIN DESCRIPTION - ORIENTATION
ORIENTATION_3: LEFT;RIGHT
ORIENTATION_2: LEFT
ORIENTATION: LEFT

## 2019-12-12 ASSESSMENT — PAIN DESCRIPTION - ONSET: ONSET: ON-GOING

## 2019-12-12 ASSESSMENT — PAIN DESCRIPTION - LOCATION
LOCATION_2: HAND
LOCATION: CHEST
LOCATION_3: BACK

## 2019-12-12 ASSESSMENT — PAIN DESCRIPTION - DURATION: DURATION_2: INTERMITTENT

## 2019-12-13 ENCOUNTER — APPOINTMENT (OUTPATIENT)
Dept: CT IMAGING | Age: 64
End: 2019-12-13
Payer: COMMERCIAL

## 2019-12-13 ENCOUNTER — APPOINTMENT (OUTPATIENT)
Dept: GENERAL RADIOLOGY | Age: 64
End: 2019-12-13
Payer: COMMERCIAL

## 2019-12-13 VITALS
HEIGHT: 65 IN | DIASTOLIC BLOOD PRESSURE: 70 MMHG | SYSTOLIC BLOOD PRESSURE: 139 MMHG | HEART RATE: 62 BPM | BODY MASS INDEX: 33.32 KG/M2 | RESPIRATION RATE: 18 BRPM | WEIGHT: 200 LBS | TEMPERATURE: 98.4 F | OXYGEN SATURATION: 97 %

## 2019-12-13 LAB
ALBUMIN SERPL-MCNC: 3.9 G/DL (ref 3.5–4.6)
ALP BLD-CCNC: 119 U/L (ref 40–130)
ALT SERPL-CCNC: 14 U/L (ref 0–33)
ANION GAP SERPL CALCULATED.3IONS-SCNC: 11 MEQ/L (ref 9–15)
AST SERPL-CCNC: 18 U/L (ref 0–35)
BASOPHILS ABSOLUTE: 0 K/UL (ref 0–0.2)
BASOPHILS RELATIVE PERCENT: 0.4 %
BILIRUB SERPL-MCNC: 0.3 MG/DL (ref 0.2–0.7)
BUN BLDV-MCNC: 15 MG/DL (ref 8–23)
CALCIUM SERPL-MCNC: 9.4 MG/DL (ref 8.5–9.9)
CHLORIDE BLD-SCNC: 105 MEQ/L (ref 95–107)
CO2: 22 MEQ/L (ref 20–31)
CREAT SERPL-MCNC: 0.57 MG/DL (ref 0.5–0.9)
EOSINOPHILS ABSOLUTE: 0.1 K/UL (ref 0–0.7)
EOSINOPHILS RELATIVE PERCENT: 1.7 %
GFR AFRICAN AMERICAN: >60
GFR NON-AFRICAN AMERICAN: >60
GLOBULIN: 2.7 G/DL (ref 2.3–3.5)
GLUCOSE BLD-MCNC: 113 MG/DL (ref 70–99)
HCT VFR BLD CALC: 48.4 % (ref 37–47)
HEMOGLOBIN: 16.4 G/DL (ref 12–16)
LYMPHOCYTES ABSOLUTE: 1.9 K/UL (ref 1–4.8)
LYMPHOCYTES RELATIVE PERCENT: 25.2 %
MAGNESIUM: 1.8 MG/DL (ref 1.7–2.4)
MCH RBC QN AUTO: 31.7 PG (ref 27–31.3)
MCHC RBC AUTO-ENTMCNC: 33.9 % (ref 33–37)
MCV RBC AUTO: 93.7 FL (ref 82–100)
MONOCYTES ABSOLUTE: 0.8 K/UL (ref 0.2–0.8)
MONOCYTES RELATIVE PERCENT: 9.7 %
NEUTROPHILS ABSOLUTE: 4.9 K/UL (ref 1.4–6.5)
NEUTROPHILS RELATIVE PERCENT: 63 %
PDW BLD-RTO: 13.7 % (ref 11.5–14.5)
PLATELET # BLD: 224 K/UL (ref 130–400)
POTASSIUM SERPL-SCNC: 3.9 MEQ/L (ref 3.4–4.9)
RBC # BLD: 5.17 M/UL (ref 4.2–5.4)
SODIUM BLD-SCNC: 138 MEQ/L (ref 135–144)
TOTAL PROTEIN: 6.6 G/DL (ref 6.3–8)
TROPONIN: <0.01 NG/ML (ref 0–0.01)
WBC # BLD: 7.7 K/UL (ref 4.8–10.8)

## 2019-12-13 PROCEDURE — 84484 ASSAY OF TROPONIN QUANT: CPT

## 2019-12-13 PROCEDURE — 70450 CT HEAD/BRAIN W/O DYE: CPT

## 2019-12-13 PROCEDURE — 80053 COMPREHEN METABOLIC PANEL: CPT

## 2019-12-13 PROCEDURE — 93005 ELECTROCARDIOGRAM TRACING: CPT | Performed by: EMERGENCY MEDICINE

## 2019-12-13 PROCEDURE — 96375 TX/PRO/DX INJ NEW DRUG ADDON: CPT

## 2019-12-13 PROCEDURE — 83735 ASSAY OF MAGNESIUM: CPT

## 2019-12-13 PROCEDURE — 85025 COMPLETE CBC W/AUTO DIFF WBC: CPT

## 2019-12-13 PROCEDURE — 2580000003 HC RX 258: Performed by: PERSONAL EMERGENCY RESPONSE ATTENDANT

## 2019-12-13 PROCEDURE — 71045 X-RAY EXAM CHEST 1 VIEW: CPT

## 2019-12-13 PROCEDURE — 36415 COLL VENOUS BLD VENIPUNCTURE: CPT

## 2019-12-13 PROCEDURE — 96365 THER/PROPH/DIAG IV INF INIT: CPT

## 2019-12-13 PROCEDURE — 6360000002 HC RX W HCPCS: Performed by: PERSONAL EMERGENCY RESPONSE ATTENDANT

## 2019-12-13 PROCEDURE — 93010 ELECTROCARDIOGRAM REPORT: CPT | Performed by: INTERNAL MEDICINE

## 2019-12-13 RX ORDER — KETOROLAC TROMETHAMINE 30 MG/ML
30 INJECTION, SOLUTION INTRAMUSCULAR; INTRAVENOUS ONCE
Status: COMPLETED | OUTPATIENT
Start: 2019-12-13 | End: 2019-12-13

## 2019-12-13 RX ORDER — MAGNESIUM SULFATE IN WATER 40 MG/ML
2 INJECTION, SOLUTION INTRAVENOUS ONCE
Status: COMPLETED | OUTPATIENT
Start: 2019-12-13 | End: 2019-12-13

## 2019-12-13 RX ORDER — BUTALBITAL, ASPIRIN, AND CAFFEINE 325; 50; 40 MG/1; MG/1; MG/1
1 CAPSULE ORAL EVERY 4 HOURS PRN
Qty: 20 CAPSULE | Refills: 0 | Status: SHIPPED | OUTPATIENT
Start: 2019-12-13 | End: 2022-01-05

## 2019-12-13 RX ORDER — 0.9 % SODIUM CHLORIDE 0.9 %
1000 INTRAVENOUS SOLUTION INTRAVENOUS ONCE
Status: COMPLETED | OUTPATIENT
Start: 2019-12-13 | End: 2019-12-13

## 2019-12-13 RX ADMIN — SODIUM CHLORIDE 1000 ML: 9 INJECTION, SOLUTION INTRAVENOUS at 00:26

## 2019-12-13 RX ADMIN — KETOROLAC TROMETHAMINE 30 MG: 30 INJECTION, SOLUTION INTRAMUSCULAR; INTRAVENOUS at 00:27

## 2019-12-13 RX ADMIN — MAGNESIUM SULFATE HEPTAHYDRATE 2 G: 40 INJECTION, SOLUTION INTRAVENOUS at 00:27

## 2019-12-13 ASSESSMENT — ENCOUNTER SYMPTOMS
COLOR CHANGE: 0
BLOOD IN STOOL: 0
COUGH: 0
DIARRHEA: 0
SHORTNESS OF BREATH: 0
NAUSEA: 0
VOMITING: 0
ABDOMINAL PAIN: 0
SORE THROAT: 0
RHINORRHEA: 0

## 2019-12-13 ASSESSMENT — PAIN SCALES - GENERAL: PAINLEVEL_OUTOF10: 8

## 2020-01-07 ENCOUNTER — OFFICE VISIT (OUTPATIENT)
Dept: FAMILY MEDICINE CLINIC | Age: 65
End: 2020-01-07
Payer: COMMERCIAL

## 2020-01-07 VITALS
BODY MASS INDEX: 32.82 KG/M2 | HEART RATE: 78 BPM | SYSTOLIC BLOOD PRESSURE: 122 MMHG | OXYGEN SATURATION: 98 % | HEIGHT: 65 IN | WEIGHT: 197 LBS | TEMPERATURE: 98.2 F | DIASTOLIC BLOOD PRESSURE: 68 MMHG

## 2020-01-07 PROBLEM — M54.50 LOW BACK PAIN: Status: ACTIVE | Noted: 2018-10-16

## 2020-01-07 LAB
INFLUENZA A ANTIBODY: NORMAL
INFLUENZA B ANTIBODY: NORMAL
S PYO AG THROAT QL: NORMAL

## 2020-01-07 PROCEDURE — 3017F COLORECTAL CA SCREEN DOC REV: CPT | Performed by: NURSE PRACTITIONER

## 2020-01-07 PROCEDURE — G8484 FLU IMMUNIZE NO ADMIN: HCPCS | Performed by: NURSE PRACTITIONER

## 2020-01-07 PROCEDURE — 99213 OFFICE O/P EST LOW 20 MIN: CPT | Performed by: NURSE PRACTITIONER

## 2020-01-07 PROCEDURE — 1036F TOBACCO NON-USER: CPT | Performed by: NURSE PRACTITIONER

## 2020-01-07 PROCEDURE — G8427 DOCREV CUR MEDS BY ELIG CLIN: HCPCS | Performed by: NURSE PRACTITIONER

## 2020-01-07 PROCEDURE — G8417 CALC BMI ABV UP PARAM F/U: HCPCS | Performed by: NURSE PRACTITIONER

## 2020-01-07 PROCEDURE — 87880 STREP A ASSAY W/OPTIC: CPT | Performed by: NURSE PRACTITIONER

## 2020-01-07 PROCEDURE — 87804 INFLUENZA ASSAY W/OPTIC: CPT | Performed by: NURSE PRACTITIONER

## 2020-01-07 RX ORDER — ONDANSETRON 4 MG/1
4 TABLET, ORALLY DISINTEGRATING ORAL EVERY 8 HOURS PRN
Qty: 15 TABLET | Refills: 0 | Status: SHIPPED | OUTPATIENT
Start: 2020-01-07 | End: 2020-02-27

## 2020-01-07 RX ORDER — PREDNISONE 20 MG/1
40 TABLET ORAL DAILY
Qty: 8 TABLET | Refills: 0 | Status: SHIPPED | OUTPATIENT
Start: 2020-01-07 | End: 2020-01-11

## 2020-01-07 RX ORDER — CETIRIZINE HYDROCHLORIDE, PSEUDOEPHEDRINE HYDROCHLORIDE 5; 120 MG/1; MG/1
1 TABLET, FILM COATED, EXTENDED RELEASE ORAL DAILY
Qty: 10 TABLET | Refills: 0 | Status: SHIPPED | OUTPATIENT
Start: 2020-01-07 | End: 2020-01-17

## 2020-01-07 ASSESSMENT — ENCOUNTER SYMPTOMS
ABDOMINAL DISTENTION: 0
COUGH: 1
VOMITING: 0
EYES NEGATIVE: 1
SORE THROAT: 1
CONSTIPATION: 0
BACK PAIN: 0
SINUS PAIN: 1
WHEEZING: 0
NAUSEA: 1
EYE DISCHARGE: 0
RHINORRHEA: 0
SINUS PRESSURE: 1
DIARRHEA: 1
ABDOMINAL PAIN: 0
SHORTNESS OF BREATH: 0

## 2020-01-07 ASSESSMENT — PATIENT HEALTH QUESTIONNAIRE - PHQ9
SUM OF ALL RESPONSES TO PHQ9 QUESTIONS 1 & 2: 0
SUM OF ALL RESPONSES TO PHQ QUESTIONS 1-9: 0
2. FEELING DOWN, DEPRESSED OR HOPELESS: 0
SUM OF ALL RESPONSES TO PHQ QUESTIONS 1-9: 0
1. LITTLE INTEREST OR PLEASURE IN DOING THINGS: 0

## 2020-01-07 NOTE — PROGRESS NOTES
Yes     Alcohol/week: 0.0 standard drinks     Comment: socially    Drug use: No    Sexual activity: Not Currently   Lifestyle    Physical activity:     Days per week: Not on file     Minutes per session: Not on file    Stress: Not on file   Relationships    Social connections:     Talks on phone: Not on file     Gets together: Not on file     Attends Gnosticist service: Not on file     Active member of club or organization: Not on file     Attends meetings of clubs or organizations: Not on file     Relationship status: Not on file    Intimate partner violence:     Fear of current or ex partner: Not on file     Emotionally abused: Not on file     Physically abused: Not on file     Forced sexual activity: Not on file   Other Topics Concern    Not on file   Social History Narrative    Not on file     Family History   Problem Relation Age of Onset    Cancer Father         COLON    High Blood Pressure Father     Heart Disease Father      Allergies   Allergen Reactions    Codeine     Demerol     Dye [Iodides] Nausea Only    Morphine     Sulfa Antibiotics Hives and Itching    Levofloxacin Itching and Rash     Current Outpatient Medications   Medication Sig Dispense Refill    ondansetron (ZOFRAN-ODT) 4 MG disintegrating tablet Take 1 tablet by mouth every 8 hours as needed for Nausea or Vomiting 15 tablet 0    cetirizine-psuedoephedrine (ZYRTEC-D ALLERGY & CONGESTION) 5-120 MG per extended release tablet Take 1 tablet by mouth Daily for 10 days 10 tablet 0    predniSONE (DELTASONE) 20 MG tablet Take 2 tablets by mouth daily for 4 days 8 tablet 0    butalbital-aspirin-caffeine (FIORINAL) -40 MG capsule Take 1 capsule by mouth every 4 hours as needed for Headaches for up to 30 days.  20 capsule 0    dicyclomine (BENTYL) 20 MG tablet Take by mouth      Azelaic Acid (FINACEA) 15 % GEL Finacea 15 % External Gel  Quantity: 50   Refills: 0  Start: 9-Oct-2015      trospium (SANCTURA) 60 MG CP24 extended release capsule Take by mouth      estradiol (VIVELLE) 0.05 MG/24HR Place onto the skin      rifaximin (XIFAXAN) 550 MG tablet Take by mouth      naproxen (NAPROSYN) 375 MG tablet Take by mouth      hyoscyamine (LEVBID) 375 MCG extended release tablet Take 1 tablet by mouth every 12 hours as needed for Cramping 60 tablet 3    traZODone (DESYREL) 50 MG tablet Take 1 tablet by mouth nightly 30 tablet 0    metoprolol succinate (TOPROL XL) 100 MG extended release tablet TAKE 1 TABLET DAILY 90 tablet 3    fluticasone (FLONASE) 50 MCG/ACT nasal spray 2 sprays by Nasal route daily 1 Bottle 3    venlafaxine (EFFEXOR XR) 75 MG extended release capsule Take 1 capsule by mouth daily 7 capsule 0     No current facility-administered medications for this visit. Review of Systems   Constitutional: Positive for appetite change, chills and fatigue. Negative for fever. HENT: Positive for ear pain, sinus pressure, sinus pain, sneezing and sore throat. Negative for congestion, ear discharge, mouth sores and rhinorrhea. Eyes: Negative. Negative for discharge. Respiratory: Positive for cough (dry). Negative for shortness of breath and wheezing. Cardiovascular: Negative for chest pain. Gastrointestinal: Positive for diarrhea and nausea. Negative for abdominal distention, abdominal pain, constipation and vomiting. Genitourinary: Negative for difficulty urinating, dysuria, flank pain, frequency, hematuria and urgency. Musculoskeletal: Positive for myalgias. Negative for arthralgias, back pain, gait problem and neck pain. Skin: Negative. Negative for rash. Neurological: Negative for dizziness, weakness and headaches. Hematological: Negative for adenopathy. Psychiatric/Behavioral: Negative for agitation and confusion.        Objective:   /68 (Site: Right Upper Arm, Position: Sitting, Cuff Size: Large Adult)   Pulse 78   Temp 98.2 °F (36.8 °C) (Oral)   Ht 5' 5\" (1.651 m)   Wt 197 lb (89.4 kg)   LMP  (LMP Unknown)   SpO2 98%   BMI 32.78 kg/m²     Physical Exam  Vitals signs reviewed. Constitutional:       Appearance: Normal appearance. HENT:      Head: Normocephalic and atraumatic. Right Ear: Hearing, tympanic membrane, ear canal and external ear normal. No middle ear effusion. No foreign body. Tympanic membrane is not injected, erythematous or bulging. Left Ear: Hearing, tympanic membrane, ear canal and external ear normal.  No middle ear effusion. No foreign body. Tympanic membrane is not injected, erythematous or bulging. Nose: Nose normal. No congestion or rhinorrhea. Right Nostril: No foreign body. Left Nostril: No foreign body. Right Turbinates: Not enlarged. Left Turbinates: Not enlarged. Right Sinus: No maxillary sinus tenderness or frontal sinus tenderness. Left Sinus: No maxillary sinus tenderness or frontal sinus tenderness. Mouth/Throat:      Lips: Pink. Mouth: Mucous membranes are moist.      Pharynx: Oropharynx is clear. Uvula midline. No pharyngeal swelling, oropharyngeal exudate, posterior oropharyngeal erythema or uvula swelling. Tonsils: No tonsillar exudate or tonsillar abscesses. Eyes:      General: Lids are normal.         Right eye: No foreign body. Left eye: No foreign body. Extraocular Movements: Extraocular movements intact. Conjunctiva/sclera: Conjunctivae normal.   Neck:      Musculoskeletal: Normal range of motion. Cardiovascular:      Rate and Rhythm: Normal rate and regular rhythm. Pulses: Normal pulses. Heart sounds: Murmur present. Pulmonary:      Effort: Pulmonary effort is normal. No tachypnea, accessory muscle usage or respiratory distress. Breath sounds: Normal breath sounds. No wheezing or rhonchi. Abdominal:      Tenderness: There is no tenderness. There is no guarding. Musculoskeletal: Normal range of motion.    Lymphadenopathy:      Cervical: No cervical adenopathy. Upper Body:      Right upper body: No supraclavicular adenopathy. Left upper body: No supraclavicular adenopathy. Skin:     General: Skin is warm and dry. Capillary Refill: Capillary refill takes less than 2 seconds. Neurological:      General: No focal deficit present. Mental Status: She is alert and oriented to person, place, and time. Cranial Nerves: Cranial nerves are intact. Gait: Gait is intact. Psychiatric:         Mood and Affect: Mood normal.         Speech: Speech normal.         Behavior: Behavior normal. Behavior is cooperative. Thought Content: Thought content normal.         Judgment: Judgment normal.         Assessment:       Diagnosis Orders   1. Acute non-recurrent sinusitis, unspecified location  predniSONE (DELTASONE) 20 MG tablet   2. Sore throat  POCT rapid strep A    Throat Culture   3. Nausea  POCT Influenza A/B    ondansetron (ZOFRAN-ODT) 4 MG disintegrating tablet   4. Congestion of nasal sinus  cetirizine-psuedoephedrine (ZYRTEC-D ALLERGY & CONGESTION) 5-120 MG per extended release tablet     Results for POC orders placed in visit on 01/07/20   POCT Influenza A/B   Result Value Ref Range    Influenza A Ab neg     Influenza B Ab neg    POCT rapid strep A   Result Value Ref Range    Strep A Ag None Detected None Detected      Plan:   Probably viral  Will treat symptomatically for now, pt states she tolerates pseudoephedrine. Pt to return if does not improve over the next week     Discussed with patient/family worsening signs and symptoms as outlined above as to when to return for care/seek emergent help. Assessment & Plan   Donaldo Rao was seen today for cough, congestion, fever, headache, pharyngitis, generalized body aches, diarrhea and nausea. Diagnoses and all orders for this visit:    Acute non-recurrent sinusitis, unspecified location  -     predniSONE (DELTASONE) 20 MG tablet;  Take 2 tablets by mouth daily for 4 days    Sore

## 2020-01-07 NOTE — PATIENT INSTRUCTIONS
Patient Education        Sinusitis: Care Instructions  Your Care Instructions    Sinusitis is an infection of the lining of the sinus cavities in your head. Sinusitis often follows a cold. It causes pain and pressure in your head and face. In most cases, sinusitis gets better on its own in 1 to 2 weeks. But some mild symptoms may last for several weeks. Sometimes antibiotics are needed. Follow-up care is a key part of your treatment and safety. Be sure to make and go to all appointments, and call your doctor if you are having problems. It's also a good idea to know your test results and keep a list of the medicines you take. How can you care for yourself at home? · Take an over-the-counter pain medicine, such as acetaminophen (Tylenol), ibuprofen (Advil, Motrin), or naproxen (Aleve). Read and follow all instructions on the label. · If the doctor prescribed antibiotics, take them as directed. Do not stop taking them just because you feel better. You need to take the full course of antibiotics. · Be careful when taking over-the-counter cold or flu medicines and Tylenol at the same time. Many of these medicines have acetaminophen, which is Tylenol. Read the labels to make sure that you are not taking more than the recommended dose. Too much acetaminophen (Tylenol) can be harmful. · Breathe warm, moist air from a steamy shower, a hot bath, or a sink filled with hot water. Avoid cold, dry air. Using a humidifier in your home may help. Follow the directions for cleaning the machine. · Use saline (saltwater) nasal washes to help keep your nasal passages open and wash out mucus and bacteria. You can buy saline nose drops at a grocery store or drugstore. Or you can make your own at home by adding 1 teaspoon of salt and 1 teaspoon of baking soda to 2 cups of distilled water. If you make your own, fill a bulb syringe with the solution, insert the tip into your nostril, and squeeze gently. Bonnye Rajendra your nose.   · Put a hot, wet towel or a warm gel pack on your face 3 or 4 times a day for 5 to 10 minutes each time. · Try a decongestant nasal spray like oxymetazoline (Afrin). Do not use it for more than 3 days in a row. Using it for more than 3 days can make your congestion worse. When should you call for help? Call your doctor now or seek immediate medical care if:    · You have new or worse swelling or redness in your face or around your eyes.     · You have a new or higher fever.    Watch closely for changes in your health, and be sure to contact your doctor if:    · You have new or worse facial pain.     · The mucus from your nose becomes thicker (like pus) or has new blood in it.     · You are not getting better as expected. Where can you learn more? Go to https://Funziopealaneweb.CloudLock. org and sign in to your Location[employer] account. Enter G452 in the [employer] box to learn more about \"Sinusitis: Care Instructions. \"     If you do not have an account, please click on the \"Sign Up Now\" link. Current as of: October 21, 2018  Content Version: 12.1  © 9965-8538 [employer]. Care instructions adapted under license by [facility] [address] If you have questions about a medical condition or this instruction, always ask your healthcare professional. [address] any warranty or liability for your use of this information. Patient Education        Sore Throat: Care Instructions  Your Care Instructions    Infection by bacteria or a virus causes most sore throats. Cigarette smoke, dry air, air pollution, allergies, and yelling can also cause a sore throat. Sore throats can be painful and annoying. Fortunately, most sore throats go away on their own. If you have a bacterial infection, your doctor may prescribe antibiotics. Follow-up care is a key part of your treatment and safety. Be sure to make and go to all appointments, and call your doctor if you are having problems.  It's also a good idea to know your test results and keep a list of the medicines you take. How can you care for yourself at home? · If your doctor prescribed antibiotics, take them as directed. Do not stop taking them just because you feel better. You need to take the full course of antibiotics. · Gargle with warm salt water once an hour to help reduce swelling and relieve discomfort. Use 1 teaspoon of salt mixed in 1 cup of warm water. · Take an over-the-counter pain medicine, such as acetaminophen (Tylenol), ibuprofen (Advil, Motrin), or naproxen (Aleve). Read and follow all instructions on the label. · Be careful when taking over-the-counter cold or flu medicines and Tylenol at the same time. Many of these medicines have acetaminophen, which is Tylenol. Read the labels to make sure that you are not taking more than the recommended dose. Too much acetaminophen (Tylenol) can be harmful. · Drink plenty of fluids. Fluids may help soothe an irritated throat. Hot fluids, such as tea or soup, may help decrease throat pain. · Use over-the-counter throat lozenges to soothe pain. Regular cough drops or hard candy may also help. These should not be given to young children because of the risk of choking. · Do not smoke or allow others to smoke around you. If you need help quitting, talk to your doctor about stop-smoking programs and medicines. These can increase your chances of quitting for good. · Use a vaporizer or humidifier to add moisture to your bedroom. Follow the directions for cleaning the machine. When should you call for help? Call your doctor now or seek immediate medical care if:    · You have new or worse trouble swallowing.     · Your sore throat gets much worse on one side.    Watch closely for changes in your health, and be sure to contact your doctor if you do not get better as expected. Where can you learn more? Go to https://sera.Serviceful. org and sign in to your Vudu account.  Enter F249

## 2020-01-08 DIAGNOSIS — J02.9 SORE THROAT: ICD-10-CM

## 2020-01-10 LAB — THROAT CULTURE: NORMAL

## 2020-02-27 RX ORDER — ONDANSETRON 4 MG/1
TABLET, ORALLY DISINTEGRATING ORAL
Qty: 15 TABLET | Refills: 0 | Status: SHIPPED | OUTPATIENT
Start: 2020-02-27 | End: 2020-10-26 | Stop reason: SDUPTHER

## 2020-04-01 ENCOUNTER — OFFICE VISIT (OUTPATIENT)
Dept: PRIMARY CARE CLINIC | Age: 65
End: 2020-04-01
Payer: COMMERCIAL

## 2020-04-01 VITALS
WEIGHT: 206 LBS | OXYGEN SATURATION: 98 % | SYSTOLIC BLOOD PRESSURE: 124 MMHG | HEART RATE: 68 BPM | DIASTOLIC BLOOD PRESSURE: 78 MMHG | TEMPERATURE: 99.4 F | BODY MASS INDEX: 34.28 KG/M2

## 2020-04-01 PROCEDURE — 99213 OFFICE O/P EST LOW 20 MIN: CPT | Performed by: NURSE PRACTITIONER

## 2020-04-01 PROCEDURE — 1036F TOBACCO NON-USER: CPT | Performed by: NURSE PRACTITIONER

## 2020-04-01 PROCEDURE — G8417 CALC BMI ABV UP PARAM F/U: HCPCS | Performed by: NURSE PRACTITIONER

## 2020-04-01 PROCEDURE — 87804 INFLUENZA ASSAY W/OPTIC: CPT | Performed by: NURSE PRACTITIONER

## 2020-04-01 PROCEDURE — G8427 DOCREV CUR MEDS BY ELIG CLIN: HCPCS | Performed by: NURSE PRACTITIONER

## 2020-04-01 PROCEDURE — 3017F COLORECTAL CA SCREEN DOC REV: CPT | Performed by: NURSE PRACTITIONER

## 2020-04-01 ASSESSMENT — ENCOUNTER SYMPTOMS
COUGH: 1
SHORTNESS OF BREATH: 0

## 2020-04-01 NOTE — PROGRESS NOTES
Reid AntonyVanessaRayne  1955    Chief Complaint   Patient presents with    Cough    Headache    Generalized Body Aches       Respiratory Symptoms:  Patient complains of 4 day(s) history of fever: low grade, headache and shortness of breath. Symptoms have been improving with time. She no longer has the fever, does have cough. Relevant PMH: No pertinent PMH. Smoking history:  She  reports that she has never smoked. She has never used smokeless tobacco.     She has had no known ill contacts. Treatment to date: Acetaminophen. Travel screen completed:  Yes          Past Medical History:   Diagnosis Date    Atrial fibrillation ([facility] [address]     Brain tumor ([facility] [address] 07/16/2018    Chest pain     Degenerative disc disease, lumbar 1/21/2016    Hx of colonic polyps     Hypertension 2/24/2015    Irritable bowel syndrome     Low back pain 10/16/2018    Lumbar radiculopathy 3/14/2016    Rapid heart rate      Past Surgical History:   Procedure Laterality Date    APPENDECTOMY      BACK SURGERY  07/14/2017    CARDIAC CATHETERIZATION  11/2/15    DR. PALMER    HYSTERECTOMY      CO CRANIECT EXCIS SKULL BONE RAFFIN N/A 7/24/2018    CRANIOTOMY performed by Papito Corona MD at [address] [place] [address] TOTAL KNEE ARTHROPLASTY  2009    UPPER GASTROINTESTINAL ENDOSCOPY  1/15/13    DR. MEEKS     Family History   Problem Relation Age of Onset    Cancer Father         COLON    High Blood Pressure Father     Heart Disease Father      Social History     Socioeconomic History    Marital status:      Spouse name: Not on file    Number of children: Not on file    Years of education: Not on file    Highest education level: Not on file   Occupational History    Not on file   Social Needs    Financial resource strain: Not on file    Food insecurity     Worry: Not on file     Inability: Not on file    Transportation needs     Medical: Not on file     Non-medical: Not on file   Tobacco -40 MG capsule Take 1 capsule by mouth every 4 hours as needed for Headaches for up to 30 days. 20 capsule 0     No current facility-administered medications on file prior to visit. Allergies:  Codeine; Demerol; Dye [iodides]; Morphine; Sulfa antibiotics; and Levofloxacin    Review of Systems   Constitutional: Positive for fatigue. HENT: Positive for congestion. Respiratory: Positive for cough. Negative for shortness of breath. Musculoskeletal: Positive for myalgias. Neurological: Positive for headaches. Objective:   /78 (Site: Left Upper Arm, Position: Sitting, Cuff Size: Large Adult)   Pulse 68   Temp 99.4 °F (37.4 °C) (Oral)   Wt 206 lb (93.4 kg)   LMP  (LMP Unknown)   SpO2 98%   BMI 34.28 kg/m²     Physical Exam  Constitutional:       Appearance: She is well-developed. HENT:      Right Ear: Tympanic membrane normal.      Left Ear: Tympanic membrane normal.   Eyes:      Conjunctiva/sclera: Conjunctivae normal.   Cardiovascular:      Rate and Rhythm: Normal rate and regular rhythm. Heart sounds: Normal heart sounds. Pulmonary:      Effort: Pulmonary effort is normal.      Breath sounds: Normal breath sounds. No wheezing or rales. Skin:     General: Skin is warm and dry. Neurological:      Mental Status: She is alert and oriented to person, place, and time. Assessment:       Diagnosis Orders   1. Influenza A     2. Cough  POCT Influenza A/B   3. Body aches  POCT Influenza A/B         Plan:         Orders Placed This Encounter   Procedures    POCT Influenza A/B     No orders of the defined types were placed in this encounter. Positive flu A. She is beyond the window of time for anti-virals and is improving. Conservative care, rest, fluids, tylenol prn. Let us know or go to ER if not improving.     Side effects, adverse effects of the medication prescribed today, as well as treatment plan and result expectations have been discussed with the patient who

## 2020-05-04 RX ORDER — HYOSCYAMINE SULFATE EXTENDED-RELEASE 0.38 MG/1
TABLET ORAL
Qty: 60 TABLET | Refills: 3 | Status: SHIPPED | OUTPATIENT
Start: 2020-05-04 | End: 2020-05-18 | Stop reason: SDUPTHER

## 2020-05-08 ENCOUNTER — HOSPITAL ENCOUNTER (OUTPATIENT)
Dept: MRI IMAGING | Age: 65
Discharge: HOME | End: 2020-05-10
Payer: COMMERCIAL

## 2020-05-08 PROCEDURE — A9579 GAD-BASE MR CONTRAST NOS,1ML: HCPCS | Performed by: NEUROLOGICAL SURGERY

## 2020-05-08 PROCEDURE — 6360000004 HC RX CONTRAST MEDICATION: Performed by: NEUROLOGICAL SURGERY

## 2020-05-08 PROCEDURE — 70553 MRI BRAIN STEM W/O & W/DYE: CPT

## 2020-05-08 RX ADMIN — GADOTERIDOL 20 ML: 279.3 INJECTION, SOLUTION INTRAVENOUS at 17:11

## 2020-05-12 LAB
GFR AFRICAN AMERICAN: >60
GFR NON-AFRICAN AMERICAN: >60
PERFORMED ON: ABNORMAL
POC CREATININE: 0.5 MG/DL (ref 0.6–1.2)
POC SAMPLE TYPE: ABNORMAL

## 2020-05-18 ENCOUNTER — VIRTUAL VISIT (OUTPATIENT)
Dept: GASTROENTEROLOGY | Age: 65
End: 2020-05-18
Payer: COMMERCIAL

## 2020-05-18 PROCEDURE — 99213 OFFICE O/P EST LOW 20 MIN: CPT | Performed by: NURSE PRACTITIONER

## 2020-05-18 RX ORDER — DIPHENOXYLATE HYDROCHLORIDE AND ATROPINE SULFATE 2.5; .025 MG/1; MG/1
1 TABLET ORAL 4 TIMES DAILY PRN
Qty: 30 TABLET | Refills: 0 | Status: SHIPPED | OUTPATIENT
Start: 2020-05-18 | End: 2020-05-28

## 2020-05-18 RX ORDER — HYOSCYAMINE SULFATE EXTENDED-RELEASE 0.38 MG/1
TABLET ORAL
Qty: 180 TABLET | Refills: 3 | Status: SHIPPED | OUTPATIENT
Start: 2020-05-18 | End: 2020-06-15 | Stop reason: SDUPTHER

## 2020-05-18 NOTE — PROGRESS NOTES
y.o. female  with clinical history suggestive of IBS-D. Patient has lost response to Imodium and Questran. Patient with diarrhea and abdominal cramping that comes and goes. No alarming symptoms such as, weight loss, loss of appetite, or bleeding. Patient 's last colonoscopy was in April, 2019 with findings of internal hemorrhoids, but normal colonic mucosa. Will refill Levbid for abdominal pain. Will trial Lomotil for diarrhea, 4 times daily as needed. 1. Irritable bowel syndrome with diarrhea  - diphenoxylate-atropine (DIPHENATOL) 2.5-0.025 MG per tablet; Take 1 tablet by mouth 4 times daily as needed for Diarrhea for up to 10 days. Dispense: 30 tablet; Refill: 0    No follow-ups on file. An  electronic signature was used to authenticate this note. --ALETA Bui - CNP on 5/18/2020 at 1:58 PM  :[id number]}    Pursuant to the emergency declaration under the [facility], [facility] waiver authority and the [employer] and [employer] Act, this Virtual  Visit was conducted, with patient's consent, to reduce the patient's risk of exposure to COVID-19 and provide continuity of care for an established patient. Services were provided through a video synchronous discussion virtually to substitute for in-person clinic visit.

## 2020-06-15 ENCOUNTER — VIRTUAL VISIT (OUTPATIENT)
Dept: GASTROENTEROLOGY | Age: 65
End: 2020-06-15
Payer: COMMERCIAL

## 2020-06-15 PROCEDURE — 99213 OFFICE O/P EST LOW 20 MIN: CPT | Performed by: NURSE PRACTITIONER

## 2020-06-15 RX ORDER — DIPHENOXYLATE HYDROCHLORIDE AND ATROPINE SULFATE 2.5; .025 MG/1; MG/1
1 TABLET ORAL 4 TIMES DAILY PRN
Qty: 120 TABLET | Refills: 5 | Status: SHIPPED | OUTPATIENT
Start: 2020-06-15 | End: 2020-10-27 | Stop reason: SDUPTHER

## 2020-06-15 RX ORDER — HYOSCYAMINE SULFATE EXTENDED-RELEASE 0.38 MG/1
TABLET ORAL
Qty: 180 TABLET | Refills: 5 | Status: SHIPPED | OUTPATIENT
Start: 2020-06-15 | End: 2020-12-15 | Stop reason: SDUPTHER

## 2020-06-15 NOTE — PROGRESS NOTES
(DIPHENATOL) 2.5-0.025 MG per tablet; Take 1 tablet by mouth 4 times daily as needed for Diarrhea for up to 180 days. Dispense: 120 tablet; Refill: 5    Return in about 6 months (around 12/15/2020). An  electronic signature was used to authenticate this note. --ALETA Harris - CNP on 6/15/2020 at 2:44 PM        Pursuant to the emergency declaration under the [facility], [facility] waiver authority and the [employer] and [employer] Act, this Virtual  Visit was conducted, with patient's consent, to reduce the patient's risk of exposure to COVID-19 and provide continuity of care for an established patient. Services were provided through a video synchronous discussion virtually to substitute for in-person clinic visit.

## 2020-06-26 ENCOUNTER — VIRTUAL VISIT (OUTPATIENT)
Dept: FAMILY MEDICINE CLINIC | Age: 65
End: 2020-06-26
Payer: COMMERCIAL

## 2020-06-26 PROCEDURE — 3017F COLORECTAL CA SCREEN DOC REV: CPT | Performed by: FAMILY MEDICINE

## 2020-06-26 PROCEDURE — G8427 DOCREV CUR MEDS BY ELIG CLIN: HCPCS | Performed by: FAMILY MEDICINE

## 2020-06-26 PROCEDURE — 99213 OFFICE O/P EST LOW 20 MIN: CPT | Performed by: FAMILY MEDICINE

## 2020-06-26 RX ORDER — PREDNISONE 10 MG/1
TABLET ORAL
Qty: 30 TABLET | Refills: 0 | Status: SHIPPED | OUTPATIENT
Start: 2020-06-26 | End: 2021-07-21 | Stop reason: ALTCHOICE

## 2020-06-26 ASSESSMENT — ENCOUNTER SYMPTOMS
VOMITING: 0
NAUSEA: 0
COLOR CHANGE: 1

## 2020-06-26 NOTE — PROGRESS NOTES
TABLET BY MOUTH EVERY 8 HOURS AS NEEDED FOR NAUSEA/ VOMITING 15 tablet 0    butalbital-aspirin-caffeine (FIORINAL) -40 MG capsule Take 1 capsule by mouth every 4 hours as needed for Headaches for up to 30 days. 20 capsule 0    estradiol (VIVELLE) 0.05 MG/24HR Place onto the skin      traZODone (DESYREL) 50 MG tablet Take 1 tablet by mouth nightly 30 tablet 0    metoprolol succinate (TOPROL XL) 100 MG extended release tablet TAKE 1 TABLET DAILY 90 tablet 3    fluticasone (FLONASE) 50 MCG/ACT nasal spray 2 sprays by Nasal route daily 1 Bottle 3    venlafaxine (EFFEXOR XR) 75 MG extended release capsule Take 1 capsule by mouth daily 7 capsule 0     No current facility-administered medications for this visit. Family History   Problem Relation Age of Onset    Cancer Father         COLON    High Blood Pressure Father     Heart Disease Father      Past Medical History:   Diagnosis Date    Atrial fibrillation ([facility] [address]     Brain tumor ([facility] [address] 2018    Chest pain     Degenerative disc disease, lumbar 2016    Hx of colonic polyps     Hypertension 2015    Irritable bowel syndrome     Low back pain 10/16/2018    Lumbar radiculopathy 3/14/2016    Rapid heart rate      Objective:   LMP  (LMP Unknown)     Physical Exam  Gen; No acute distress  Neuro; No focal deficits. No facial asymmetries  Mental status; Alert. Awake. Affect appropriate  Skin; Faint erythema along left side of neck and posterior aspect of left shoulder and lesser degree right shoulder.  -pictures poor quality   Assessment:       Diagnosis Orders   1.  Rash           Plan:      Orders Placed This Encounter   Medications    predniSONE (DELTASONE) 10 MG tablet     Simg daily x 4d, 30mg daily x 3d, 20mg x 2d, 10mg x 1d, then d/c     Dispense:  30 tablet     Refill:  0   f/u in 2 weeks if needed

## 2020-08-31 RX ORDER — METOPROLOL SUCCINATE 100 MG/1
TABLET, EXTENDED RELEASE ORAL
Qty: 90 TABLET | Refills: 0 | Status: SHIPPED | OUTPATIENT
Start: 2020-08-31 | End: 2020-12-17 | Stop reason: SDUPTHER

## 2020-08-31 NOTE — TELEPHONE ENCOUNTER
requesting medication refill.  Please approve or deny this request.    Rx requested:  Requested Prescriptions     Pending Prescriptions Disp Refills    metoprolol succinate (TOPROL XL) 100 MG extended release tablet [Pharmacy Med Name: METOPROLOL SUCCINATE ER TABS 100MG] 90 tablet 3     Sig: TAKE 1 TABLET DAILY         Last Office Visit:   12/9/2019      Next Visit Date:  Future Appointments   Date Time Provider Carlyn Rodriguez   12/15/2020  1:30 PM Luisa Gunn

## 2020-10-26 RX ORDER — ONDANSETRON 4 MG/1
TABLET, ORALLY DISINTEGRATING ORAL
Qty: 15 TABLET | Refills: 0 | Status: SHIPPED | OUTPATIENT
Start: 2020-10-26 | End: 2021-07-21 | Stop reason: SDUPTHER

## 2020-10-26 RX ORDER — DIPHENOXYLATE HYDROCHLORIDE AND ATROPINE SULFATE 2.5; .025 MG/1; MG/1
1 TABLET ORAL 4 TIMES DAILY PRN
Qty: 120 TABLET | Refills: 5 | OUTPATIENT
Start: 2020-10-26 | End: 2021-04-24

## 2020-10-26 NOTE — TELEPHONE ENCOUNTER
Pt thanks provider for refill of Zofran. Casandra Chavarria states GI office told her to have pcp continue w/ refills of IBS med.

## 2020-10-26 NOTE — TELEPHONE ENCOUNTER
Miles called office requesting medication refill. Rx requested:  Requested Prescriptions     Pending Prescriptions Disp Refills    diphenoxylate-atropine (DIPHENATOL) 2.5-0.025 MG per tablet 120 tablet 5     Sig: Take 1 tablet by mouth 4 times daily as needed for Diarrhea for up to 180 days.     ondansetron (ZOFRAN-ODT) 4 MG disintegrating tablet 15 tablet 0       Last Office Visit:   6/26/2020      Next Visit Date:  Future Appointments   Date Time Provider Carlyn Falloni   12/15/2020  1:30 PM Shruti Smith [facility]   6/16/2021 11:15 AM Yves Curtis MD [facility]

## 2020-10-26 NOTE — TELEPHONE ENCOUNTER
Due to pt's provider out of the office this week, are you willing to approve or deny pt's refill request?

## 2020-10-27 RX ORDER — DIPHENOXYLATE HYDROCHLORIDE AND ATROPINE SULFATE 2.5; .025 MG/1; MG/1
1 TABLET ORAL 4 TIMES DAILY PRN
Qty: 120 TABLET | Refills: 0 | Status: SHIPPED | OUTPATIENT
Start: 2020-10-27 | End: 2020-12-18 | Stop reason: SDUPTHER

## 2020-10-27 NOTE — TELEPHONE ENCOUNTER
Let pt know I sent over a months refill and she should make an appt with Dr. Webber Hopes to further discuss.

## 2020-11-02 RX ORDER — VENLAFAXINE HYDROCHLORIDE 75 MG/1
75 CAPSULE, EXTENDED RELEASE ORAL DAILY
Qty: 90 CAPSULE | Refills: 3 | Status: SHIPPED | OUTPATIENT
Start: 2020-11-02 | End: 2021-12-08 | Stop reason: SDUPTHER

## 2020-11-10 ENCOUNTER — VIRTUAL VISIT (OUTPATIENT)
Dept: FAMILY MEDICINE CLINIC | Age: 65
End: 2020-11-10
Payer: COMMERCIAL

## 2020-11-10 PROCEDURE — G8417 CALC BMI ABV UP PARAM F/U: HCPCS | Performed by: PHYSICIAN ASSISTANT

## 2020-11-10 PROCEDURE — G8428 CUR MEDS NOT DOCUMENT: HCPCS | Performed by: PHYSICIAN ASSISTANT

## 2020-11-10 PROCEDURE — 99213 OFFICE O/P EST LOW 20 MIN: CPT | Performed by: PHYSICIAN ASSISTANT

## 2020-11-10 PROCEDURE — G8484 FLU IMMUNIZE NO ADMIN: HCPCS | Performed by: PHYSICIAN ASSISTANT

## 2020-11-10 PROCEDURE — 1036F TOBACCO NON-USER: CPT | Performed by: PHYSICIAN ASSISTANT

## 2020-11-10 PROCEDURE — 3017F COLORECTAL CA SCREEN DOC REV: CPT | Performed by: PHYSICIAN ASSISTANT

## 2020-11-10 ASSESSMENT — ENCOUNTER SYMPTOMS
RHINORRHEA: 1
WHEEZING: 0
SORE THROAT: 0
SHORTNESS OF BREATH: 0
SINUS PAIN: 0
SINUS PRESSURE: 1
COUGH: 1

## 2020-11-10 NOTE — PROGRESS NOTES
11/10/2020     Florina Franklin (:  1955) is a 63 y.o. female, here for evaluation of the following medical concerns:  Not feeling well  HPI  Medicine doxy video visit due to concern for exposure to COVID-19 (coronavirus). Patient is aware this is a billable visit. Patient was identified at the start of visit and was in her home I was remote. Patient with complaint of nonproductive cough runny nose one episode of diarrhea since yesterday temperature 98.5 nausea but no vomiting she had brain surgery several years ago and lost her taste and smell following the procedure. She denies significant shortness of breath using DayQuil NyQuil and Tylenol for symptom relief    Review of Systems   Constitutional: Positive for activity change, appetite change and fatigue. Negative for chills and fever. HENT: Positive for postnasal drip, rhinorrhea and sinus pressure. Negative for congestion, sinus pain and sore throat. Respiratory: Positive for cough. Negative for shortness of breath and wheezing. Musculoskeletal: Positive for joint swelling. Allergic/Immunologic: Positive for environmental allergies. Prior to Visit Medications    Medication Sig Taking? Authorizing Provider   venlafaxine (EFFEXOR XR) 75 MG extended release capsule Take 1 capsule by mouth daily  Sharda Mike DO   diphenoxylate-atropine (DIPHENATOL) 2.5-0.025 MG per tablet Take 1 tablet by mouth 4 times daily as needed for Diarrhea for up to 180 days.   ALETA Knapp CNP   ondansetron (ZOFRAN-ODT) 4 MG disintegrating tablet DISSOLVE ONE TABLET BY MOUTH EVERY 8 HOURS AS NEEDED FOR NAUSEA/ VOMITING  ALETA Knapp CNP   metoprolol succinate (TOPROL XL) 100 MG extended release tablet TAKE 1 TABLET DAILY  Kem J Holiday, DO   predniSONE (DELTASONE) 10 MG tablet 40mg daily x 4d, 30mg daily x 3d, 20mg x 2d, 10mg x 1d, then d/c  Rubi Tatum MD   hyoscyamine (LEVBID) 375 MCG extended release tablet TAKE ONE TABLET BY MOUTH EVERY 12 HOURS AS NEEDED FOR CRAMPING  Michilauren ALETA Moroe CNP   butalbital-aspirin-caffeine [facility]) -40 MG capsule Take 1 capsule by mouth every 4 hours as needed for Headaches for up to 30 days. SANTINO Lau   estradiol (VIVELLE) 0.05 MG/24HR Place onto the skin  Historical Provider, MD   traZODone (DESYREL) 50 MG tablet Take 1 tablet by mouth nightly  ALETA Jameson CNP   fluticasone (FLONASE) 50 MCG/ACT nasal spray 2 sprays by Nasal route daily  Larwence Aase, APRN - CNP        Social History     Tobacco Use    Smoking status: Never Smoker    Smokeless tobacco: Never Used   Substance Use Topics    Alcohol use: Yes     Alcohol/week: 0.0 standard drinks     Comment: socially        There were no vitals filed for this visit. Estimated body mass index is 33.28 kg/m² as calculated from the following:    Height as of 6/10/20: 5' 5\" (1.651 m). Weight as of 6/10/20: 200 lb (90.7 kg). Physical Exam  Constitutional:       General: She is not in acute distress. Appearance: She is obese. She is ill-appearing. HENT:      Head: Normocephalic and atraumatic. Eyes:      General: No scleral icterus. Left eye: No discharge. Extraocular Movements: Extraocular movements intact. Conjunctiva/sclera: Conjunctivae normal.   Pulmonary:      Effort: Pulmonary effort is normal. No respiratory distress. Breath sounds: No wheezing. Neurological:      Mental Status: She is alert and oriented to person, place, and time. Psychiatric:         Mood and Affect: Mood normal.         Thought Content: Thought content normal.         Judgment: Judgment normal.              Assessment & Plan    Diagnosis Orders   1. Cough     2. Diarrhea, unspecified type      brat diet, increase fluids   3. Nausea           No orders of the defined types were placed in this encounter. No orders of the defined types were placed in this encounter.     There are no discontinued medications. Return if symptoms worsen or fail to improve, for follow up with your PCP as directed. Megan George PA-C      ASSESSMENT/PLAN:  There are no diagnoses linked to this encounter. No follow-ups on file. An electronic signature was used to authenticate this note.     --Megan George PA-C on 11/10/2020 at 1:28 PM

## 2020-11-23 ENCOUNTER — TELEPHONE (OUTPATIENT)
Dept: FAMILY MEDICINE CLINIC | Age: 65
End: 2020-11-23

## 2020-11-23 NOTE — TELEPHONE ENCOUNTER
Scheduling outreach call to review Health Maintenance and / or schedule appointment. AWV  n/a  Colonoscopy  done  [facility] [address] GAP YES  Lab work A1c for DM screen-Hep C-HIV  Mammogram done  PHQ-9 done  Last appointment VV 6-26-20  with  and f/u if needed  Upcoming appointment no  Scanned and updated HM yes    I spoke with patient and at this time patient does not want to schedule an appointment but will call if she needs anything.

## 2020-11-27 RX ORDER — METOPROLOL SUCCINATE 100 MG/1
TABLET, EXTENDED RELEASE ORAL
Qty: 90 TABLET | Refills: 3 | OUTPATIENT
Start: 2020-11-27

## 2020-12-08 ENCOUNTER — VIRTUAL VISIT (OUTPATIENT)
Dept: FAMILY MEDICINE CLINIC | Age: 65
End: 2020-12-08
Payer: COMMERCIAL

## 2020-12-08 ENCOUNTER — NURSE TRIAGE (OUTPATIENT)
Dept: OTHER | Facility: CLINIC | Age: 65
End: 2020-12-08

## 2020-12-08 PROCEDURE — 99214 OFFICE O/P EST MOD 30 MIN: CPT | Performed by: FAMILY MEDICINE

## 2020-12-08 PROCEDURE — G8427 DOCREV CUR MEDS BY ELIG CLIN: HCPCS | Performed by: FAMILY MEDICINE

## 2020-12-08 PROCEDURE — 3017F COLORECTAL CA SCREEN DOC REV: CPT | Performed by: FAMILY MEDICINE

## 2020-12-08 ASSESSMENT — PATIENT HEALTH QUESTIONNAIRE - PHQ9
SUM OF ALL RESPONSES TO PHQ QUESTIONS 1-9: 1
SUM OF ALL RESPONSES TO PHQ QUESTIONS 1-9: 1
2. FEELING DOWN, DEPRESSED OR HOPELESS: 1
SUM OF ALL RESPONSES TO PHQ QUESTIONS 1-9: 1
1. LITTLE INTEREST OR PLEASURE IN DOING THINGS: 0
SUM OF ALL RESPONSES TO PHQ9 QUESTIONS 1 & 2: 1

## 2020-12-08 ASSESSMENT — ENCOUNTER SYMPTOMS: ABDOMINAL PAIN: 0

## 2020-12-08 NOTE — PROGRESS NOTES
Subjective:      Patient ID: Pamela Encarnacion is a 63 y.o. female    Headache    This is a new problem. The current episode started 1 to 4 weeks ago. The problem occurs constantly. Pertinent negatives include no abdominal pain, neck pain or weakness. The treatment provided mild relief. Here with 2 weeks of occipital headache. Does not go away although does get better with tylenol. No nausea or emesis. No vision problems  Balance seems normal. No fever or chills. No neck pain. History of meningioma resection about 2 years ago       Review of Systems   Constitutional: Positive for fatigue. Negative for chills. Gastrointestinal: Negative for abdominal pain. Musculoskeletal: Negative for neck pain. Skin: Negative for rash. Neurological: Positive for headaches. Negative for weakness. Reviewed allergy, medical, social, surgical, family and med list changes and updated   Files       TELEHEALTH EVALUATION -- Audio/Visual (During ZUZXV-86 public health emergency)    -   Pamela Encarnacion is a 63 y.o. female being evaluated by a Virtual Visit (video visit) encounter to address concerns as mentioned above. A caregiver was present when appropriate. Due to this being a TeleHealth encounter (During KSINI-29 public health emergency), evaluation of the following organ systems was limited: Vitals/Constitutional/EENT/Resp/CV/GI//MS/Neuro/Skin/Heme-Lymph-Imm. Pursuant to the emergency declaration under the [facility], [address] [place] [address] authority and the [employer] Act, this Virtual Visit was conducted with patient's (and/or legal guardian's) consent, to reduce the patient's risk of exposure to COVID-19 and provide necessary medical care.   The patient (and/or legal guardian) has also been advised to contact this office for worsening conditions or problems, and seek emergency medical treatment and/or call 911 if deemed necessary. Patient accepts tele health video visit via [employer]. me and associated charges  Visit about 22 min   Services were provided through a video synchronous discussion virtually to substitute for in-person clinic visit. Type of encounter was _x_ Doxy __ MyChart ___Facetime    Patient was located at their home. Provider was located at their __x_ home or        ____ office. --Choco Ramey MD on 12/8/2020 at 4:08 PM    An electronic signature was used to authenticate this note. Social History     Socioeconomic History    Marital status:      Spouse name: None    Number of children: None    Years of education: None    Highest education level: None   Occupational History    None   Social Needs    Financial resource strain: None    Food insecurity     Worry: None     Inability: None    Transportation needs     Medical: None     Non-medical: None   Tobacco Use    Smoking status: Never Smoker    Smokeless tobacco: Never Used   Substance and Sexual Activity    Alcohol use:  Yes     Alcohol/week: 0.0 standard drinks     Comment: socially    Drug use: No    Sexual activity: Not Currently   Lifestyle    Physical activity     Days per week: None     Minutes per session: None    Stress: None   Relationships    Social connections     Talks on phone: None     Gets together: None     Attends Mormon service: None     Active member of club or organization: None     Attends meetings of clubs or organizations: None     Relationship status: None    Intimate partner violence     Fear of current or ex partner: None     Emotionally abused: None     Physically abused: None     Forced sexual activity: None   Other Topics Concern    None   Social History Narrative    None     Current Outpatient Medications   Medication Sig Dispense Refill    venlafaxine (EFFEXOR XR) 75 MG extended release capsule Take 1 capsule by mouth daily 90 capsule 3    diphenoxylate-atropine (DIPHENATOL) 2.5-0.025 MG per tablet Take 1 tablet by mouth 4 times daily as needed for Diarrhea for up to 180 days. 120 tablet 0    ondansetron (ZOFRAN-ODT) 4 MG disintegrating tablet DISSOLVE ONE TABLET BY MOUTH EVERY 8 HOURS AS NEEDED FOR NAUSEA/ VOMITING 15 tablet 0    metoprolol succinate (TOPROL XL) 100 MG extended release tablet TAKE 1 TABLET DAILY 90 tablet 0    predniSONE (DELTASONE) 10 MG tablet 40mg daily x 4d, 30mg daily x 3d, 20mg x 2d, 10mg x 1d, then d/c 30 tablet 0    hyoscyamine (LEVBID) 375 MCG extended release tablet TAKE ONE TABLET BY MOUTH EVERY 12 HOURS AS NEEDED FOR CRAMPING 180 tablet 5    butalbital-aspirin-caffeine (FIORINAL) -40 MG capsule Take 1 capsule by mouth every 4 hours as needed for Headaches for up to 30 days. 20 capsule 0    estradiol (VIVELLE) 0.05 MG/24HR Place onto the skin      traZODone (DESYREL) 50 MG tablet Take 1 tablet by mouth nightly 30 tablet 0    fluticasone (FLONASE) 50 MCG/ACT nasal spray 2 sprays by Nasal route daily 1 Bottle 3     No current facility-administered medications for this visit. Family History   Problem Relation Age of Onset    Cancer Father         COLON    High Blood Pressure Father     Heart Disease Father      Past Medical History:   Diagnosis Date    [facility] [address] [address]     Brain tumor ([facility] [address] [facility] 07/16/2018    Chest pain     Degenerative disc disease, lumbar 1/21/2016    Hx of colonic polyps     Hypertension 2/24/2015    Irritable bowel syndrome     Low back pain 10/16/2018    Lumbar radiculopathy 3/14/2016    Rapid heart rate      Objective:   LMP  (LMP Unknown)     Physical Exam  Gen; No acute distress  Neuro; No focal deficits. No facial asymmetries;    Mental status; Alert. Awake. Affect appropriate   Pulmonary        Respirations unlabored   Assessment:       Diagnosis Orders   1. Acute intractable headache, unspecified headache type     2. Hx of resection of meningioma  MRI BRAIN W WO CONTRAST   3.  PAF (paroxysmal atrial fibrillation) ([facility] [address]           Plan:    paf stable and followed by cardiology-continue current tx   Orders Placed This Encounter   Procedures    MRI BRAIN W WO CONTRAST     Standing Status:   Future     Standing Expiration Date:   12/8/2021   f/u after mri-to er for any worsening

## 2020-12-08 NOTE — TELEPHONE ENCOUNTER
provided warm transfer to [facility] at [facility] for appointment scheduling. Attention Provider: Thank you for allowing me to participate in the care of your patient. The patient was connected to triage in response to information provided to the ECC. Please do not respond through this encounter as the response is not directed to a shared pool.

## 2020-12-14 PROBLEM — E06.3 HASHIMOTO'S THYROIDITIS: Status: ACTIVE | Noted: 2020-12-14

## 2020-12-14 PROBLEM — N39.0 RECURRENT UTI: Status: ACTIVE | Noted: 2020-12-14

## 2020-12-14 PROBLEM — N28.9 RENAL LESION: Status: ACTIVE | Noted: 2020-12-14

## 2020-12-14 PROBLEM — Z86.011 HISTORY OF BENIGN NEOPLASM OF BRAIN: Status: ACTIVE | Noted: 2020-12-14

## 2020-12-15 ENCOUNTER — VIRTUAL VISIT (OUTPATIENT)
Dept: GASTROENTEROLOGY | Age: 65
End: 2020-12-15
Payer: COMMERCIAL

## 2020-12-15 PROCEDURE — 99213 OFFICE O/P EST LOW 20 MIN: CPT | Performed by: NURSE PRACTITIONER

## 2020-12-15 RX ORDER — HYOSCYAMINE SULFATE EXTENDED-RELEASE 0.38 MG/1
TABLET ORAL
Qty: 180 TABLET | Refills: 5 | Status: SHIPPED | OUTPATIENT
Start: 2020-12-15 | End: 2020-12-15

## 2020-12-15 RX ORDER — HYOSCYAMINE SULFATE EXTENDED-RELEASE 0.38 MG/1
375 TABLET ORAL 2 TIMES DAILY
Qty: 180 TABLET | Refills: 5 | Status: SHIPPED | OUTPATIENT
Start: 2020-12-15 | End: 2021-08-18

## 2020-12-15 NOTE — PROGRESS NOTES
12/15/2020    TELEHEALTH EVALUATION -- Audio/Visual (During EGBUK-89 public health emergency)    Due to COVID 19 outbreak, patient's office visit was converted to a virtual visit. Patient was contacted and agreed to proceed with a virtual visit via Telephone Visit 15 minutes. The risks and benefits of converting to a virtual visit were discussed in light of the current infectious disease epidemic. Patient also understood that insurance coverage and co-pays are up to their individual insurance plans. Patient location [facility]  Writer locationoffice  HPI:  Ning Hernandez (:  1955) has requested an audio/video evaluation for the following concern(s):  Patient last seen in  with clinical history suggestive of IBSD. Good response to Levbid and Lomotil. FODMAP diet recommended    Patient reports episodes of diarrhea and lower abdominal pain. Lower abdominal pain before during and after a bowel movement. After a bowel movement pain subsides, Levbid helps. However, only taking Levbid as needed. Lomotil helps with diarrhea, takes this as needed. No melena, hematochezia, or hematemesis. No weight loss or loss of appetite. Patient has not tried low FODMAP diet. Symptoms are intermittent and at times will go days/weeks without any symptoms. No GERD or dysphagia. No chest pain, shortness of breath, or palpitations. Endoscopic investigations: Colonoscopy 4/3/19- Normal colonic mucosa throughout, small internal hemorrhoids         Review of Systems   All other systems reviewed and are negative. Prior to Visit Medications    Medication Sig Taking?  Authorizing Provider   hyoscyamine (LEVBID) 375 MCG extended release tablet TAKE ONE TABLET BY MOUTH EVERY 12 HOURS AS NEEDED FOR CRAMPING Yes Dyann Back ALETA Toscano - CNP   cetirizine-psuedoephedrine (ZYRTEC-D) 5-120 MG per extended release tablet Take by mouth Yes Historical Provider, MD Past Surgical History:   Procedure Laterality Date    APPENDECTOMY      BACK SURGERY  07/14/2017    CARDIAC CATHETERIZATION  11/2/15    DR. PALMER    HYSTERECTOMY      CT CRANIECT EXCIS SKULL BONE LESN N/A 7/24/2018    CRANIOTOMY performed by Kymberly Barahona MD at [address] [place] [address] TOTAL KNEE ARTHROPLASTY  2009    UPPER GASTROINTESTINAL ENDOSCOPY  1/15/13    DR. MEEKS   ,   Social History     Tobacco Use    Smoking status: Never Smoker    Smokeless tobacco: Never Used   Substance Use Topics    Alcohol use: Yes     Alcohol/week: 0.0 standard drinks     Comment: socially    Drug use: No   ,   Family History   Problem Relation Age of Onset    Cancer Father         COLON    High Blood Pressure Father     Heart Disease Father      Due to this being a TeleHealth encounter, evaluation of the following organ systems is limited: Vitals/Constitutional/EENT/Resp/CV/GI//MS/Neuro/Skin/Heme-Lymph-Imm. ASSESSMENT/PLAN:  1. Irritable bowel syndrome with diarrhea  = Lifestyle modification with stress reduction, relaxation techniques discussed in detail.  = Dietary changes discussed, including FODMAP  = Fiber supplementation, issue with fiber increasing gas and bloating discussed  = Lomotil as needed   = Levbid 375 mcg twice daily    Return in about 6 weeks (around 1/26/2021). An  electronic signature was used to authenticate this note. --Marlon Amaya, ALETA - CNP on 12/15/2020 at 11:20 AM      Pursuant to the emergency declaration under the [facility] waiver authority and the [employer] Act, this Virtual  Visit was conducted, with patient's consent, to reduce the patient's risk of exposure to COVID-19 and provide continuity of care for an established patient. Services were provided through a video synchronous discussion virtually to substitute for in-person clinic visit.

## 2020-12-17 RX ORDER — METOPROLOL SUCCINATE 100 MG/1
TABLET, EXTENDED RELEASE ORAL
Qty: 30 TABLET | Refills: 0 | Status: SHIPPED | OUTPATIENT
Start: 2020-12-17 | End: 2021-01-12 | Stop reason: SDUPTHER

## 2020-12-18 ENCOUNTER — TELEPHONE (OUTPATIENT)
Dept: GASTROENTEROLOGY | Age: 65
End: 2020-12-18

## 2020-12-18 RX ORDER — DIPHENOXYLATE HYDROCHLORIDE AND ATROPINE SULFATE 2.5; .025 MG/1; MG/1
1 TABLET ORAL 4 TIMES DAILY PRN
Qty: 120 TABLET | Refills: 2 | Status: SHIPPED | OUTPATIENT
Start: 2020-12-18 | End: 2021-06-16

## 2020-12-18 NOTE — TELEPHONE ENCOUNTER
The patient said Delmy Braun has not received her Lomotil and the Levbid were not called in. The patient was told to check with the pharmacy, since the [address] was confirmed through her pharmacy.

## 2020-12-23 ENCOUNTER — HOSPITAL ENCOUNTER (OUTPATIENT)
Dept: MRI IMAGING | Age: 65
Discharge: HOME | End: 2020-12-25
Payer: COMMERCIAL

## 2020-12-23 PROCEDURE — A9577 INJ MULTIHANCE: HCPCS | Performed by: FAMILY MEDICINE

## 2020-12-23 PROCEDURE — 6360000004 HC RX CONTRAST MEDICATION: Performed by: FAMILY MEDICINE

## 2020-12-23 PROCEDURE — 70553 MRI BRAIN STEM W/O & W/DYE: CPT

## 2020-12-23 RX ORDER — SODIUM CHLORIDE 0.9 % (FLUSH) 0.9 %
10 SYRINGE (ML) INJECTION 2 TIMES DAILY
Status: DISCONTINUED | OUTPATIENT
Start: 2020-12-23 | End: 2020-12-26 | Stop reason: HOSPADM

## 2020-12-23 RX ADMIN — GADOBENATE DIMEGLUMINE 20 ML: 529 INJECTION, SOLUTION INTRAVENOUS at 14:41

## 2021-01-05 ENCOUNTER — HOSPITAL ENCOUNTER (EMERGENCY)
Age: 66
Discharge: HOME | End: 2021-01-06
Attending: EMERGENCY MEDICINE
Payer: COMMERCIAL

## 2021-01-05 DIAGNOSIS — I10 ESSENTIAL HYPERTENSION: Primary | ICD-10-CM

## 2021-01-05 LAB
EKG ATRIAL RATE: 58 BPM
EKG P AXIS: 6 DEGREES
EKG P-R INTERVAL: 190 MS
EKG Q-T INTERVAL: 440 MS
EKG QRS DURATION: 130 MS
EKG QTC CALCULATION (BAZETT): 431 MS
EKG R AXIS: -27 DEGREES
EKG T AXIS: 10 DEGREES
EKG VENTRICULAR RATE: 58 BPM

## 2021-01-05 PROCEDURE — 85025 COMPLETE CBC W/AUTO DIFF WBC: CPT

## 2021-01-05 PROCEDURE — 80053 COMPREHEN METABOLIC PANEL: CPT

## 2021-01-05 PROCEDURE — 84443 ASSAY THYROID STIM HORMONE: CPT

## 2021-01-05 PROCEDURE — 81001 URINALYSIS AUTO W/SCOPE: CPT

## 2021-01-05 PROCEDURE — 83690 ASSAY OF LIPASE: CPT

## 2021-01-05 PROCEDURE — 36415 COLL VENOUS BLD VENIPUNCTURE: CPT

## 2021-01-05 PROCEDURE — 84484 ASSAY OF TROPONIN QUANT: CPT

## 2021-01-05 PROCEDURE — 99284 EMERGENCY DEPT VISIT MOD MDM: CPT

## 2021-01-05 PROCEDURE — 96374 THER/PROPH/DIAG INJ IV PUSH: CPT

## 2021-01-05 PROCEDURE — 93005 ELECTROCARDIOGRAM TRACING: CPT | Performed by: EMERGENCY MEDICINE

## 2021-01-05 RX ORDER — 0.9 % SODIUM CHLORIDE 0.9 %
1000 INTRAVENOUS SOLUTION INTRAVENOUS ONCE
Status: COMPLETED | OUTPATIENT
Start: 2021-01-05 | End: 2021-01-06

## 2021-01-05 RX ORDER — ONDANSETRON 2 MG/ML
4 INJECTION INTRAMUSCULAR; INTRAVENOUS ONCE
Status: COMPLETED | OUTPATIENT
Start: 2021-01-05 | End: 2021-01-06

## 2021-01-05 ASSESSMENT — PAIN DESCRIPTION - FREQUENCY: FREQUENCY: CONTINUOUS

## 2021-01-05 ASSESSMENT — PAIN SCALES - GENERAL: PAINLEVEL_OUTOF10: 8

## 2021-01-05 ASSESSMENT — PAIN DESCRIPTION - DESCRIPTORS: DESCRIPTORS: ACHING

## 2021-01-05 ASSESSMENT — PAIN DESCRIPTION - ONSET: ONSET: PROGRESSIVE

## 2021-01-05 ASSESSMENT — PAIN DESCRIPTION - LOCATION: LOCATION: BACK

## 2021-01-06 ENCOUNTER — APPOINTMENT (OUTPATIENT)
Dept: GENERAL RADIOLOGY | Age: 66
End: 2021-01-06
Payer: COMMERCIAL

## 2021-01-06 VITALS
TEMPERATURE: 98 F | OXYGEN SATURATION: 96 % | HEIGHT: 65 IN | WEIGHT: 197 LBS | HEART RATE: 59 BPM | RESPIRATION RATE: 19 BRPM | DIASTOLIC BLOOD PRESSURE: 67 MMHG | SYSTOLIC BLOOD PRESSURE: 123 MMHG | BODY MASS INDEX: 32.82 KG/M2

## 2021-01-06 LAB
ALBUMIN SERPL-MCNC: 4.3 G/DL (ref 3.5–4.6)
ALP BLD-CCNC: 103 U/L (ref 40–130)
ALT SERPL-CCNC: 13 U/L (ref 0–33)
ANION GAP SERPL CALCULATED.3IONS-SCNC: 8 MEQ/L (ref 9–15)
AST SERPL-CCNC: 20 U/L (ref 0–35)
BACTERIA: NEGATIVE /HPF
BASOPHILS ABSOLUTE: 0 K/UL (ref 0–0.2)
BASOPHILS RELATIVE PERCENT: 0.3 %
BILIRUB SERPL-MCNC: 0.4 MG/DL (ref 0.2–0.7)
BILIRUBIN URINE: NEGATIVE
BLOOD, URINE: ABNORMAL
BUN BLDV-MCNC: 11 MG/DL (ref 8–23)
CALCIUM SERPL-MCNC: 9 MG/DL (ref 8.5–9.9)
CHLORIDE BLD-SCNC: 105 MEQ/L (ref 95–107)
CLARITY: CLEAR
CO2: 22 MEQ/L (ref 20–31)
COLOR: YELLOW
CREAT SERPL-MCNC: 0.5 MG/DL (ref 0.5–0.9)
EOSINOPHILS ABSOLUTE: 0.1 K/UL (ref 0–0.7)
EOSINOPHILS RELATIVE PERCENT: 1.4 %
EPITHELIAL CELLS, UA: ABNORMAL /HPF (ref 0–5)
GFR AFRICAN AMERICAN: >60
GFR NON-AFRICAN AMERICAN: >60
GLOBULIN: 2.2 G/DL (ref 2.3–3.5)
GLUCOSE BLD-MCNC: 101 MG/DL (ref 70–99)
GLUCOSE URINE: NEGATIVE MG/DL
HCT VFR BLD CALC: 48 % (ref 37–47)
HEMOGLOBIN: 16.1 G/DL (ref 12–16)
HYALINE CASTS: ABNORMAL /HPF (ref 0–5)
KETONES, URINE: NEGATIVE MG/DL
LEUKOCYTE ESTERASE, URINE: NEGATIVE
LIPASE: 16 U/L (ref 12–95)
LYMPHOCYTES ABSOLUTE: 2.2 K/UL (ref 1–4.8)
LYMPHOCYTES RELATIVE PERCENT: 28.4 %
MCH RBC QN AUTO: 30.9 PG (ref 27–31.3)
MCHC RBC AUTO-ENTMCNC: 33.6 % (ref 33–37)
MCV RBC AUTO: 92 FL (ref 82–100)
MONOCYTES ABSOLUTE: 0.7 K/UL (ref 0.2–0.8)
MONOCYTES RELATIVE PERCENT: 9 %
NEUTROPHILS ABSOLUTE: 4.7 K/UL (ref 1.4–6.5)
NEUTROPHILS RELATIVE PERCENT: 60.9 %
NITRITE, URINE: NEGATIVE
PDW BLD-RTO: 13.8 % (ref 11.5–14.5)
PH UA: 5.5 (ref 5–9)
PLATELET # BLD: 202 K/UL (ref 130–400)
POTASSIUM REFLEX MAGNESIUM: 4.2 MEQ/L (ref 3.4–4.9)
PROTEIN UA: NEGATIVE MG/DL
RBC # BLD: 5.21 M/UL (ref 4.2–5.4)
RBC UA: ABNORMAL /HPF (ref 0–5)
SARS-COV-2, NAAT: NOT DETECTED
SODIUM BLD-SCNC: 135 MEQ/L (ref 135–144)
SPECIFIC GRAVITY UA: 1.03 (ref 1–1.03)
TOTAL PROTEIN: 6.5 G/DL (ref 6.3–8)
TROPONIN: <0.01 NG/ML (ref 0–0.01)
TSH SERPL DL<=0.05 MIU/L-ACNC: 2.06 UIU/ML (ref 0.44–3.86)
UROBILINOGEN, URINE: 0.2 E.U./DL
WBC # BLD: 7.7 K/UL (ref 4.8–10.8)
WBC UA: ABNORMAL /HPF (ref 0–5)

## 2021-01-06 PROCEDURE — 6360000002 HC RX W HCPCS: Performed by: EMERGENCY MEDICINE

## 2021-01-06 PROCEDURE — U0002 COVID-19 LAB TEST NON-CDC: HCPCS

## 2021-01-06 PROCEDURE — 2580000003 HC RX 258: Performed by: EMERGENCY MEDICINE

## 2021-01-06 PROCEDURE — 71045 X-RAY EXAM CHEST 1 VIEW: CPT

## 2021-01-06 RX ADMIN — ONDANSETRON 4 MG: 2 INJECTION INTRAMUSCULAR; INTRAVENOUS at 00:02

## 2021-01-06 RX ADMIN — SODIUM CHLORIDE 1000 ML: 9 INJECTION, SOLUTION INTRAVENOUS at 00:02

## 2021-01-06 NOTE — ED TRIAGE NOTES
Patient to ED with c/o nausea and back pain since this morning  Patient denies injury  Skin warm and dry  Patient afebrile  Patient denies chest pain or sob

## 2021-01-06 NOTE — ED PROVIDER NOTES
eMERGENCY dEPARTMENT eNCOUnter      [facility]    Chief Complaint   Patient presents with    Nausea       HPI    Namrata Reece is a 63 y.o. female who presentsto ED from home  By private car  With complaint of tremors, nausea, concerned about her A. fib  Onset 1 day  Intensity of symptoms moderate  Patient checked her blood pressure at home and it was elevated. Patient denies any chest pain or shortness of breath. Patient denies any fever chills Covid symptoms. PAST MEDICAL HISTORY    Past Medical History:   Diagnosis Date    Atrial fibrillation ([address]     Brain tumor ([address] 07/16/2018    Chest pain     Degenerative disc disease, lumbar 1/21/2016    Hx of colonic polyps     Hypertension 2/24/2015    Irritable bowel syndrome     Low back pain 10/16/2018    Lumbar radiculopathy 3/14/2016    Rapid heart rate        SURGICAL HISTORY    Past Surgical History:   Procedure Laterality Date    APPENDECTOMY      BACK SURGERY  07/14/2017    CARDIAC CATHETERIZATION  11/2/15    DR. PALMER    HYSTERECTOMY      TX CRANIECT EXCIS SKULL BONE LESN N/A 7/24/2018    CRANIOTOMY performed by Kerry Eric MD at [address] [place] [address] TOTAL KNEE ARTHROPLASTY  2009    UPPER GASTROINTESTINAL ENDOSCOPY  1/15/13    DR. MEEKS       CURRENT MEDICATIONS    Current Outpatient Rx   Medication Sig Dispense Refill    diphenoxylate-atropine (DIPHENATOL) 2.5-0.025 MG per tablet Take 1 tablet by mouth 4 times daily as needed for Diarrhea for up to 180 days.  120 tablet 2    metoprolol succinate (TOPROL XL) 100 MG extended release tablet TAKE 1 TABLET DAILY 30 tablet 0    hyoscyamine (LEVBID) 375 MCG extended release tablet Take 1 tablet by mouth 2 times daily 180 tablet 5    cetirizine-psuedoephedrine (ZYRTEC-D) 5-120 MG per extended release tablet Take by mouth      venlafaxine (EFFEXOR XR) 75 MG extended release capsule Take 1 capsule by mouth daily 90 capsule 3    ondansetron (ZOFRAN-ODT) 4 MG disintegrating tablet DISSOLVE ONE TABLET BY MOUTH EVERY 8 HOURS AS NEEDED FOR NAUSEA/ VOMITING 15 tablet 0    predniSONE (DELTASONE) 10 MG tablet 40mg daily x 4d, 30mg daily x 3d, 20mg x 2d, 10mg x 1d, then d/c 30 tablet 0    butalbital-aspirin-caffeine (FIORINAL) -40 MG capsule Take 1 capsule by mouth every 4 hours as needed for Headaches for up to 30 days. 20 capsule 0    estradiol (VIVELLE) 0.05 MG/24HR Place onto the skin      traZODone (DESYREL) 50 MG tablet Take 1 tablet by mouth nightly 30 tablet 0    fluticasone (FLONASE) 50 MCG/ACT nasal spray 2 sprays by Nasal route daily 1 Bottle 3       ALLERGIES    Allergies   Allergen Reactions    Codeine     Demerol     Dye [Iodides] Nausea Only    Morphine     Penicillins Other (See Comments)    Sulfa Antibiotics Hives and Itching    Levofloxacin Itching and Rash       FAMILY HISTORY    Family History   Problem Relation Age of Onset    Cancer Father         COLON    High Blood Pressure Father     Heart Disease Father        SOCIAL HISTORY    Social History     Socioeconomic History    Marital status:      Spouse name: None    Number of children: None    Years of education: None    Highest education level: None   Occupational History    None   Social Needs    Financial resource strain: None    Food insecurity     Worry: None     Inability: None    Transportation needs     Medical: None     Non-medical: None   Tobacco Use    Smoking status: Never Smoker    Smokeless tobacco: Never Used   Substance and Sexual Activity    Alcohol use:  Yes     Alcohol/week: 0.0 standard drinks     Comment: socially    Drug use: No    Sexual activity: Not Currently   Lifestyle    Physical activity     Days per week: None     Minutes per session: None    Stress: None   Relationships    Social connections     Talks on phone: None     Gets together: None     Attends Bahai service: None     Active member of club or organization: None     Attends meetings of clubs or organizations: None     Relationship status: None    Intimate partner violence     Fear of current or ex partner: None     Emotionally abused: None     Physically abused: None     Forced sexual activity: None   Other Topics Concern    None   Social History Narrative    None       REVIEW OF SYSTEMS    Constitutional:  Denies fever, chills, weight loss or weakness   Eyes:  Denies photophobia or discharge   HENT:  Denies sore throat or ear pain   Respiratory:  Denies cough or shortness of breath   Cardiovascular:  Denies chest pain, palpitations or swelling   GI:  Denies abdominal pain, nausea, vomiting, or diarrhea   Musculoskeletal:  Denies back pain   Skin:  Denies rash   Neurologic:  Denies headache, focal weakness or sensory changes   Endocrine:  Denies polyuria or polydypsia   Lymphatic:  Denies swollen glands   Psychiatric:  Denies depression, suicidal ideation or homicidal ideation   All systems negative except as marked. PHYSICAL EXAM    VITAL SIGNS: /72   Pulse 56   Temp 98 °F (36.7 °C) (Oral)   Resp 16   Ht 5' 5\" (1.651 m)   Wt 197 lb (89.4 kg)   LMP  (LMP Unknown)   SpO2 99%   BMI 32.78 kg/m²    Constitutional:  Well developed, Well nourished, No acute distress, Non-toxic appearance. HENT:  Normocephalic, Atraumatic, Bilateral external ears normal, Oropharynx moist, No oral exudates, Nose normal. Neck- Normal range of motion, No tenderness, Supple, No stridor. Eyes:  PERRL, EOMI, Conjunctiva normal, No discharge. Respiratory:  Normal breath sounds, No respiratory distress, No wheezing, No chest tenderness. Cardiovascular: Bradycardic, Normal rhythm, No murmurs, No rubs, No gallops. GI:  Bowel sounds normal, Soft, No tenderness, No masses, No pulsatile masses. :  No CVA tenderness. Musculoskeletal:  Intact distal pulses, No edema, No tenderness, No cyanosis, No clubbing. Good range of motion in all major joints.  No words are mis-transcribed.)          Annabelle Eric MD  01/06/21 [id number]

## 2021-01-06 NOTE — ED NOTES
Discharge education reviewed verbally and in writing. Patient instructed to follow up with PCP and come back to the ED with any new or worsening symptoms. No questions or concerns at this time. No s/s of distress noted at this time.         Jessica Bains RN  01/06/21 [id number]

## 2021-01-07 PROCEDURE — 93010 ELECTROCARDIOGRAM REPORT: CPT | Performed by: INTERNAL MEDICINE

## 2021-01-12 DIAGNOSIS — I10 ESSENTIAL HYPERTENSION: ICD-10-CM

## 2021-01-12 RX ORDER — METOPROLOL SUCCINATE 100 MG/1
TABLET, EXTENDED RELEASE ORAL
Qty: 30 TABLET | Refills: 0 | Status: SHIPPED | OUTPATIENT
Start: 2021-01-12 | End: 2021-01-14 | Stop reason: SDUPTHER

## 2021-01-14 ENCOUNTER — OFFICE VISIT (OUTPATIENT)
Dept: CARDIOLOGY CLINIC | Age: 66
End: 2021-01-14
Payer: COMMERCIAL

## 2021-01-14 VITALS
BODY MASS INDEX: 32.82 KG/M2 | HEART RATE: 56 BPM | HEIGHT: 65 IN | WEIGHT: 197 LBS | RESPIRATION RATE: 20 BRPM | OXYGEN SATURATION: 99 % | SYSTOLIC BLOOD PRESSURE: 122 MMHG | DIASTOLIC BLOOD PRESSURE: 84 MMHG

## 2021-01-14 DIAGNOSIS — I10 ESSENTIAL HYPERTENSION: Primary | ICD-10-CM

## 2021-01-14 DIAGNOSIS — I10 ESSENTIAL HYPERTENSION: ICD-10-CM

## 2021-01-14 DIAGNOSIS — S30.1XXS HEMATOMA OF RECTUS SHEATH, SEQUELA: ICD-10-CM

## 2021-01-14 PROCEDURE — 99214 OFFICE O/P EST MOD 30 MIN: CPT | Performed by: INTERNAL MEDICINE

## 2021-01-14 PROCEDURE — G8427 DOCREV CUR MEDS BY ELIG CLIN: HCPCS | Performed by: INTERNAL MEDICINE

## 2021-01-14 PROCEDURE — G8417 CALC BMI ABV UP PARAM F/U: HCPCS | Performed by: INTERNAL MEDICINE

## 2021-01-14 PROCEDURE — 1036F TOBACCO NON-USER: CPT | Performed by: INTERNAL MEDICINE

## 2021-01-14 PROCEDURE — 3017F COLORECTAL CA SCREEN DOC REV: CPT | Performed by: INTERNAL MEDICINE

## 2021-01-14 PROCEDURE — G8484 FLU IMMUNIZE NO ADMIN: HCPCS | Performed by: INTERNAL MEDICINE

## 2021-01-14 RX ORDER — METOPROLOL SUCCINATE 100 MG/1
TABLET, EXTENDED RELEASE ORAL
Qty: 90 TABLET | Refills: 1 | Status: SHIPPED | OUTPATIENT
Start: 2021-01-14 | End: 2021-05-24

## 2021-01-14 ASSESSMENT — ENCOUNTER SYMPTOMS
CHEST TIGHTNESS: 0
SHORTNESS OF BREATH: 0
BLOOD IN STOOL: 0
APNEA: 0
DIARRHEA: 0
NAUSEA: 0
VOMITING: 0

## 2021-01-14 NOTE — TELEPHONE ENCOUNTER
requesting medication refill.  Please approve or deny this request.    Rx requested:  Requested Prescriptions      No prescriptions requested or ordered in this encounter         Last Office Visit:   12/9/2019      Next Visit Date:  Future Appointments   Date Time Provider Carlyn Rodriguez   1/14/2021 12:30 PM Diony Jaime MD [facility]   1/29/2021  9:15 AM Greg Bashir MD [facility] [address]   6/16/2021 11:15 AM Luana Fabian MD [facility]

## 2021-01-14 NOTE — PROGRESS NOTES
[facility] CARDIOLOGY OFFICE FOLLOW-UP      Patient: Nii Busby  YOB: 1955  MRN: [id number]    Chief Complaint:  Chief Complaint   Patient presents with    Follow-up     LOV 19    Atrial Fibrillation    Hypertension         Subjective/HPI:  21: Patient presents today for follow-up of atrial fibrillation. She has atrial fibrillation about 6 7 years ago. She was symptomatic. At that time she was treated with beta-blockers and blood thinners. Anticoagulation was DC'd because she had large rectus sheath hematoma and she did not want to restart the anticoagulation. He had a sister who  recently. She is a little upset about it. Otherwise no chest pain. No congestive heart failure symptoms no ankle edema no bleeding no TIA-like symptoms. She has history of meningioma. And other issues including inflammatory bowel disease. Also history of Hashimoto's thyroiditis. Right bundle branch block on electrocardiogram.  No angina. She will see me in 6 months. Will hold anticoagulant at this point she is not at all in favor of it       19: Patient presents today for follow-up of palpitation atrial fibrillation. She has significant hematoma in the abdominal wall. She has been off on oral anticoagulants. This is a second episode. She does not want to go on oral anticoagulants. She is on metoprolol 100 mg a day. No chest pain congestive heart failure symptoms. See me in 3 months     19: Patient presents today for follow-up of hospitalization at [facility] for palpitation. Kervin Pae to that she was admitted for abdominal pain due to rectus sheath hematoma.  She obviously does not want to take prior to that she had GI bleeding.  She had Dr. dressing to ablation in the past.     7/15/19: Patient presents today for follow-up of atrial fibrillation.  Had atrial fibrillation by Dr. chi at [address] [place] [address] done well. Madeline Bernardokeeper used to be on Xarelto.  In  she had acute abdominal pain. the jaw. No nausea no vomiting no diaphoresis. No fever or cough or chills. We'll set her up for Lexiscan and then  see me. Also needs Holter monitor.     6/9/2017: For preop evaluation. Needs to get back surgery by Dr. Alexis Hoover. He is scheduled for 7/14/2017 at [facility]. She has a history of atrial fibrillation. Has seen dressing in the past currently in sinus rhythm with Toprol and Xarelto. No chest pain no syncope no dizziness. Moderately obese. She acceptable risk for surgery. Xarelto on 4 July. See me in one year.     6/10/16:for fu of AF. Seen Dressing before. He started tambocor On BID. Also on toprol 100. On xarelto. no bleeding. Every now and then some short episodes. No syncopy. No HA. See in 1 year     7/17/15: Doing well. No AF episodes. Stay on [address]. see in 6M     11/17/14: Was admitted to [facility] with AF. Saw Tunde. Started on sotalol. On it for few weeks. In SR. Had PAF few months prior. See me in Jan.On xarelto.     9/29/14: She got recurrent episode of AF. We need to increase lopressor to 25 TID. See me in Oct.     9/19/14: DISCONTINUED HCTZ AND DECREASED LOPRESSOR TO 25MG TWICE DAILY. See me in 3 weeks.     8/19/14: Was seen by me at [facility] with AF.she was going to see her sister in [facility] on lopressor. Had few spells of palpitation,nothing like what she had. Echo mild elevation of RVSP. We will get stress cardiolite and 24 hour holter. See me after. Past Medical History:   Diagnosis Date    Atrial fibrillation ([address]     Brain tumor ([address] 07/16/2018    Chest pain     Degenerative disc disease, lumbar 1/21/2016    Hx of colonic polyps     Hypertension 2/24/2015    Irritable bowel syndrome     Low back pain 10/16/2018    Lumbar radiculopathy 3/14/2016    Rapid heart rate        Past Surgical History:   Procedure Laterality Date    APPENDECTOMY      BACK SURGERY  07/14/2017    CARDIAC CATHETERIZATION  11/2/15    DR. PALMER    HYSTERECTOMY      OR CRANIECT EXCIS SKULL BONE SIDDHARTH N/A 7/24/2018    CRANIOTOMY performed by Neva Cortes MD at [address] [address]    UPPER GASTROINTESTINAL ENDOSCOPY  1/15/13    DR. MEEKS       Family History   Problem Relation Age of Onset    Cancer Father         COLON    High Blood Pressure Father     Heart Disease Father        Social History     Socioeconomic History    Marital status:      Spouse name: None    Number of children: None    Years of education: None    Highest education level: None   Occupational History    None   Social Needs    Financial resource strain: None    Food insecurity     Worry: None     Inability: None    Transportation needs     Medical: None     Non-medical: None   Tobacco Use    Smoking status: Never Smoker    Smokeless tobacco: Never Used   Substance and Sexual Activity    Alcohol use:  Yes     Alcohol/week: 0.0 standard drinks     Comment: socially    Drug use: No    Sexual activity: Not Currently   Lifestyle    Physical activity     Days per week: None     Minutes per session: None    Stress: None   Relationships    Social connections     Talks on phone: None     Gets together: None     Attends [employer] service: None     Active member of club or organization: None     Attends meetings of clubs or organizations: None     Relationship status: None    Intimate partner violence     Fear of current or ex partner: None     Emotionally abused: None     Physically abused: None     Forced sexual activity: None   Other Topics Concern    None   Social History Narrative    None       Allergies   Allergen Reactions    Codeine     Demerol     Dye [Iodides] Nausea Only    Morphine     Penicillins Other (See Comments)    Sulfa Antibiotics Hives and Itching    Levofloxacin Itching and Rash       Current Outpatient Medications   Medication Sig Dispense Refill    metoprolol succinate (TOPROL XL) 100 MG extended release tablet TAKE 1 TABLET DAILY 90 tablet 1    diphenoxylate-atropine (DIPHENATOL) 2.5-0.025 MG per tablet Take 1 tablet by mouth 4 times daily as needed for Diarrhea for up to 180 days. 120 tablet 2    hyoscyamine (LEVBID) 375 MCG extended release tablet Take 1 tablet by mouth 2 times daily 180 tablet 5    cetirizine-psuedoephedrine (ZYRTEC-D) 5-120 MG per extended release tablet Take by mouth      venlafaxine (EFFEXOR XR) 75 MG extended release capsule Take 1 capsule by mouth daily 90 capsule 3    ondansetron (ZOFRAN-ODT) 4 MG disintegrating tablet DISSOLVE ONE TABLET BY MOUTH EVERY 8 HOURS AS NEEDED FOR NAUSEA/ VOMITING 15 tablet 0    predniSONE (DELTASONE) 10 MG tablet 40mg daily x 4d, 30mg daily x 3d, 20mg x 2d, 10mg x 1d, then d/c 30 tablet 0    estradiol (VIVELLE) 0.05 MG/24HR Place onto the skin      traZODone (DESYREL) 50 MG tablet Take 1 tablet by mouth nightly 30 tablet 0    fluticasone (FLONASE) 50 MCG/ACT nasal spray 2 sprays by Nasal route daily 1 Bottle 3    butalbital-aspirin-caffeine (FIORINAL) -40 MG capsule Take 1 capsule by mouth every 4 hours as needed for Headaches for up to 30 days. 20 capsule 0     No current facility-administered medications for this visit. Review of Systems:   Review of Systems   Constitutional: Negative for activity change, appetite change, diaphoresis, fatigue and unexpected weight change. HENT: Negative for facial swelling, nosebleeds, trouble swallowing and voice change. Respiratory: Negative for apnea, chest tightness, shortness of breath and wheezing. Cardiovascular: Negative for chest pain, palpitations and leg swelling. Gastrointestinal: Negative for abdominal distention, anal bleeding, blood in stool, diarrhea, nausea and vomiting. Genitourinary: Negative for decreased urine volume and dysuria. Musculoskeletal: Negative for gait problem, myalgias, neck pain and neck stiffness. Skin: Negative for color change, pallor, rash and wound.    Neurological: Negative for dizziness, seizures, syncope, facial asymmetry, weakness, light-headedness, numbness and headaches. Hematological: Does not bruise/bleed easily. Psychiatric/Behavioral: Negative for agitation, behavioral problems, confusion, hallucinations and suicidal ideas. The patient is not nervous/anxious. All other systems reviewed and are negative. Review of System is negative except for as mentioned above. Physical Examination:    /84 (Site: Left Upper Arm, Position: Sitting, Cuff Size: Medium Adult)   Pulse 56   Resp 20   Ht 5' 5\" (1.651 m)   Wt 197 lb (89.4 kg)   LMP  (LMP Unknown)   SpO2 99%   BMI 32.78 kg/m²    Physical Exam   Constitutional: She appears healthy. No distress. HENT:   Nose: Nose normal.   Mouth/Throat: Dentition is normal. Oropharynx is clear. Eyes: Pupils are equal, round, and reactive to light. Conjunctivae are normal.   Neck: Normal range of motion and thyroid normal. Neck supple. Cardiovascular: Regular rhythm, S1 normal, S2 normal, normal heart sounds, intact distal pulses and normal pulses. PMI is not displaced. No murmur heard. Pulmonary/Chest: She has no wheezes. She has no rales. She exhibits no tenderness. Abdominal: Soft. Bowel sounds are normal. She exhibits no distension and no mass. There is no splenomegaly or hepatomegaly. There is no abdominal tenderness. No hernia. Neurological: She is alert and oriented to person, place, and time. She has normal motor skills. Gait normal.   Skin: Skin is warm and dry. No cyanosis. No jaundice. Nails show no clubbing.            Patient Active Problem List   Diagnosis    Irritable bowel syndrome    Hx of colonic polyps    PAF (paroxysmal atrial fibrillation) (HCC)    Hypertension    Pulmonary vein stenosis    Idiopathic scoliosis of lumbar spine    Degenerative disc disease, lumbar    Lumbar radiculopathy    Brain mass    Atypical chest pain    Benign meningioma of brain (HCC)    M[facility]ioma [facility]    Functional diarrhea    Acute blood loss anemia    Long term (current) use of anticoagulants    TMJ (temporomandibular joint syndrome)    Thyroid nodule    Right bundle branch block (RBBB) on electrocardiography    Palpitations    Presence of artificial knee joint, left    Allergy status to sulfonamides status    Allergy status to narcotic agent status    Low back pain    History of benign neoplasm of brain    Hashimoto's thyroiditis    Renal lesion    Recurrent UTI           No orders of the defined types were placed in this encounter. No orders of the defined types were placed in this encounter. Assessment/Orders:       ICD-10-CM    1. Essential hypertension  I10    2. Hematoma of rectus sheath, sequela  S30. 1XXS        No orders of the defined types were placed in this encounter. There are no discontinued medications. No orders of the defined types were placed in this encounter. Plan:    Stay on same medications.     See me in 3 months        Electronically signed by: Jan Alfaro MD  1/18/2021 12:53 PM

## 2021-01-17 ASSESSMENT — ENCOUNTER SYMPTOMS
VOICE CHANGE: 0
ANAL BLEEDING: 0
FACIAL SWELLING: 0
TROUBLE SWALLOWING: 0
COLOR CHANGE: 0
WHEEZING: 0
ABDOMINAL DISTENTION: 0

## 2021-01-27 ENCOUNTER — VIRTUAL VISIT (OUTPATIENT)
Dept: GASTROENTEROLOGY | Age: 66
End: 2021-01-27
Payer: COMMERCIAL

## 2021-01-27 DIAGNOSIS — K58.0 IRRITABLE BOWEL SYNDROME WITH DIARRHEA: Primary | ICD-10-CM

## 2021-01-27 PROCEDURE — 99442 PR PHYS/QHP TELEPHONE EVALUATION 11-20 MIN: CPT | Performed by: INTERNAL MEDICINE

## 2021-01-27 RX ORDER — CHOLESTYRAMINE 4 G/9G
0.5 POWDER, FOR SUSPENSION ORAL 2 TIMES DAILY
Qty: 30 PACKET | Refills: 3 | Status: SHIPPED | OUTPATIENT
Start: 2021-01-27 | End: 2021-03-11

## 2021-01-27 NOTE — PROGRESS NOTES
2021    TELEHEALTH EVALUATION -- Audio/Visual (During OBKPP-42 public health emergency)    Due to Isak 19 outbreak, patient's office visit was converted to a virtual visit. Patient was contacted and agreed to proceed with a virtual visit via Telephone Visit  The risks and benefits of converting to a virtual visit were discussed in light of the current infectious disease epidemic. Patient also understood that insurance coverage and co-pays are up to their individual insurance plans. Chief Complaint   Patient presents with    Follow-up     Text [phone]. Pt having severe abdominal pain, diarrhea and nausea.  Diarrhea       HPI:  Patient Location: home  Provider location: Office    Justine Franklin (:  1955) follow up, last GI interaction via telephone visit on 12/15/2020, with longstanding symptoms of irritable bowel syndrome with diarrhea. Interval history: Patient currently on Levbid on twice daily basis, Lomotil on 1 tablet 4 times daily basis. Patient reports on and off symptoms of diarrhea and lower abdominal pain. Has both good and bad days, on good days has 1 to no bowel movements, on bad days has 3-4 loose bowel movements. Background: 72-year-old female with clinical history consistent with irritable bowel syndrome with diarrhea/functional gastrointestinal disorder. Patient has tried a number of medications in the past including fiber, IBgard, FD guard, Questran, Imodium, Bentyl. Patient reports stopping the Questran because she developed constipation. Previous GI work up/Endoscopic investigations:   Colonoscopy 4/3/19- Normal colonic mucosa throughout, small internal hemorrhoids    Review of Systems   All other systems reviewed and are negative. Prior to Visit Medications    Medication Sig Taking?  Authorizing Provider   cholestyramine (QUESTRAN) 4 g packet Take 0.5 packets by mouth 2 times daily Yes Lisa Martin MD metoprolol succinate (TOPROL XL) 100 MG extended release tablet TAKE 1 TABLET DAILY Yes Barbara Michael MD   diphenoxylate-atropine (DIPHENATOL) 2.5-0.025 MG per tablet Take 1 tablet by mouth 4 times daily as needed for Diarrhea for up to 180 days. Yes ALETA Hoffmann CNP   hyoscyamine (LEVBID) 375 MCG extended release tablet Take 1 tablet by mouth 2 times daily Yes ALETA Hoffmann CNP   cetirizine-psuedoephedrine (ZYRTEC-D) 5-120 MG per extended release tablet Take by mouth Yes Historical Provider, MD   venlafaxine (EFFEXOR XR) 75 MG extended release capsule Take 1 capsule by mouth daily Yes Galdino Abernathy DO   ondansetron (ZOFRAN-ODT) 4 MG disintegrating tablet DISSOLVE ONE TABLET BY MOUTH EVERY 8 HOURS AS NEEDED FOR NAUSEA/ VOMITING Yes ALETA Wang CNP   predniSONE (DELTASONE) 10 MG tablet 40mg daily x 4d, 30mg daily x 3d, 20mg x 2d, 10mg x 1d, then d/c Yes Madhu Quach MD   estradiol ([place]) 0.05 MG/24HR Place onto the skin Yes Historical Provider, MD   traZODone (DESYREL) 50 MG tablet Take 1 tablet by mouth nightly Yes ALETA Patel CNP   fluticasone (FLONASE) 50 MCG/ACT nasal spray 2 sprays by Nasal route daily Yes ALETA Stratton CNP   butalbital-aspirin-caffeine (FIORINAL) -40 MG capsule Take 1 capsule by mouth every 4 hours as needed for Headaches for up to 30 days. [place], [place]     Social History     Tobacco Use    Smoking status: Never Smoker    Smokeless tobacco: Never Used   Substance Use Topics    Alcohol use:  Yes     Alcohol/week: 0.0 standard drinks     Comment: socially    Drug use: No      PMH, PSH, FH and allergies reviewed and updated    Limited information on physical examination: Due to this being a telehealth visit, physical examination could not be performed    Laboratory, Pathology, Radiology reviewed indetail with relevant important investigations summarized below:  Lab Results Component Value Date    WBC 7.7 01/05/2021    HGB 16.1 (H) 01/05/2021    HCT 48.0 (H) 01/05/2021    MCV 92.0 01/05/2021     01/05/2021     Lab Results   Component Value Date    ALT 13 01/05/2021    AST 20 01/05/2021    ALKPHOS 103 01/05/2021    BILITOT 0.4 01/05/2021     Assessment and Plan:  63 y.o. female with longstanding IBS with diarrhea, multiple medications and supplements without significant change in symptoms, certain improvement in frequency and lower abdominal pain  - Trial of cholestyramine (QUESTRAN) at a lower dose  take 0.5 packets by mouth 2 times daily     Return in 6 to 8 weeks (around 3/17/2021). This visit was completed over telephone, with patient at their home and provider at the hospital, total time spent conversing with the patient 20 minutes, other personnel involved in the care are  staff and Medical assistant. Bryson Mckenna MD   Gastroenterology  [facility]    Pursuant to the emergency declaration under the [facility] waiver authority and the Coronavirus Preparedness and [employer] Act, this Virtual Visit was conducted, with patient's consent, to reduce the patient's risk of exposure to COVID-19 and provide continuity of care for an established patient. Please note this report has been partially produced using speech recognition software and may cause contain errors related to thatsystem including grammar, punctuation and spelling as well as words and phrases that may seem inappropriate. If there are questions or concerns please feel free to contact me to clarify.

## 2021-03-04 ENCOUNTER — OFFICE VISIT (OUTPATIENT)
Dept: FAMILY MEDICINE CLINIC | Age: 66
End: 2021-03-04
Payer: COMMERCIAL

## 2021-03-04 VITALS
WEIGHT: 198 LBS | DIASTOLIC BLOOD PRESSURE: 80 MMHG | SYSTOLIC BLOOD PRESSURE: 128 MMHG | TEMPERATURE: 97.5 F | HEIGHT: 65 IN | HEART RATE: 68 BPM | BODY MASS INDEX: 32.99 KG/M2 | RESPIRATION RATE: 12 BRPM | OXYGEN SATURATION: 98 %

## 2021-03-04 DIAGNOSIS — R30.0 DYSURIA: ICD-10-CM

## 2021-03-04 DIAGNOSIS — N39.0 URINARY TRACT INFECTION WITHOUT HEMATURIA, SITE UNSPECIFIED: Primary | ICD-10-CM

## 2021-03-04 PROCEDURE — G8427 DOCREV CUR MEDS BY ELIG CLIN: HCPCS | Performed by: PHYSICIAN ASSISTANT

## 2021-03-04 PROCEDURE — 1036F TOBACCO NON-USER: CPT | Performed by: PHYSICIAN ASSISTANT

## 2021-03-04 PROCEDURE — 99213 OFFICE O/P EST LOW 20 MIN: CPT | Performed by: PHYSICIAN ASSISTANT

## 2021-03-04 PROCEDURE — 3017F COLORECTAL CA SCREEN DOC REV: CPT | Performed by: PHYSICIAN ASSISTANT

## 2021-03-04 PROCEDURE — G8417 CALC BMI ABV UP PARAM F/U: HCPCS | Performed by: PHYSICIAN ASSISTANT

## 2021-03-04 PROCEDURE — G8484 FLU IMMUNIZE NO ADMIN: HCPCS | Performed by: PHYSICIAN ASSISTANT

## 2021-03-04 RX ORDER — PHENAZOPYRIDINE HYDROCHLORIDE 100 MG/1
100 TABLET, FILM COATED ORAL 3 TIMES DAILY PRN
Qty: 30 TABLET | Refills: 0 | Status: SHIPPED | OUTPATIENT
Start: 2021-03-04 | End: 2021-03-14

## 2021-03-04 RX ORDER — CEPHALEXIN 500 MG/1
500 CAPSULE ORAL 2 TIMES DAILY
Qty: 20 CAPSULE | Refills: 0 | Status: SHIPPED | OUTPATIENT
Start: 2021-03-04 | End: 2021-03-14

## 2021-03-04 ASSESSMENT — ENCOUNTER SYMPTOMS
ABDOMINAL PAIN: 0
DIARRHEA: 0
SHORTNESS OF BREATH: 0
CHEST TIGHTNESS: 0
BACK PAIN: 0
SINUS PRESSURE: 0
VOMITING: 0
COUGH: 0
TROUBLE SWALLOWING: 0

## 2021-03-04 ASSESSMENT — PATIENT HEALTH QUESTIONNAIRE - PHQ9
2. FEELING DOWN, DEPRESSED OR HOPELESS: 0
SUM OF ALL RESPONSES TO PHQ QUESTIONS 1-9: 0
SUM OF ALL RESPONSES TO PHQ9 QUESTIONS 1 & 2: 0

## 2021-03-04 NOTE — PROGRESS NOTES
Subjective:      Patient ID: Kirit Cash is a 61 y.o. female who presents today for:  Chief Complaint   Patient presents with    Urinary Tract Infection     burning,freq. Jody Salm sx's started Sunday       HPI  61year old female who complains of dysuria and suprapubic pain when not urinating. She also complains of urinary frequency and urgency. She denies hematuria. Complains of chills. She has left sided flank pain. Took azo tablets with no relief    Past Medical History:   Diagnosis Date    [facility]rial [facility] [address]     Brain tumor ([facility] [address] 07/16/2018    Chest pain     Degenerative disc disease, lumbar 1/21/2016    Hx of colonic polyps     Hypertension 2/24/2015    Irritable bowel syndrome     Low back pain 10/16/2018    Lumbar radiculopathy 3/14/2016    Rapid heart rate      Past Surgical History:   Procedure Laterality Date    APPENDECTOMY      BACK SURGERY  07/14/2017    CARDIAC CATHETERIZATION  11/2/15    DR. PALMER    HYSTERECTOMY      AL CRANIECT EXCIS SKULL BONE LESN N/A 7/24/2018    CRANIOTOMY performed by Rachelle Paige MD at [address] [place] [address] TOTAL KNEE ARTHROPLASTY  2009    UPPER GASTROINTESTINAL ENDOSCOPY  1/15/13    DR. MEEKS     Social History     Socioeconomic History    Marital status:      Spouse name: Not on file    Number of children: Not on file    Years of education: Not on file    Highest education level: Not on file   Occupational History    Not on file   Social Needs    Financial resource strain: Not on file    Food insecurity     Worry: Not on file     Inability: Not on file    Transportation needs     Medical: Not on file     Non-medical: Not on file   Tobacco Use    Smoking status: Never Smoker    Smokeless tobacco: Never Used   Substance and Sexual Activity    Alcohol use:  Yes     Alcohol/week: 0.0 standard drinks     Comment: socially    Drug use: No    Sexual activity: Not Currently   Lifestyle    Physical activity Days per week: Not on file     Minutes per session: Not on file    Stress: Not on file   Relationships    Social connections     Talks on phone: Not on file     Gets together: Not on file     Attends Voodoo service: Not on file     Active member of club or organization: Not on file     Attends meetings of clubs or organizations: Not on file     Relationship status: Not on file    Intimate partner violence     Fear of current or ex partner: Not on file     Emotionally abused: Not on file     Physically abused: Not on file     Forced sexual activity: Not on file   Other Topics Concern    Not on file   Social History Narrative    Not on file     Family History   Problem Relation Age of Onset    Cancer Father         COLON    High Blood Pressure Father     Heart Disease Father      Allergies   Allergen Reactions    Codeine     Demerol     Dye [Iodides] Nausea Only    Morphine     Penicillins Other (See Comments)    Sulfa Antibiotics Hives and Itching    Levofloxacin Itching and Rash     Current Outpatient Medications   Medication Sig Dispense Refill    cholestyramine (QUESTRAN) 4 g packet Take 0.5 packets by mouth 2 times daily 30 packet 3    metoprolol succinate (TOPROL XL) 100 MG extended release tablet TAKE 1 TABLET DAILY 90 tablet 1    diphenoxylate-atropine (DIPHENATOL) 2.5-0.025 MG per tablet Take 1 tablet by mouth 4 times daily as needed for Diarrhea for up to 180 days.  120 tablet 2    hyoscyamine (LEVBID) 375 MCG extended release tablet Take 1 tablet by mouth 2 times daily 180 tablet 5    cetirizine-psuedoephedrine (ZYRTEC-D) 5-120 MG per extended release tablet Take by mouth      venlafaxine (EFFEXOR XR) 75 MG extended release capsule Take 1 capsule by mouth daily 90 capsule 3    ondansetron (ZOFRAN-ODT) 4 MG disintegrating tablet DISSOLVE ONE TABLET BY MOUTH EVERY 8 HOURS AS NEEDED FOR NAUSEA/ VOMITING 15 tablet 0    predniSONE (DELTASONE) 10 MG tablet 40mg daily x 4d, 30mg daily x 3d, 20mg x 2d, 10mg x 1d, then d/c 30 tablet 0    butalbital-aspirin-caffeine (FIORINAL) -40 MG capsule Take 1 capsule by mouth every 4 hours as needed for Headaches for up to 30 days. 20 capsule 0    estradiol (VIVELLE) 0.05 MG/24HR Place onto the skin      traZODone (DESYREL) 50 MG tablet Take 1 tablet by mouth nightly 30 tablet 0    fluticasone (FLONASE) 50 MCG/ACT nasal spray 2 sprays by Nasal route daily 1 Bottle 3     No current facility-administered medications for this visit. Review of Systems   Constitutional: Negative for activity change, appetite change, chills, fever and unexpected weight change. HENT: Negative for drooling, ear pain, nosebleeds, sinus pressure and trouble swallowing. Respiratory: Negative for cough, chest tightness and shortness of breath. Cardiovascular: Negative for chest pain and leg swelling. Gastrointestinal: Negative for abdominal pain, diarrhea and vomiting. Endocrine: Negative for polydipsia and polyphagia. Genitourinary: Positive for dysuria, flank pain (left) and urgency. Negative for frequency. Musculoskeletal: Negative for back pain and myalgias. Skin: Negative for pallor and rash. Neurological: Negative for syncope, weakness and headaches. Hematological: Does not bruise/bleed easily. Psychiatric/Behavioral: Negative for agitation, behavioral problems and confusion. All other systems reviewed and are negative. Objective:   /80   Pulse 68   Temp 97.5 °F (36.4 °C)   Resp 12   Ht 5' 5\" (1.651 m)   Wt 198 lb (89.8 kg)   LMP  (LMP Unknown)   SpO2 98%   BMI 32.95 kg/m²     Physical Exam  Vitals signs and nursing note reviewed. Constitutional:       General: She is awake. She is not in acute distress. Appearance: Normal appearance. She is well-developed and normal weight. She is not ill-appearing, toxic-appearing or diaphoretic. Comments: No photophobia. No phonophobia.    HENT:      Head: Normocephalic and atraumatic. No Ramirez's sign. Right Ear: External ear normal.      Left Ear: External ear normal.      Nose: Nose normal. No congestion or rhinorrhea. Mouth/Throat:      Mouth: Mucous membranes are moist.      Pharynx: Oropharynx is clear. No oropharyngeal exudate or posterior oropharyngeal erythema. Eyes:      General: No scleral icterus. Right eye: No foreign body or discharge. Left eye: No discharge. Extraocular Movements: Extraocular movements intact. Conjunctiva/sclera: Conjunctivae normal.      Left eye: No exudate. Pupils: Pupils are equal, round, and reactive to light. Neck:      Musculoskeletal: Normal range of motion and neck supple. No neck rigidity. Vascular: No JVD. Trachea: No tracheal deviation. Comments: No meningismus. Cardiovascular:      Rate and Rhythm: Normal rate and regular rhythm. Heart sounds: Normal heart sounds. Heart sounds not distant. No murmur. No friction rub. No gallop. Pulmonary:      Effort: Pulmonary effort is normal. No respiratory distress. Breath sounds: Normal breath sounds. No stridor. No wheezing, rhonchi or rales. Chest:      Chest wall: No tenderness. Abdominal:      General: Abdomen is flat. Bowel sounds are normal. There is no distension. Palpations: Abdomen is soft. There is no mass. Tenderness: There is no abdominal tenderness. There is no right CVA tenderness, left CVA tenderness, guarding or rebound. Hernia: No hernia is present. Musculoskeletal: Normal range of motion. General: No swelling, tenderness, deformity or signs of injury. Lymphadenopathy:      Head:      Right side of head: No submental adenopathy. Left side of head: No submental adenopathy. Skin:     General: Skin is warm and dry. Capillary Refill: Capillary refill takes less than 2 seconds. Coloration: Skin is not jaundiced or pale.       Findings: No bruising, erythema, lesion or rash.   Neurological:      General: No focal deficit present. Mental Status: She is alert and oriented to person, place, and time. Mental status is at baseline. Cranial Nerves: No cranial nerve deficit. Sensory: No sensory deficit. Motor: No weakness. Coordination: Coordination normal.      Deep Tendon Reflexes: Reflexes are normal and symmetric. Psychiatric:         Mood and Affect: Mood normal.         Behavior: Behavior normal. Behavior is cooperative. Thought Content: Thought content normal.         Judgment: Judgment normal.         Assessment:       Diagnosis Orders   1. Urinary tract infection without hematuria, site unspecified  Culture, Urine     No results found for this visit on 03/04/21. Plan:     Assessment & Plan   [facility] was seen today for urinary tract infection. Diagnoses and all orders for this visit:    Urinary tract infection without hematuria, site unspecified  -     Culture, Urine; Future      Orders Placed This Encounter   Procedures    Culture, Urine     Standing Status:   Future     Standing Expiration Date:   3/4/2022     Order Specific Question:   Specify (ex-cath, midstream, cysto, etc)? Answer:   midstream     No orders of the defined types were placed in this encounter. There are no discontinued medications. No follow-ups on file. Reviewed with the patient/family: current clinical status & medications. Side effects of the medication prescribed today, as well as treatment plan/rationale and result expectations have been discussed with the patient/family who expresses understanding. Patient will be discharged home in stable condition. Follow up with PCP to evaluate treatment results or return if symptoms worsen or fail to improve. Discussed signs and symptoms which require immediate follow-up in ED/call to 911. Understanding verbalized.      I have reviewed the patient's medical history in detail and updated the computerized patient record.     SANTINO Duran

## 2021-03-05 DIAGNOSIS — N39.0 URINARY TRACT INFECTION WITHOUT HEMATURIA, SITE UNSPECIFIED: ICD-10-CM

## 2021-03-08 LAB
ORGANISM: ABNORMAL
URINE CULTURE, ROUTINE: ABNORMAL

## 2021-03-11 ENCOUNTER — VIRTUAL VISIT (OUTPATIENT)
Dept: GASTROENTEROLOGY | Age: 66
End: 2021-03-11
Payer: COMMERCIAL

## 2021-03-11 DIAGNOSIS — K58.0 IRRITABLE BOWEL SYNDROME WITH DIARRHEA: Primary | ICD-10-CM

## 2021-03-11 PROCEDURE — G8427 DOCREV CUR MEDS BY ELIG CLIN: HCPCS | Performed by: INTERNAL MEDICINE

## 2021-03-11 PROCEDURE — 99214 OFFICE O/P EST MOD 30 MIN: CPT | Performed by: INTERNAL MEDICINE

## 2021-03-11 PROCEDURE — 3017F COLORECTAL CA SCREEN DOC REV: CPT | Performed by: INTERNAL MEDICINE

## 2021-03-11 RX ORDER — LACTOBACILLUS RHAMNOSUS GG 10B CELL
1 CAPSULE ORAL DAILY
Qty: 30 CAPSULE | Refills: 1 | Status: SHIPPED | OUTPATIENT
Start: 2021-03-11

## 2021-03-11 RX ORDER — TRAZODONE HYDROCHLORIDE 50 MG/1
50 TABLET ORAL NIGHTLY
Qty: 90 TABLET | Refills: 3 | Status: SHIPPED | OUTPATIENT
Start: 2021-03-11

## 2021-03-11 NOTE — PROGRESS NOTES
3/11/2021    TELEHEALTH EVALUATION -- Audio/Visual (During RHIGN-68 public health emergency)    Due to [place] 19 outbreak, patient's office visit was converted to a virtual visit. Patient was contacted and agreed to proceed with a virtual visit via [email or web address]. me  The risks and benefits of converting to a virtual visit were discussed in light of the current infectious disease epidemic. Patient also understood that insurance coverage and co-pays are up to their individual insurance plans. Chief Complaint   Patient presents with    Follow-up     text [phone]. Patient is feeling okay, still having diarrhea inconsistently. This is usually followed by constipation     HPI:  Patient Location: Home  Provider location: Office    [facility]  1955[facility] last clinic visit 2021 with longstanding IBS with diarrhea, patient was restarted on Questran half a packet twice daily. Interval change: Patient reports no significant change in symptoms with Questran. Continues to have intermittent diarrhea and constipation. Symptoms slightly improved compared to previous. Other medications include Lomotil 1 tablet once daily, Levbid 1 tablet twice daily, Metamucil 2 TSP daily. Patient currently taking antibiotic for a urinary tract infection. Denies any abdominal pain, blood in the stool or melena. Weight stable    HPI at 2021 visit:   Patient currently on Levbid on twice daily basis, Lomotil on 1 tablet 4 times daily basis. Patient reports on and off symptoms of diarrhea and lower abdominal pain. Has both good and bad days, on good days has 1 to no bowel movements, on bad days has 3-4 loose bowel movements.     Background: 27-year-old female with clinical history consistent with irritable bowel syndrome with diarrhea/functional gastrointestinal disorder. Patient has tried a number of medications in the past including fiber, IBgard, FD guard, Questran, Imodium, Bentyl.   Patient reports stopping the Questran because she developed constipation. Previous GI work up/Endoscopic investigations:   Colonoscopy 4/3/19- Normal colonic mucosa throughout, small internal hemorrhoids    Review of Systems   All other systems reviewed and are negative. Prior to Visit Medications    Medication Sig Taking? Authorizing Provider   traZODone (DESYREL) 50 MG tablet Take 1 tablet by mouth nightly Yes Sebastian Warner MD   lactobacillus (CULTURELLE) capsule Take 1 capsule by mouth daily Yes Sebastian Warner MD   cephALEXin (KEFLEX) 500 MG capsule Take 1 capsule by mouth 2 times daily for 10 days Yes SANTINO Mar   phenazopyridine (PYRIDIUM) 100 MG tablet Take 1 tablet by mouth 3 times daily as needed for Pain Yes SANTINO Mar   metoprolol succinate (TOPROL XL) 100 MG extended release tablet TAKE 1 TABLET DAILY Yes Ezra Dickinson MD   diphenoxylate-atropine (DIPHENATOL) 2.5-0.025 MG per tablet Take 1 tablet by mouth 4 times daily as needed for Diarrhea for up to 180 days.  Yes ALETA Wallace CNP   hyoscyamine (LEVBID) 375 MCG extended release tablet Take 1 tablet by mouth 2 times daily Yes ALETA Wallace CNP   cetirizine-psuedoephedrine (ZYRTEC-D) 5-120 MG per extended release tablet Take by mouth Yes Historical Provider, MD   venlafaxine (EFFEXOR XR) 75 MG extended release capsule Take 1 capsule by mouth daily Yes La Abernathy,    ondansetron (ZOFRAN-ODT) 4 MG disintegrating tablet DISSOLVE ONE TABLET BY MOUTH EVERY 8 HOURS AS NEEDED FOR NAUSEA/ VOMITING Yes Tressa Casper Husbands, APRN - CNP   predniSONE (DELTASONE) 10 MG tablet 40mg daily x 4d, 30mg daily x 3d, 20mg x 2d, 10mg x 1d, then d/c Yes Modesto Arboleda MD   estradiol (VIVELLE) 0.05 MG/24HR Place onto the skin Yes Historical Provider, MD   fluticasone (FLONASE) 50 MCG/ACT nasal spray 2 sprays by Nasal route daily Yes Vickie BrownestlyALETA CNP   butalbital-aspirin-caffeine (FIORINAL) -40 MG capsule Take 1 capsule by mouth every 4 hours as needed for Headaches for up to 30 days. [place], [place]     Social History     Tobacco Use    Smoking status: Never Smoker    Smokeless tobacco: Never Used   Substance Use Topics    Alcohol use: Yes     Alcohol/week: 0.0 standard drinks     Comment: socially    Drug use: No      PMH, PSH, FH and allergies reviewed and updated    PHYSICAL EXAMINATION:  [ INSTRUCTIONS:  \"[x]\" Indicates a positive item  \"[]\" Indicates a negative item  -- DELETE ALL ITEMS NOT EXAMINED]  [x] Alert  [x] Oriented to person/place/time    [x] No apparent distress  [x] Normal Mood     Due to this being a TeleHealth encounter, evaluation of the following organ systems is limited: Vitals/Constitutional/EENT/Resp/CV/GI//MS/Neuro/Skin/Heme-Lymph-Imm. ASSESSMENT/PLAN:    61 y.o. female with   1. Irritable bowel syndrome with diarrhea  - Increase fiber supplementation, from 2 to 3 teaspoons daily, watch for any symptoms of bloating  -Continue trazodone, prescription provided for traZODone (DESYREL) 50 MG tablet; Take 1 tablet by mouth nightly  Dispense: 90 tablet; Refill: 3  -Recommend probiotic: Lactobacillus (CULTURELLE) capsule; Take 1 capsule by mouth daily  Dispense: 30 capsule; Refill: 1    Return in 2 to 3 months (around 5/27/2021). An electronic signature was used to authenticate this note. --Ana Laura Edwards MD on 3/11/2021 at 2:44 PM  Gastroenterology  [address] to the emergency declaration under the [facility], [facility] waiver authority and the [employer] Act, this Virtual Visit was conducted, with patient's consent, to reduce the patient's risk of exposure to COVID-19 and provide continuity of care for an established patient. Services were provided through a video synchronous discussion virtually to substitute for in-person clinic visit.     Please note this report has been partially produced using speech recognition software and may cause contain errors related to thatsystem including grammar, punctuation and spelling as well as words and phrases that may seem inappropriate. If there are questions or concerns please feel free to contact me to clarify.

## 2021-05-22 DIAGNOSIS — I10 ESSENTIAL HYPERTENSION: ICD-10-CM

## 2021-05-25 RX ORDER — METOPROLOL SUCCINATE 100 MG/1
TABLET, EXTENDED RELEASE ORAL
Qty: 90 TABLET | Refills: 1 | Status: SHIPPED | OUTPATIENT
Start: 2021-05-25 | End: 2021-10-18

## 2021-07-15 ENCOUNTER — TELEPHONE (OUTPATIENT)
Dept: GASTROENTEROLOGY | Age: 66
End: 2021-07-15

## 2021-07-15 DIAGNOSIS — K92.1 MELENA: Primary | ICD-10-CM

## 2021-07-15 DIAGNOSIS — K92.1 MELENA: ICD-10-CM

## 2021-07-15 LAB
HCT VFR BLD CALC: 49.4 % (ref 37–47)
HEMOGLOBIN: 16.2 G/DL (ref 12–16)
MCH RBC QN AUTO: 30.3 PG (ref 27–31.3)
MCHC RBC AUTO-ENTMCNC: 32.9 % (ref 33–37)
MCV RBC AUTO: 92 FL (ref 82–100)
PDW BLD-RTO: 12.8 % (ref 11.5–14.5)
PLATELET # BLD: 223 K/UL (ref 130–400)
RBC # BLD: 5.36 M/UL (ref 4.2–5.4)
WBC # BLD: 5.3 K/UL (ref 4.8–10.8)

## 2021-07-15 NOTE — TELEPHONE ENCOUNTER
Patient called stating she had bright red blood in her stool earlier this week. It lasted for 2 days. Today she is having black stool. Talked to Dr. Edouard Villarreal he would like patient to go have blood work done. Patient has a follow up with Dr. Edouard Villarreal on 7-21-21. I also told patient to follow up with primary care.

## 2021-07-21 ENCOUNTER — OFFICE VISIT (OUTPATIENT)
Dept: GASTROENTEROLOGY | Age: 66
End: 2021-07-21
Payer: MEDICARE

## 2021-07-21 ENCOUNTER — OFFICE VISIT (OUTPATIENT)
Dept: FAMILY MEDICINE CLINIC | Age: 66
End: 2021-07-21
Payer: MEDICARE

## 2021-07-21 VITALS
TEMPERATURE: 97.8 F | HEIGHT: 65 IN | OXYGEN SATURATION: 98 % | HEART RATE: 59 BPM | BODY MASS INDEX: 32.99 KG/M2 | WEIGHT: 198 LBS | SYSTOLIC BLOOD PRESSURE: 122 MMHG | DIASTOLIC BLOOD PRESSURE: 80 MMHG

## 2021-07-21 DIAGNOSIS — D32.9 MENINGIOMA (HCC): ICD-10-CM

## 2021-07-21 DIAGNOSIS — I48.0 PAF (PAROXYSMAL ATRIAL FIBRILLATION) (HCC): ICD-10-CM

## 2021-07-21 DIAGNOSIS — J02.9 ACUTE PHARYNGITIS, UNSPECIFIED ETIOLOGY: Primary | ICD-10-CM

## 2021-07-21 DIAGNOSIS — R11.0 NAUSEA: ICD-10-CM

## 2021-07-21 DIAGNOSIS — K58.0 IRRITABLE BOWEL SYNDROME WITH DIARRHEA: Primary | ICD-10-CM

## 2021-07-21 LAB
Lab: NORMAL
PERFORMING INSTRUMENT: NORMAL
QC PASS/FAIL: NORMAL
SARS-COV-2, POC: NORMAL

## 2021-07-21 PROCEDURE — 87426 SARSCOV CORONAVIRUS AG IA: CPT | Performed by: NURSE PRACTITIONER

## 2021-07-21 PROCEDURE — G8417 CALC BMI ABV UP PARAM F/U: HCPCS | Performed by: NURSE PRACTITIONER

## 2021-07-21 PROCEDURE — 1036F TOBACCO NON-USER: CPT | Performed by: INTERNAL MEDICINE

## 2021-07-21 PROCEDURE — 99213 OFFICE O/P EST LOW 20 MIN: CPT | Performed by: NURSE PRACTITIONER

## 2021-07-21 PROCEDURE — 99214 OFFICE O/P EST MOD 30 MIN: CPT | Performed by: INTERNAL MEDICINE

## 2021-07-21 PROCEDURE — 3017F COLORECTAL CA SCREEN DOC REV: CPT | Performed by: INTERNAL MEDICINE

## 2021-07-21 PROCEDURE — 1036F TOBACCO NON-USER: CPT | Performed by: NURSE PRACTITIONER

## 2021-07-21 PROCEDURE — G8427 DOCREV CUR MEDS BY ELIG CLIN: HCPCS | Performed by: INTERNAL MEDICINE

## 2021-07-21 PROCEDURE — G8427 DOCREV CUR MEDS BY ELIG CLIN: HCPCS | Performed by: NURSE PRACTITIONER

## 2021-07-21 PROCEDURE — 3017F COLORECTAL CA SCREEN DOC REV: CPT | Performed by: NURSE PRACTITIONER

## 2021-07-21 PROCEDURE — G8417 CALC BMI ABV UP PARAM F/U: HCPCS | Performed by: INTERNAL MEDICINE

## 2021-07-21 RX ORDER — AZITHROMYCIN 250 MG/1
250 TABLET, FILM COATED ORAL SEE ADMIN INSTRUCTIONS
Qty: 6 TABLET | Refills: 0 | Status: SHIPPED | OUTPATIENT
Start: 2021-07-21 | End: 2021-07-26

## 2021-07-21 RX ORDER — PREDNISONE 10 MG/1
30 TABLET ORAL DAILY
Qty: 9 TABLET | Refills: 0 | Status: SHIPPED | OUTPATIENT
Start: 2021-07-21 | End: 2021-07-24

## 2021-07-21 RX ORDER — ALUMINUM ZIRCONIUM OCTACHLOROHYDREX GLY 16 G/100G
GEL TOPICAL
Qty: 425 G | Refills: 2 | Status: SHIPPED | OUTPATIENT
Start: 2021-07-21 | End: 2022-04-13 | Stop reason: ALTCHOICE

## 2021-07-21 RX ORDER — ONDANSETRON 4 MG/1
TABLET, ORALLY DISINTEGRATING ORAL
Qty: 15 TABLET | Refills: 0 | Status: SHIPPED | OUTPATIENT
Start: 2021-07-21 | End: 2022-04-13 | Stop reason: ALTCHOICE

## 2021-07-21 RX ORDER — LOPERAMIDE HYDROCHLORIDE 2 MG/1
2 CAPSULE ORAL PRN
Qty: 60 CAPSULE | Refills: 3 | Status: SHIPPED | OUTPATIENT
Start: 2021-07-21 | End: 2021-08-20

## 2021-07-21 SDOH — ECONOMIC STABILITY: FOOD INSECURITY: WITHIN THE PAST 12 MONTHS, THE FOOD YOU BOUGHT JUST DIDN'T LAST AND YOU DIDN'T HAVE MONEY TO GET MORE.: NEVER TRUE

## 2021-07-21 SDOH — ECONOMIC STABILITY: TRANSPORTATION INSECURITY
IN THE PAST 12 MONTHS, HAS THE LACK OF TRANSPORTATION KEPT YOU FROM MEDICAL APPOINTMENTS OR FROM GETTING MEDICATIONS?: NO

## 2021-07-21 SDOH — ECONOMIC STABILITY: FOOD INSECURITY: WITHIN THE PAST 12 MONTHS, YOU WORRIED THAT YOUR FOOD WOULD RUN OUT BEFORE YOU GOT MONEY TO BUY MORE.: NEVER TRUE

## 2021-07-21 SDOH — ECONOMIC STABILITY: TRANSPORTATION INSECURITY
IN THE PAST 12 MONTHS, HAS LACK OF TRANSPORTATION KEPT YOU FROM MEETINGS, WORK, OR FROM GETTING THINGS NEEDED FOR DAILY LIVING?: NO

## 2021-07-21 ASSESSMENT — ENCOUNTER SYMPTOMS
NAUSEA: 0
SHORTNESS OF BREATH: 0
DIARRHEA: 0
SORE THROAT: 1
COUGH: 1
WHEEZING: 0
VOMITING: 0
SINUS PAIN: 1

## 2021-07-21 ASSESSMENT — SOCIAL DETERMINANTS OF HEALTH (SDOH): HOW HARD IS IT FOR YOU TO PAY FOR THE VERY BASICS LIKE FOOD, HOUSING, MEDICAL CARE, AND HEATING?: NOT HARD AT ALL

## 2021-07-21 NOTE — PATIENT INSTRUCTIONS
Patient Education        Sore Throat: Care Instructions  Your Care Instructions     Infection by bacteria or a virus causes most sore throats. Cigarette smoke, dry air, air pollution, allergies, and yelling can also cause a sore throat. Sore throats can be painful and annoying. Fortunately, most sore throats go away on their own. If you have a bacterial infection, your doctor may prescribe antibiotics. Follow-up care is a key part of your treatment and safety. Be sure to make and go to all appointments, and call your doctor if you are having problems. It's also a good idea to know your test results and keep a list of the medicines you take. How can you care for yourself at home? · If your doctor prescribed antibiotics, take them as directed. Do not stop taking them just because you feel better. You need to take the full course of antibiotics. · Gargle with warm salt water once an hour to help reduce swelling and relieve discomfort. Use 1 teaspoon of salt mixed in 1 cup of warm water. · Take an over-the-counter pain medicine, such as acetaminophen (Tylenol), ibuprofen (Advil, Motrin), or naproxen (Aleve). Read and follow all instructions on the label. · Be careful when taking over-the-counter cold or flu medicines and Tylenol at the same time. Many of these medicines have acetaminophen, which is Tylenol. Read the labels to make sure that you are not taking more than the recommended dose. Too much acetaminophen (Tylenol) can be harmful. · Drink plenty of fluids. Fluids may help soothe an irritated throat. Hot fluids, such as tea or soup, may help decrease throat pain. · Use over-the-counter throat lozenges to soothe pain. Regular cough drops or hard candy may also help. These should not be given to young children because of the risk of choking. · Do not smoke or allow others to smoke around you. If you need help quitting, talk to your doctor about stop-smoking programs and medicines.  These can increase your chances of quitting for good. · Use a vaporizer or humidifier to add moisture to your bedroom. Follow the directions for cleaning the machine. When should you call for help? Call your doctor now or seek immediate medical care if:    · You have new or worse trouble swallowing.     · Your sore throat gets much worse on one side. Watch closely for changes in your health, and be sure to contact your doctor if you do not get better as expected. Where can you learn more? Go to https://Yummly.Sandboxx. org and sign in to your [employer]Vest[employer] account. Enter A971 in the [employer] box to learn more about \"Sore Throat: Care Instructions. \"     If you do not have an account, please click on the \"Sign Up Now\" link. Current as of: December 2, 2020               Content Version: 12.9  © 2006-2021 [email or web address]. Care instructions adapted under license by [facility] ([facility]). If you have questions about a medical condition or this instruction, always ask your healthcare professional. [place] [address] any warranty or liability for your use of this information. Patient Education        Upper Respiratory Infection (Cold): Care Instructions  Your Care Instructions     An upper respiratory infection, or URI, is an infection of the nose, sinuses, or throat. URIs are spread by coughs, sneezes, and direct contact. The common cold is the most frequent kind of URI. The flu and sinus infections are other kinds of URIs. Almost all URIs are caused by viruses. Antibiotics won't cure them. But you can treat most infections with home care. This may include drinking lots of fluids and taking over-the-counter pain medicine. You will probably feel better in 4 to 10 days. The doctor has checked you carefully, but problems can develop later. If you notice any problems or new symptoms, get medical treatment right away. Follow-up care is a key part of your treatment and safety.  Be sure to make and go to all appointments, and call your doctor if you are having problems. It's also a good idea to know your test results and keep a list of the medicines you take. How can you care for yourself at home? · To prevent dehydration, drink plenty of fluids. Choose water and other caffeine-free clear liquids until you feel better. If you have kidney, heart, or liver disease and have to limit fluids, talk with your doctor before you increase the amount of fluids you drink. · Take an over-the-counter pain medicine, such as acetaminophen (Tylenol), ibuprofen (Advil, Motrin), or naproxen (Aleve). Read and follow all instructions on the label. · Before you use cough and cold medicines, check the label. These medicines may not be safe for young children or for people with certain health problems. · Be careful when taking over-the-counter cold or flu medicines and Tylenol at the same time. Many of these medicines have acetaminophen, which is Tylenol. Read the labels to make sure that you are not taking more than the recommended dose. Too much acetaminophen (Tylenol) can be harmful. · Get plenty of rest.  · Do not smoke or allow others to smoke around you. If you need help quitting, talk to your doctor about stop-smoking programs and medicines. These can increase your chances of quitting for good. When should you call for help? Call 911 anytime you think you may need emergency care. For example, call if:    · You have severe trouble breathing. Call your doctor now or seek immediate medical care if:    · You seem to be getting much sicker.     · You have new or worse trouble breathing.     · You have a new or higher fever.     · You have a new rash.    Watch closely for changes in your health, and be sure to contact your doctor if:    · You have a new symptom, such as a sore throat, an earache, or sinus pain.     · You cough more deeply or more often, especially if you notice more mucus or a change in the color of your mucus.     · You do not get better as expected. Where can you learn more? Go to https://chpepiceweb.healthHarbour Antibodies. org and sign in to your [employer] account. Enter G009 in the [facility] box to learn more about \"Upper Respiratory Infection (Cold): Care Instructions. \"     If you do not have an account, please click on the \"Sign Up Now\" link. Current as of: October 26, 2020               Content Version: 12.9  © 2006-2021 Healthwise, Incorporated. Care instructions adapted under license by [facility] ([facility]). If you have questions about a medical condition or this instruction, always ask your healthcare professional. [address] any warranty or liability for your use of this information.

## 2021-07-21 NOTE — PROGRESS NOTES
Gastroenterology Clinic Follow up Visit    Chino Ledbetter  [id number]  Chief Complaint   Patient presents with    Follow-up     HPI: 61 y.o. female following up after last GI clinic on 3/11/2021 with IBS-D    Interval change: Patient continues to have 5-6 loose bowel movements on a daily basis. Some postprandial some without any trigger. Denies any blood in the stool. Has intense abdominal cramping with the symptoms. On Levbid twice daily. On Lomotil 1 tablet daily. Takes 1 tablet of fiber daily. Reports unintentional weight loss based on scales, did not feel that she has lost weight. No dysphagia. Patient did call with melena a week ago. At that time labs were drawn and hemoglobin was noted to be 16.2. HPI from last GI clinic visit on 3/11/2021  summarized below:  Patient reports no significant change in symptoms with Questran. Continues to have intermittent diarrhea and constipation. Symptoms slightly improved compared to previous. Other medications include Lomotil 1 tablet once daily, Levbid 1 tablet twice daily, Metamucil 2 TSP daily. Patient currently taking antibiotic for a urinary tract infection. Denies any abdominal pain, blood in the stool or melena.   Weight stable     HPI at 1/27/2021 visit:   Patient currently on Jackie Erps on twice daily basis, Lomotil on 1 tablet 4 times daily basis. Don Jimenez reports on and off symptoms of diarrhea and lower abdominal pain.  Has both good and bad days, on good days has 1 to no bowel movements, on bad days has 3-4 loose bowel movements.     Background: 27-year-old female with clinical history consistent with irritable bowel syndrome with diarrhea/functional gastrointestinal disorder.  Patient has tried a number of medications in the past including fiber, IBgard, FD guard, Questran, Imodium, Bentyl.  Patient reports stopping the Questran because she developed constipation.     Previous GI work up/Endoscopic investigations:   Colonoscopy 4/3/19- Normal colonic mucosa throughout, small internal hemorrhoids    Review of Systems   All other systems reviewed and are negative. Past medical history, past surgical history, medication list, social and familyhistory reviewed      Physical Exam  Constitutional:       General: She is not in acute distress. Appearance: Normal appearance. She is well-developed. Comments: Central obesity   Eyes:      General: No scleral icterus. Cardiovascular:      Rate and Rhythm: Normal rate and regular rhythm. Pulmonary:      Effort: Pulmonary effort is normal.      Breath sounds: Normal breath sounds. Abdominal:      General: Bowel sounds are normal. There is no distension. Palpations: Abdomen is soft. There is no mass. Tenderness: There is no abdominal tenderness. There is no guarding or rebound. Musculoskeletal:         General: Normal range of motion. Lymphadenopathy:      Cervical: No cervical adenopathy. Neurological:      Mental Status: She is alert and oriented to person, place, and time. Psychiatric:         Behavior: Behavior normal.         Thought Content: Thought content normal.         Judgment: Judgment normal.       Laboratory, Pathology, Radiology reviewed in detail with relevantimportant investigations summarized below:    Recent Labs     07/15/21  1633   WBC 5.3   HGB 16.2*   HCT 49.4*   MCV 92.0        Lab Results   Component Value Date    ALT 13 01/05/2021    AST 20 01/05/2021    ALKPHOS 103 01/05/2021    BILITOT 0.4 01/05/2021     Last CT scan from 11/8/2019 reviewed    Assessment and Plan:  Daxa Franklin 61 y.o. female with clinical history consistent with IBS with diarrhea. 1. Nausea  - ondansetron (ZOFRAN-ODT) 4 MG disintegrating tablet; DISSOLVE ONE TABLET BY MOUTH EVERY 8 HOURS AS NEEDED FOR NAUSEA/ VOMITING  Dispense: 15 tablet; Refill: 0    2.  Irritable bowel syndrome with diarrhea  - Lifestyle modification with stress reduction, relaxation

## 2021-07-21 NOTE — PROGRESS NOTES
Subjective:      Patient ID: Sosa Maharaj is a 61 y.o. female who presents today for:  Chief Complaint   Patient presents with    Nasal Congestion     patient here with nasal congestion runny nose, bilateral ear pain, sore throat 3 days       HPI      She has not been feeling well for 3 days   The worst is the throat   The rest she can handle   Hurts just to swallow spit   shes taking coricidin   Sister and niece have a bug   She is doing salt water gargles       Past Medical History:   Diagnosis Date    Atrial fibrillation ([address]     Brain tumor ([address] 07/16/2018    Chest pain     Degenerative disc disease, lumbar 1/21/2016    Hx of colonic polyps     Hypertension 2/24/2015    Irritable bowel syndrome     Low back pain 10/16/2018    Lumbar radiculopathy 3/14/2016    Rapid heart rate      Past Surgical History:   Procedure Laterality Date    APPENDECTOMY      BACK SURGERY  07/14/2017    CARDIAC CATHETERIZATION  11/2/15    DR. PALMER    HYSTERECTOMY      [place] CRANIECT EXCIS SKULL BONE LESN N/A 7/24/2018    CRANIOTOMY performed by Nelson Rizvi MD at [address] [place] [address] TOTAL KNEE ARTHROPLASTY  2009    UPPER GASTROINTESTINAL ENDOSCOPY  1/15/13    DR. MEEKS     Social History     Socioeconomic History    Marital status:      Spouse name: Not on file    Number of children: Not on file    Years of education: Not on file    Highest education level: Not on file   Occupational History    Not on file   Tobacco Use    Smoking status: Never Smoker    Smokeless tobacco: Never Used   Vaping Use    Vaping Use: Never used   Substance and Sexual Activity    Alcohol use:  Yes     Alcohol/week: 0.0 standard drinks     Comment: socially    Drug use: No    Sexual activity: Not Currently   Other Topics Concern    Not on file   Social History Narrative    Not on file     Social Determinants of Health     Financial Resource Strain: Low Risk     Difficulty of Paying Living Expenses: Not hard at all   Food Insecurity: No Food Insecurity    Worried About [facility] in the Last Year: Never true    Ran Out of Food in the Last Year: Never true   Transportation Needs: No Transportation Needs    Lack of Transportation (Medical): No    Lack of Transportation (Non-Medical):  No   Physical Activity:     Days of Exercise per Week:     Minutes of Exercise per Session:    Stress:     Feeling of Stress :    Social Connections:     Frequency of Communication with Friends and Family:     Frequency of Social Gatherings with Friends and Family:     Attends Religion Services:     Active Member of Clubs or Organizations:     Attends Club or Organization Meetings:     Marital Status:    Intimate Partner Violence:     Fear of Current or Ex-Partner:     Emotionally Abused:     Physically Abused:     Sexually Abused:      Family History   Problem Relation Age of Onset    Cancer Father         COLON    High Blood Pressure Father     Heart Disease Father      Allergies   Allergen Reactions    Codeine     Demerol     Dye [Iodides] Nausea Only    Morphine     Penicillins Other (See Comments)    Sulfa Antibiotics Hives and Itching    Levofloxacin Itching and Rash     Current Outpatient Medications   Medication Sig Dispense Refill    azithromycin (ZITHROMAX) 250 MG tablet Take 1 tablet by mouth See Admin Instructions for 5 days 500mg on day 1 followed by 250mg on days 2 - 5 6 tablet 0    metoprolol succinate (TOPROL XL) 100 MG extended release tablet TAKE 1 TABLET DAILY 90 tablet 1    traZODone (DESYREL) 50 MG tablet Take 1 tablet by mouth nightly 90 tablet 3    lactobacillus (CULTURELLE) capsule Take 1 capsule by mouth daily (Patient not taking: Reported on 7/21/2021) 30 capsule 1    hyoscyamine (LEVBID) 375 MCG extended release tablet Take 1 tablet by mouth 2 times daily 180 tablet 5    cetirizine-psuedoephedrine (ZYRTEC-D) 5-120 MG per extended release tablet Take by mouth      venlafaxine (EFFEXOR XR) 75 MG extended release capsule Take 1 capsule by mouth daily 90 capsule 3    estradiol (VIVELLE) 0.05 MG/24HR Place onto the skin      fluticasone (FLONASE) 50 MCG/ACT nasal spray 2 sprays by Nasal route daily 1 Bottle 3    ondansetron (ZOFRAN-ODT) 4 MG disintegrating tablet DISSOLVE ONE TABLET BY MOUTH EVERY 8 HOURS AS NEEDED FOR NAUSEA/ VOMITING 15 tablet 0    psyllium (METAMUCIL SMOOTH TEXTURE) 58.6 % powder Take 2 teaspoon with 8 to 10 oz water. 425 g 2    loperamide (RA ANTI-DIARRHEAL) 2 MG capsule Take 1 capsule by mouth as needed for Diarrhea 60 capsule 3    butalbital-aspirin-caffeine (FIORINAL) -40 MG capsule Take 1 capsule by mouth every 4 hours as needed for Headaches for up to 30 days. 20 capsule 0     No current facility-administered medications for this visit. Review of Systems   Constitutional: Positive for chills. Negative for fatigue and fever. HENT: Positive for congestion, ear pain, rhinorrhea, sinus pain and sore throat. Respiratory: Positive for cough (dry). Negative for shortness of breath and wheezing. Gastrointestinal: Negative for diarrhea, nausea and vomiting. Musculoskeletal: Positive for myalgias. Skin: Negative for rash. Neurological: Positive for headaches. Negative for dizziness and light-headedness. Psychiatric/Behavioral: Negative for agitation, confusion and hallucinations. Objective:   /80 (Site: Right Upper Arm, Position: Sitting, Cuff Size: Large Adult)   Pulse 59   Temp 97.8 °F (36.6 °C) (Temporal)   Ht 5' 5\" (1.651 m)   Wt 198 lb (89.8 kg)   LMP  (LMP Unknown)   SpO2 98%   BMI 32.95 kg/m²     Physical Exam  Vitals reviewed. Constitutional:       Appearance: Normal appearance. HENT:      Head: Normocephalic and atraumatic. Right Ear: Hearing, tympanic membrane, ear canal and external ear normal. No middle ear effusion. No foreign body.  Tympanic membrane is not injected, erythematous or bulging. Left Ear: Hearing, tympanic membrane, ear canal and external ear normal.  No middle ear effusion. No foreign body. Tympanic membrane is not injected, erythematous or bulging. Nose: Congestion present. No rhinorrhea. Right Nostril: No foreign body. Left Nostril: No foreign body. Right Turbinates: Not enlarged. Left Turbinates: Not enlarged. Right Sinus: No maxillary sinus tenderness or frontal sinus tenderness. Left Sinus: No maxillary sinus tenderness or frontal sinus tenderness. Mouth/Throat:      Lips: Pink. Mouth: Mucous membranes are moist.      Pharynx: Oropharynx is clear. Uvula midline. No pharyngeal swelling, oropharyngeal exudate, posterior oropharyngeal erythema or uvula swelling. Tonsils: No tonsillar exudate or tonsillar abscesses. 0 on the right. 0 on the left. Comments: Hx of tonsillectomy   Eyes:      General: Lids are normal.         Right eye: No foreign body. Left eye: No foreign body. Extraocular Movements: Extraocular movements intact. Conjunctiva/sclera: Conjunctivae normal.   Cardiovascular:      Rate and Rhythm: Normal rate and regular rhythm. Heart sounds: Normal heart sounds. Pulmonary:      Effort: Pulmonary effort is normal. No tachypnea, accessory muscle usage or respiratory distress. Breath sounds: Normal breath sounds. No wheezing or rhonchi. Abdominal:      Tenderness: There is no abdominal tenderness. There is no guarding. Musculoskeletal:         General: Normal range of motion. Cervical back: Normal range of motion. Lymphadenopathy:      Cervical: No cervical adenopathy. Upper Body:      Right upper body: No supraclavicular adenopathy. Left upper body: No supraclavicular adenopathy. Skin:     General: Skin is warm and dry. Capillary Refill: Capillary refill takes less than 2 seconds. Neurological:      General: No focal deficit present. Mental Status: She is alert and oriented to person, place, and time. Cranial Nerves: Cranial nerves are intact. Gait: Gait is intact. Psychiatric:         Mood and Affect: Mood normal.         Speech: Speech normal.         Behavior: Behavior normal. Behavior is cooperative. Thought Content: Thought content normal.         Judgment: Judgment normal.         Assessment:       Diagnosis Orders   1. Acute pharyngitis, unspecified etiology  POCT COVID-19, Antigen    predniSONE (DELTASONE) 10 MG tablet    azithromycin (ZITHROMAX) 250 MG tablet   2. PAF (paroxysmal atrial fibrillation) ([facility]  Managed by primary care   3. Meningioma [facility])  Managed by primary care     No results found for this visit on 07/21/21. Plan:   Rapid covid negative. Assessment & Plan   lFaca Saunders was seen today for nasal congestion. Diagnoses and all orders for this visit:    Acute pharyngitis, unspecified etiology  -     POCT COVID-19, Antigen  -     predniSONE (DELTASONE) 10 MG tablet; Take 3 tablets by mouth daily for 3 days  -     azithromycin (ZITHROMAX) 250 MG tablet; Take 1 tablet by mouth See Admin Instructions for 5 days 500mg on day 1 followed by 250mg on days 2 - 5    PAF (paroxysmal atrial fibrillation) (HCC)    Meningioma (HCC)      Orders Placed This Encounter   Procedures    POCT COVID-19, Antigen     Order Specific Question:   Is this test for diagnosis or screening? Answer:   Diagnosis of ill patient     Order Specific Question:   Symptomatic for COVID-19 as defined by CDC? Answer:   Yes     Order Specific Question:   Date of Symptom Onset     Answer:   7/17/2021     Order Specific Question:   Hospitalized for COVID-19? Answer:   No     Order Specific Question:   Admitted to ICU for COVID-19? Answer:   No     Order Specific Question:   Employed in healthcare setting? Answer:   No     Order Specific Question:   Resident in a congregate (group) care setting?      Answer:   No     Order Specific Question:   Pregnant? Answer:   No     Order Specific Question:   Previously tested for COVID-19? Answer:   Yes     Orders Placed This Encounter   Medications    predniSONE (DELTASONE) 10 MG tablet     Sig: Take 3 tablets by mouth daily for 3 days     Dispense:  9 tablet     Refill:  0    azithromycin (ZITHROMAX) 250 MG tablet     Sig: Take 1 tablet by mouth See Admin Instructions for 5 days 500mg on day 1 followed by 250mg on days 2 - 5     Dispense:  6 tablet     Refill:  0     Medications Discontinued During This Encounter   Medication Reason    predniSONE (DELTASONE) 10 MG tablet Therapy completed     Return if symptoms worsen or fail to improve. Reviewed with the patient/family: current clinical status & medications. Side effects of the medication prescribed today, as well as treatment plan/rationale and result expectations have been discussed with the patient/family who expresses understanding. Patient will be discharged home in stable condition. Follow up with PCP to evaluate treatment results or return if symptoms worsen or fail to improve. Discussed signs and symptoms which require immediate follow-up in ED/call to 911. Understanding verbalized. I have reviewed the patient's medical history in detail and updated the computerized patient record.     Estee Warren, ALETA - CNP

## 2021-07-26 ASSESSMENT — ENCOUNTER SYMPTOMS: RHINORRHEA: 1

## 2021-07-29 ENCOUNTER — TELEPHONE (OUTPATIENT)
Dept: NEUROSURGERY | Age: 66
End: 2021-07-29

## 2021-08-04 ENCOUNTER — OFFICE VISIT (OUTPATIENT)
Dept: NEUROSURGERY | Age: 66
End: 2021-08-04
Payer: MEDICARE

## 2021-08-04 VITALS — TEMPERATURE: 96.6 F | BODY MASS INDEX: 31.65 KG/M2 | WEIGHT: 190 LBS | HEIGHT: 65 IN

## 2021-08-04 DIAGNOSIS — D32.9 MENINGIOMA (HCC): Primary | ICD-10-CM

## 2021-08-04 PROCEDURE — 99212 OFFICE O/P EST SF 10 MIN: CPT | Performed by: NEUROLOGICAL SURGERY

## 2021-08-04 PROCEDURE — 3017F COLORECTAL CA SCREEN DOC REV: CPT | Performed by: NEUROLOGICAL SURGERY

## 2021-08-04 PROCEDURE — 1036F TOBACCO NON-USER: CPT | Performed by: NEUROLOGICAL SURGERY

## 2021-08-04 PROCEDURE — G8427 DOCREV CUR MEDS BY ELIG CLIN: HCPCS | Performed by: NEUROLOGICAL SURGERY

## 2021-08-04 PROCEDURE — G8417 CALC BMI ABV UP PARAM F/U: HCPCS | Performed by: NEUROLOGICAL SURGERY

## 2021-08-04 ASSESSMENT — ENCOUNTER SYMPTOMS
DIARRHEA: 0
CONSTIPATION: 0
BACK PAIN: 0
NAUSEA: 1
SHORTNESS OF BREATH: 0

## 2021-08-04 NOTE — PROGRESS NOTES
[facility] ([facility]) Physicians  Neurosurgery and Pain [address] [facility], [place]: [phone]  F: [phone]       Florina Franklin  (1955)    8/4/2021    Referred by No ref. provider found    Subjective:     Donnise Boeck is 61 y.o. female who complains today of:    Chief Complaint   Patient presents with    Other     The patient is here for follow up after yearly MRI. Feels better compared to last visit. History of craniotomy 7-24-18. Incision site is clean and dry. She is back to her normal self. Denies issues. Other  Associated symptoms include headaches, nausea and neck pain. Pertinent negatives include no fever or rash. Allergies:  Codeine, Demerol, Dye [iodides], Morphine, Penicillins, Sulfa antibiotics, and Levofloxacin    Past Medical History:   Diagnosis Date    Atrial fibrillation ([address]     Brain tumor ([address] 07/16/2018    Chest pain     Degenerative disc disease, lumbar 1/21/2016    Hx of colonic polyps     Hypertension 2/24/2015    Irritable bowel syndrome     Low back pain 10/16/2018    Lumbar radiculopathy 3/14/2016    Rapid heart rate      Past Surgical History:   Procedure Laterality Date    APPENDECTOMY      BACK SURGERY  07/14/2017    CARDIAC CATHETERIZATION  11/2/15    DR. PALMER    HYSTERECTOMY      [place] CRANIECT EXCIS SKULL BONE LESN N/A 7/24/2018    CRANIOTOMY performed by Basim Alberts MD at [address] TOTAL KNEE ARTHROPLASTY  2009    UPPER GASTROINTESTINAL ENDOSCOPY  1/15/13    DR. MEEKS     Family History   Problem Relation Age of Onset    Cancer Father         COLON    High Blood Pressure Father     Heart Disease Father      Social History     Socioeconomic History    Marital status:      Spouse name: Not on file    Number of children: Not on file    Years of education: Not on file    Highest education level: Not on file Occupational History    Not on file   Tobacco Use    Smoking status: Never Smoker    Smokeless tobacco: Never Used   Vaping Use    Vaping Use: Never used   Substance and Sexual Activity    Alcohol use: Yes     Alcohol/week: 0.0 standard drinks     Comment: socially    Drug use: No    Sexual activity: Not Currently   Other Topics Concern    Not on file   Social History Narrative    Not on file     Social Determinants of Health     Financial Resource Strain: Low Risk     Difficulty of Paying Living Expenses: Not hard at all   Food Insecurity: No Food Insecurity    Worried About Running Out of Food in the Last Year: Never true    Cristiano of Food in the Last Year: Never true   Transportation Needs: No Transportation Needs    Lack of Transportation (Medical): No    Lack of Transportation (Non-Medical): No   Physical Activity:     Days of Exercise per Week:     Minutes of Exercise per Session:    Stress:     Feeling of Stress :    Social Connections:     Frequency of Communication with Friends and Family:     Frequency of Social Gatherings with Friends and Family:     Attends Restoration Services:     Active Member of Clubs or Organizations:     Attends Club or Organization Meetings:     Marital Status:    Intimate Partner Violence:     Fear of Current or Ex-Partner:     Emotionally Abused:     Physically Abused:     Sexually Abused:        Current Outpatient Medications on File Prior to Visit   Medication Sig Dispense Refill    ondansetron (ZOFRAN-ODT) 4 MG disintegrating tablet DISSOLVE ONE TABLET BY MOUTH EVERY 8 HOURS AS NEEDED FOR NAUSEA/ VOMITING 15 tablet 0    psyllium (METAMUCIL SMOOTH TEXTURE) 58.6 % powder Take 2 teaspoon with 8 to 10 oz water.  425 g 2    loperamide (RA ANTI-DIARRHEAL) 2 MG capsule Take 1 capsule by mouth as needed for Diarrhea 60 capsule 3    metoprolol succinate (TOPROL XL) 100 MG extended release tablet TAKE 1 TABLET DAILY 90 tablet 1    traZODone (DESYREL) 50 MG tablet Take 1 tablet by mouth nightly 90 tablet 3    lactobacillus (CULTURELLE) capsule Take 1 capsule by mouth daily (Patient not taking: Reported on 7/21/2021) 30 capsule 1    hyoscyamine (LEVBID) 375 MCG extended release tablet Take 1 tablet by mouth 2 times daily 180 tablet 5    cetirizine-psuedoephedrine (ZYRTEC-D) 5-120 MG per extended release tablet Take by mouth      venlafaxine (EFFEXOR XR) 75 MG extended release capsule Take 1 capsule by mouth daily 90 capsule 3    butalbital-aspirin-caffeine (FIORINAL) -40 MG capsule Take 1 capsule by mouth every 4 hours as needed for Headaches for up to 30 days. 20 capsule 0    estradiol (VIVELLE) 0.05 MG/24HR Place onto the skin      fluticasone (FLONASE) 50 MCG/ACT nasal spray 2 sprays by Nasal route daily 1 Bottle 3     No current facility-administered medications on file prior to visit. Review of Systems   Constitutional: Negative for fever. HENT: Negative for hearing loss. Respiratory: Negative for shortness of breath. Gastrointestinal: Positive for nausea. Negative for constipation and diarrhea. Genitourinary: Negative for difficulty urinating. Musculoskeletal: Positive for neck pain. Negative for back pain. Skin: Negative for rash. Neurological: Positive for headaches. Hematological: Does not bruise/bleed easily. Psychiatric/Behavioral: Negative for sleep disturbance. Objective:     Vitals:  Temp 96.6 °F (35.9 °C) (Temporal)   Ht 5' 5\" (1.651 m)   Wt 190 lb (86.2 kg)   LMP  (LMP Unknown)   BMI 31.62 kg/m²        Physical Exam  Vitals and nursing note reviewed. HENT:      Head: Normocephalic and atraumatic. Mouth/Throat:      Mouth: Mucous membranes are moist.      Pharynx: Oropharynx is clear. Eyes:      Extraocular Movements: Extraocular movements intact. Pupils: Pupils are equal, round, and reactive to light. Cardiovascular:      Rate and Rhythm: Regular rhythm.       Pulses: Normal pulses. Skin:     General: Skin is warm and dry. Neurological:      Mental Status: She is alert and oriented to person, place, and time. Cranial Nerves: No cranial nerve deficit. Sensory: No sensory deficit. Gait: Gait normal.      Comments: Incision site is clean and dry of head. Psychiatric:         Mood and Affect: Mood normal.           Assessment:      Diagnosis Orders   1. Meningioma [facility])         Plan:        Patient returns my office continued follow-up. Chief complaint of meningioma status post resection doing well no headache no weakness numbness no seizure activities. Off medication. Ambulating well neurologically stable incisions well-healed. MRI from December was noted. Impression: Status post meningioma resection 2-1/2 years ago with doubt any recurrence. Patient is recommend to follow-up MRI in December since last scan was December 2020 and recommend up to 5-year MRI follow-up through family physician at this point since I will be leaving the practice. Once again thank you very much for assisting patient care    Follow up:  Return if symptoms worsen or fail to improve.     Milton Petit MD

## 2021-08-05 ENCOUNTER — TELEPHONE (OUTPATIENT)
Dept: NEUROSURGERY | Age: 66
End: 2021-08-05

## 2021-08-09 ENCOUNTER — OFFICE VISIT (OUTPATIENT)
Dept: FAMILY MEDICINE CLINIC | Age: 66
End: 2021-08-09
Payer: MEDICARE

## 2021-08-09 VITALS
HEIGHT: 65 IN | DIASTOLIC BLOOD PRESSURE: 72 MMHG | TEMPERATURE: 98.2 F | SYSTOLIC BLOOD PRESSURE: 126 MMHG | WEIGHT: 203 LBS | OXYGEN SATURATION: 99 % | HEART RATE: 65 BPM | BODY MASS INDEX: 33.82 KG/M2

## 2021-08-09 DIAGNOSIS — Z12.31 ENCOUNTER FOR SCREENING MAMMOGRAM FOR MALIGNANT NEOPLASM OF BREAST: ICD-10-CM

## 2021-08-09 DIAGNOSIS — D32.9 MENINGIOMA (HCC): Primary | ICD-10-CM

## 2021-08-09 DIAGNOSIS — Z11.59 NEED FOR HEPATITIS C SCREENING TEST: ICD-10-CM

## 2021-08-09 DIAGNOSIS — M54.16 LUMBAR RADICULOPATHY: ICD-10-CM

## 2021-08-09 DIAGNOSIS — R73.01 IMPAIRED FASTING BLOOD SUGAR: ICD-10-CM

## 2021-08-09 DIAGNOSIS — R42 DIZZINESS: ICD-10-CM

## 2021-08-09 DIAGNOSIS — Z11.4 ENCOUNTER FOR SCREENING FOR HIV: ICD-10-CM

## 2021-08-09 PROCEDURE — 99214 OFFICE O/P EST MOD 30 MIN: CPT | Performed by: FAMILY MEDICINE

## 2021-08-09 PROCEDURE — 1036F TOBACCO NON-USER: CPT | Performed by: FAMILY MEDICINE

## 2021-08-09 PROCEDURE — G8417 CALC BMI ABV UP PARAM F/U: HCPCS | Performed by: FAMILY MEDICINE

## 2021-08-09 PROCEDURE — G8427 DOCREV CUR MEDS BY ELIG CLIN: HCPCS | Performed by: FAMILY MEDICINE

## 2021-08-09 PROCEDURE — 3017F COLORECTAL CA SCREEN DOC REV: CPT | Performed by: FAMILY MEDICINE

## 2021-08-09 RX ORDER — MECLIZINE HCL 12.5 MG/1
12.5 TABLET ORAL EVERY 12 HOURS PRN
Qty: 30 TABLET | Refills: 0 | Status: SHIPPED | OUTPATIENT
Start: 2021-08-09 | End: 2021-09-08

## 2021-08-09 NOTE — PROGRESS NOTES
Chief Complaint   Patient presents with    Dizziness    Nausea        HPI: Elmo Franklin 61 y.o. female presenting for    Patinet has been feeling dizzy, lightheaded, nauseous   Has been going on for hte last couple of months   Has not been tested for diabetes   Admits to hot and cold symptoms. Dizziness feels like the room is spinning   Denies any tinnitus. Denies any new medications. symptom can happen anytime and can happen all day. Current Outpatient Medications   Medication Sig Dispense Refill    meclizine (ANTIVERT) 12.5 MG tablet Take 1 tablet by mouth every 12 hours as needed for Dizziness 30 tablet 0    Handicap Placard MISC by Does not apply route Good from 8/9/21 to 8/9/26 1 each 0    ondansetron (ZOFRAN-ODT) 4 MG disintegrating tablet DISSOLVE ONE TABLET BY MOUTH EVERY 8 HOURS AS NEEDED FOR NAUSEA/ VOMITING 15 tablet 0    psyllium (METAMUCIL SMOOTH TEXTURE) 58.6 % powder Take 2 teaspoon with 8 to 10 oz water. 425 g 2    loperamide (RA ANTI-DIARRHEAL) 2 MG capsule Take 1 capsule by mouth as needed for Diarrhea 60 capsule 3    metoprolol succinate (TOPROL XL) 100 MG extended release tablet TAKE 1 TABLET DAILY 90 tablet 1    traZODone (DESYREL) 50 MG tablet Take 1 tablet by mouth nightly 90 tablet 3    lactobacillus (CULTURELLE) capsule Take 1 capsule by mouth daily 30 capsule 1    hyoscyamine (LEVBID) 375 MCG extended release tablet Take 1 tablet by mouth 2 times daily 180 tablet 5    cetirizine-psuedoephedrine (ZYRTEC-D) 5-120 MG per extended release tablet Take by mouth      venlafaxine (EFFEXOR XR) 75 MG extended release capsule Take 1 capsule by mouth daily 90 capsule 3    estradiol (VIVELLE) 0.05 MG/24HR Place onto the skin      fluticasone (FLONASE) 50 MCG/ACT nasal spray 2 sprays by Nasal route daily 1 Bottle 3    butalbital-aspirin-caffeine (FIORINAL) -40 MG capsule Take 1 capsule by mouth every 4 hours as needed for Headaches for up to 30 days.  21 capsule 0     No current facility-administered medications for this visit. ROS  CONSTITUTIONAL: The patient denies fevers, chills, sweats and body ache. HEENT: Admits to feeling dizzy and nauseous. RESPIRATORY: Denies cough, sputum, hemoptysis. CARDIAC: Denies chest pain, pressure, palpitations, Denies lower extremity edema. GASTROINTESTINAL: Denies abdominal pain, constipation, diarrhea, bleeding in the stools,   GENITOURINARY: Denies dysuria, hematuria, nocturia or frequency, urinary incontinence. NEUROLOGIC: Denies headaches, dizziness, syncope, weakness  MUSCULOSKELETAL: denies changes in range of motion, joint pain, stiffness. ENDOCRINOLOGY: Denies heat or cold intolerance. HEMATOLOGY: Denies easy bleeding or blood transfusion,anemia  DERMATOLOGY: Denies changes in moles or pigmentation changes. PSYCHIATRY: Denies depression, agitation or anxiety. Past Medical History:   Diagnosis Date    Atrial fibrillation ([facility] [facility]     Brain tumor ([facility] [facility] 07/16/2018    Chest pain     Degenerative disc disease, lumbar 1/21/2016    Hx of colonic polyps     Hypertension 2/24/2015    Irritable bowel syndrome     Low back pain 10/16/2018    Lumbar radiculopathy 3/14/2016    Rapid heart rate         Past Surgical History:   Procedure Laterality Date    APPENDECTOMY      BACK SURGERY  07/14/2017    CARDIAC CATHETERIZATION  11/2/15    DR. PALMER    HYSTERECTOMY      AK CRANIECT EXCIS SKULL BONE LESN N/A 7/24/2018    CRANIOTOMY performed by Esmer Wells MD at [address] [place] [address] TOTAL KNEE ARTHROPLASTY  2009    UPPER GASTROINTESTINAL ENDOSCOPY  1/15/13    DR. MEEKS        Family History   Problem Relation Age of Onset    Cancer Father         COLON    High Blood Pressure Father     Heart Disease Father         Social History     Socioeconomic History    Marital status:      Spouse name: Not on file    Number of children: Not on file    Years of education: Not Nose - normal and patent, no erythema, discharge or polyps   Sinuses - Normal paranasal sinuses without tenderness   Throat - mucous membranes moist, pharynx normal without lesions   Neck - supple, no significant adenopathy   Thyroid - thyroid is normal in size without nodules or tenderness    Chest - clear to auscultation, no wheezes, rales or rhonchi, symmetric air entry   Heart - normal rate, regular rhythm, normal S1, S2, no murmurs, rubs, clicks or gallops  Abdomen - soft, nontender, nondistended, no masses or organomegaly   Back exam - full range of motion, no tenderness, palpable spasm or pain on motion   Neurological - alert, oriented, normal speech, no focal findings or movement disorder noted   Musculoskeletal - no joint tenderness, deformity or swelling   Extremities - peripheral pulses normal, no pedal edema, no clubbing or cyanosis   Skin - normal coloration and turgor, no rashes, no suspicious skin lesions noted      Labs   TSH   Date Value Ref Range Status   11/13/2018 0.728 0.270 - 4.200 uIU/mL Final     TSH   Date Value Ref Range Status   01/05/2021 2.060 0.440 - 3.860 uIU/mL Final   07/26/2019 1.760 0.440 - 3.860 uIU/mL Final   04/11/2019 1.490 0.440 - 3.860 uIU/mL Final     Comment:     Effective:  2/7/2019  New reference range for this analyte has been established.     06/28/2015 2.030 0.270 - 4.200 uIU/mL Final   02/14/2015 4.560 (H) 0.270 - 4.200 uIU/mL Final   12/26/2014 0.612 0.270 - 4.200 uIU/mL Final   12/24/2014 1.250 0.270 - 4.200 uIU/mL Final   10/26/2014 1.950 0.270 - 4.200 uIU/mL Final   09/13/2014 2.300 0.270 - 4.200 uIU/mL Final   08/01/2014 1.680 0.270 - 4.200 uIU/mL Final   01/25/2014 0.880 0.270 - 4.200 uIU/mL Final     Comment:     Effective 12/4/2013  Methodology and/or Reference Range has changed.    12/27/2012 1.806 0.550 - 4.780 uIU/mL Final     Lab Results   Component Value Date     08/09/2021    K 4.7 08/09/2021     08/09/2021    CO2 22 08/09/2021    BUN 15 08/09/2021    CREATININE 0.46 (L) 08/09/2021    GLUCOSE 101 (H) 08/09/2021    CALCIUM 9.7 08/09/2021    PROT 6.8 08/09/2021    LABALBU 3.9 08/09/2021    BILITOT 0.6 08/09/2021    ALKPHOS 98 08/09/2021    AST 21 08/09/2021    ALT 16 08/09/2021    LABGLOM >60.0 08/09/2021    GFRAA >60.0 08/09/2021    AGRATIO 1.4 03/18/2018    GLOB 2.9 08/09/2021       Lab Results   Component Value Date    WBC 4.7 (L) 08/09/2021    HGB 16.0 08/09/2021    HCT 48.8 (H) 08/09/2021    MCV 92.4 08/09/2021     08/09/2021     Lab Results   Component Value Date    LABA1C 5.4 08/09/2021     No results found for: EAG        A/P: Nonda Reaper Rico-Rayne 61 y.o. female presenting for     1. Meningioma ([facility] [address]  - Handicap Placard MISC; by Does not apply route Good from 8/9/21 to 8/9/26  Dispense: 1 each; Refill: 0    2. Lumbar radiculopathy    - Handicap placard  - Handicap Placard MISC; by Does not apply route Good from 8/9/21 to 8/9/26  Dispense: 1 each; Refill: 0    3. Dizziness    - CBC With Auto Differential; Future  - Comprehensive Metabolic Panel; Future    4. Impaired fasting blood sugar    - Hemoglobin A1C; Future    5. Encounter for screening mammogram for malignant neoplasm of breast    - JACKELYN DIGITAL SCREEN W OR WO CAD BILATERAL; Future    6. Encounter for screening for HIV    - HIV Screen; Future    7. Need for hepatitis C screening test    - Hepatitis C Antibody; Future          Please note, this report has been partially produced using speech recognition software  and may cause  and /or contain errors related to that system including grammar, punctuation and spelling as well as words and phrases that may seem inappropriate. If there are questions or concerns please feel free to contact me to clarify.

## 2021-08-17 DIAGNOSIS — R11.0 NAUSEA: Primary | ICD-10-CM

## 2021-08-17 DIAGNOSIS — K58.0 IRRITABLE BOWEL SYNDROME WITH DIARRHEA: ICD-10-CM

## 2021-08-17 DIAGNOSIS — K59.00 CONSTIPATION, UNSPECIFIED CONSTIPATION TYPE: ICD-10-CM

## 2021-08-18 DIAGNOSIS — R14.3 FLATULENCE: Primary | ICD-10-CM

## 2021-08-18 RX ORDER — SIMETHICONE 80 MG
80 TABLET,CHEWABLE ORAL 4 TIMES DAILY PRN
Qty: 180 TABLET | Refills: 3 | Status: SHIPPED | OUTPATIENT
Start: 2021-08-18

## 2021-08-18 RX ORDER — HYOSCYAMINE SULFATE 0.38 MG/1
TABLET, EXTENDED RELEASE ORAL
Qty: 180 TABLET | Refills: 3 | Status: SHIPPED | OUTPATIENT
Start: 2021-08-18

## 2021-10-18 DIAGNOSIS — I10 ESSENTIAL HYPERTENSION: ICD-10-CM

## 2021-10-18 RX ORDER — METOPROLOL SUCCINATE 100 MG/1
TABLET, EXTENDED RELEASE ORAL
Qty: 90 TABLET | Refills: 0 | Status: SHIPPED | OUTPATIENT
Start: 2021-10-18 | End: 2022-01-14

## 2021-10-18 NOTE — TELEPHONE ENCOUNTER
Requesting medication refill. Please approve or deny this request.    Rx requested:  Requested Prescriptions     Pending Prescriptions Disp Refills    metoprolol succinate (TOPROL XL) 100 MG extended release tablet [Pharmacy Med Name: METOPROLOL SUCCINATE ER TABS 100MG] 90 tablet 3     Sig: TAKE 1 TABLET DAILY         Last Office Visit:   1/14/2021      Next Visit Date:  Future Appointments   Date Time Provider Carlyn Rodriguez   11/9/2021 11:00 AM [facility]               Last refill 5/25/21. Please approve or deny.

## 2021-12-08 ENCOUNTER — TELEPHONE (OUTPATIENT)
Dept: OBGYN CLINIC | Age: 66
End: 2021-12-08

## 2021-12-08 RX ORDER — VENLAFAXINE HYDROCHLORIDE 75 MG/1
75 CAPSULE, EXTENDED RELEASE ORAL DAILY
Qty: 30 CAPSULE | Refills: 0 | Status: SHIPPED | OUTPATIENT
Start: 2021-12-08 | End: 2021-12-15 | Stop reason: SDUPTHER

## 2021-12-08 NOTE — TELEPHONE ENCOUNTER
Pt is out of medication Effexor. Pt needs refill sent to [facility] in [place]. She will see him on Friday but needs enough to get [facility] at her appt.

## 2021-12-15 ENCOUNTER — TELEPHONE (OUTPATIENT)
Dept: OBGYN CLINIC | Age: 66
End: 2021-12-15

## 2021-12-15 RX ORDER — VENLAFAXINE HYDROCHLORIDE 75 MG/1
75 CAPSULE, EXTENDED RELEASE ORAL DAILY
Qty: 30 CAPSULE | Refills: 0 | Status: SHIPPED | OUTPATIENT
Start: 2021-12-15 | End: 2022-01-05

## 2021-12-25 ENCOUNTER — APPOINTMENT (OUTPATIENT)
Dept: GENERAL RADIOLOGY | Age: 66
End: 2021-12-25
Payer: COMMERCIAL

## 2021-12-25 ENCOUNTER — HOSPITAL ENCOUNTER (EMERGENCY)
Age: 66
Discharge: HOME | End: 2021-12-25
Payer: COMMERCIAL

## 2021-12-25 VITALS
TEMPERATURE: 97.9 F | RESPIRATION RATE: 16 BRPM | OXYGEN SATURATION: 98 % | HEIGHT: 65 IN | HEART RATE: 68 BPM | SYSTOLIC BLOOD PRESSURE: 139 MMHG | DIASTOLIC BLOOD PRESSURE: 76 MMHG | BODY MASS INDEX: 32.49 KG/M2 | WEIGHT: 195 LBS

## 2021-12-25 DIAGNOSIS — S93.601A SPRAIN OF RIGHT FOOT, INITIAL ENCOUNTER: Primary | ICD-10-CM

## 2021-12-25 PROCEDURE — 6360000002 HC RX W HCPCS: Performed by: PHYSICIAN ASSISTANT

## 2021-12-25 PROCEDURE — 99283 EMERGENCY DEPT VISIT LOW MDM: CPT

## 2021-12-25 PROCEDURE — 96372 THER/PROPH/DIAG INJ SC/IM: CPT

## 2021-12-25 PROCEDURE — 73630 X-RAY EXAM OF FOOT: CPT

## 2021-12-25 RX ORDER — HYDROCODONE BITARTRATE AND ACETAMINOPHEN 5; 325 MG/1; MG/1
1 TABLET ORAL EVERY 6 HOURS PRN
Qty: 10 TABLET | Refills: 0 | Status: SHIPPED | OUTPATIENT
Start: 2021-12-25 | End: 2021-12-28

## 2021-12-25 RX ORDER — KETOROLAC TROMETHAMINE 15 MG/ML
30 INJECTION, SOLUTION INTRAMUSCULAR; INTRAVENOUS ONCE
Status: COMPLETED | OUTPATIENT
Start: 2021-12-25 | End: 2021-12-25

## 2021-12-25 RX ADMIN — KETOROLAC TROMETHAMINE 30 MG: 15 INJECTION, SOLUTION INTRAMUSCULAR; INTRAVENOUS at 16:19

## 2021-12-25 ASSESSMENT — PAIN DESCRIPTION - LOCATION: LOCATION: FOOT

## 2021-12-25 ASSESSMENT — PAIN DESCRIPTION - PAIN TYPE: TYPE: ACUTE PAIN

## 2021-12-25 ASSESSMENT — ENCOUNTER SYMPTOMS
APNEA: 0
ABDOMINAL PAIN: 0
ALLERGIC/IMMUNOLOGIC NEGATIVE: 1
EYE PAIN: 0
COLOR CHANGE: 0
SHORTNESS OF BREATH: 0
TROUBLE SWALLOWING: 0

## 2021-12-25 ASSESSMENT — PAIN DESCRIPTION - ORIENTATION: ORIENTATION: RIGHT

## 2021-12-25 ASSESSMENT — PAIN SCALES - GENERAL
PAINLEVEL_OUTOF10: 9
PAINLEVEL_OUTOF10: 9

## 2021-12-25 ASSESSMENT — PAIN DESCRIPTION - DESCRIPTORS: DESCRIPTORS: SHARP

## 2021-12-25 ASSESSMENT — PAIN DESCRIPTION - FREQUENCY: FREQUENCY: CONTINUOUS

## 2021-12-25 NOTE — ED PROVIDER NOTES
[facility] ED  eMERGENCYdEPARTMENT eNCOUnter      Pt Name: Marie Jones  MRN: [id number]  [place] 1955  Date of evaluation: 12/25/2021  Provider:Terence Londono PA-C    CHIEF COMPLAINT       Chief Complaint   Patient presents with    Foot Pain     Right foot times 4 days          HISTORY OF PRESENT ILLNESS  (Location/Symptom, Timing/Onset, Context/Setting, Quality, Duration, Modifying Factors, Severity.)   Marie Jones is a 61 y.o. female who presents to the emergency department withn a 4 day history of right foot pain along the medial aspect of the foot. Denies any known injuries. Describes the pain as a burning sensation. No swelling. No fevers. HPI    Nursing Notes were reviewed and I agree. REVIEW OF SYSTEMS    (2-9 systems for level 4, 10 or more for level 5)     Review of Systems   Constitutional: Negative for diaphoresis and fever. HENT: Negative for hearing loss and trouble swallowing. Eyes: Negative for pain. Respiratory: Negative for apnea and shortness of breath. Cardiovascular: Negative for chest pain. Gastrointestinal: Negative for abdominal pain. Endocrine: Negative. Genitourinary: Negative for hematuria. Musculoskeletal: Negative for neck pain and neck stiffness. Skin: Negative for color change. Allergic/Immunologic: Negative. Neurological: Negative for dizziness and numbness. Hematological: Negative. Psychiatric/Behavioral: Negative. All other systems reviewed and are negative. Except as noted above the remainder of the review of systems was reviewed and negative.        PAST MEDICAL HISTORY     Past Medical History:   Diagnosis Date    Atrial fibrillation ([address]     Brain tumor ([facility] [address] 07/16/2018    Chest pain     Degenerative disc disease, lumbar 1/21/2016    Hx of colonic polyps     Hypertension 2/24/2015    Irritable bowel syndrome     Low back pain 10/16/2018    Lumbar radiculopathy 3/14/2016    Rapid heart rate          SURGICAL HISTORY       Past Surgical History:   Procedure Laterality Date    APPENDECTOMY      BACK SURGERY  07/14/2017    CARDIAC CATHETERIZATION  11/2/15    DR. PALMER    HYSTERECTOMY      [place] CRANIECT EXCIS SKULL BONE LESN N/A 7/24/2018    CRANIOTOMY performed by Ana Paula Bateman MD at [address] [place] [address] TOTAL KNEE ARTHROPLASTY  2009    UPPER GASTROINTESTINAL ENDOSCOPY  1/15/13    DR. MEEKS         CURRENT MEDICATIONS       Previous Medications    BUTALBITAL-ASPIRIN-CAFFEINE (FIORINAL) -40 MG CAPSULE    Take 1 capsule by mouth every 4 hours as needed for Headaches for up to 30 days. CETIRIZINE-PSUEDOEPHEDRINE (ZYRTEC-D) 5-120 MG PER EXTENDED RELEASE TABLET    Take by mouth    ESTRADIOL (VIVELLE) 0.05 MG/24HR    Place onto the skin    FLUTICASONE (FLONASE) 50 MCG/ACT NASAL SPRAY    2 sprays by Nasal route daily    HANDICAP PLACARD MISC    by Does not apply route Good from 8/9/21 to 8/9/26    LACTOBACILLUS (CULTURELLE) CAPSULE    Take 1 capsule by mouth daily    LEVBID 0.375 MG EXTENDED RELEASE TABLET    TAKE 1 TABLET EVERY 12 HOURS AS NEEDED FOR CRAMPING    METOPROLOL SUCCINATE (TOPROL XL) 100 MG EXTENDED RELEASE TABLET    TAKE 1 TABLET DAILY    ONDANSETRON (ZOFRAN-ODT) 4 MG DISINTEGRATING TABLET    DISSOLVE ONE TABLET BY MOUTH EVERY 8 HOURS AS NEEDED FOR NAUSEA/ VOMITING    PSYLLIUM (METAMUCIL SMOOTH TEXTURE) 58.6 % POWDER    Take 2 teaspoon with 8 to 10 oz water.     SIMETHICONE (MYLICON) 80 MG CHEWABLE TABLET    Take 1 tablet by mouth 4 times daily as needed for Flatulence    TRAZODONE (DESYREL) 50 MG TABLET    Take 1 tablet by mouth nightly    VENLAFAXINE (EFFEXOR XR) 75 MG EXTENDED RELEASE CAPSULE    Take 1 capsule by mouth daily       ALLERGIES     Codeine, Demerol, Dye [iodides], Morphine, Penicillins, Sulfa antibiotics, and Levofloxacin    FAMILY HISTORY       Family History   Problem Relation Age of Onset    Cancer Father         COLON    findings:    neg    Interpretation per the Radiologist below, if available at the time of this note:    XR FOOT RIGHT (MIN 3 VIEWS)   Final Result      No acute findings. LABS:  Labs Reviewed - No data to display    All other labs were within normal range or not returnedas of this dictation. EMERGENCYDEPARTMENT COURSE and DIFFERENTIAL DIAGNOSIS/MDM:   Vitals:    Vitals:    12/25/21 1448   BP: 139/76   Pulse: 68   Resp: 16   Temp: 97.9 °F (36.6 °C)   TempSrc: Temporal   SpO2: 98%   Weight: 195 lb (88.5 kg)   Height: 5' 5\" (1.651 m)       REASSESSMENT        Patient presented to the emergency department with 4-day history of right foot pain without known injury. Patient has no erythema to suggest cellulitis or gouty arthritis. Discussed possible sprain with the patient. She was placed in a postop shoe, given pain control and referred to podiatry for outpatient follow-up    MDM    PROCEDURES:    Procedures      FINAL IMPRESSION      1. Sprain of right foot, initial encounter          DISPOSITION/PLAN   DISPOSITION Decision To Discharge 12/25/2021 04:04:26 PM      PATIENT REFERRED TO:  Amber Valle DPM  [facility] [id number] [place] [address]    Call in 2 days      Ludin Joe MD  [address]  [phone]    Call in 2 days        DISCHARGE MEDICATIONS:  New Prescriptions    HYDROCODONE-ACETAMINOPHEN (NORCO) 5-325 MG PER TABLET    Take 1 tablet by mouth every 6 hours as needed for Pain for up to 3 days. Intended supply: 3 days.  Take lowest dose possible to manage pain       (Please note that portions of this note were completed with a voice recognition program.  Efforts were made to edit the dictations but occasionally words are mis-transcribed.)    ROB Kern PA-C  12/25/21 1391

## 2022-01-02 ENCOUNTER — PATIENT MESSAGE (OUTPATIENT)
Dept: GASTROENTEROLOGY | Age: 67
End: 2022-01-02

## 2022-01-03 NOTE — TELEPHONE ENCOUNTER
From: Yolette Alicea RicBruna  To: Dr. Crowder Askew: 1/2/2022 7:00 PM EST  Subject: Stomach Issue's    Mi Wright,  I be been having my belly pain & diharrea everyday for the past 10days. No matter what I eat or drink. I'm taking my medicine as I'm suppose to. What do you or Dr Alejo Roberts think ? I don't believe a appt. Is necessary but I will follow thru with what y'all think . Thank you I'll wait to hear from yous .    [address] JunitoBruna  [phone]

## 2022-01-05 ENCOUNTER — NURSE ONLY (OUTPATIENT)
Dept: PRIMARY CARE CLINIC | Age: 67
End: 2022-01-05

## 2022-01-05 ENCOUNTER — VIRTUAL VISIT (OUTPATIENT)
Dept: FAMILY MEDICINE CLINIC | Age: 67
End: 2022-01-05
Payer: COMMERCIAL

## 2022-01-05 DIAGNOSIS — R11.0 NAUSEA: ICD-10-CM

## 2022-01-05 DIAGNOSIS — U07.1 COVID-19: Primary | ICD-10-CM

## 2022-01-05 DIAGNOSIS — Z11.52 ENCOUNTER FOR SCREENING FOR COVID-19: ICD-10-CM

## 2022-01-05 LAB
Lab: ABNORMAL
PERFORMING INSTRUMENT: ABNORMAL
QC PASS/FAIL: ABNORMAL
SARS-COV-2, POC: DETECTED

## 2022-01-05 PROCEDURE — 99441 PR PHYS/QHP TELEPHONE EVALUATION 5-10 MIN: CPT | Performed by: NURSE PRACTITIONER

## 2022-01-05 PROCEDURE — 87426 SARSCOV CORONAVIRUS AG IA: CPT | Performed by: NURSE PRACTITIONER

## 2022-01-05 RX ORDER — ONDANSETRON 4 MG/1
4 TABLET, FILM COATED ORAL 3 TIMES DAILY PRN
Qty: 30 TABLET | Refills: 0 | Status: SHIPPED | OUTPATIENT
Start: 2022-01-05 | End: 2022-04-13 | Stop reason: ALTCHOICE

## 2022-01-05 ASSESSMENT — ENCOUNTER SYMPTOMS
COUGH: 1
DIARRHEA: 1
VOMITING: 0
SHORTNESS OF BREATH: 0
SORE THROAT: 1
NAUSEA: 1
WHEEZING: 0
RHINORRHEA: 1

## 2022-01-05 NOTE — PATIENT INSTRUCTIONS
Patient Education        Learning About Coronavirus [phone]  What is coronavirus (COVID-19)? COVID-19 is a disease caused by a type of coronavirus. This illness was first found in December 2019. It has since spread worldwide. Coronaviruses are a large group of viruses. They cause the common cold. They also cause more serious illnesses like [place] respiratory syndrome (MERS) and severe acute respiratory syndrome (SARS). COVID-19 is caused by a novel coronavirus. That means it's a new type that has not been seen in people before. What are the symptoms? COVID-19 symptoms may include:  · Fever. · Cough. · Trouble breathing. · Chills or repeated shaking with chills. · Muscle and body aches. · Headache. · Sore throat. · New loss of taste or smell. · Vomiting. · Diarrhea. In severe cases, COVID-19 can cause pneumonia and make it hard to breathe without help from a machine. It can cause death. How is it diagnosed? COVID-19 is diagnosed with a viral test. This may also be called a PCR test or antigen test. It looks for evidence of the virus in your breathing passages or lungs (respiratory system). The test is most often done on a sample from the nose, throat, or lungs. It's sometimes done on a sample of saliva. One way a sample is collected is by putting a long swab into the back of your nose. How is it treated? Mild cases of COVID-19 can be treated at home. Serious cases need treatment in the hospital. Treatment may include medicines to reduce symptoms, plus breathing support such as oxygen therapy or a ventilator. Some people may be placed on their belly to help their oxygen levels. Treatments that may help people who have COVID-19 include:  Antiviral medicines. These medicines treat viral infections. Remdesivir is an example. Immune-based therapy. These medicines help the immune system fight COVID-19. Examples include monoclonal antibodies. Blood thinners.   These medicines help prevent blood clots. People with severe illness are at risk for blood clots. How can you protect yourself and others? · Get vaccinated. · Avoid sick people. · Cover your mouth with a tissue when you cough or sneeze. · Wash your hands often, especially after you cough or sneeze. Use soap and water, and scrub for at least 20 seconds. If soap and water aren't available, use an alcohol-based hand . · Avoid touching your mouth, nose, and eyes. Be sure to follow all instructions from the [facility] and your local health authorities. Here are some examples of specific precautions you may need to take. · If you are not fully vaccinated:  ? Wear a mask if you have to go to public areas. ? Avoid crowds and try to stay at least 6 feet away from other people. · If you have been exposed to the virus and are not fully vaccinated:  ? Stay home. Don't go to school, work, or public areas. And don't use public transportation, ride-shares, or taxis unless you have no choice. ? Wear a mask if you have to go to public areas, like the pharmacy. · Even if you're fully vaccinated, there's still a small chance you can get and spread COVID-19. If you live in an area where COVID-19 is spreading quickly, wear a mask if you have to go to indoor public areas. You might also want to wear a mask in crowded outdoor areas if you:  ? Have certain health conditions. ? Live with someone who has a compromised immune system. ? Live with someone who is not fully vaccinated. · If you have been exposed and you are fully vaccinated:  ? Talk to your doctor. You may need a COVID-19 test.  ? Wear a mask in public indoor spaces for 14 days or until you test negative for COVID-19. If you're sick:  · Leave your home only if you need to get medical care. But call the doctor's office first so they know you're coming. And wear a mask. · Wear a mask whenever you're around other people. · Limit contact with pets and people in your home.  If possible, stay in a separate bedroom and use a separate bathroom. · Clean and disinfect your home every day. Use household  and disinfectant wipes or sprays. Take special care to clean things that you touch with your hands. How can you self-isolate when you have COVID-19? If you have COVID-19, there are things you can do to help avoid spreading the virus to others. · Limit contact with people in your home. If possible, stay in a separate bedroom and use a separate bathroom. · Wear a mask when you are around other people. · If you have to leave home, avoid crowds and try to stay at least 6 feet away from other people. · Avoid contact with pets and other animals. · Cover your mouth and nose with a tissue when you cough or sneeze. Then throw it in the trash right away. · Wash your hands often, especially after you cough or sneeze. Use soap and water, and scrub for at least 20 seconds. If soap and water aren't available, use an alcohol-based hand . · Don't share personal household items. These include bedding, towels, cups and glasses, and eating utensils. · [address] in the warmest water allowed for the fabric type, and dry it completely. It's okay to wash other people's laundry with yours. · Clean and disinfect your home. Use household  and disinfectant wipes or sprays. When should you call for help? Call 911 anytime you think you may need emergency care. For example, call if you have life-threatening symptoms, such as:    · You have severe trouble breathing. (You can't talk at all.)     · You have constant chest pain or pressure.     · You are severely dizzy or lightheaded.     · You are confused or can't think clearly.     · You have pale, gray, or blue-colored skin or lips.     · You pass out (lose consciousness) or are very hard to wake up. Call your doctor now or seek immediate medical care if:    · You have moderate trouble breathing.  (You can't speak a full sentence.)     · You are coughing up blood (more than about 1 teaspoon).     · You have signs of low blood pressure. These include feeling lightheaded; being too weak to stand; and having cold, pale, clammy skin. Watch closely for changes in your health, and be sure to contact your doctor if:    · Your symptoms get worse.     · You are not getting better as expected.     · You have new or worse symptoms of anxiety, depression, nightmares, or flashbacks. Call before you go to the doctor's office. Follow their instructions. And wear a mask. Current as of: July 1, 2021               Content Version: 13.1  © 2006-2021 [employer]. Care instructions adapted under license by [facility] ([facility]). If you have questions about a medical condition or this instruction, always ask your healthcare professional. [place] [address] any warranty or liability for your use of this information. Patient Education        Learning About Being High-Risk for Serious Illness From COVID-19  Who is at high risk? COVID-19 causes a mild illness in many people who have it. But certain things may increase your risk for more serious illness. These include:  · Age. ? Babies born premature or who are less than 3year old may be at high risk. ? The risk also increases with age. Older adults are at highest risk. · Asthma, cystic fibrosis, chronic obstructive pulmonary disease (COPD), and other chronic lung diseases. · Vaping or smoking or having a history of smoking. · Serious heart conditions, such as heart failure, coronary artery disease, or high blood pressure. · HIV. · A weakened immune system or taking medicines, such as steroids, that suppress the immune system. · Cancer or getting treatment for cancer. · Neurologic conditions or diseases that involve the nerves and brain, such as stroke, dementia, or cerebral palsy. · Being overweight (obesity). · Diabetes. · Chronic kidney disease. · Liver disease.   · Substance use disorders. · Sickle cell disease. · Pregnancy or a recent pregnancy. · Genetic, metabolic, or neurologic problems in children. This includes children who may have many health problems that affect many body systems. These problems may limit how well the child can do routine activities of daily life. · Down syndrome. This is not a complete list. If you have a chronic health problem, ask your doctor if you should take extra precautions. Should you get the COVID-19 vaccine if you have an underlying health problem? The simple answer is yes. The COVID-19 vaccine is safe and effective for almost everyone. The only people advised not to get it are those who have had a severe allergic reaction to the vaccine's ingredients. Experts recommend getting the vaccine. This is especially true if you have an underlying health problem like diabetes, chronic lung disease, or obesity. Getting infected with COVID-19 can be much worse if you have conditions like these. If you have a weakened immune system, you may be at higher risk for getting seriously ill with COVID-19. The vaccine may not work as well for you, but it should still be safe. Talk with your doctor if you have any questions about the vaccine. How can you protect yourself and others? · Get vaccinated. · Avoid sick people. · Cover your mouth with a tissue when you cough or sneeze. · Wash your hands often, especially after you cough or sneeze. Use soap and water, and scrub for at least 20 seconds. If soap and water aren't available, use an alcohol-based hand . · Avoid touching your mouth, nose, and eyes. Be sure to follow all instructions from the [facility] and your local health authorities. Here are some examples of specific precautions you may need to take. · If you are not fully vaccinated:  ? Wear a mask if you have to go to public areas. ? Avoid crowds and try to stay at least 6 feet away from other people.   · If you have been exposed to the virus and are not fully vaccinated:  ? Stay home. Don't go to school, work, or public areas. And don't use public transportation, ride-shares, or taxis unless you have no choice. ? Wear a mask if you have to go to public areas, like the pharmacy. · Even if you're fully vaccinated, there's still a small chance you can get and spread COVID-19. If you live in an area where COVID-19 is spreading quickly, wear a mask if you have to go to indoor public areas. You might also want to wear a mask in crowded outdoor areas if you:  ? Have certain health conditions. ? Live with someone who has a compromised immune system. ? Live with someone who is not fully vaccinated. · If you have been exposed and you are fully vaccinated:  ? Talk to your doctor. You may need a COVID-19 test.  ? Wear a mask in public indoor spaces for 14 days or until you test negative for COVID-19. If you're sick:  · Leave your home only if you need to get medical care. But call the doctor's office first so they know you're coming. And wear a mask. · Wear a mask whenever you're around other people. · Limit contact with pets and people in your home. If possible, stay in a separate bedroom and use a separate bathroom. · Clean and disinfect your home every day. Use household  and disinfectant wipes or sprays. Take special care to clean things that you touch with your hands. Should you get the COVID-19 vaccine if you are pregnant or were recently pregnant? Experts recommend getting the COVID-19 vaccine if you are pregnant or were recently pregnant. Talk to your doctor about getting the vaccine. Other vaccines, like the flu vaccine, are safely given in pregnancy and after pregnancy. The risk of problems from the COVID-19 vaccine should be far smaller than the risks to you and your baby from having the infection. Where can you learn more? Go to https://chbeka.[email or web address]. org and sign in to your [employer] account.  Enter A131 in the Search Health Information box to learn more about \"Learning About Being High-Risk for Serious Illness From COVID-19. \"     If you do not have an account, please click on the \"Sign Up Now\" link. Current as of: July 1, 2021               Content Version: 13.1  © 2006-2021 [employer]. Care instructions adapted under license by [facility] ([facility]). If you have questions about a medical condition or this instruction, always ask your healthcare professional. [place] [address] any warranty or liability for your use of this information. Patient Education        Coronavirus ([id number]): Care Instructions  Overview  The coronavirus disease (COVID-19) is caused by a virus. Symptoms may include a fever, a cough, and shortness of breath. It can spread through droplets from coughing and sneezing, breathing, and singing. The virus also can spread when people are in close contact with someone who is infected. Most people have mild symptoms and can take care of themselves at home. If their symptoms get worse, they may need care in a hospital. Treatment may include medicines to reduce symptoms, plus breathing support such as oxygen therapy or a ventilator. It's important to not spread the virus to others. If you have COVID-19, wear a mask anytime you are around other people. It can help stop the spread of the virus. You need to isolate yourself while you are sick. Leave your home only if you need to get medical care or testing. Follow-up care is a key part of your treatment and safety. Be sure to make and go to all appointments, and call your doctor if you are having problems. It's also a good idea to know your test results and keep a list of the medicines you take. How can you care for yourself at home? · Get extra rest. It can help you feel better. · Drink plenty of fluids. This helps replace fluids lost from fever. Fluids may also help ease a scratchy throat.   · You can take acetaminophen (Tylenol) fever for 24 hours without taking medicines to lower the fever and  · Your symptoms are improving. If you tested positive but have no symptoms, you can end isolation after 10 days. But if you start to have symptoms, follow the steps above. Ask your doctor if you need to be tested before you end isolation. This is especially important if you have a weakened immune system. When should you call for help? Call 911 anytime you think you may need emergency care. For example, call if you have life-threatening symptoms, such as:    · You have severe trouble breathing. (You can't talk at all.)     · You have constant chest pain or pressure.     · You are severely dizzy or lightheaded.     · You are confused or can't think clearly.     · You have pale, gray, or blue-colored skin or lips.     · You pass out (lose consciousness) or are very hard to wake up. Call your doctor now or seek immediate medical care if:    · You have moderate trouble breathing. (You can't speak a full sentence.)     · You are coughing up blood (more than about 1 teaspoon).     · You have signs of low blood pressure. These include feeling lightheaded; being too weak to stand; and having cold, pale, clammy skin. Watch closely for changes in your health, and be sure to contact your doctor if:    · Your symptoms get worse.     · You are not getting better as expected.     · You have new or worse symptoms of anxiety, depression, nightmares, or flashbacks. Call before you go to the doctor's office. Follow their instructions. And wear a mask. Current as of: July 1, 2021               Content Version: 13.1  © 2006-2021 [employer]. Care instructions adapted under license by [facility] ([facility]). If you have questions about a medical condition or this instruction, always ask your healthcare professional. [place] [address] any warranty or liability for your use of this information.

## 2022-01-05 NOTE — PROGRESS NOTES
fatigue. Negative for fever. HENT: Positive for congestion, rhinorrhea and sore throat. Respiratory: Positive for cough (very little). Negative for shortness of breath and wheezing. Gastrointestinal: Positive for diarrhea and nausea. Negative for vomiting. Musculoskeletal: Negative for myalgias. Skin: Negative for rash. Neurological: Positive for headaches. Negative for dizziness and light-headedness. Psychiatric/Behavioral: Negative for agitation, confusion and hallucinations. Prior to Visit Medications    Medication Sig Taking? Authorizing Provider   venlafaxine (EFFEXOR XR) 75 MG extended release capsule TAKE ONE CAPSULE BY MOUTH EVERY DAY Yes Bernard Abernathy DO   ondansetron (ZOFRAN) 4 MG tablet Take 1 tablet by mouth 3 times daily as needed for Nausea or Vomiting Yes ALETA Lang - REGINA   metoprolol succinate (TOPROL XL) 100 MG extended release tablet TAKE 1 TABLET DAILY Yes Sunita Sparrow MD   LEVBID 0.375 MG extended release tablet TAKE 1 TABLET EVERY 12 HOURS AS NEEDED FOR CRAMPING Yes ALETA Lew - CNP   simethicone (MYLICON) 80 MG chewable tablet Take 1 tablet by mouth 4 times daily as needed for Flatulence Yes Radha Lugo MD   Handicap Placard MISC by Does not apply route Good from 8/9/21 to 8/9/26 Yes Radha Lugo MD   ondansetron (ZOFRAN-ODT) 4 MG disintegrating tablet DISSOLVE ONE TABLET BY MOUTH EVERY 8 HOURS AS NEEDED FOR NAUSEA/ VOMITING Yes Pat Portillo MD   psyllium (METAMUCIL SMOOTH TEXTURE) 58.6 % powder Take 2 teaspoon with 8 to 10 oz water.  Yes Pat Portillo MD   traZODone (DESYREL) 50 MG tablet Take 1 tablet by mouth nightly Yes Pat Portillo MD   lactobacillus (CULTURELLE) capsule Take 1 capsule by mouth daily Yes Pat Portillo MD   cetirizine-psuedoephedrine (ZYRTEC-D) 5-120 MG per extended release tablet Take by mouth Yes Historical Provider, MD   estradiol (VIVELLE) 0.05 MG/24HR Place onto the skin Yes Historical Provider, MD fluticasone (FLONASE) 50 MCG/ACT nasal spray 2 sprays by Nasal route daily Yes Crow Jennifer, APRN - CNP       Past Medical History:   Diagnosis Date    [facility] [address] [facility]     Brain tumor ([facility] [address] 07/16/2018    Chest pain     Degenerative disc disease, lumbar 1/21/2016    Hx of colonic polyps     Hypertension 2/24/2015    Irritable bowel syndrome     Low back pain 10/16/2018    Lumbar radiculopathy 3/14/2016    Rapid heart rate      Past Surgical History:   Procedure Laterality Date    APPENDECTOMY      BACK SURGERY  07/14/2017    CARDIAC CATHETERIZATION  11/2/15    DR. PALMER    HYSTERECTOMY      [place] CRANIECT EXCIS SKULL BONE LESN N/A 7/24/2018    CRANIOTOMY performed by Danay Mullins MD at [address] [place] [address] TOTAL KNEE ARTHROPLASTY  2009    UPPER GASTROINTESTINAL ENDOSCOPY  1/15/13    DR. MEEKS     Social History     Socioeconomic History    Marital status:      Spouse name: Not on file    Number of children: Not on file    Years of education: Not on file    Highest education level: Not on file   Occupational History    Not on file   Tobacco Use    Smoking status: Never Smoker    Smokeless tobacco: Never Used   Vaping Use    Vaping Use: Never used   Substance and Sexual Activity    Alcohol use: Yes     Alcohol/week: 0.0 standard drinks     Comment: socially    Drug use: No    Sexual activity: Not Currently   Other Topics Concern    Not on file   Social History Narrative    Not on file     Social Determinants of Health     Financial Resource Strain: Low Risk     Difficulty of Paying Living Expenses: Not hard at all   Food Insecurity: No Food Insecurity    Worried About Running Out of Food in the Last Year: Never true    Cristiano of Food in the Last Year: Never true   Transportation Needs: No Transportation Needs    Lack of Transportation (Medical): No    Lack of Transportation (Non-Medical):  No   Physical Activity:     Days of Exercise per Week: Not on file    Minutes of Exercise per Session: Not on file   Stress:     Feeling of Stress : Not on file   Social Connections:     Frequency of Communication with Friends and Family: Not on file    Frequency of Social Gatherings with Friends and Family: Not on file    Attends Faith Services: Not on file    Active Member of [address] [place] [address] [address] or Organizations: Not on file    Attends Club or Organization Meetings: Not on file    Marital Status: Not on file   Intimate Partner Violence:     Fear of Current or Ex-Partner: Not on file    Emotionally Abused: Not on file    Physically Abused: Not on file    Sexually Abused: Not on file   Housing Stability:     Unable to Pay for Housing in the Last Year: Not on file    Number of [place] in the Last Year: Not on file    Unstable Housing in the Last Year: Not on file     Family History   Problem Relation Age of Onset    Cancer Father         COLON    High Blood Pressure Father     Heart Disease Father      Allergies   Allergen Reactions    Codeine     Demerol     Dye [Iodides] Nausea Only    Morphine     Penicillins Other (See Comments)    Sulfa Antibiotics Hives and Itching    Levofloxacin Itching and Rash       PMH, Surgical Hx, Family Hx, and Social Hx reviewed and updated. Health Maintenance reviewed. PHYSICAL EXAMINATION:  \"[x]\" Indicates a positive item  \"[]\" Indicates a negative item    Vital Signs: (As obtained by patient/caregiver or practitioner observation)    Blood pressure-  Heart rate-    Respiratory rate-    Temperature-  Pulse oximetry-     Constitutional: [] Appears well-developed and well-nourished [x] No apparent distress      [] Abnormal-   Mental status  [x] Alert and awake  [x] Oriented to person/place/time [x]Able to follow commands      Eyes:  EOM    []  Normal  [] Abnormal-  Sclera  []  Normal  [] Abnormal -         Discharge []  None visible  [] Abnormal -    HENT:   [] Normocephalic, atraumatic.   [] Abnormal   [] Mouth/Throat: Mucous membranes are moist.     External Ears [] Normal  [] Abnormal-     Neck: [] No visualized mass     Pulmonary/Chest: [x] Respiratory effort normal.  [x] No heard signs of difficulty breathing or respiratory distress        [] Abnormal-      Musculoskeletal:   [] Normal gait with no signs of ataxia         [] Normal range of motion of neck        [] Abnormal-       Neurological:       [] No Facial Asymmetry (Cranial nerve 7 motor function) (limited exam to video visit)          [] No gaze palsy        [] Abnormal-         Skin:        [] No significant exanthematous lesions or discoloration noted on facial skin         [] Abnormal-            Psychiatric:       [x] Normal Affect [x] No Hallucinations        [] Abnormal-     Other pertinent observable physical exam findings-   Results for orders placed or performed in visit on 01/05/22   POCT COVID-19, Antigen   Result Value Ref Range    SARS-COV-2, POC Detected (A) Not Detected    Lot Number [id number]     QC Pass/Fail pass     Performing Instrument BD Veritor        ASSESSMENT/PLAN:    Quarantine until Monday     Return if symptoms worsen or fail to improve. Will need testing for COVID-19 and quarantine until test resulted is negative with symptom improvement or 10 days from of onset of symptoms. Discussed OTC comfort care. Discussed hydration and self proning for coughing or SOB. Pt to f/u if not improving as expected or to Er if symptoms severe. Spent much time educating the patient on COVID and answering questions. Assessment & Plan   Pranav Valles was seen today for nausea & vomiting. Diagnoses and all orders for this visit:    COVID-19    Nausea  -     ondansetron (ZOFRAN) 4 MG tablet;  Take 1 tablet by mouth 3 times daily as needed for Nausea or Vomiting    Encounter for screening for COVID-19  -     POCT COVID-19, Antigen      Orders Placed This Encounter   Procedures    POCT COVID-19, Antigen     Order Specific Question:   Is this test for diagnosis or screening? Answer:   Diagnosis of ill patient     Order Specific Question:   Symptomatic for COVID-19 as defined by CDC? Answer:   Yes     Order Specific Question:   Date of Symptom Onset     Answer:   1/3/2022     Order Specific Question:   Hospitalized for COVID-19? Answer:   No     Order Specific Question:   Admitted to ICU for COVID-19? Answer:   No     Order Specific Question:   Employed in healthcare setting? Answer:   No     Order Specific Question:   Resident in a congregate (group) care setting? Answer:   No     Order Specific Question:   Pregnant? Answer:   No     Order Specific Question:   Previously tested for COVID-19? Answer:   Yes     Orders Placed This Encounter   Medications    ondansetron (ZOFRAN) 4 MG tablet     Sig: Take 1 tablet by mouth 3 times daily as needed for Nausea or Vomiting     Dispense:  30 tablet     Refill:  0     Medications Discontinued During This Encounter   Medication Reason    butalbital-aspirin-caffeine (FIORINAL) -40 MG capsule LIST CLEANUP     Return if symptoms worsen or fail to improve. Reviewed with the patient: current clinical status, medications, activities and diet. Side effects, adverse effects of the medication prescribed today, as well as treatment plan/ rationale and result expectations have been discussed with the patient who expresses understanding and desires to proceed. Close follow up to evaluate treatment results and for coordination of care. I have reviewed the patient's medical history in detail and updated the computerized patient record. Patient identification was verified at the start of the visit: Yes    Total time spent on this encounter: About 8 mins       --ALETA Bah CNP on 1/5/2022 at 2:45 PM    An electronic signature was used to authenticate this note.

## 2022-01-10 PROBLEM — E04.2 NONTOXIC MULTINODULAR GOITER: Status: ACTIVE | Noted: 2020-11-11

## 2022-01-11 ENCOUNTER — OFFICE VISIT (OUTPATIENT)
Dept: OBGYN CLINIC | Age: 67
End: 2022-01-11
Payer: MEDICARE

## 2022-01-11 VITALS
HEART RATE: 59 BPM | WEIGHT: 199 LBS | DIASTOLIC BLOOD PRESSURE: 76 MMHG | BODY MASS INDEX: 33.12 KG/M2 | SYSTOLIC BLOOD PRESSURE: 128 MMHG

## 2022-01-11 DIAGNOSIS — Z12.31 ENCOUNTER FOR SCREENING MAMMOGRAM FOR BREAST CANCER: Primary | ICD-10-CM

## 2022-01-11 PROCEDURE — G0101 CA SCREEN;PELVIC/BREAST EXAM: HCPCS | Performed by: OBSTETRICS & GYNECOLOGY

## 2022-01-11 PROCEDURE — G8484 FLU IMMUNIZE NO ADMIN: HCPCS | Performed by: OBSTETRICS & GYNECOLOGY

## 2022-01-11 RX ORDER — VENLAFAXINE HYDROCHLORIDE 75 MG/1
CAPSULE, EXTENDED RELEASE ORAL
Qty: 90 CAPSULE | Refills: 3 | Status: SHIPPED | OUTPATIENT
Start: 2022-01-11 | End: 2022-08-31 | Stop reason: SDUPTHER

## 2022-01-11 RX ORDER — ESTRADIOL 0.05 MG/D
1 FILM, EXTENDED RELEASE TRANSDERMAL WEEKLY
Qty: 24 PATCH | Refills: 3 | Status: SHIPPED | OUTPATIENT
Start: 2022-01-11 | End: 2022-08-31 | Stop reason: SDUPTHER

## 2022-01-11 ASSESSMENT — ENCOUNTER SYMPTOMS
BACK PAIN: 0
NAUSEA: 0
CONSTIPATION: 0
VOMITING: 0
COLOR CHANGE: 0
WHEEZING: 0
SORE THROAT: 0
ABDOMINAL DISTENTION: 0
ABDOMINAL PAIN: 0
TROUBLE SWALLOWING: 0
CHEST TIGHTNESS: 0
COUGH: 0
VOICE CHANGE: 0
BLOOD IN STOOL: 0
SHORTNESS OF BREATH: 0

## 2022-01-11 NOTE — PROGRESS NOTES
CHIEF COMPLAINT: Patient is here for an annual examination. She has not been seen here for several years. She is on estradiol for estrogen replacement therapy and Effexor for depression she is doing well with both. She has had a hysterectomy. She has an occasional hot flash. She does not have them at night  Chief Complaint   Patient presents with    Annual Exam         HISTORY OF PRESENT ILLNESS:  61 y.o. female presents for her annual exam. She had no concernsor complaints today. Her menses is absent. She denies breakthrough bleeding, pelvic pain, or abnormal discharge. Bowel and bladder function is normal. Her health overallhas been good. Past Medical History:   Diagnosis Date    [facility] [address] [facility]     Brain tumor ([facility] [address] 07/16/2018    Chest pain     Degenerative disc disease, lumbar 1/21/2016    Hx of colonic polyps     Hypertension 2/24/2015    Irritable bowel syndrome     Low back pain 10/16/2018    Lumbar radiculopathy 3/14/2016    Rapid heart rate     Stress incontinence      Past Surgical History:   Procedure Laterality Date    APPENDECTOMY      BACK SURGERY  07/14/2017    CARDIAC CATHETERIZATION  11/2/15    DR. PALMER    HYSTERECTOMY      [place] CRANIECT EXCIS SKULL BONE LESN N/A 7/24/2018    CRANIOTOMY performed by Stefanie Lau MD at [address] [place] [address] TOTAL KNEE ARTHROPLASTY  2009    UPPER GASTROINTESTINAL ENDOSCOPY  1/15/13    DR. MEEKS     Family History   Problem Relation Age of Onset    Cancer Father         COLON    High Blood Pressure Father     Heart Disease Father     Breast Cancer Neg Hx      Social History     Socioeconomic History    Marital status:      Spouse name: Not on file    Number of children: Not on file    Years of education: Not on file    Highest education level: Not on file   Occupational History    Not on file   Tobacco Use    Smoking status: Never Smoker    Smokeless tobacco: Never Used   Vaping Use  Vaping Use: Never used   Substance and Sexual Activity    Alcohol use: Yes     Alcohol/week: 0.0 standard drinks     Comment: socially    Drug use: No    Sexual activity: Not Currently   Other Topics Concern    Not on file   Social History Narrative    Not on file     Social Determinants of Health     Financial Resource Strain: Low Risk     Difficulty of Paying Living Expenses: Not hard at all   Food Insecurity: No Food Insecurity    Worried About Running Out of Food in the Last Year: Never true    Cristiano of Food in the Last Year: Never true   Transportation Needs: No Transportation Needs    Lack of Transportation (Medical): No    Lack of Transportation (Non-Medical):  No   Physical Activity:     Days of Exercise per Week: Not on file    Minutes of Exercise per Session: Not on file   Stress:     Feeling of Stress : Not on file   Social Connections:     Frequency of Communication with Friends and Family: Not on file    Frequency of Social Gatherings with Friends and Family: Not on file    Attends [employer]ism Services: Not on file    Active Member of [address] [place] [address] or Organizations: Not on file    Attends Club or Organization Meetings: Not on file    Marital Status: Not on file   Intimate Partner Violence:     Fear of Current or Ex-Partner: Not on file    Emotionally Abused: Not on file    Physically Abused: Not on file    Sexually Abused: Not on file   Housing Stability:     Unable to Pay for Housing in the Last Year: Not on file    Number of [place] in the Last Year: Not on file    Unstable Housing in the Last Year: Not on file     Allergies:  Codeine, Demerol, Dye [iodides], Morphine, Penicillins, Sulfa antibiotics, and Levofloxacin  Current Outpatient Medications on File Prior to Visit   Medication Sig Dispense Refill    venlafaxine (EFFEXOR XR) 75 MG extended release capsule TAKE ONE CAPSULE BY MOUTH EVERY DAY 30 capsule 11    ondansetron (ZOFRAN) 4 MG tablet Take 1 tablet by mouth 3 Negative for cold intolerance, heat intolerance, polydipsia, polyphagia and polyuria. Genitourinary: Negative for difficulty urinating, dyspareunia, dysuria, flank pain, frequency, genital sores, hematuria, menstrual problem, pelvic pain, urgency, vaginal bleeding, vaginal discharge and vaginal pain. Musculoskeletal: Negative for arthralgias, back pain, joint swelling and myalgias. Skin: Negative for color change and rash. Allergic/Immunologic: Negative for environmental allergies, food allergies and immunocompromised state. Neurological: Negative for dizziness, seizures, syncope, speech difficulty, weakness, numbness and headaches. Hematological: Negative for adenopathy. Does not bruise/bleed easily. Psychiatric/Behavioral: Negative for agitation, behavioral problems, confusion, decreased concentration, dysphoric mood and suicidal ideas. The patient is not nervous/anxious and is not hyperactive. All other systems reviewed and are negative. PHYSICAL EXAM  /76   Pulse 59   Wt 199 lb (90.3 kg)   LMP  (LMP Unknown)   BMI 33.12 kg/m²     Physical Exam  Vitals and nursing note reviewed. Exam conducted with a chaperone present. Constitutional:       Appearance: She is well-developed. HENT:      Head: Normocephalic and atraumatic. Eyes:      Pupils: Pupils are equal, round, and reactive to light. Neck:      Thyroid: No thyromegaly. Trachea: No tracheal deviation. Cardiovascular:      Rate and Rhythm: Normal rate and regular rhythm. Heart sounds: Normal heart sounds. Pulmonary:      Effort: Pulmonary effort is normal.      Breath sounds: Normal breath sounds. Chest:      Chest wall: No tenderness. Abdominal:      General: Bowel sounds are normal. There is no distension. Palpations: Abdomen is soft. There is no mass. Tenderness: There is no abdominal tenderness. There is no guarding or rebound. Hernia: There is no hernia in the left inguinal area. Genitourinary:     Labia:         Right: No rash, tenderness, lesion or injury. Left: No rash, tenderness, lesion or injury. Vagina: Normal. No foreign body. No vaginal discharge, erythema, tenderness or bleeding. Adnexa:         Right: No mass, tenderness or fullness. Left: No mass, tenderness or fullness. Rectum: Normal. No mass, tenderness, anal fissure, external hemorrhoid or internal hemorrhoid. Normal anal tone. Musculoskeletal:         General: Normal range of motion. Cervical back: Normal range of motion. Lymphadenopathy:      Cervical: No cervical adenopathy. Neurological:      Mental Status: She is alert and oriented to person, place, and time. ASSESSMENT : Routine Annual Exam      Diagnosis Orders   1. Encounter for screening mammogram for breast cancer     Menopause  Status post hysterectomy      PLAN: Mammogram.  Continue with the estradiol. Continue the Effexor  Pap smear  Not obtained  No orders of the defined types were placed in this encounter. No orders of the defined types were placed in this encounter. Repeat Annual every 1 year. New ASCCP guidelines regarding pap and Hi Risk HPV co-testing reviewed. Cervical Cytology Evaluation begins at 24years old. If Negative Cytology, Follow-upscreening per current  ASCCP guidelines. Mammograms every 1 year. If 35 yo and last mammogram was negative. Calcium and Vitamin D dosing reviewed. Colonoscopy screening guidelines reviewed as well as onset forbone density testing. Birth control and barrier methods and recommendations discussed. STD counseling and prevention reviewed. Gardisil counseling completed for all patients 7-35 yo. Routine health maintenance per patients PCP.        Electronically signed by Jimena Cuevas DO on 1/11/22

## 2022-01-14 DIAGNOSIS — I10 ESSENTIAL HYPERTENSION: ICD-10-CM

## 2022-01-14 RX ORDER — METOPROLOL SUCCINATE 100 MG/1
TABLET, EXTENDED RELEASE ORAL
Qty: 30 TABLET | Refills: 0 | Status: SHIPPED | OUTPATIENT
Start: 2022-01-14 | End: 2022-06-20 | Stop reason: SDUPTHER

## 2022-01-14 NOTE — TELEPHONE ENCOUNTER
Please approve or deny this refill request. The order is pended. Thank you.     LOV 1/14/2021    Next Visit Date:  Future Appointments   Date Time Provider Carlyn Rodriguez   1/24/2022  4:20 PM [facility] MAMMO ROOM 2 [facility]   1/12/2023  2:30 PM [address]

## 2022-01-24 ENCOUNTER — HOSPITAL ENCOUNTER (OUTPATIENT)
Dept: WOMENS IMAGING | Age: 67
Discharge: HOME | End: 2022-01-26
Payer: COMMERCIAL

## 2022-01-24 DIAGNOSIS — Z12.31 ENCOUNTER FOR SCREENING MAMMOGRAM FOR BREAST CANCER: ICD-10-CM

## 2022-01-24 PROCEDURE — 77067 SCR MAMMO BI INCL CAD: CPT

## 2022-01-25 DIAGNOSIS — R92.8 ABNORMAL MAMMOGRAM OF RIGHT BREAST: Primary | ICD-10-CM

## 2022-02-02 ENCOUNTER — HOSPITAL ENCOUNTER (OUTPATIENT)
Dept: ULTRASOUND IMAGING | Age: 67
Discharge: HOME | End: 2022-02-04
Payer: COMMERCIAL

## 2022-02-02 ENCOUNTER — HOSPITAL ENCOUNTER (OUTPATIENT)
Dept: WOMENS IMAGING | Age: 67
Discharge: HOME | End: 2022-02-04
Payer: COMMERCIAL

## 2022-02-02 DIAGNOSIS — R92.8 ABNORMAL MAMMOGRAM: ICD-10-CM

## 2022-02-02 DIAGNOSIS — R92.8 ABNORMAL MAMMOGRAM OF RIGHT BREAST: ICD-10-CM

## 2022-02-02 PROCEDURE — 76642 ULTRASOUND BREAST LIMITED: CPT

## 2022-02-02 PROCEDURE — G0279 TOMOSYNTHESIS, MAMMO: HCPCS

## 2022-02-04 ENCOUNTER — TELEPHONE (OUTPATIENT)
Dept: OBGYN | Age: 67
End: 2022-02-04

## 2022-02-09 ENCOUNTER — OFFICE VISIT (OUTPATIENT)
Dept: FAMILY MEDICINE CLINIC | Age: 67
End: 2022-02-09
Payer: COMMERCIAL

## 2022-02-09 ENCOUNTER — HOSPITAL ENCOUNTER (OUTPATIENT)
Dept: GENERAL RADIOLOGY | Age: 67
Discharge: HOME | End: 2022-02-11
Payer: COMMERCIAL

## 2022-02-09 VITALS
HEIGHT: 65 IN | TEMPERATURE: 95.1 F | HEART RATE: 55 BPM | BODY MASS INDEX: 33.15 KG/M2 | DIASTOLIC BLOOD PRESSURE: 82 MMHG | OXYGEN SATURATION: 97 % | WEIGHT: 199 LBS | SYSTOLIC BLOOD PRESSURE: 128 MMHG

## 2022-02-09 DIAGNOSIS — D32.9 MENINGIOMA (HCC): ICD-10-CM

## 2022-02-09 DIAGNOSIS — M54.50 ACUTE BILATERAL LOW BACK PAIN WITHOUT SCIATICA: ICD-10-CM

## 2022-02-09 DIAGNOSIS — I48.0 PAF (PAROXYSMAL ATRIAL FIBRILLATION) (HCC): ICD-10-CM

## 2022-02-09 DIAGNOSIS — M54.50 ACUTE BILATERAL LOW BACK PAIN WITHOUT SCIATICA: Primary | ICD-10-CM

## 2022-02-09 PROCEDURE — G8484 FLU IMMUNIZE NO ADMIN: HCPCS | Performed by: PHYSICIAN ASSISTANT

## 2022-02-09 PROCEDURE — G8417 CALC BMI ABV UP PARAM F/U: HCPCS | Performed by: PHYSICIAN ASSISTANT

## 2022-02-09 PROCEDURE — 1036F TOBACCO NON-USER: CPT | Performed by: PHYSICIAN ASSISTANT

## 2022-02-09 PROCEDURE — 74018 RADEX ABDOMEN 1 VIEW: CPT

## 2022-02-09 PROCEDURE — G8427 DOCREV CUR MEDS BY ELIG CLIN: HCPCS | Performed by: PHYSICIAN ASSISTANT

## 2022-02-09 PROCEDURE — 3017F COLORECTAL CA SCREEN DOC REV: CPT | Performed by: PHYSICIAN ASSISTANT

## 2022-02-09 PROCEDURE — 99213 OFFICE O/P EST LOW 20 MIN: CPT | Performed by: PHYSICIAN ASSISTANT

## 2022-02-09 RX ORDER — PREDNISONE 20 MG/1
40 TABLET ORAL DAILY
Qty: 20 TABLET | Refills: 0 | Status: SHIPPED | OUTPATIENT
Start: 2022-02-09 | End: 2022-02-19

## 2022-02-09 RX ORDER — CYCLOBENZAPRINE HCL 10 MG
10 TABLET ORAL 3 TIMES DAILY PRN
Qty: 30 TABLET | Refills: 0 | Status: SHIPPED | OUTPATIENT
Start: 2022-02-09 | End: 2022-02-15

## 2022-02-09 ASSESSMENT — ENCOUNTER SYMPTOMS
BACK PAIN: 1
TROUBLE SWALLOWING: 0
DIARRHEA: 0
CHEST TIGHTNESS: 0
VOMITING: 0
SHORTNESS OF BREATH: 0
COUGH: 0
ABDOMINAL PAIN: 0
SINUS PRESSURE: 0

## 2022-02-09 ASSESSMENT — PATIENT HEALTH QUESTIONNAIRE - PHQ9
SUM OF ALL RESPONSES TO PHQ QUESTIONS 1-9: 0
2. FEELING DOWN, DEPRESSED OR HOPELESS: 0
SUM OF ALL RESPONSES TO PHQ QUESTIONS 1-9: 0
SUM OF ALL RESPONSES TO PHQ QUESTIONS 1-9: 0
SUM OF ALL RESPONSES TO PHQ9 QUESTIONS 1 & 2: 0
1. LITTLE INTEREST OR PLEASURE IN DOING THINGS: 0
SUM OF ALL RESPONSES TO PHQ QUESTIONS 1-9: 0

## 2022-02-09 NOTE — PROGRESS NOTES
Subjective:      Patient ID: Damian Ospina is a 61 y.o. female who presents today for:  Chief Complaint   Patient presents with    Back Pain     Patient has back pain wear the bra strap goes and it radiates to the front       HPI   61year old female who presents with bilateral flank pain that wraps around to the front. Had pain for the past week. Denies trauma. States it get to be 10/10 at time    Past Medical History:   Diagnosis Date    At[facility]     Brain tumor ([facility] 07/16/2018    Chest pain     Degenerative disc disease, lumbar 1/21/2016    Hx of colonic polyps     Hypertension 2/24/2015    Irritable bowel syndrome     Low back pain 10/16/2018    Lumbar radiculopathy 3/14/2016    Rapid heart rate     Stress incontinence      Past Surgical History:   Procedure Laterality Date    APPENDECTOMY      BACK SURGERY  07/14/2017    CARDIAC CATHETERIZATION  11/2/15    DR. PALMER    HYSTERECTOMY      ND CRANIECT EXCIS SKULL BONE LESN N/A 7/24/2018    CRANIOTOMY performed by Minna Bernardo MD at [address] [place] [address] TOTAL KNEE ARTHROPLASTY  2009    UPPER GASTROINTESTINAL ENDOSCOPY  1/15/13    DR. MEEKS     Social History     Socioeconomic History    Marital status:      Spouse name: Not on file    Number of children: Not on file    Years of education: Not on file    Highest education level: Not on file   Occupational History    Not on file   Tobacco Use    Smoking status: Never Smoker    Smokeless tobacco: Never Used   Vaping Use    Vaping Use: Never used   Substance and Sexual Activity    Alcohol use:  Yes     Alcohol/week: 0.0 standard drinks     Comment: socially    Drug use: No    Sexual activity: Not Currently   Other Topics Concern    Not on file   Social History Narrative    Not on file     Social Determinants of Health     Financial Resource Strain: Low Risk     Difficulty of Paying Living Expenses: Not hard at all   Food Insecurity: No Food Insecurity    Worried About Running Out of Food in the Last Year: Never true    Ran Out of Food in the Last Year: Never true   Transportation Needs: No Transportation Needs    Lack of Transportation (Medical): No    Lack of Transportation (Non-Medical):  No   Physical Activity:     Days of Exercise per Week: Not on file    Minutes of Exercise per Session: Not on file   Stress:     Feeling of Stress : Not on file   Social Connections:     Frequency of Communication with Friends and Family: Not on file    Frequency of Social Gatherings with Friends and Family: Not on file    Attends Gnosticist Services: Not on file    Active Member of [address] [place] [address] [employer] or Organizations: Not on file    Attends Club or Organization Meetings: Not on file    Marital Status: Not on file   Intimate Partner Violence:     Fear of Current or Ex-Partner: Not on file    Emotionally Abused: Not on file    Physically Abused: Not on file    Sexually Abused: Not on file   Housing Stability:     Unable to Pay for Housing in the Last Year: Not on file    Number of [place] in the Last Year: Not on file    Unstable Housing in the Last Year: Not on file     Family History   Problem Relation Age of Onset    Cancer Father         COLON    High Blood Pressure Father     Heart Disease Father     Breast Cancer Neg Hx      Allergies   Allergen Reactions    Codeine     Demerol     Dye [Iodides] Nausea Only    Morphine     Penicillins Other (See Comments)    Sulfa Antibiotics Hives and Itching    Levofloxacin Itching and Rash     Current Outpatient Medications   Medication Sig Dispense Refill    predniSONE (DELTASONE) 20 MG tablet Take 2 tablets by mouth daily for 10 days 20 tablet 0    cyclobenzaprine (FLEXERIL) 10 MG tablet Take 1 tablet by mouth 3 times daily as needed for Muscle spasms 30 tablet 0    metoprolol succinate (TOPROL XL) 100 MG extended release tablet TAKE 1 TABLET DAILY 30 tablet 0    venlafaxine (EFFEXOR XR) 75 MG extended release capsule TAKE ONE CAPSULE BY MOUTH EVERY DAY 90 capsule 3    estradiol (VIVELLE) 0.05 MG/24HR Place 1 patch onto the skin once a week 24 patch 3    ondansetron (ZOFRAN) 4 MG tablet Take 1 tablet by mouth 3 times daily as needed for Nausea or Vomiting 30 tablet 0    LEVBID 0.375 MG extended release tablet TAKE 1 TABLET EVERY 12 HOURS AS NEEDED FOR CRAMPING 180 tablet 3    simethicone (MYLICON) 80 MG chewable tablet Take 1 tablet by mouth 4 times daily as needed for Flatulence 180 tablet 3    Handicap Placard MISC by Does not apply route Good from 8/9/21 to 8/9/26 1 each 0    ondansetron (ZOFRAN-ODT) 4 MG disintegrating tablet DISSOLVE ONE TABLET BY MOUTH EVERY 8 HOURS AS NEEDED FOR NAUSEA/ VOMITING 15 tablet 0    psyllium (METAMUCIL SMOOTH TEXTURE) 58.6 % powder Take 2 teaspoon with 8 to 10 oz water. 425 g 2    traZODone (DESYREL) 50 MG tablet Take 1 tablet by mouth nightly 90 tablet 3    lactobacillus (CULTURELLE) capsule Take 1 capsule by mouth daily 30 capsule 1    cetirizine-psuedoephedrine (ZYRTEC-D) 5-120 MG per extended release tablet Take by mouth      fluticasone (FLONASE) 50 MCG/ACT nasal spray 2 sprays by Nasal route daily 1 Bottle 3     No current facility-administered medications for this visit. Review of Systems   Constitutional: Negative for activity change, appetite change, chills, fever and unexpected weight change. HENT: Negative for drooling, ear pain, nosebleeds, sinus pressure and trouble swallowing. Respiratory: Negative for cough, chest tightness and shortness of breath. Cardiovascular: Negative for chest pain and leg swelling. Gastrointestinal: Negative for abdominal pain, diarrhea and vomiting. Endocrine: Negative for polydipsia and polyphagia. Genitourinary: Positive for flank pain. Negative for dysuria and frequency. Musculoskeletal: Positive for back pain. Negative for myalgias. Skin: Negative for pallor and rash. Neurological: Negative for syncope, weakness and headaches. Hematological: Does not bruise/bleed easily. Psychiatric/Behavioral: Negative for agitation, behavioral problems and confusion. All other systems reviewed and are negative. Objective:   /82   Pulse 55   Temp 95.1 °F (35.1 °C) (Infrared)   Ht 5' 5\" (1.651 m)   Wt 199 lb (90.3 kg)   LMP  (LMP Unknown)   SpO2 97%   BMI 33.12 kg/m²     Physical Exam  Vitals and nursing note reviewed. Constitutional:       General: She is awake. She is not in acute distress. Appearance: Normal appearance. She is well-developed and normal weight. She is not ill-appearing, toxic-appearing or diaphoretic. Comments: No photophobia. No phonophobia. HENT:      Head: Normocephalic and atraumatic. No Ramirez's sign. Right Ear: External ear normal.      Left Ear: External ear normal.      Nose: Nose normal. No congestion or rhinorrhea. Mouth/Throat:      Mouth: Mucous membranes are moist.      Pharynx: Oropharynx is clear. No oropharyngeal exudate or posterior oropharyngeal erythema. Eyes:      General: No scleral icterus. Right eye: No foreign body or discharge. Left eye: No discharge. Extraocular Movements: Extraocular movements intact. Conjunctiva/sclera: Conjunctivae normal.      Left eye: No exudate. Pupils: Pupils are equal, round, and reactive to light. Neck:      Vascular: No JVD. Trachea: No tracheal deviation. Comments: No meningismus. Cardiovascular:      Rate and Rhythm: Normal rate and regular rhythm. Heart sounds: Normal heart sounds. Heart sounds not distant. No murmur heard. No friction rub. No gallop. Pulmonary:      Effort: Pulmonary effort is normal. No respiratory distress. Breath sounds: Normal breath sounds. No stridor. No wheezing, rhonchi or rales. Chest:      Chest wall: No tenderness. Abdominal:      General: Abdomen is flat.  Bowel sounds are normal. There is no distension. Palpations: Abdomen is soft. There is no mass. Tenderness: There is no abdominal tenderness. There is no right CVA tenderness, left CVA tenderness, guarding or rebound. Hernia: No hernia is present. Musculoskeletal:         General: No swelling, tenderness, deformity or signs of injury. Normal range of motion. Cervical back: Normal range of motion and neck supple. No rigidity. Lymphadenopathy:      Head:      Right side of head: No submental adenopathy. Left side of head: No submental adenopathy. Skin:     General: Skin is warm and dry. Capillary Refill: Capillary refill takes less than 2 seconds. Coloration: Skin is not jaundiced or pale. Findings: No bruising, erythema, lesion or rash. Neurological:      General: No focal deficit present. Mental Status: She is alert and oriented to person, place, and time. Mental status is at baseline. Cranial Nerves: No cranial nerve deficit. Sensory: No sensory deficit. Motor: No weakness. Coordination: Coordination normal.      Deep Tendon Reflexes: Reflexes are normal and symmetric. Psychiatric:         Mood and Affect: Mood normal.         Behavior: Behavior normal. Behavior is cooperative. Thought Content: Thought content normal.         Judgment: Judgment normal.         Assessment:       Diagnosis Orders   1. Acute bilateral low back pain without sciatica  XR ABDOMEN (KUB) (SINGLE AP VIEW)    predniSONE (DELTASONE) 20 MG tablet    cyclobenzaprine (FLEXERIL) 10 MG tablet   2. PAF (paroxysmal atrial fibrillation) ([facility]     3. Meningioma ([facility] [facility]       No results found for this visit on 02/09/22. Plan:     Assessment & Plan   Diogo Dietz was seen today for back pain. Diagnoses and all orders for this visit:    Acute bilateral low back pain without sciatica  -     XR ABDOMEN (KUB) (SINGLE AP VIEW); Future  -     predniSONE (DELTASONE) 20 MG tablet;  Take 2 tablets by mouth daily for 10 days  -     cyclobenzaprine (FLEXERIL) 10 MG tablet; Take 1 tablet by mouth 3 times daily as needed for Muscle spasms    PAF (paroxysmal atrial fibrillation) (HCC)    Meningioma ([facility] [address]      Orders Placed This Encounter   Procedures    XR ABDOMEN (KUB) (SINGLE AP VIEW)     Standing Status:   Future     Standing Expiration Date:   2/9/2023     Order Specific Question:   Reason for exam:     Answer:   r/o nephrolithiasis     Orders Placed This Encounter   Medications    predniSONE (DELTASONE) 20 MG tablet     Sig: Take 2 tablets by mouth daily for 10 days     Dispense:  20 tablet     Refill:  0    cyclobenzaprine (FLEXERIL) 10 MG tablet     Sig: Take 1 tablet by mouth 3 times daily as needed for Muscle spasms     Dispense:  30 tablet     Refill:  0     There are no discontinued medications. Return if symptoms worsen or fail to improve. Reviewed with the patient/family: current clinical status & medications. Side effects of the medication prescribed today, as well as treatment plan/rationale and result expectations have been discussed with the patient/family who expresses understanding. Patient will be discharged home in stable condition. Follow up with PCP to evaluate treatment results or return if symptoms worsen or fail to improve. Discussed signs and symptoms which require immediate follow-up in ED/call to 911. Understanding verbalized. I have reviewed the patient's medical history in detail and updated the computerized patient record.     SANTINO Tabares

## 2022-02-15 ENCOUNTER — APPOINTMENT (OUTPATIENT)
Dept: CT IMAGING | Age: 67
End: 2022-02-15
Payer: COMMERCIAL

## 2022-02-15 ENCOUNTER — HOSPITAL ENCOUNTER (EMERGENCY)
Age: 67
Discharge: HOME | End: 2022-02-15
Attending: EMERGENCY MEDICINE
Payer: COMMERCIAL

## 2022-02-15 ENCOUNTER — NURSE TRIAGE (OUTPATIENT)
Dept: OTHER | Facility: CLINIC | Age: 67
End: 2022-02-15

## 2022-02-15 ENCOUNTER — TELEPHONE (OUTPATIENT)
Dept: FAMILY MEDICINE CLINIC | Age: 67
End: 2022-02-15

## 2022-02-15 VITALS
BODY MASS INDEX: 32.82 KG/M2 | TEMPERATURE: 98.7 F | OXYGEN SATURATION: 97 % | WEIGHT: 197 LBS | HEIGHT: 65 IN | SYSTOLIC BLOOD PRESSURE: 125 MMHG | HEART RATE: 62 BPM | RESPIRATION RATE: 16 BRPM | DIASTOLIC BLOOD PRESSURE: 72 MMHG

## 2022-02-15 DIAGNOSIS — R10.10 PAIN OF UPPER ABDOMEN: Primary | ICD-10-CM

## 2022-02-15 LAB
ALBUMIN SERPL-MCNC: 3.8 G/DL (ref 3.5–4.6)
ALP BLD-CCNC: 106 U/L (ref 40–130)
ALT SERPL-CCNC: 13 U/L (ref 0–33)
ANION GAP SERPL CALCULATED.3IONS-SCNC: 11 MEQ/L (ref 9–15)
APTT: 26.8 SEC (ref 24.4–36.8)
AST SERPL-CCNC: 16 U/L (ref 0–35)
BASOPHILS ABSOLUTE: 0 K/UL (ref 0–0.2)
BASOPHILS RELATIVE PERCENT: 0.3 %
BILIRUB SERPL-MCNC: 0.6 MG/DL (ref 0.2–0.7)
BUN BLDV-MCNC: 14 MG/DL (ref 8–23)
CALCIUM SERPL-MCNC: 8.8 MG/DL (ref 8.5–9.9)
CHLORIDE BLD-SCNC: 105 MEQ/L (ref 95–107)
CO2: 21 MEQ/L (ref 20–31)
CREAT SERPL-MCNC: 0.5 MG/DL (ref 0.5–0.9)
EOSINOPHILS ABSOLUTE: 0 K/UL (ref 0–0.7)
EOSINOPHILS RELATIVE PERCENT: 0.1 %
GFR AFRICAN AMERICAN: >60
GFR AFRICAN AMERICAN: >60
GFR NON-AFRICAN AMERICAN: >60
GFR NON-AFRICAN AMERICAN: >60
GLOBULIN: 3 G/DL (ref 2.3–3.5)
GLUCOSE BLD-MCNC: 110 MG/DL (ref 70–99)
HCT VFR BLD CALC: 48.5 % (ref 37–47)
HEMOGLOBIN: 16.3 G/DL (ref 12–16)
INR BLD: 1
LYMPHOCYTES ABSOLUTE: 1 K/UL (ref 1–4.8)
LYMPHOCYTES RELATIVE PERCENT: 11.2 %
MCH RBC QN AUTO: 30.7 PG (ref 27–31.3)
MCHC RBC AUTO-ENTMCNC: 33.6 % (ref 33–37)
MCV RBC AUTO: 91.3 FL (ref 82–100)
MONOCYTES ABSOLUTE: 0.2 K/UL (ref 0.2–0.8)
MONOCYTES RELATIVE PERCENT: 2 %
NEUTROPHILS ABSOLUTE: 7.8 K/UL (ref 1.4–6.5)
NEUTROPHILS RELATIVE PERCENT: 86.4 %
PDW BLD-RTO: 13.8 % (ref 11.5–14.5)
PERFORMED ON: ABNORMAL
PLATELET # BLD: 247 K/UL (ref 130–400)
POC CREATININE WHOLE BLOOD: 0.5
POC CREATININE: 0.5 MG/DL (ref 0.6–1.2)
POC SAMPLE TYPE: ABNORMAL
POTASSIUM SERPL-SCNC: 4.3 MEQ/L (ref 3.4–4.9)
PROTHROMBIN TIME: 13.2 SEC (ref 12.3–14.9)
RBC # BLD: 5.32 M/UL (ref 4.2–5.4)
SODIUM BLD-SCNC: 137 MEQ/L (ref 135–144)
TOTAL CK: 34 U/L (ref 0–170)
TOTAL PROTEIN: 6.8 G/DL (ref 6.3–8)
TROPONIN: <0.01 NG/ML (ref 0–0.01)
WBC # BLD: 9 K/UL (ref 4.8–10.8)

## 2022-02-15 PROCEDURE — 99284 EMERGENCY DEPT VISIT MOD MDM: CPT

## 2022-02-15 PROCEDURE — 82550 ASSAY OF CK (CPK): CPT

## 2022-02-15 PROCEDURE — 96374 THER/PROPH/DIAG INJ IV PUSH: CPT

## 2022-02-15 PROCEDURE — 85730 THROMBOPLASTIN TIME PARTIAL: CPT

## 2022-02-15 PROCEDURE — 6360000004 HC RX CONTRAST MEDICATION: Performed by: STUDENT IN AN ORGANIZED HEALTH CARE EDUCATION/TRAINING PROGRAM

## 2022-02-15 PROCEDURE — 71275 CT ANGIOGRAPHY CHEST: CPT

## 2022-02-15 PROCEDURE — 84484 ASSAY OF TROPONIN QUANT: CPT

## 2022-02-15 PROCEDURE — 85610 PROTHROMBIN TIME: CPT

## 2022-02-15 PROCEDURE — 80053 COMPREHEN METABOLIC PANEL: CPT

## 2022-02-15 PROCEDURE — 85025 COMPLETE CBC W/AUTO DIFF WBC: CPT

## 2022-02-15 PROCEDURE — 93005 ELECTROCARDIOGRAM TRACING: CPT | Performed by: EMERGENCY MEDICINE

## 2022-02-15 PROCEDURE — 36415 COLL VENOUS BLD VENIPUNCTURE: CPT

## 2022-02-15 PROCEDURE — 6360000002 HC RX W HCPCS: Performed by: STUDENT IN AN ORGANIZED HEALTH CARE EDUCATION/TRAINING PROGRAM

## 2022-02-15 PROCEDURE — 96375 TX/PRO/DX INJ NEW DRUG ADDON: CPT

## 2022-02-15 PROCEDURE — 6370000000 HC RX 637 (ALT 250 FOR IP): Performed by: STUDENT IN AN ORGANIZED HEALTH CARE EDUCATION/TRAINING PROGRAM

## 2022-02-15 RX ORDER — ACETAMINOPHEN 500 MG
1000 TABLET ORAL ONCE
Status: COMPLETED | OUTPATIENT
Start: 2022-02-15 | End: 2022-02-15

## 2022-02-15 RX ORDER — CYCLOBENZAPRINE HCL 10 MG
10 TABLET ORAL NIGHTLY PRN
Qty: 10 TABLET | Refills: 0 | Status: SHIPPED | OUTPATIENT
Start: 2022-02-15 | End: 2022-02-25

## 2022-02-15 RX ORDER — NAPROXEN 500 MG/1
500 TABLET ORAL 2 TIMES DAILY WITH MEALS
Qty: 20 TABLET | Refills: 0 | Status: SHIPPED | OUTPATIENT
Start: 2022-02-15 | End: 2022-04-13 | Stop reason: ALTCHOICE

## 2022-02-15 RX ORDER — KETOROLAC TROMETHAMINE 15 MG/ML
15 INJECTION, SOLUTION INTRAMUSCULAR; INTRAVENOUS ONCE
Status: COMPLETED | OUTPATIENT
Start: 2022-02-15 | End: 2022-02-15

## 2022-02-15 RX ORDER — ONDANSETRON 2 MG/ML
4 INJECTION INTRAMUSCULAR; INTRAVENOUS ONCE
Status: COMPLETED | OUTPATIENT
Start: 2022-02-15 | End: 2022-02-15

## 2022-02-15 RX ADMIN — ACETAMINOPHEN 1000 MG: 500 TABLET ORAL at 16:29

## 2022-02-15 RX ADMIN — IOPAMIDOL 100 ML: 612 INJECTION, SOLUTION INTRAVENOUS at 16:50

## 2022-02-15 RX ADMIN — ONDANSETRON 4 MG: 2 INJECTION INTRAMUSCULAR; INTRAVENOUS at 16:29

## 2022-02-15 RX ADMIN — KETOROLAC TROMETHAMINE 15 MG: 15 INJECTION, SOLUTION INTRAMUSCULAR; INTRAVENOUS at 16:29

## 2022-02-15 ASSESSMENT — PAIN DESCRIPTION - ONSET: ONSET: ON-GOING

## 2022-02-15 ASSESSMENT — PAIN DESCRIPTION - DESCRIPTORS: DESCRIPTORS: SQUEEZING;PRESSURE

## 2022-02-15 ASSESSMENT — ENCOUNTER SYMPTOMS
NAUSEA: 0
SHORTNESS OF BREATH: 0
ABDOMINAL PAIN: 1
EYE PAIN: 0
BACK PAIN: 1
CHEST TIGHTNESS: 0
DIARRHEA: 0
SORE THROAT: 0

## 2022-02-15 ASSESSMENT — PAIN SCALES - GENERAL: PAINLEVEL_OUTOF10: 9

## 2022-02-15 ASSESSMENT — PAIN DESCRIPTION - PROGRESSION: CLINICAL_PROGRESSION: NOT CHANGED

## 2022-02-15 ASSESSMENT — PAIN DESCRIPTION - PAIN TYPE: TYPE: ACUTE PAIN

## 2022-02-15 ASSESSMENT — PAIN DESCRIPTION - LOCATION: LOCATION: BACK

## 2022-02-15 ASSESSMENT — PAIN DESCRIPTION - ORIENTATION: ORIENTATION: MID

## 2022-02-15 ASSESSMENT — PAIN DESCRIPTION - FREQUENCY: FREQUENCY: CONTINUOUS

## 2022-02-15 ASSESSMENT — ACTIVITIES OF DAILY LIVING (ADL): EFFECT OF PAIN ON DAILY ACTIVITIES: WORSE WITH MOVEMENT

## 2022-02-15 NOTE — TELEPHONE ENCOUNTER
Received call from Tigist Barr at [facility] with Red Flag Complaint. Subjective: Caller states \"It started back on 2/9/21 off and on the week prior to that. I went to walk-in 2 weeks ago, they thought could be kidney stone or back issue. He gave me a muscle relaxer and prednisone - it eased off until today. It feels like I'm having to take deeper breath to feel okay due to the pain. \"     Current Symptoms:   Upper back pain - increasing intensity comes around to front on both sides under breast area     Slight blood in urine (Pt has been seen for this before, reports \"normal, it is no worse than usual\")    Diarrhea - history of IBS - denies blood in stool or black, tarry stools    Abdominal pain earlier today now resolved - denies N/V    Denies falls/trauma    Onset: 3 weeks ago; worsening since today     Associated Symptoms: NA    Pain Severity: 8/10; aching; constant - increases to 10/10+ when moving around     Temperature: Denies fever     What has been tried: Muscle relaxer and prednisone    LMP: NA Pregnant: NA    Recommended disposition: GO TO ED NOW. Pt advised any chest pain or trouble breathing call 911 for EMS transport. Care advice provided, patient verbalizes understanding; denies any other questions or concerns; instructed to call back for any new or worsening symptoms. Patient/caller proceeding to nearest Emergency Department     Attention Provider: Thank you for allowing me to participate in the care of your patient. The patient was connected to triage in response to information provided to the ECC/PSC. Please do not respond through this encounter as the response is not directed to a shared pool.     Reason for Disposition   Blood in urine (red, pink, or tea-colored)    Protocols used: BACK PAIN-ADULT-OH

## 2022-02-15 NOTE — TELEPHONE ENCOUNTER
----- Message from Rakesh Ovalle sent at 2/15/2022 12:24 PM EST -----  Subject: Results Request    QUESTIONS  Which lab or imaging result is the patient calling about? XRAY ABDOMEN   Which provider ordered the test? Ivania Ping   At what location was the test performed? Date the test was performed? 2022-02-09  Additional Information for Provider? PATIENT STATED THE WALK IN PROVIDER   WAS SUPPOSED TO CALLED WITH XRAY RESULTS AND NEVER DID WOULD LIKE THE   RESULTS. THE PAIN WENT A WAY A LITTLE BUT BACK AGAIN.   ---------------------------------------------------------------------------  --------------  CALL BACK INFO  What is the best way for the office to contact you? OK to leave message on   voicemail  Preferred Call Back Phone Number?  [phone]

## 2022-02-15 NOTE — ED PROVIDER NOTES
[facility] ED  eMERGENCYdEPARTMENT eNCOUnter      Pt Name: Roxanna Zavaleta  MRN: [id number]  Zak 1955  Date of evaluation: 2/15/2022  Provider:Sebas Dubon PA-C    CHIEF COMPLAINT           HPI  Roxanna Zavaleta is a 61 y.o. female per chart review has a h/o A. fib, PE, TMJ, palpitations, back pain presents with circumferential abdominal pain and back pain. Patient reports gradual onset, constant, sharp bandlike pain on her torso that improves when she takes in deep breaths and is worse with palpation. She does state that this is what her last pulmonary embolism felt like. She denies shortness of breath, fever, chills, chest pain, abdominal pain. ROS  Review of Systems   Constitutional: Negative for chills, fatigue and fever. HENT: Negative for ear pain, hearing loss and sore throat. Eyes: Negative for pain and visual disturbance. Respiratory: Negative for chest tightness and shortness of breath. Cardiovascular: Negative for chest pain. Gastrointestinal: Positive for abdominal pain. Negative for diarrhea and nausea. Endocrine: Negative for cold intolerance. Genitourinary: Negative for hematuria. Musculoskeletal: Positive for back pain. Skin: Negative for rash and wound. Neurological: Negative for dizziness and headaches. Psychiatric/Behavioral: Negative for behavioral problems and confusion. Except as noted above the remainder of the review of systems was reviewed and negative.        PAST MEDICAL HISTORY     Past Medical History:   Diagnosis Date    Atrial fibrillation ([facility]     Brain tumor ([facility] 07/16/2018    Chest pain     Degenerative disc disease, lumbar 1/21/2016    Hx of colonic polyps     Hypertension 2/24/2015    Irritable bowel syndrome     Low back pain 10/16/2018    Lumbar radiculopathy 3/14/2016    Rapid heart rate     Stress incontinence          SURGICAL HISTORY       Past Surgical History:   Procedure Laterality Date    APPENDECTOMY      BACK SURGERY  07/14/2017    CARDIAC CATHETERIZATION  11/2/15    DR. PALMER    HYSTERECTOMY      [place] CRANIECT EXCIS SKULL BONE LESN N/A 7/24/2018    CRANIOTOMY performed by Andrea Light MD at [address] [place] [address] TOTAL KNEE ARTHROPLASTY  2009    UPPER GASTROINTESTINAL ENDOSCOPY  1/15/13    DR. Dyson [facility]       Discharge Medication List as of 2/15/2022  5:28 PM      CONTINUE these medications which have NOT CHANGED    Details   predniSONE (DELTASONE) 20 MG tablet Take 2 tablets by mouth daily for 10 days, Disp-20 tablet, R-0Normal      metoprolol succinate (TOPROL XL) 100 MG extended release tablet TAKE 1 TABLET DAILY, Disp-30 tablet, R-0Patient needs an apptNormal      venlafaxine (EFFEXOR XR) 75 MG extended release capsule TAKE ONE CAPSULE BY MOUTH EVERY DAY, Disp-90 capsule, R-3Normal      estradiol (VIVELLE) 0.05 MG/24HR Place 1 patch onto the skin once a week, Disp-24 patch, R-3Normal      ondansetron (ZOFRAN) 4 MG tablet Take 1 tablet by mouth 3 times daily as needed for Nausea or Vomiting, Disp-30 tablet, R-0Normal      LEVBID 0.375 MG extended release tablet TAKE 1 TABLET EVERY 12 HOURS AS NEEDED FOR CRAMPING, Disp-180 tablet, R-3Normal      simethicone (MYLICON) 80 MG chewable tablet Take 1 tablet by mouth 4 times daily as needed for Flatulence, Disp-180 tablet, R-3Normal      Handicap Placard MISC Starting Mon 8/9/2021, Disp-1 each, R-0, PrintGood from 8/9/21 to 8/9/26      ondansetron (ZOFRAN-ODT) 4 MG disintegrating tablet DISSOLVE ONE TABLET BY MOUTH EVERY 8 HOURS AS NEEDED FOR NAUSEA/ VOMITING, Disp-15 tablet, R-0Normal      psyllium (METAMUCIL SMOOTH TEXTURE) 58.6 % powder Take 2 teaspoon with 8 to 10 oz water., Disp-425 g, R-2Normal      traZODone (DESYREL) 50 MG tablet Take 1 tablet by mouth nightly, Disp-90 tablet, R-3Normal      lactobacillus (CULTURELLE) capsule Take 1 capsule by mouth daily, Disp-30 capsule, R-1Normal      cetirizine-psuedoephedrine (ZYRTEC-D) 5-120 MG per extended release tablet Take by mouthHistorical Med      fluticasone (FLONASE) 50 MCG/ACT nasal spray 2 sprays by Nasal route daily, Disp-1 Bottle, R-3Normal             ALLERGIES     Codeine, Demerol, Dye [iodides], Morphine, Penicillins, Sulfa antibiotics, and Levofloxacin    FAMILY HISTORY       Family History   Problem Relation Age of Onset    Cancer Father         COLON    High Blood Pressure Father     Heart Disease Father     Breast Cancer Neg Hx           SOCIAL HISTORY       Social History     Socioeconomic History    Marital status:      Spouse name: None    Number of children: None    Years of education: None    Highest education level: None   Occupational History    None   Tobacco Use    Smoking status: Never Smoker    Smokeless tobacco: Never Used   Vaping Use    Vaping Use: Never used   Substance and Sexual Activity    Alcohol use: Not Currently     Alcohol/week: 0.0 standard drinks     Comment: socially    Drug use: No    Sexual activity: Not Currently   Other Topics Concern    None   Social History Narrative    None     Social Determinants of Health     Financial Resource Strain: Low Risk     Difficulty of Paying Living Expenses: Not hard at all   Food Insecurity: No Food Insecurity    Worried About Running Out of Food in the Last Year: Never true    Cristiano of Food in the Last Year: Never true   Transportation Needs: No Transportation Needs    Lack of Transportation (Medical): No    Lack of Transportation (Non-Medical):  No   Physical Activity:     Days of Exercise per Week: Not on file    Minutes of Exercise per Session: Not on file   Stress:     Feeling of Stress : Not on file   Social Connections:     Frequency of Communication with Friends and Family: Not on file    Frequency of Social Gatherings with Friends and Family: Not on file    Attends Episcopal Services: Not on file   [employer] or Organizations: Not on file    Attends Club or Organization Meetings: Not on file    Marital Status: Not on file   Intimate Partner Violence:     Fear of Current or Ex-Partner: Not on file    Emotionally Abused: Not on file    Physically Abused: Not on file    Sexually Abused: Not on file   Housing Stability:     Unable to Pay for Housing in the Last Year: Not on file    Number of [place] in the Last Year: Not on file    Unstable Housing in the Last Year: Not on file         PHYSICAL EXAM       ED Triage Vitals [02/15/22 1526]   BP Temp Temp Source Pulse Resp SpO2 Height Weight   125/72 98.7 °F (37.1 °C) Oral 62 16 97 % 5' 5\" (1.651 m) 197 lb (89.4 kg)       Physical Exam  Constitutional:       Appearance: Normal appearance. HENT:      Head: Normocephalic and atraumatic. Right Ear: External ear normal.      Left Ear: External ear normal.      Nose: Nose normal.      Mouth/Throat:      Mouth: Mucous membranes are moist.   Eyes:      Extraocular Movements: Extraocular movements intact. Conjunctiva/sclera: Conjunctivae normal.   Cardiovascular:      Rate and Rhythm: Normal rate and regular rhythm. Heart sounds: Normal heart sounds. Pulmonary:      Effort: Pulmonary effort is normal.      Breath sounds: Normal breath sounds. No stridor. No wheezing or rhonchi. Abdominal:      Palpations: Abdomen is soft. Tenderness: There is no abdominal tenderness. Musculoskeletal:         General: Normal range of motion. Cervical back: Normal range of motion and neck supple. No tenderness. Comments: Patient has pain when pressing on the sides of her ribs on both sides. She has no abdominal pain with pressure or back pain with pressure. Patient states the pain is relieved when taking deep breaths or sitting in the right position. Skin:     General: Skin is warm and dry. Neurological:      General: No focal deficit present.       Mental Status: She is alert and oriented to person, place, and time. Psychiatric:         Mood and Affect: Mood normal.         Behavior: Behavior normal.           MDM  This is a 68-year-old female presenting with torso pain. Patient is afebrile and hemodynamically stable. EKG shows NSR with HR 62, normal axis, normal intervals, no ST changes. Labs unremarkable including cardiac labs. Due to patient's history of PE a CTA was performed which found no evidence of pulmonary embolism or cardiopulmonary findings. Patient given IV Toradol, Zofran, p.o. Tylenol. Patient reevaluated and states pain is about the same. Patient is agreeable to discharge for likely costochondritis versus pleurisy. Patient given anti-inflammatories and will follow up with primary care or return if symptoms change or worsen. FINAL IMPRESSION      1.  Pain of upper abdomen          DISPOSITION/PLAN   DISPOSITION          DISCHARGE MEDICATIONS:  [unfilled]         Los Uribe PA-C(electronically signed)  Attending Emergency Physician           Los Uribe PA-C  02/15/22 [id number]

## 2022-02-16 LAB
EKG ATRIAL RATE: 62 BPM
EKG P AXIS: 8 DEGREES
EKG P-R INTERVAL: 182 MS
EKG Q-T INTERVAL: 446 MS
EKG QRS DURATION: 128 MS
EKG QTC CALCULATION (BAZETT): 452 MS
EKG R AXIS: -11 DEGREES
EKG T AXIS: 0 DEGREES
EKG VENTRICULAR RATE: 62 BPM

## 2022-02-28 ENCOUNTER — OFFICE VISIT (OUTPATIENT)
Dept: FAMILY MEDICINE CLINIC | Age: 67
End: 2022-02-28
Payer: COMMERCIAL

## 2022-02-28 VITALS
RESPIRATION RATE: 16 BRPM | TEMPERATURE: 98.8 F | HEART RATE: 61 BPM | BODY MASS INDEX: 32.82 KG/M2 | DIASTOLIC BLOOD PRESSURE: 82 MMHG | OXYGEN SATURATION: 95 % | HEIGHT: 65 IN | WEIGHT: 197 LBS | SYSTOLIC BLOOD PRESSURE: 120 MMHG

## 2022-02-28 DIAGNOSIS — M47.814 OSTEOARTHRITIS OF THORACIC SPINE, UNSPECIFIED SPINAL OSTEOARTHRITIS COMPLICATION STATUS: ICD-10-CM

## 2022-02-28 DIAGNOSIS — M54.9 MID BACK PAIN: Primary | ICD-10-CM

## 2022-02-28 PROCEDURE — 1090F PRES/ABSN URINE INCON ASSESS: CPT | Performed by: FAMILY MEDICINE

## 2022-02-28 PROCEDURE — 1036F TOBACCO NON-USER: CPT | Performed by: FAMILY MEDICINE

## 2022-02-28 PROCEDURE — 1123F ACP DISCUSS/DSCN MKR DOCD: CPT | Performed by: FAMILY MEDICINE

## 2022-02-28 PROCEDURE — G8484 FLU IMMUNIZE NO ADMIN: HCPCS | Performed by: FAMILY MEDICINE

## 2022-02-28 PROCEDURE — 4040F PNEUMOC VAC/ADMIN/RCVD: CPT | Performed by: FAMILY MEDICINE

## 2022-02-28 PROCEDURE — 3017F COLORECTAL CA SCREEN DOC REV: CPT | Performed by: FAMILY MEDICINE

## 2022-02-28 PROCEDURE — G8400 PT W/DXA NO RESULTS DOC: HCPCS | Performed by: FAMILY MEDICINE

## 2022-02-28 PROCEDURE — 99213 OFFICE O/P EST LOW 20 MIN: CPT | Performed by: FAMILY MEDICINE

## 2022-02-28 PROCEDURE — G8427 DOCREV CUR MEDS BY ELIG CLIN: HCPCS | Performed by: FAMILY MEDICINE

## 2022-02-28 PROCEDURE — G8417 CALC BMI ABV UP PARAM F/U: HCPCS | Performed by: FAMILY MEDICINE

## 2022-02-28 ASSESSMENT — ENCOUNTER SYMPTOMS: COUGH: 0

## 2022-02-28 NOTE — PROGRESS NOTES
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 No injury. Pain doing much better since taking naprosyn-75 % improved . No sob. Seen in walk in clinic and also in er-told had pleurisy. No fever or cough. Review of Systems   Constitutional: Negative for chills and fever. Respiratory: Negative for cough. Skin: Negative for rash. Neurological: Negative for weakness. Reviewed allergy, medical, social, surgical, family and med list changes and updated   Files   xray showing multi level spurring along t spine   Social History     Socioeconomic History    Marital status:      Spouse name: None    Number of children: None    Years of education: None    Highest education level: None   Occupational History    None   Tobacco Use    Smoking status: Never Smoker    Smokeless tobacco: Never Used   Vaping Use    Vaping Use: Never used   Substance and Sexual Activity    Alcohol use: Not Currently     Alcohol/week: 0.0 standard drinks     Comment: socially    Drug use: No    Sexual activity: Not Currently   Other Topics Concern    None   Social History Narrative    None     Social Determinants of Health     Financial Resource Strain: Low Risk     Difficulty of Paying Living Expenses: Not hard at all   Food Insecurity: No Food Insecurity    Worried About Running Out of Food in the Last Year: Never true    Cristiano of Food in the Last Year: Never true   Transportation Needs: No Transportation Needs    Lack of Transportation (Medical): No    Lack of Transportation (Non-Medical):  No   Physical Activity:     Days of Exercise per Week: Not on file    Minutes of Exercise per Session: Not on file   Stress:     Feeling of Stress : Not on file   Social Connections:     Frequency of Communication with Friends and Family: Not on file    Frequency of Social Gatherings with Friends and Family: Not on file    Attends Mu-ism Services: Not on file    Active Member of Clubs or Organizations: Not on file    Attends Club or Organization Meetings: Not on file    Marital Status: Not on file   Intimate Partner Violence:     Fear of Current or Ex-Partner: Not on file    Emotionally Abused: Not on file    Physically Abused: Not on file    Sexually Abused: Not on file   Housing Stability:     Unable to Pay for Housing in the Last Year: Not on file    Number of [place] in the Last Year: Not on file    Unstable Housing in the Last Year: Not on file     Current Outpatient Medications   Medication Sig Dispense Refill    metoprolol succinate (TOPROL XL) 100 MG extended release tablet TAKE 1 TABLET DAILY 30 tablet 0    venlafaxine (EFFEXOR XR) 75 MG extended release capsule TAKE ONE CAPSULE BY MOUTH EVERY DAY 90 capsule 3    estradiol (VIVELLE) 0.05 MG/24HR Place 1 patch onto the skin once a week 24 patch 3    ondansetron (ZOFRAN) 4 MG tablet Take 1 tablet by mouth 3 times daily as needed for Nausea or Vomiting 30 tablet 0    LEVBID 0.375 MG extended release tablet TAKE 1 TABLET EVERY 12 HOURS AS NEEDED FOR CRAMPING 180 tablet 3    simethicone (MYLICON) 80 MG chewable tablet Take 1 tablet by mouth 4 times daily as needed for Flatulence 180 tablet 3    Handicap Placard MISC by Does not apply route Good from 8/9/21 to 8/9/26 1 each 0    ondansetron (ZOFRAN-ODT) 4 MG disintegrating tablet DISSOLVE ONE TABLET BY MOUTH EVERY 8 HOURS AS NEEDED FOR NAUSEA/ VOMITING 15 tablet 0    psyllium (METAMUCIL SMOOTH TEXTURE) 58.6 % powder Take 2 teaspoon with 8 to 10 oz water.  425 g 2    traZODone (DESYREL) 50 MG tablet Take 1 tablet by mouth nightly 90 tablet 3    lactobacillus (CULTURELLE) capsule Take 1 capsule by mouth daily 30 capsule 1    cetirizine-psuedoephedrine (ZYRTEC-D) 5-120 MG per extended release tablet Take by mouth      fluticasone (FLONASE) 50 MCG/ACT nasal spray 2 sprays by Nasal route daily 1 Bottle 3    naproxen (NAPROSYN) 500 MG tablet Take 1 tablet by mouth 2 times daily (with meals) for 10 days 20 tablet 0     No current facility-administered medications for this visit. Family History   Problem Relation Age of Onset    Cancer Father         COLON    High Blood Pressure Father     Heart Disease Father     Breast Cancer Neg Hx      Past Medical History:   Diagnosis Date    Atrial fibrillation ([facility]     Brain tumor ([facility] 07/16/2018    Chest pain     Degenerative disc disease, lumbar 1/21/2016    Hx of colonic polyps     Hypertension 2/24/2015    Irritable bowel syndrome     Low back pain 10/16/2018    Lumbar radiculopathy 3/14/2016    Rapid heart rate     Stress incontinence      Objective:   /82   Pulse 61   Temp 98.8 °F (37.1 °C)   Resp 16   Ht 5' 5\" (1.651 m)   Wt 197 lb (89.4 kg)   LMP  (LMP Unknown)   SpO2 95%   BMI 32.78 kg/m²     Physical Exam  Lungs:Clear  Equal b.s bilaterally  Heart:  Rate reg   1/6 syst murmur across precordium   Back:       No  CVA tenderness. Points to mid t spine as to where having pain   Abdomen: B.S present   Soft           No  Tenderness         No  Rebound                    No hepatosplenomegaly. No masses. Gen:      No acute distress  Skin:      No rashes   Chest;   Slight tenderness along lower right rib cage margin   Assessment:            Diagnosis Orders   1.  Mid back pain  XR THORACIC SPINE (3 VIEWS)   2. Osteoarthritis of thoracic spine, unspecified spinal osteoarthritis complication status        Plan:      Orders Placed This Encounter   Procedures    XR THORACIC SPINE (3 VIEWS)     Standing Status:   Future     Number of Occurrences:   1     Standing Expiration Date:   2/28/2023   continue with naprosyn for now   F/u in few weeks if still having problems

## 2022-04-05 ENCOUNTER — OFFICE VISIT (OUTPATIENT)
Dept: FAMILY MEDICINE CLINIC | Age: 67
End: 2022-04-05
Payer: COMMERCIAL

## 2022-04-05 ENCOUNTER — HOSPITAL ENCOUNTER (OUTPATIENT)
Dept: GENERAL RADIOLOGY | Age: 67
Discharge: HOME | End: 2022-04-07
Payer: COMMERCIAL

## 2022-04-05 VITALS
HEIGHT: 65 IN | WEIGHT: 200 LBS | TEMPERATURE: 96.4 F | SYSTOLIC BLOOD PRESSURE: 124 MMHG | HEART RATE: 60 BPM | DIASTOLIC BLOOD PRESSURE: 76 MMHG | OXYGEN SATURATION: 100 % | BODY MASS INDEX: 33.32 KG/M2

## 2022-04-05 DIAGNOSIS — R10.9 LEFT FLANK PAIN: Primary | ICD-10-CM

## 2022-04-05 DIAGNOSIS — R10.9 LEFT FLANK PAIN: ICD-10-CM

## 2022-04-05 PROCEDURE — 4040F PNEUMOC VAC/ADMIN/RCVD: CPT | Performed by: PHYSICIAN ASSISTANT

## 2022-04-05 PROCEDURE — G8427 DOCREV CUR MEDS BY ELIG CLIN: HCPCS | Performed by: PHYSICIAN ASSISTANT

## 2022-04-05 PROCEDURE — 1123F ACP DISCUSS/DSCN MKR DOCD: CPT | Performed by: PHYSICIAN ASSISTANT

## 2022-04-05 PROCEDURE — 74018 RADEX ABDOMEN 1 VIEW: CPT

## 2022-04-05 PROCEDURE — G8417 CALC BMI ABV UP PARAM F/U: HCPCS | Performed by: PHYSICIAN ASSISTANT

## 2022-04-05 PROCEDURE — 99213 OFFICE O/P EST LOW 20 MIN: CPT | Performed by: PHYSICIAN ASSISTANT

## 2022-04-05 PROCEDURE — 1090F PRES/ABSN URINE INCON ASSESS: CPT | Performed by: PHYSICIAN ASSISTANT

## 2022-04-05 PROCEDURE — 1036F TOBACCO NON-USER: CPT | Performed by: PHYSICIAN ASSISTANT

## 2022-04-05 PROCEDURE — 3017F COLORECTAL CA SCREEN DOC REV: CPT | Performed by: PHYSICIAN ASSISTANT

## 2022-04-05 PROCEDURE — G8400 PT W/DXA NO RESULTS DOC: HCPCS | Performed by: PHYSICIAN ASSISTANT

## 2022-04-05 ASSESSMENT — ENCOUNTER SYMPTOMS
DIARRHEA: 0
SINUS PRESSURE: 0
BACK PAIN: 0
VOMITING: 0
TROUBLE SWALLOWING: 0
ABDOMINAL PAIN: 0
CHEST TIGHTNESS: 0
COUGH: 0
SHORTNESS OF BREATH: 0

## 2022-04-05 ASSESSMENT — PATIENT HEALTH QUESTIONNAIRE - PHQ9
SUM OF ALL RESPONSES TO PHQ QUESTIONS 1-9: 0
SUM OF ALL RESPONSES TO PHQ QUESTIONS 1-9: 0
2. FEELING DOWN, DEPRESSED OR HOPELESS: 0
1. LITTLE INTEREST OR PLEASURE IN DOING THINGS: 0
SUM OF ALL RESPONSES TO PHQ QUESTIONS 1-9: 0
SUM OF ALL RESPONSES TO PHQ9 QUESTIONS 1 & 2: 0
SUM OF ALL RESPONSES TO PHQ QUESTIONS 1-9: 0

## 2022-04-05 NOTE — PROGRESS NOTES
Subjective:      Patient ID: Bozena Jeong is a 74 y.o. female who presents today for:  Chief Complaint   Patient presents with    Flank Pain     Patient is here c/o side pain. Pt states she has pain on L side of body, near the rib area. Pt states pain is constant, pain varies on certain movements. Pt describes pain as stabbing, pain was first noticed yesterday. HPI  74year old female who presents with left sided flank pain for one day last week which went away and then returned last night. Rates pain at 10/10. Past Medical History:   Diagnosis Date    [facility]     Brain tumor ([facility] 07/16/2018    Chest pain     Degenerative disc disease, lumbar 1/21/2016    Hx of colonic polyps     Hypertension 2/24/2015    Irritable bowel syndrome     Low back pain 10/16/2018    Lumbar radiculopathy 3/14/2016    Rapid heart rate     Stress incontinence      Past Surgical History:   Procedure Laterality Date    APPENDECTOMY      BACK SURGERY  07/14/2017    CARDIAC CATHETERIZATION  11/2/15    DR. PALMER    HYSTERECTOMY      IN CRANIECT EXCIS SKULL BONE LESN N/A 7/24/2018    CRANIOTOMY performed by Yoseph Da Silva MD at [address] [place] [address] TOTAL KNEE ARTHROPLASTY  2009    UPPER GASTROINTESTINAL ENDOSCOPY  1/15/13    DR. MEEKS     Social History     Socioeconomic History    Marital status:      Spouse name: Not on file    Number of children: Not on file    Years of education: Not on file    Highest education level: Not on file   Occupational History    Not on file   Tobacco Use    Smoking status: Never Smoker    Smokeless tobacco: Never Used   Vaping Use    Vaping Use: Never used   Substance and Sexual Activity    Alcohol use: Not Currently     Alcohol/week: 0.0 standard drinks     Comment: socially    Drug use: No    Sexual activity: Not Currently   Other Topics Concern    Not on file   Social History Narrative    Not on file Social Determinants of Health     Financial Resource Strain: Low Risk     Difficulty of Paying Living Expenses: Not hard at all   Food Insecurity: No Food Insecurity    Worried About Running Out of Food in the Last Year: Never true    Cristiano of Food in the Last Year: Never true   Transportation Needs: No Transportation Needs    Lack of Transportation (Medical): No    Lack of Transportation (Non-Medical):  No   Physical Activity:     Days of Exercise per Week: Not on file    Minutes of Exercise per Session: Not on file   Stress:     Feeling of Stress : Not on file   Social Connections:     Frequency of Communication with Friends and Family: Not on file    Frequency of Social Gatherings with Friends and Family: Not on file    Attends Alevism Services: Not on file    Active Member of Clubs or Organizations: Not on file    Attends Club or Organization Meetings: Not on file    Marital Status: Not on file   Intimate Partner Violence:     Fear of Current or Ex-Partner: Not on file    Emotionally Abused: Not on file    Physically Abused: Not on file    Sexually Abused: Not on file   Housing Stability:     Unable to Pay for Housing in the Last Year: Not on file    Number of Places Lived in the Last Year: Not on file    Unstable Housing in the Last Year: Not on file     Family History   Problem Relation Age of Onset    Cancer Father         COLON    High Blood Pressure Father     Heart Disease Father     Breast Cancer Neg Hx      Allergies   Allergen Reactions    Codeine     Demerol     Dye [Iodides] Nausea Only    Morphine     Penicillins Other (See Comments)    Sulfa Antibiotics Hives and Itching    Levofloxacin Itching and Rash     Current Outpatient Medications   Medication Sig Dispense Refill    metoprolol succinate (TOPROL XL) 100 MG extended release tablet TAKE 1 TABLET DAILY 30 tablet 0    venlafaxine (EFFEXOR XR) 75 MG extended release capsule TAKE ONE CAPSULE BY MOUTH EVERY DAY 90 capsule 3    estradiol (VIVELLE) 0.05 MG/24HR Place 1 patch onto the skin once a week 24 patch 3    ondansetron (ZOFRAN) 4 MG tablet Take 1 tablet by mouth 3 times daily as needed for Nausea or Vomiting 30 tablet 0    LEVBID 0.375 MG extended release tablet TAKE 1 TABLET EVERY 12 HOURS AS NEEDED FOR CRAMPING 180 tablet 3    simethicone (MYLICON) 80 MG chewable tablet Take 1 tablet by mouth 4 times daily as needed for Flatulence 180 tablet 3    Handicap Placard MISC by Does not apply route Good from 8/9/21 to 8/9/26 1 each 0    ondansetron (ZOFRAN-ODT) 4 MG disintegrating tablet DISSOLVE ONE TABLET BY MOUTH EVERY 8 HOURS AS NEEDED FOR NAUSEA/ VOMITING 15 tablet 0    psyllium (METAMUCIL SMOOTH TEXTURE) 58.6 % powder Take 2 teaspoon with 8 to 10 oz water. 425 g 2    traZODone (DESYREL) 50 MG tablet Take 1 tablet by mouth nightly 90 tablet 3    lactobacillus (CULTURELLE) capsule Take 1 capsule by mouth daily 30 capsule 1    cetirizine-psuedoephedrine (ZYRTEC-D) 5-120 MG per extended release tablet Take by mouth      fluticasone (FLONASE) 50 MCG/ACT nasal spray 2 sprays by Nasal route daily 1 Bottle 3    naproxen (NAPROSYN) 500 MG tablet Take 1 tablet by mouth 2 times daily (with meals) for 10 days 20 tablet 0     No current facility-administered medications for this visit. Review of Systems   Constitutional: Negative for activity change, appetite change, chills, fever and unexpected weight change. HENT: Negative for drooling, ear pain, nosebleeds, sinus pressure and trouble swallowing. Respiratory: Negative for cough, chest tightness and shortness of breath. Cardiovascular: Negative for chest pain and leg swelling. Gastrointestinal: Negative for abdominal pain, diarrhea and vomiting. Endocrine: Negative for polydipsia and polyphagia. Genitourinary: Positive for flank pain (left). Negative for dysuria and frequency. Musculoskeletal: Negative for back pain and myalgias.    Skin: Negative for pallor and rash. Neurological: Negative for syncope, weakness and headaches. Hematological: Does not bruise/bleed easily. Psychiatric/Behavioral: Negative for agitation, behavioral problems and confusion. All other systems reviewed and are negative. Objective:   /76 (Site: Right Upper Arm, Position: Sitting, Cuff Size: Small Adult)   Pulse 60   Temp 96.4 °F (35.8 °C)   Ht 5' 5\" (1.651 m)   Wt 200 lb (90.7 kg)   LMP  (LMP Unknown)   SpO2 100%   BMI 33.28 kg/m²     Physical Exam  Vitals and nursing note reviewed. Constitutional:       General: She is awake. She is not in acute distress. Appearance: Normal appearance. She is well-developed and normal weight. She is not ill-appearing, toxic-appearing or diaphoretic. Comments: No photophobia. No phonophobia. HENT:      Head: Normocephalic and atraumatic. No Ramirez's sign. Right Ear: External ear normal.      Left Ear: External ear normal.      Nose: Nose normal. No congestion or rhinorrhea. Mouth/Throat:      Mouth: Mucous membranes are moist.      Pharynx: Oropharynx is clear. No oropharyngeal exudate or posterior oropharyngeal erythema. Eyes:      General: No scleral icterus. Right eye: No foreign body or discharge. Left eye: No discharge. Extraocular Movements: Extraocular movements intact. Conjunctiva/sclera: Conjunctivae normal.      Left eye: No exudate. Pupils: Pupils are equal, round, and reactive to light. Neck:      Vascular: No JVD. Trachea: No tracheal deviation. Comments: No meningismus. Cardiovascular:      Rate and Rhythm: Normal rate and regular rhythm. Heart sounds: Normal heart sounds. Heart sounds not distant. No murmur heard. No friction rub. No gallop. Pulmonary:      Effort: Pulmonary effort is normal. No respiratory distress. Breath sounds: Normal breath sounds. No stridor. No wheezing, rhonchi or rales.    Chest:      Chest wall: No tenderness. Abdominal:      General: Abdomen is flat. Bowel sounds are normal. There is no distension. Palpations: Abdomen is soft. There is no mass. Tenderness: There is no abdominal tenderness. There is no right CVA tenderness, left CVA tenderness, guarding or rebound. Hernia: No hernia is present. Musculoskeletal:         General: No swelling, tenderness, deformity or signs of injury. Normal range of motion. Cervical back: Normal range of motion and neck supple. No rigidity. Lymphadenopathy:      Head:      Right side of head: No submental adenopathy. Left side of head: No submental adenopathy. Skin:     General: Skin is warm and dry. Capillary Refill: Capillary refill takes less than 2 seconds. Coloration: Skin is not jaundiced or pale. Findings: No bruising, erythema, lesion or rash. Neurological:      General: No focal deficit present. Mental Status: She is alert and oriented to person, place, and time. Mental status is at baseline. Cranial Nerves: No cranial nerve deficit. Sensory: No sensory deficit. Motor: No weakness. Coordination: Coordination normal.      Deep Tendon Reflexes: Reflexes are normal and symmetric. Psychiatric:         Mood and Affect: Mood normal.         Behavior: Behavior normal. Behavior is cooperative. Thought Content: Thought content normal.         Judgment: Judgment normal.         Assessment:       Diagnosis Orders   1. Left flank pain       No results found for this visit on 04/05/22. Plan:     Assessment & Plan   Jodie Cushing was seen today for flank pain. Diagnoses and all orders for this visit:    Left flank pain      No orders of the defined types were placed in this encounter. No orders of the defined types were placed in this encounter. There are no discontinued medications. Return if symptoms worsen or fail to improve.         Reviewed with the patient/family: current clinical status & medications. Side effects of the medication prescribed today, as well as treatment plan/rationale and result expectations have been discussed with the patient/family who expresses understanding. Patient will be discharged home in stable condition. Follow up with PCP to evaluate treatment results or return if symptoms worsen or fail to improve. Discussed signs and symptoms which require immediate follow-up in ED/call to 911. Understanding verbalized. I have reviewed the patient's medical history in detail and updated the computerized patient record.     SANTINO Loza

## 2022-04-13 ENCOUNTER — OFFICE VISIT (OUTPATIENT)
Dept: FAMILY MEDICINE CLINIC | Age: 67
End: 2022-04-13
Payer: MEDICARE

## 2022-04-13 VITALS
OXYGEN SATURATION: 98 % | HEIGHT: 65 IN | DIASTOLIC BLOOD PRESSURE: 78 MMHG | BODY MASS INDEX: 34.05 KG/M2 | SYSTOLIC BLOOD PRESSURE: 120 MMHG | WEIGHT: 204.4 LBS | HEART RATE: 80 BPM | TEMPERATURE: 96.5 F

## 2022-04-13 DIAGNOSIS — R42 VERTIGO: Primary | ICD-10-CM

## 2022-04-13 DIAGNOSIS — K58.0 IRRITABLE BOWEL SYNDROME WITH DIARRHEA: ICD-10-CM

## 2022-04-13 DIAGNOSIS — R11.0 NAUSEA: ICD-10-CM

## 2022-04-13 PROCEDURE — 3017F COLORECTAL CA SCREEN DOC REV: CPT | Performed by: STUDENT IN AN ORGANIZED HEALTH CARE EDUCATION/TRAINING PROGRAM

## 2022-04-13 PROCEDURE — 99214 OFFICE O/P EST MOD 30 MIN: CPT | Performed by: STUDENT IN AN ORGANIZED HEALTH CARE EDUCATION/TRAINING PROGRAM

## 2022-04-13 PROCEDURE — G8427 DOCREV CUR MEDS BY ELIG CLIN: HCPCS | Performed by: STUDENT IN AN ORGANIZED HEALTH CARE EDUCATION/TRAINING PROGRAM

## 2022-04-13 PROCEDURE — 1090F PRES/ABSN URINE INCON ASSESS: CPT | Performed by: STUDENT IN AN ORGANIZED HEALTH CARE EDUCATION/TRAINING PROGRAM

## 2022-04-13 PROCEDURE — 1123F ACP DISCUSS/DSCN MKR DOCD: CPT | Performed by: STUDENT IN AN ORGANIZED HEALTH CARE EDUCATION/TRAINING PROGRAM

## 2022-04-13 PROCEDURE — 1036F TOBACCO NON-USER: CPT | Performed by: STUDENT IN AN ORGANIZED HEALTH CARE EDUCATION/TRAINING PROGRAM

## 2022-04-13 PROCEDURE — G8400 PT W/DXA NO RESULTS DOC: HCPCS | Performed by: STUDENT IN AN ORGANIZED HEALTH CARE EDUCATION/TRAINING PROGRAM

## 2022-04-13 PROCEDURE — G8417 CALC BMI ABV UP PARAM F/U: HCPCS | Performed by: STUDENT IN AN ORGANIZED HEALTH CARE EDUCATION/TRAINING PROGRAM

## 2022-04-13 PROCEDURE — 4040F PNEUMOC VAC/ADMIN/RCVD: CPT | Performed by: STUDENT IN AN ORGANIZED HEALTH CARE EDUCATION/TRAINING PROGRAM

## 2022-04-13 RX ORDER — MECLIZINE HYDROCHLORIDE 25 MG/1
25 TABLET ORAL 3 TIMES DAILY PRN
Qty: 42 TABLET | Refills: 1 | Status: SHIPPED | OUTPATIENT
Start: 2022-04-13 | End: 2022-04-27

## 2022-04-13 RX ORDER — LEVOCETIRIZINE DIHYDROCHLORIDE 5 MG/1
5 TABLET, FILM COATED ORAL NIGHTLY
COMMUNITY

## 2022-04-13 RX ORDER — ONDANSETRON 4 MG/1
TABLET, ORALLY DISINTEGRATING ORAL
Qty: 20 TABLET | Refills: 1 | Status: SHIPPED | OUTPATIENT
Start: 2022-04-13

## 2022-04-13 ASSESSMENT — ENCOUNTER SYMPTOMS
VOMITING: 0
EYE DISCHARGE: 0
DIARRHEA: 1
NAUSEA: 1
RHINORRHEA: 0
ABDOMINAL PAIN: 0
COUGH: 0
SHORTNESS OF BREATH: 0
SORE THROAT: 0
WHEEZING: 0

## 2022-04-13 ASSESSMENT — PATIENT HEALTH QUESTIONNAIRE - PHQ9
SUM OF ALL RESPONSES TO PHQ QUESTIONS 1-9: 1
1. LITTLE INTEREST OR PLEASURE IN DOING THINGS: 0
2. FEELING DOWN, DEPRESSED OR HOPELESS: 1
SUM OF ALL RESPONSES TO PHQ9 QUESTIONS 1 & 2: 1
SUM OF ALL RESPONSES TO PHQ QUESTIONS 1-9: 1

## 2022-04-13 NOTE — PATIENT INSTRUCTIONS
Patient Education        Epley Maneuver at Home for Vertigo: Exercises  Introduction  Vertigo is a spinning or whirling sensation when you move your head. Your doctor may have moved you in different positions to help your vertigo get better faster. This is called the Epley maneuver. Your doctor also may haveasked you to do these exercises at home. Do the exercises as often as your doctor recommends. If your vertigo is gettingworse, your doctor may have you change the exercise or stop it. Step 1  Step 1    1. Sit on the edge of a bed or sofa. Step 2    1. Turn your head 45 degrees in the direction your doctor told you to. This should be toward the ear that causes the most vertigo for you. In this picture, the woman is turning toward her left ear. Step 3    1. Tilt yourself backward until you are lying on your back. Your head should still be at a 45-degree turn. Your head should be about midway between looking straight ahead and looking out to your side. Hold for 30 seconds. If you have vertigo, stay in this position until it stops. Step 4    1. Turn your head 90 degrees toward the ear that has the least vertigo. In this picture, the woman is turning to the right because she has vertigo on her left side. The point of your chin should be raised and over your shoulder. Hold for 30 seconds. Step 5    1. Roll onto the side with the least vertigo. You should now be looking at the floor. Hold for 30 seconds. Follow-up care is a key part of your treatment and safety. Be sure to make and go to all appointments, and call your doctor if you are having problems. It's also a good idea to know your test results and keep alist of the medicines you take. Where can you learn more? Go to https://chpejeffeb.[email or web address]. org and sign in to your [employer] account. Enter G444 in the [employer] box to learn more about \"Epley Maneuver at Home for Vertigo: Exercises. \"     If you do not have an account, please click on the \"Sign Up Now\" link. Current as of: December 13, 2021               Content Version: 13.2  © 2006-2022 [employer]. Care instructions adapted under license by [facility] ([facility]). If you have questions about a medical condition or this instruction, always ask your healthcare professional. [place] [address] any warranty or liability for your use of this information.

## 2022-06-20 ENCOUNTER — OFFICE VISIT (OUTPATIENT)
Dept: PAIN MANAGEMENT | Age: 67
End: 2022-06-20
Payer: MEDICARE

## 2022-06-20 VITALS — HEIGHT: 65 IN | TEMPERATURE: 97.9 F | WEIGHT: 197 LBS | RESPIRATION RATE: 16 BRPM | BODY MASS INDEX: 32.82 KG/M2

## 2022-06-20 DIAGNOSIS — D32.0 BENIGN MENINGIOMA OF BRAIN (HCC): Primary | ICD-10-CM

## 2022-06-20 DIAGNOSIS — I10 ESSENTIAL HYPERTENSION: ICD-10-CM

## 2022-06-20 PROCEDURE — 99214 OFFICE O/P EST MOD 30 MIN: CPT | Performed by: NURSE PRACTITIONER

## 2022-06-20 PROCEDURE — 1090F PRES/ABSN URINE INCON ASSESS: CPT | Performed by: NURSE PRACTITIONER

## 2022-06-20 PROCEDURE — 1036F TOBACCO NON-USER: CPT | Performed by: NURSE PRACTITIONER

## 2022-06-20 PROCEDURE — G8417 CALC BMI ABV UP PARAM F/U: HCPCS | Performed by: NURSE PRACTITIONER

## 2022-06-20 PROCEDURE — G8427 DOCREV CUR MEDS BY ELIG CLIN: HCPCS | Performed by: NURSE PRACTITIONER

## 2022-06-20 PROCEDURE — 3017F COLORECTAL CA SCREEN DOC REV: CPT | Performed by: NURSE PRACTITIONER

## 2022-06-20 PROCEDURE — G8400 PT W/DXA NO RESULTS DOC: HCPCS | Performed by: NURSE PRACTITIONER

## 2022-06-20 PROCEDURE — 1123F ACP DISCUSS/DSCN MKR DOCD: CPT | Performed by: NURSE PRACTITIONER

## 2022-06-20 RX ORDER — METOPROLOL SUCCINATE 100 MG/1
TABLET, EXTENDED RELEASE ORAL
Qty: 30 TABLET | Refills: 1 | Status: SHIPPED | OUTPATIENT
Start: 2022-06-20 | End: 2022-08-30 | Stop reason: SDUPTHER

## 2022-06-20 RX ORDER — PREDNISONE 50 MG/1
50 TABLET ORAL EVERY 6 HOURS
Qty: 3 TABLET | Refills: 0 | Status: CANCELLED | OUTPATIENT
Start: 2022-06-20 | End: 2022-06-21

## 2022-06-20 RX ORDER — DIPHENHYDRAMINE HCL 25 MG
50 TABLET ORAL ONCE
Qty: 2 TABLET | Refills: 0 | Status: CANCELLED | OUTPATIENT
Start: 2022-06-20 | End: 2022-06-20

## 2022-06-20 ASSESSMENT — ENCOUNTER SYMPTOMS
RESPIRATORY NEGATIVE: 1
DIARRHEA: 0
CONSTIPATION: 0
BACK PAIN: 0

## 2022-06-20 NOTE — TELEPHONE ENCOUNTER
Requesting medication refill. Please approve or deny this request.    Rx requested:  Requested Prescriptions     Pending Prescriptions Disp Refills    metoprolol succinate (TOPROL XL) 100 MG extended release tablet 30 tablet 1     Sig: TAKE ONE TABLET DAILY         Last Office Visit:   Visit date not found      Next Visit Date:  Future Appointments   Date Time Provider Carlyn Rodriguez   6/20/2022  1:30 PM ALETA Ng - CNP [facility] [place]   7/21/2022  2:30 PM Kem Collier DO  [facility]   1/12/2023  2:30 PM Татьяна Ponce DO [facility]               Last refill 1/14/22. Please approve or deny.

## 2022-06-20 NOTE — PROGRESS NOTES
Patient: Donnise Boeck  YOB: 1955  Date: 6/20/22        Subjective:     Donnise Boeck is a 74 y.o. female who complains today of:    Chief Complaint   Patient presents with    Back Pain         Allergies:  Codeine, Demerol, Dye [iodides], Morphine, Penicillins, Sulfa antibiotics, and Levofloxacin    Past Medical History:   Diagnosis Date    Atrial fibrillation ([facility]     Brain tumor ([facility] 07/16/2018    Chest pain     Degenerative disc disease, lumbar 1/21/2016    Hx of colonic polyps     Hypertension 2/24/2015    Irritable bowel syndrome     Low back pain 10/16/2018    Lumbar radiculopathy 3/14/2016    Rapid heart rate     Stress incontinence      Past Surgical History:   Procedure Laterality Date    APPENDECTOMY      BACK SURGERY  07/14/2017    CARDIAC CATHETERIZATION  11/2/15    DR. PALMER    HYSTERECTOMY (CERVIX STATUS UNKNOWN)      ND CRANIECT EXCIS SKULL BONE LESN N/A 7/24/2018    CRANIOTOMY performed by Basim Alberts MD at [address] [place] [address]    UPPER GASTROINTESTINAL ENDOSCOPY  1/15/13    DR. MEEKS     Family History   Problem Relation Age of Onset    Cancer Father         COLON    High Blood Pressure Father     Heart Disease Father     Breast Cancer Neg Hx      Social History     Socioeconomic History    Marital status:      Spouse name: Not on file    Number of children: Not on file    Years of education: Not on file    Highest education level: Not on file   Occupational History    Not on file   Tobacco Use    Smoking status: Never Smoker    Smokeless tobacco: Never Used   Vaping Use    Vaping Use: Never used   Substance and Sexual Activity    Alcohol use: Not Currently     Alcohol/week: 0.0 standard drinks     Comment: socially    Drug use: No    Sexual activity: Not Currently   Other Topics Concern    Not on file   Social History Narrative    Not on file     Social Determinants of Health     Financial Resource Strain: Low Risk     Difficulty of Paying Living Expenses: Not hard at all   Food Insecurity: No Food Insecurity    Worried About Running Out of Food in the Last Year: Never true    Cristiano of Food in the Last Year: Never true   Transportation Needs: No Transportation Needs    Lack of Transportation (Medical): No    Lack of Transportation (Non-Medical):  No   Physical Activity:     Days of Exercise per Week: Not on file    Minutes of Exercise per Session: Not on file   Stress:     Feeling of Stress : Not on file   Social Connections:     Frequency of Communication with Friends and Family: Not on file    Frequency of Social Gatherings with Friends and Family: Not on file    Attends Restoration Services: Not on file    Active Member of [address] [place] [address] [employer] or Organizations: Not on file    Attends Club or Organization Meetings: Not on file    Marital Status: Not on file   Intimate Partner Violence:     Fear of Current or Ex-Partner: Not on file    Emotionally Abused: Not on file    Physically Abused: Not on file    Sexually Abused: Not on file   Housing Stability:     Unable to Pay for Housing in the Last Year: Not on file    Number of [place] in the Last Year: Not on file    Unstable Housing in the Last Year: Not on file       Current Outpatient Medications on File Prior to Visit   Medication Sig Dispense Refill    levocetirizine (XYZAL ALLERGY 24HR) 5 MG tablet Take 5 mg by mouth nightly      ondansetron (ZOFRAN-ODT) 4 MG disintegrating tablet DISSOLVE ONE TABLET BY MOUTH EVERY 8 HOURS AS NEEDED FOR NAUSEA/ VOMITING 20 tablet 1    venlafaxine (EFFEXOR XR) 75 MG extended release capsule TAKE ONE CAPSULE BY MOUTH EVERY DAY 90 capsule 3    estradiol (VIVELLE) 0.05 MG/24HR Place 1 patch onto the skin once a week 24 patch 3    LEVBID 0.375 MG extended release tablet TAKE 1 TABLET EVERY 12 HOURS AS NEEDED FOR CRAMPING 180 tablet 3    simethicone (MYLICON) 80 MG chewable tablet Take 1 tablet by mouth 4 times daily as needed for Flatulence 180 tablet 3    Handicap Placard MISC by Does not apply route Good from 8/9/21 to 8/9/26 1 each 0    traZODone (DESYREL) 50 MG tablet Take 1 tablet by mouth nightly 90 tablet 3    lactobacillus (CULTURELLE) capsule Take 1 capsule by mouth daily 30 capsule 1    fluticasone (FLONASE) 50 MCG/ACT nasal spray 2 sprays by Nasal route daily 1 Bottle 3     No current facility-administered medications on file prior to visit. The patient is here for follow up after MRI. Feels better compared to last visit. History of craniotomy 7-24-18. She did have a headache but it went away with tylenol. Is here for her annual order for MRI brain with and without contrast to evaluate s/p meningioma surgery. She does vomit after the last two contrast studies and is usually premedicated. Review of Systems   Constitutional: Negative. Negative for activity change and unexpected weight change. HENT: Negative. Negative for hearing loss. Respiratory: Negative. Cardiovascular: Negative for leg swelling. Gastrointestinal: Negative for constipation and diarrhea. Genitourinary: Negative. Musculoskeletal: Negative for back pain, gait problem, joint swelling and neck pain. Skin: Negative for rash. Neurological: Negative for dizziness, weakness, numbness and headaches. Psychiatric/Behavioral: Negative. Negative for sleep disturbance. Objective:     Vitals:  Temp 97.9 °F (36.6 °C)   Resp 16   Ht 5' 5\" (1.651 m)   Wt 197 lb (89.4 kg)   LMP  (LMP Unknown)   BMI 32.78 kg/m² Pain Score:   0 - No pain    Physical Exam  Vitals reviewed. Constitutional:       Appearance: She is well-developed. HENT:      Head: Normocephalic. Nose: Nose normal.   Pulmonary:      Effort: Pulmonary effort is normal.   Musculoskeletal:      Cervical back: Normal range of motion and neck supple.    Lymphadenopathy:      Cervical: No cervical adenopathy. Skin:     General: Skin is warm and dry. Findings: No erythema or rash. Neurological:      Mental Status: She is alert and oriented to person, place, and time. Cranial Nerves: No cranial nerve deficit. Deep Tendon Reflexes: Reflexes are normal and symmetric. Reflexes normal.   Psychiatric:         Mood and Affect: Mood normal.         Behavior: Behavior normal.         Thought Content: Thought content normal.         Judgment: Judgment normal.            Assessment:        Diagnosis Orders   1. Benign meningioma of brain [facility])  MRI Brain W Wo Contrast       Plan:          No orders of the defined types were placed in this encounter. Orders Placed This Encounter   Procedures    MRI Brain W Wo Contrast     Standing Status:   Future     Standing Expiration Date:   6/20/2023     Order Specific Question:   Reason for exam:     Answer:   s/p craniotomy 7/24/18     MRI w wo contrast toe valuate meningioma s/p surgery 2018    Follow up:  Return for F/U Dr Marta Rogers, f/u after procedure and reassess pain/medications.     Alejandro Menon, ALETA - CNP

## 2022-06-28 ENCOUNTER — HOSPITAL ENCOUNTER (OUTPATIENT)
Dept: MRI IMAGING | Age: 67
Discharge: HOME | End: 2022-06-30
Payer: COMMERCIAL

## 2022-06-28 DIAGNOSIS — D32.0 BENIGN MENINGIOMA OF BRAIN (HCC): ICD-10-CM

## 2022-06-28 PROCEDURE — A9577 INJ MULTIHANCE: HCPCS | Performed by: NURSE PRACTITIONER

## 2022-06-28 PROCEDURE — 6360000004 HC RX CONTRAST MEDICATION: Performed by: NURSE PRACTITIONER

## 2022-06-28 PROCEDURE — 70553 MRI BRAIN STEM W/O & W/DYE: CPT

## 2022-06-28 RX ORDER — SODIUM CHLORIDE 0.9 % (FLUSH) 0.9 %
10 SYRINGE (ML) INJECTION PRN
Status: DISCONTINUED | OUTPATIENT
Start: 2022-06-28 | End: 2022-07-01 | Stop reason: HOSPADM

## 2022-06-28 RX ADMIN — GADOBENATE DIMEGLUMINE 15 ML: 529 INJECTION, SOLUTION INTRAVENOUS at 16:15

## 2022-07-27 NOTE — PROGRESS NOTES
Patient Name: Gus Talley : 1955        Date: 22    Type of Appt: Follow up    Reason for appt: Follow up per Puma Bruce NP for MRI review    Last seen by Dr. Vicente Houston on 21 for meningioma status post resection    Studies done: 22 Brain MRI @ [facility]    Surgeries: 18 bicoronal craniotomy for resection of refractory groove meningioma by Dr. Vicente Houston     10-10-02 C5-6 ACDF by Dr. Norma Chadwick    Smoking: No    REVIEW OF SYSTEMS:    Nausea, Headaches, Bruising/Bleeding Easily, Sleep Disturbance, Diarrhea, Neck Pain, Back Pain                         [facility]) Physicians  Neurosurgery and Pain Management Susie Brigitte Barba, Lilly [address] [place], [place] [address]ju[address] [address]: [phone]  F: [phone]          Patient:  Gus Talley  YOB: 1955  Date: 2022    The patient is a 74 y.o. female who presents today for evaluation of the following problems:     Chief Complaint   Patient presents with    Other        Referred by No ref. provider found    @BIE@    PAST MEDICAL, FAMILY AND SOCIAL HISTORY:  Past Medical History:   Diagnosis Date    Atrial fibrillation ([address]     Brain tumor ([facility] 2018    Chest pain     Degenerative disc disease, lumbar 2016    Hx of colonic polyps     Hypertension 2015    Irritable bowel syndrome     Low back pain 10/16/2018    Lumbar radiculopathy 3/14/2016    Rapid heart rate     Stress incontinence      Past Surgical History:   Procedure Laterality Date    APPENDECTOMY      BACK SURGERY  2017    CARDIAC CATHETERIZATION  11/2/15    DR. PALMER    HYSTERECTOMY (CERVIX STATUS UNKNOWN)      MT CRANIECT EXCIS SKULL BONE LESN N/A 2018    CRANIOTOMY performed by Ana Paula Bateman MD at [address] [place] [address] [address]      UPPER GASTROINTESTINAL ENDOSCOPY  1/15/13    DR. MEEKS     Family History   Problem Relation Age of Onset    Cancer Father         COLON    High Blood Pressure Father     Heart Disease Father     Breast Cancer Neg Hx      Current Outpatient Medications on File Prior to Visit   Medication Sig Dispense Refill    metoprolol succinate (TOPROL XL) 100 MG extended release tablet TAKE ONE TABLET DAILY 30 tablet 1    levocetirizine (XYZAL ALLERGY 24HR) 5 MG tablet Take 5 mg by mouth nightly      ondansetron (ZOFRAN-ODT) 4 MG disintegrating tablet DISSOLVE ONE TABLET BY MOUTH EVERY 8 HOURS AS NEEDED FOR NAUSEA/ VOMITING 20 tablet 1    venlafaxine (EFFEXOR XR) 75 MG extended release capsule TAKE ONE CAPSULE BY MOUTH EVERY DAY 90 capsule 3    estradiol (VIVELLE) 0.05 MG/24HR Place 1 patch onto the skin once a week 24 patch 3    LEVBID 0.375 MG extended release tablet TAKE 1 TABLET EVERY 12 HOURS AS NEEDED FOR CRAMPING 180 tablet 3    simethicone (MYLICON) 80 MG chewable tablet Take 1 tablet by mouth 4 times daily as needed for Flatulence 180 tablet 3    Handicap Placard MISC by Does not apply route Good from 8/9/21 to 8/9/26 1 each 0    traZODone (DESYREL) 50 MG tablet Take 1 tablet by mouth nightly 90 tablet 3    lactobacillus (CULTURELLE) capsule Take 1 capsule by mouth daily 30 capsule 1    fluticasone (FLONASE) 50 MCG/ACT nasal spray 2 sprays by Nasal route daily 1 Bottle 3     No current facility-administered medications on file prior to visit. Social History     Tobacco Use    Smoking status: Never    Smokeless tobacco: Never   Vaping Use    Vaping Use: Never used   Substance Use Topics    Alcohol use: Not Currently     Alcohol/week: 0.0 standard drinks     Comment: socially    Drug use: No       ALLERGIES  Allergies   Allergen Reactions    Codeine     Demerol     Dye [Iodides] Nausea Only    Morphine     Penicillins Other (See Comments)    Sulfa Antibiotics Hives and Itching    Levofloxacin Itching and Rash         REVIEW OF SYSTEMS:  Review of Systems   Constitutional:  Negative for fever. HENT:  Negative for hearing loss. Eyes:  Negative for pain.    Respiratory:  Negative for shortness of breath. Gastrointestinal:  Positive for diarrhea and nausea. Endocrine: Negative for heat intolerance. Genitourinary:  Negative for difficulty urinating. Musculoskeletal:  Positive for back pain and neck pain. Skin:  Negative for rash. Allergic/Immunologic: Negative for immunocompromised state. Neurological:  Positive for headaches. Hematological:  Bruises/bleeds easily. Psychiatric/Behavioral:  Positive for sleep disturbance. I have personally reviewed the PMH, PSH, family history, home medications, social history, allergies, ROS. Physical Exam:      Vitals:    08/02/22 1555   BP: 128/76   Site: Left Upper Arm   Temp: 97.4 °F (36.3 °C)   TempSrc: Temporal   Weight: 197 lb (89.4 kg)   Height: 5' 5\" (1.651 m)     Body mass index is 32.78 kg/m². Physical Exam  Vitals and nursing note reviewed. Constitutional:       Appearance: Normal appearance. HENT:      Head: Normocephalic and atraumatic. Comments: Well-healed bifrontal scar behind the hairline greater limb on the right side. Right Ear: External ear normal.      Left Ear: External ear normal.      Nose: Nose normal.      Mouth/Throat:      Pharynx: Oropharynx is clear. Eyes:      Extraocular Movements: Extraocular movements intact. Cardiovascular:      Pulses: Normal pulses. Pulmonary:      Effort: Pulmonary effort is normal.   Abdominal:      Palpations: Abdomen is soft. Genitourinary:     Rectum: Normal.   Musculoskeletal:         General: Normal range of motion. Cervical back: Normal range of motion. Skin:     General: Skin is warm. Neurological:      General: No focal deficit present. Psychiatric:         Mood and Affect: Mood normal.        I have reviewed all laboratory studies, reports, data, and pertinent images. 6/28/2022 MRI brain  EXAMINATION:  MRI BRAIN. CLINICAL HISTORY:  HISTORY OF MENINGIOMA. COMPARISONS:  12/23/2020. TECHNIQUE:  T1 and T2 scans.  Diffusion scans. With and without contrast.       FINDINGS:  Patient has had a right frontal craniotomy and there are postsurgical changes in the right frontal lobe and some associated encephalomalacia. There is a large subdural fluid collection in the region of the for right frontal lobe. No    enhancement is demonstrated. Single high signal white matter lesion in the left parietal lobe. This appears stable. No other lesion is demonstrated. Impression   POSTSURGICAL CHANGES RIGHT FRONTAL REGION AS DESCRIBED ABOVE. NO SIGNIFICANT CHANGE SINCE THE PREVIOUS EXAM.           7/24/2018 MRI brain  MRI BRAIN W WO CONTRAST : 7/17/2018       CLINICAL HISTORY:  MASS OR LUMP, HEAD . COMPARISON: Noncontrast head CT 7/16/2018, head without with contrast 2/17/2014. TECHNIQUE: Multiplanar MR imaging of the pelvis performed before and after intravenous administration of approximately 20 mL of ProHance gadolinium contrast.           FINDINGS:        A nearly uniformly enhancing lobulated solid extra-axial 3.5 x 3 x 2 cm mass arising from the inferior aspect of the anterior cranial fossa midline is consistent with a meningioma with a broad-based dural tail. This has enlarged from approximately 2.5 x 1.5 cm from the prior study. There has mild to moderate surrounding vasogenic edema of the adjacent right frontal lobe, with approximately 7 mL of right-to-left midline shift anteriorly. Malignant transformation is not excluded. There are no other enhancing masses identified elsewhere. Small areas of chronic appearing subcortical white matter change are present more posteriorly within the frontal lobes. There is no significant cerebral atrophy, or hemorrhage, or hydrocephalus. Impression       ENLARGING PREDOMINANTLY RIGHT FRONTAL MENINGIOMA OF THE INFERIOR ANTERIOR CRANIAL FOSSA, WITH SURROUNDING VASOGENIC EDEMA AND 7 MM OF RIGHT-TO-LEFT MIDLINE SHIFT ANTERIORLY.  MALIGNANT TRANSFORMATION IS NOT EXCLUDED. HISTORY OF PRESENT ILLNESS:    Patient is doing well at this time. She has some mild headache but nothing to complain. She has had no seizures or other issues. Reviewed the above images on the computer screen with the patient showing his various ages the above was printed out for the patient. All questions answered. Advised recheck examination 1 to 2 years with MRI of the brain.     Mirian Lugo MD

## 2022-08-02 ENCOUNTER — OFFICE VISIT (OUTPATIENT)
Dept: NEUROSURGERY | Age: 67
End: 2022-08-02
Payer: MEDICARE

## 2022-08-02 VITALS
DIASTOLIC BLOOD PRESSURE: 76 MMHG | HEIGHT: 65 IN | TEMPERATURE: 97.4 F | BODY MASS INDEX: 32.82 KG/M2 | SYSTOLIC BLOOD PRESSURE: 128 MMHG | WEIGHT: 197 LBS

## 2022-08-02 DIAGNOSIS — D32.0 BENIGN MENINGIOMA OF BRAIN (HCC): Primary | ICD-10-CM

## 2022-08-02 PROCEDURE — G8400 PT W/DXA NO RESULTS DOC: HCPCS | Performed by: NEUROLOGICAL SURGERY

## 2022-08-02 PROCEDURE — 99214 OFFICE O/P EST MOD 30 MIN: CPT | Performed by: NEUROLOGICAL SURGERY

## 2022-08-02 PROCEDURE — 3017F COLORECTAL CA SCREEN DOC REV: CPT | Performed by: NEUROLOGICAL SURGERY

## 2022-08-02 PROCEDURE — 1036F TOBACCO NON-USER: CPT | Performed by: NEUROLOGICAL SURGERY

## 2022-08-02 PROCEDURE — G8417 CALC BMI ABV UP PARAM F/U: HCPCS | Performed by: NEUROLOGICAL SURGERY

## 2022-08-02 PROCEDURE — G8427 DOCREV CUR MEDS BY ELIG CLIN: HCPCS | Performed by: NEUROLOGICAL SURGERY

## 2022-08-02 PROCEDURE — 1123F ACP DISCUSS/DSCN MKR DOCD: CPT | Performed by: NEUROLOGICAL SURGERY

## 2022-08-02 PROCEDURE — 1090F PRES/ABSN URINE INCON ASSESS: CPT | Performed by: NEUROLOGICAL SURGERY

## 2022-08-02 ASSESSMENT — ENCOUNTER SYMPTOMS
DIARRHEA: 1
NAUSEA: 1
SHORTNESS OF BREATH: 0
EYE PAIN: 0
BACK PAIN: 1

## 2022-08-30 ENCOUNTER — OFFICE VISIT (OUTPATIENT)
Dept: CARDIOLOGY CLINIC | Age: 67
End: 2022-08-30
Payer: MEDICARE

## 2022-08-30 VITALS
OXYGEN SATURATION: 95 % | SYSTOLIC BLOOD PRESSURE: 110 MMHG | RESPIRATION RATE: 16 BRPM | HEART RATE: 58 BPM | WEIGHT: 209.2 LBS | BODY MASS INDEX: 34.81 KG/M2 | DIASTOLIC BLOOD PRESSURE: 82 MMHG

## 2022-08-30 DIAGNOSIS — I48.0 PAF (PAROXYSMAL ATRIAL FIBRILLATION) (HCC): Primary | Chronic | ICD-10-CM

## 2022-08-30 DIAGNOSIS — I45.10 RIGHT BUNDLE BRANCH BLOCK (RBBB) ON ELECTROCARDIOGRAPHY: ICD-10-CM

## 2022-08-30 DIAGNOSIS — I10 ESSENTIAL HYPERTENSION: ICD-10-CM

## 2022-08-30 DIAGNOSIS — I10 PRIMARY HYPERTENSION: ICD-10-CM

## 2022-08-30 DIAGNOSIS — Q26.8 PULMONARY VEIN STENOSIS: ICD-10-CM

## 2022-08-30 PROBLEM — D62 ACUTE BLOOD LOSS ANEMIA: Status: RESOLVED | Noted: 2019-06-07 | Resolved: 2022-08-30

## 2022-08-30 PROBLEM — R07.89 ATYPICAL CHEST PAIN: Status: RESOLVED | Noted: 2018-03-19 | Resolved: 2022-08-30

## 2022-08-30 PROCEDURE — 99214 OFFICE O/P EST MOD 30 MIN: CPT | Performed by: INTERNAL MEDICINE

## 2022-08-30 PROCEDURE — 93000 ELECTROCARDIOGRAM COMPLETE: CPT | Performed by: INTERNAL MEDICINE

## 2022-08-30 PROCEDURE — 1123F ACP DISCUSS/DSCN MKR DOCD: CPT | Performed by: INTERNAL MEDICINE

## 2022-08-30 RX ORDER — METOPROLOL SUCCINATE 100 MG/1
TABLET, EXTENDED RELEASE ORAL
Qty: 90 TABLET | Refills: 3 | Status: SHIPPED | OUTPATIENT
Start: 2022-08-30

## 2022-08-30 NOTE — PROGRESS NOTES
Chief Complaint   Patient presents with    Establish Cardiologist     PREVIOUS DR KAT PT    Atrial Fibrillation       8-30-33: Patient presents for initial medical evaluation. Patient is followed on a regular basis by Dr. Crescencio Archuleta MD. patient with history of paroxysmal atrial fibrillation since 2014. Currently not on any oral anticoagulation due to history of large rectus sheath hematoma. Patient with history of A. fib ablation x2 with Dr. Larissa Marroquin at [facility]. She is not on any antiarrhythmic medications at this time. She was previously on flecainide. Currently on Toprol- mg daily. Patient can usually sense when she develops atrial fibrillation. Patient with history of benign meningioma status post resection in 2018. Last echocardiogram in 2018 with normal LV function no valvular rallies mild pulmonary retention. Status post normal nuclear stress test in 2017  EKG today with normal sinus rhythm.   Normal QTc interval.      Patient Active Problem List   Diagnosis    Irritable bowel syndrome    Hx of colonic polyps    Left flank pain    PAF (paroxysmal atrial fibrillation) (HCC)    Hypertension    Pulmonary vein stenosis    Idiopathic scoliosis of lumbar spine    Degenerative disc disease, lumbar    Lumbar radiculopathy    Brain mass    Benign meningioma of brain (HCC)    Meningioma (HCC)    Functional diarrhea    Long term (current) use of anticoagulants    TMJ (temporomandibular joint syndrome)    Thyroid nodule    Right bundle branch block (RBBB) on electrocardiography    Presence of artificial knee joint, left    Allergy status to sulfonamides status    Allergy status to narcotic agent status    Low back pain    History of benign neoplasm of brain    Hashimoto's thyroiditis    Renal lesion    Urinary tract infection without hematuria    Dysuria    Nontoxic multinodular goiter       Past Surgical History:   Procedure Laterality Date    APPENDECTOMY      BACK SURGERY  07/14/2017 CARDIAC CATHETERIZATION  11/2/15    DR. PALMER    HYSTERECTOMY (CERVIX STATUS UNKNOWN)      MS CRANIECT EXCIS SKULL BONE LESN N/A 7/24/2018    CRANIOTOMY performed by Chago Rojas MD at [address] [place] [address]  2009    UPPER GASTROINTESTINAL ENDOSCOPY  1/15/13    DR. MEEKS       Social History     Socioeconomic History    Marital status:      Spouse name: None    Number of children: None    Years of education: None    Highest education level: None   Tobacco Use    Smoking status: Never    Smokeless tobacco: Never   Vaping Use    Vaping Use: Never used   Substance and Sexual Activity    Alcohol use: Not Currently     Alcohol/week: 0.0 standard drinks     Comment: socially    Drug use: No    Sexual activity: Not Currently       Family History   Problem Relation Age of Onset    Cancer Father         COLON    High Blood Pressure Father     Heart Disease Father     Breast Cancer Neg Hx        Current Outpatient Medications   Medication Sig Dispense Refill    metoprolol succinate (TOPROL XL) 100 MG extended release tablet TAKE ONE TABLET DAILY 90 tablet 3    levocetirizine (XYZAL) 5 MG tablet Take 5 mg by mouth nightly      ondansetron (ZOFRAN-ODT) 4 MG disintegrating tablet DISSOLVE ONE TABLET BY MOUTH EVERY 8 HOURS AS NEEDED FOR NAUSEA/ VOMITING 20 tablet 1    venlafaxine (EFFEXOR XR) 75 MG extended release capsule TAKE ONE CAPSULE BY MOUTH EVERY DAY 90 capsule 3    estradiol (VIVELLE) 0.05 MG/24HR Place 1 patch onto the skin once a week 24 patch 3    LEVBID 0.375 MG extended release tablet TAKE 1 TABLET EVERY 12 HOURS AS NEEDED FOR CRAMPING 180 tablet 3    simethicone (MYLICON) 80 MG chewable tablet Take 1 tablet by mouth 4 times daily as needed for Flatulence 180 tablet 3    Handicap Placard MISC by Does not apply route Good from 8/9/21 to 8/9/26 1 each 0    traZODone (DESYREL) 50 MG tablet Take 1 tablet by mouth nightly 90 tablet 3    lactobacillus (CULTURELLE) capsule Take 1 capsule by mouth daily 30 capsule 1    fluticasone (FLONASE) 50 MCG/ACT nasal spray 2 sprays by Nasal route daily 1 Bottle 3     No current facility-administered medications for this visit. Codeine, Demerol, Dye [iodides], Morphine, Penicillins, Sulfa antibiotics, and Levofloxacin    Review of Systems:  General ROS: negative  Psychological ROS: negative  Hematological and Lymphatic ROS: No history of blood clots or bleeding disorder. Respiratory ROS: no cough, shortness of breath, or wheezing  Cardiovascular ROS: no chest pain or dyspnea on exertion  Gastrointestinal ROS: no abdominal pain, change in bowel habits, or black or bloody stools  Genito-Urinary ROS: no dysuria, trouble voiding, or hematuria  Musculoskeletal ROS: negative  Neurological ROS: no TIA or stroke symptoms  Dermatological ROS: negative    VITALS:  Blood pressure 110/82, pulse 58, resp. rate 16, weight 209 lb 3.2 oz (94.9 kg), SpO2 95 %, not currently breastfeeding. Body mass index is 34.81 kg/m². Physical Examination:  General appearance - alert, well appearing, and in no distress  Mental status - alert, oriented to person, place, and time  Neck - Neck is supple, no JVD or carotid bruits. No thyromegaly or adenopathy. Chest - clear to auscultation, no wheezes, rales or rhonchi, symmetric air entry  Heart - normal rate, regular rhythm, normal S1, S2, no murmurs, rubs, clicks or gallops  Abdomen - soft, nontender, nondistended, no masses or organomegaly  Neurological - alert, oriented, normal speech, no focal findings or movement disorder noted  Extremities - peripheral pulses normal, no pedal edema, no clubbing or cyanosis  Skin - normal coloration and turgor, no rashes, no suspicious skin lesions noted      EKG: normal sinus rhythm, nonspecific ST and T waves changes    Orders Placed This Encounter   Procedures    EKG 12 lead       ASSESSMENT:     Diagnosis Orders   1.  PAF (paroxysmal atrial fibrillation) ([facility])  EKG 12 lead      2. Essential hypertension  metoprolol succinate (TOPROL XL) 100 MG extended release tablet      3. Primary hypertension        4. Right bundle branch block (RBBB) on electrocardiography        5. Pulmonary vein stenosis              PLAN:         As always, aggressive risk factor modification is strongly recommended. We should adhere to the JNC VIII guidelines for HTN management and the NCEP ATP III guidelines for LDL-C management. Cardiac diet is always recommended with low fat, cholesterol, calories and sodium. Continue medications at current doses. Check EKG    No anticoagulation for now given history of large rectus sheath hematoma. Consider watchman device if patient redevelops atrial fibrillation. Consider antiarrhythmic medications. Consider implantable loop recorder insertion for monitoring of atrial fibrillation.   Also recommended cardio mobile device for self monitoring at home

## 2022-08-31 RX ORDER — ESTRADIOL 0.05 MG/D
1 FILM, EXTENDED RELEASE TRANSDERMAL WEEKLY
Qty: 24 PATCH | Refills: 3 | Status: SHIPPED | OUTPATIENT
Start: 2022-08-31

## 2022-08-31 RX ORDER — VENLAFAXINE HYDROCHLORIDE 75 MG/1
CAPSULE, EXTENDED RELEASE ORAL
Qty: 90 CAPSULE | Refills: 3 | Status: SHIPPED | OUTPATIENT
Start: 2022-08-31

## 2022-10-10 DIAGNOSIS — Z12.31 SCREENING MAMMOGRAM, ENCOUNTER FOR: Primary | ICD-10-CM

## 2022-10-10 DIAGNOSIS — R92.8 ABNORMAL MAMMOGRAM OF RIGHT BREAST: ICD-10-CM

## 2022-10-13 ENCOUNTER — TELEPHONE (OUTPATIENT)
Dept: CARDIOLOGY CLINIC | Age: 67
End: 2022-10-13

## 2022-10-13 NOTE — TELEPHONE ENCOUNTER
Patient concerned w/BP:  10/11--159/81 (HR 69)  10/12--141/85 (HR 77)  10/13--160/84 (HR 57)  BP's taken after medication  No SOB, headache (yes)-no other issues today  She has contacted her PCP  I made her an appointment w/Luciana (10/25/2022)  Please call and advise

## 2022-10-25 ENCOUNTER — OFFICE VISIT (OUTPATIENT)
Dept: CARDIOLOGY CLINIC | Age: 67
End: 2022-10-25
Payer: MEDICARE

## 2022-10-25 VITALS
WEIGHT: 207 LBS | OXYGEN SATURATION: 97 % | HEART RATE: 61 BPM | SYSTOLIC BLOOD PRESSURE: 116 MMHG | DIASTOLIC BLOOD PRESSURE: 84 MMHG | BODY MASS INDEX: 34.45 KG/M2

## 2022-10-25 DIAGNOSIS — I10 PRIMARY HYPERTENSION: Primary | ICD-10-CM

## 2022-10-25 DIAGNOSIS — I48.0 PAF (PAROXYSMAL ATRIAL FIBRILLATION) (HCC): Chronic | ICD-10-CM

## 2022-10-25 PROCEDURE — 99213 OFFICE O/P EST LOW 20 MIN: CPT | Performed by: NURSE PRACTITIONER

## 2022-10-25 PROCEDURE — 1123F ACP DISCUSS/DSCN MKR DOCD: CPT | Performed by: NURSE PRACTITIONER

## 2022-10-25 RX ORDER — AMLODIPINE BESYLATE 5 MG/1
5 TABLET ORAL 2 TIMES DAILY
Qty: 60 TABLET | Refills: 0 | Status: SHIPPED | OUTPATIENT
Start: 2022-10-25

## 2022-10-25 NOTE — PROGRESS NOTES
Chief Complaint   Patient presents with    Hypertension       8-[birth year]33: Patient presents for initial medical evaluation. Patient is followed on a regular basis by Dr. Milagro Ureña MD. patient with history of paroxysmal atrial fibrillation since 2014. Currently not on any oral anticoagulation due to history of large rectus sheath hematoma. Patient with history of A. fib ablation x2 with Dr. Hammond Due at [address] She is not on any antiarrhythmic medications at this time. She was previously on flecainide. Currently on Toprol- mg daily. Patient can usually sense when she develops atrial fibrillation. Patient with history of benign meningioma status post resection in 2018. Last echocardiogram in 2018 with normal LV function no valvular rallies mild pulmonary retention. Status post normal nuclear stress test in 2017  EKG today with normal sinus rhythm. Normal QTc interval.    10/25/22: pt seen in office today per her request for recent high blood pressure readings at home. Pt reports BP has been 130s-150s over 80s-90s. She states that when he BP is high, she does not feel well, fatigue, weak, HA. States she is checking her BP after taking her medications. /84 in office today. We discussed addition of Norvasc to current medications. Also advised pt regarding low sodium diet. She will contact this office if BP remains high despite taking Norvasc. Pt denies chest pain, dyspnea, dyspnea on exertion, change in exercise capacity, fatigue,  nausea, vomiting, diarrhea, constipation, motor weakness, insomnia, weight loss, syncope, dizziness, lightheadedness, palpitations, PND, orthopnea, or claudication. No bleeding issues. Pt denies any angina or CHF type symptoms. No recent hospitalizations. Pt is compliant with all Rx medications. Blood pressure and heart rate are under control.          Patient Active Problem List   Diagnosis    Irritable bowel syndrome    Hx of colonic polyps CAPSULE BY MOUTH EVERY DAY 90 capsule 3    estradiol (VIVELLE) 0.05 MG/24HR Place 1 patch onto the skin once a week 24 patch 3    metoprolol succinate (TOPROL XL) 100 MG extended release tablet TAKE ONE TABLET DAILY 90 tablet 3    levocetirizine (XYZAL) 5 MG tablet Take 5 mg by mouth nightly      ondansetron (ZOFRAN-ODT) 4 MG disintegrating tablet DISSOLVE ONE TABLET BY MOUTH EVERY 8 HOURS AS NEEDED FOR NAUSEA/ VOMITING 20 tablet 1    LEVBID 0.375 MG extended release tablet TAKE 1 TABLET EVERY 12 HOURS AS NEEDED FOR CRAMPING 180 tablet 3    simethicone (MYLICON) 80 MG chewable tablet Take 1 tablet by mouth 4 times daily as needed for Flatulence 180 tablet 3    Handicap Placard MISC by Does not apply route Good from 8/9/21 to 8/9/26 1 each 0    traZODone (DESYREL) 50 MG tablet Take 1 tablet by mouth nightly 90 tablet 3    lactobacillus (CULTURELLE) capsule Take 1 capsule by mouth daily 30 capsule 1    fluticasone (FLONASE) 50 MCG/ACT nasal spray 2 sprays by Nasal route daily 1 Bottle 3     No current facility-administered medications for this visit. Codeine, Demerol, Dye [iodides], Morphine, Penicillins, Sulfa antibiotics, and Levofloxacin    Review of Systems:  General ROS: negative  Psychological ROS: negative  Hematological and Lymphatic ROS: No history of blood clots or bleeding disorder. Respiratory ROS: no cough, shortness of breath, or wheezing  Cardiovascular ROS: no chest pain or dyspnea on exertion  Gastrointestinal ROS: no abdominal pain, change in bowel habits, or black or bloody stools  Genito-Urinary ROS: no dysuria, trouble voiding, or hematuria  Musculoskeletal ROS: negative  Neurological ROS: no TIA or stroke symptoms  Dermatological ROS: negative    VITALS:  Blood pressure 116/84, pulse 61, weight 207 lb (93.9 kg), SpO2 97 %, not currently breastfeeding. Body mass index is 34.45 kg/m².     Physical Examination:  General appearance - alert, well appearing, and in no distress  Mental status - alert, oriented to person, place, and time  Neck - Neck is supple, no JVD or carotid bruits. No thyromegaly or adenopathy. Chest - clear to auscultation, no wheezes, rales or rhonchi, symmetric air entry  Heart - normal rate, regular rhythm, normal S1, S2, no murmurs, rubs, clicks or gallops  Abdomen - soft, nontender, nondistended, no masses or organomegaly  Neurological - alert, oriented, normal speech, no focal findings or movement disorder noted  Extremities - peripheral pulses normal, no pedal edema, no clubbing or cyanosis  Skin - normal coloration and turgor, no rashes, no suspicious skin lesions noted      EKG: normal sinus rhythm, nonspecific ST and T waves changes    No orders of the defined types were placed in this encounter. ASSESSMENT:     Diagnosis Orders   1. Primary hypertension        2. PAF (paroxysmal atrial fibrillation) (HCC)                PLAN:     As always, aggressive risk factor modification is strongly recommended. We should adhere to the JNC VIII guidelines for HTN management and the NCEP ATP III guidelines for LDL-C management. Cardiac diet is always recommended with low fat, cholesterol, calories and sodium. Continue medications at current doses. Add Norvasc 5mg BID    Check EKG    No anticoagulation for now given history of large rectus sheath hematoma. Consider watchman device if patient redevelops atrial fibrillation. Consider antiarrhythmic medications. Consider implantable loop recorder insertion for monitoring of atrial fibrillation. Also recommended cardio mobile device for self monitoring at home      The nature of cardiac risk has been fully discussed with this patient. I have made him aware of his LDL target goal given his cardiovascular risk analysis. I have discussed the appropriate diet. The need for lifelong compliance in order to reduce risk is stressed.  A regular exercise program is recommended to help achieve and maintain normal body weight, fitness and improve lipid balance. Details of medical condition explained and patient was warned about adverse consequences of uncontrolled medical conditions and possible side effects of prescribed medications. Patient was advised and encouraged to check blood pressure at home or at a pharmacy, maintain a logbook, and also call us back if blood pressures are above or below the target ranges. Patient clearly understands and agrees to these instructions.

## 2022-10-25 NOTE — PATIENT INSTRUCTIONS
Limiting Sodium With Heart Failure: Care Instructions  Your Care Instructions     Sodium causes your body to hold on to extra water. This may cause your heart failure symptoms to get worse. Limiting sodium may help you feel better. People get most of their sodium from processed foods. Fast food and restaurant meals also tend to be very high in sodium. Your doctor may suggest that you limit sodium. Your doctor can tell you how much sodium is right for you. An example is less than 3,000 mg a day. This includes all the salt you eat in cooked or packaged foods. Follow-up care is a key part of your treatment and safety. Be sure to make and go to all appointments, and call your doctor if you are having problems. It's also a good idea to know your test results and keep a list of the medicines you take. How can you care for yourself at home? Read food labels  Read food labels on cans and food packages. The labels tell you how much sodium is in each serving. Make sure that you look at the serving size. If you eat more than the serving size, you have eaten more sodium than is listed for one serving. Food labels also tell you the Percent Daily Value for sodium. Choose products with low Percent Daily Values for sodium. Be aware that sodium can come in forms other than salt, including monosodium glutamate (MSG), sodium citrate, and sodium bicarbonate (baking soda). MSG is often added to Asian food. You can sometimes ask for food without MSG or salt. Buy low-sodium foods  Buy foods that are labeled \"unsalted\" (no salt added), \"sodium-free\" (less than 5 mg of sodium per serving), or \"low-sodium\" (140 mg or less of sodium per serving). A food labeled \"light sodium\" has less than half of the full-sodium version of that food. Foods labeled \"reduced-sodium\" may still have too much sodium. Buy fresh vegetables or plain, frozen vegetables. Buy low-sodium versions of canned vegetables, soups, and other canned goods.   Prepare low-sodium meals  Use less salt each day when cooking. Reducing salt in this way will help you adjust to the taste. Do not add salt after cooking. Take the salt shaker off the table. Flavor your food with garlic, lemon juice, onion, vinegar, herbs, and spices instead of salt. Do not use soy sauce, steak sauce, onion salt, garlic salt, or ketchup on your food. Make your own salad dressings, sauces, and ketchup without adding salt. Use less salt (or none) when recipes call for it. You can often use half the salt a recipe calls for without losing flavor. Other dishes like rice, pasta, and grains do not need added salt. Rinse canned vegetables. This removes some--but not all--of the salt. Avoid water that has a naturally high sodium content or that has been treated with water softeners, which add sodium. If you buy bottled water, read the label and choose a sodium-free brand. Avoid high-sodium foods, such as:  Smoked, cured, salted, and canned meat, fish, and poultry. Ham, ingram, hot dogs, and luncheon meats. Regular, hard, and processed cheese and regular peanut butter. Crackers with salted tops. Frozen prepared meals. Canned and dried soups, broths, and bouillon, unless labeled sodium-free or low-sodium. Canned vegetables, unless labeled sodium-free or low-sodium. Salted snack foods such as chips and pretzels. [place] fries, pizza, tacos, and other fast foods. Pickles, olives, ketchup, and other condiments, especially soy sauce, unless labeled sodium-free or low-sodium. If you cannot cook for yourself  Have family members or friends help you, or have someone cook low-sodium meals. Check with your local senior nutrition program to find out where meals are served and whether they offer a low-sodium option. You can often find these programs through your local health department or hospital.  Have meals delivered to your home.  Most [facility] have a Meals on AMBER Cook. These programs provide one hot meal a day for older adults, delivered to their homes. Ask whether these meals are low-sodium. Let them know that you are on a low-sodium diet. Where can you learn more? Go to https://EnerneticspealanewsentitO Networks.Critique^It. org and sign in to your myParcelDelivery account. Enter A166 in the [facility] box to learn more about \"Limiting Sodium With Heart Failure: Care Instructions. \"     If you do not have an account, please click on the \"Sign Up Now\" link. Current as of: April 29, 2021               Content Version: 13.1  © 0543-8301 [employer]. Care instructions adapted under license by [facility] ([facility]). If you have questions about a medical condition or this instruction, always ask your healthcare professional. [address] any warranty or liability for your use of this information. Learning About Low-Sodium Foods  What foods are low in sodium? The foods you eat contain nutrients, such as vitamins and minerals. Sodium is a nutrient. Your body needs the right amount to stay healthy and work as it should. You can use the list below to help you make choices about which foods to eat.   Fruits  Fresh, frozen, canned, or dried fruit  Vegetables  Fresh or frozen vegetables, with no added salt  Canned vegetables, low-sodium or with no added salt  Grains  Bagels without salted tops  Cereal with no added salt  Corn tortillas  Crackers with no added salt  Oatmeal, cooked without salt  Popcorn with no salt  Pasta and noodles, cooked without salt  Rice, cooked without salt  Unsalted pretzels  Dairy and dairy alternatives  Butter, unsalted  Cream cheese  Ice cream  Milk  Soy milk  Meats and other protein foods  Beans and peas, canned with no salt  Eggs  Fresh fish (not smoked)  Fresh meats (not smoked or cured)  Nuts and nut butter, prepared without salt  Poultry, not packaged with sodium solution  Tofu, unseasoned  Tuna, canned without salt  Seasonings  Garlic  Herbs and spices  Lemon juice  Mustard  Olive oil  Salt-free seasoning mixes  Vinegar  Work with your doctor to find out how much of this nutrient you need. Depending on your health, you may need more or less of it in your diet. Where can you learn more? Go to https://chbeka.[facility]. org and sign in to your [employer] H[employer]e computer account. Enter P219 in the [facility] box to learn more about \"Learning About Low-Sodium Foods. \"     If you do not have an account, please click on the \"Sign Up Now\" link. Current as of: September 8, 2021               Content Version: 13.1  © 2006-2021 [employer]phion. Care instructions adapted under license by [facility] ([facility]). If you have questions about a medical condition or this instruction, always ask your healthcare professional. [address] any warranty or liability for your use of this information. How to Read a Food Label to Limit Sodium: Care Instructions  Overview  Limiting sodium can be an important part of managing some health problems. Processed foods, fast food, and restaurant foods are the major sources of dietary sodium. The most common name for sodium is salt. Most packaged foods have a Nutrition Facts label. This will tell you how much sodium is in one serving of food. Follow-up care is a key part of your treatment and safety. Be sure to make and go to all appointments, and call your doctor if you are having problems. It's also a good idea to know your test results and keep a list of the medicines you take. How can you care for yourself at home? Read ingredient lists on food labels  Read the list of ingredients on food labels to help you find how much sodium is in a food. The label lists the ingredients in a food in descending order (from the most to the least). If salt or sodium is high on the list, there may be a lot of sodium in the food. Know that sodium has different names.  Sodium is also called monosodium glutamate (MSG), sodium citrate, sodium alginate, and sodium phosphate. Read Nutrition Facts labels  On most foods, there is a Nutrition Facts label. This will tell you how much sodium is in one serving of food. Look at both the serving size and the sodium amount. The serving size is located at the top of the label, usually right under the \"Nutrition Facts\" title. The amount of sodium is given in the list under the title. It is given in milligrams (mg). Check the serving size carefully. A single serving is often very small, and you may eat more than one serving. If this is the case, you will eat more sodium than listed on the label. For example, if the serving size for a canned soup is 1 cup and the sodium amount is 470 mg, if you have 2 cups you will eat 940 mg of sodium. The nutrition facts for fresh fruits and vegetables are not listed on the food. They may be listed somewhere in the store. These foods usually have no sodium or low sodium. The Nutrition Facts label also gives you the Percent Daily Value for sodium. This is how much of the recommended amount of sodium a serving contains. The daily value for sodium is 2,300 mg. So if the Percent Daily Value says 50%, this means one serving is giving you half of this, or 1,150 mg. Buy low-sodium foods  Look for foods that are made with less sodium. Watch for the following words on the label. \"Unsalted\" means there is no sodium added to the food. But there may be sodium already in the food naturally. \"Sodium-free\" means a serving has less than 5 mg of sodium. \"Very low sodium\" means a serving has 35 mg or less of sodium. \"Low-sodium\" means a serving has 140 mg or less of sodium. \"Reduced-sodium\" means that there is 25% less sodium than what the food normally has. This is still usually too much sodium. Buy fresh vegetables, or frozen vegetables without added sauces. Buy low-sodium versions of canned vegetables, soups, and other canned goods. Where can you learn more?   Go to https://chpepiceweb.MusicPlay Analytics. org and sign in to your Egoscue account. Enter 26 [id number] in the [facility] box to learn more about \"How to Read a Food Label to Limit Sodium: Care Instructions. \"     If you do not have an account, please click on the \"Sign Up Now\" link. Current as of: September 8, 2021               Content Version: 13.1  © 2006-2021 OneShield. Care instructions adapted under license by [facility] ([facility]). If you have questions about a medical condition or this instruction, always ask your healthcare professional. [place] [address] any warranty or liability for your use of this information. DASH Diet: Care Instructions  Your Care Instructions     The DASH diet is an eating plan that can help lower your blood pressure. DASH stands for Dietary Approaches to Stop Hypertension. Hypertension is high blood pressure. The DASH diet focuses on eating foods that are high in calcium, potassium, and magnesium. These nutrients can lower blood pressure. The foods that are highest in these nutrients are fruits, vegetables, low-fat dairy products, nuts, seeds, and legumes. But taking calcium, potassium, and magnesium supplements instead of eating foods that are high in those nutrients does not have the same effect. The DASH diet also includes whole grains, fish, and poultry. The DASH diet is one of several lifestyle changes your doctor may recommend to lower your high blood pressure. Your doctor may also want you to decrease the amount of sodium in your diet. Lowering sodium while following the DASH diet can lower blood pressure even further than just the DASH diet alone. Follow-up care is a key part of your treatment and safety. Be sure to make and go to all appointments, and call your doctor if you are having problems. It's also a good idea to know your test results and keep a list of the medicines you take. How can you care for yourself at home?   Following the [facility] diet  Eat 4 to 5 servings of fruit each day. A serving is 1 medium-sized piece of fruit, ½ cup chopped or canned fruit, 1/4 cup dried fruit, or 4 ounces (½ cup) of fruit juice. Choose fruit more often than fruit juice. Eat 4 to 5 servings of vegetables each day. A serving is 1 cup of lettuce or raw leafy vegetables, ½ cup of chopped or cooked vegetables, or 4 ounces (½ cup) of vegetable juice. Choose vegetables more often than vegetable juice. Get 2 to 3 servings of low-fat and fat-free dairy each day. A serving is 8 ounces of milk, 1 cup of yogurt, or 1 ½ ounces of cheese. Eat 6 to 8 servings of grains each day. A serving is 1 slice of bread, 1 ounce of dry cereal, or ½ cup of cooked rice, pasta, or cooked cereal. Try to choose whole-grain products as much as possible. Limit lean meat, poultry, and fish to 2 servings each day. A serving is 3 ounces, about the size of a deck of cards. Eat 4 to 5 servings of nuts, seeds, and legumes (cooked dried beans, lentils, and split peas) each week. A serving is 1/3 cup of nuts, 2 tablespoons of seeds, or ½ cup of cooked beans or peas. Limit fats and oils to 2 to 3 servings each day. A serving is 1 teaspoon of vegetable oil or 2 tablespoons of salad dressing. Limit sweets and added sugars to 5 servings or less a week. A serving is 1 tablespoon jelly or jam, ½ cup sorbet, or 1 cup of lemonade. Eat less than 2,300 milligrams (mg) of sodium a day. If you limit your sodium to 1,500 mg a day, you can lower your blood pressure even more. Be aware that all of these are the suggested number of servings for people who eat 1,800 to 2,000 calories a day. Your recommended number of servings may be different if you need more or fewer calories. Tips for success  Start small. Do not try to make dramatic changes to your diet all at once. You might feel that you are missing out on your favorite foods and then be more likely to not follow the plan.  Make small changes, and stick with them. Once those changes become habit, add a few more changes. Try some of the following:  Make it a goal to eat a fruit or vegetable at every meal and at snacks. This will make it easy to get the recommended amount of fruits and vegetables each day. Try yogurt topped with fruit and nuts for a snack or healthy dessert. Add lettuce, tomato, cucumber, and onion to sandwiches. Combine a ready-made pizza crust with low-fat mozzarella cheese and lots of vegetable toppings. Try using tomatoes, squash, spinach, broccoli, carrots, cauliflower, and onions. Have a variety of cut-up vegetables with a low-fat dip as an appetizer instead of chips and dip. Sprinkle sunflower seeds or chopped almonds over salads. Or try adding chopped walnuts or almonds to cooked vegetables. Try some vegetarian meals using beans and peas. Add garbanzo or kidney beans to salads. Make burritos and tacos with mashed guzman beans or black beans. Where can you learn more? Go to https://VG Life Sciencespepiceweb.[employer]. org and sign in to your Powelectrics account. Enter O052 in the I Read Books box to learn more about \"DASH Diet: Care Instructions. \"     If you do not have an account, please click on the \"Sign Up Now\" link. Current as of: April 29, 2021               Content Version: 13.0  © 6136-1229 Healthwise, Incorporated. Care instructions adapted under license by [facility] ([facility]). If you have questions about a medical condition or this instruction, always ask your healthcare professional. [place] [address] any warranty or liability for your use of this information.

## 2022-10-27 ENCOUNTER — OFFICE VISIT (OUTPATIENT)
Dept: FAMILY MEDICINE CLINIC | Age: 67
End: 2022-10-27
Payer: MEDICARE

## 2022-10-27 VITALS
HEIGHT: 65 IN | HEART RATE: 72 BPM | TEMPERATURE: 97.9 F | BODY MASS INDEX: 34.49 KG/M2 | DIASTOLIC BLOOD PRESSURE: 80 MMHG | RESPIRATION RATE: 16 BRPM | SYSTOLIC BLOOD PRESSURE: 120 MMHG | OXYGEN SATURATION: 99 % | WEIGHT: 207 LBS

## 2022-10-27 DIAGNOSIS — J06.9 URI WITH COUGH AND CONGESTION: Primary | ICD-10-CM

## 2022-10-27 LAB
INFLUENZA A ANTIBODY: NORMAL
INFLUENZA B ANTIBODY: NORMAL
Lab: NORMAL
PERFORMING INSTRUMENT: NORMAL
QC PASS/FAIL: NORMAL
SARS-COV-2, POC: NORMAL

## 2022-10-27 PROCEDURE — 87804 INFLUENZA ASSAY W/OPTIC: CPT | Performed by: NURSE PRACTITIONER

## 2022-10-27 PROCEDURE — 3074F SYST BP LT 130 MM HG: CPT | Performed by: NURSE PRACTITIONER

## 2022-10-27 PROCEDURE — 99213 OFFICE O/P EST LOW 20 MIN: CPT | Performed by: NURSE PRACTITIONER

## 2022-10-27 PROCEDURE — 3078F DIAST BP <80 MM HG: CPT | Performed by: NURSE PRACTITIONER

## 2022-10-27 PROCEDURE — 87426 SARSCOV CORONAVIRUS AG IA: CPT | Performed by: NURSE PRACTITIONER

## 2022-10-27 PROCEDURE — 1123F ACP DISCUSS/DSCN MKR DOCD: CPT | Performed by: NURSE PRACTITIONER

## 2022-10-27 RX ORDER — DOXYCYCLINE HYCLATE 100 MG
TABLET ORAL
COMMUNITY
Start: 2022-09-28

## 2022-10-27 RX ORDER — BENZONATATE 200 MG/1
200 CAPSULE ORAL 3 TIMES DAILY PRN
Qty: 21 CAPSULE | Refills: 0 | Status: SHIPPED | OUTPATIENT
Start: 2022-10-27 | End: 2022-11-03

## 2022-10-27 SDOH — ECONOMIC STABILITY: FOOD INSECURITY: WITHIN THE PAST 12 MONTHS, THE FOOD YOU BOUGHT JUST DIDN'T LAST AND YOU DIDN'T HAVE MONEY TO GET MORE.: NEVER TRUE

## 2022-10-27 SDOH — ECONOMIC STABILITY: FOOD INSECURITY: WITHIN THE PAST 12 MONTHS, YOU WORRIED THAT YOUR FOOD WOULD RUN OUT BEFORE YOU GOT MONEY TO BUY MORE.: NEVER TRUE

## 2022-10-27 ASSESSMENT — ENCOUNTER SYMPTOMS
RHINORRHEA: 1
SORE THROAT: 1
WHEEZING: 0
SHORTNESS OF BREATH: 0
CHEST TIGHTNESS: 0
VOICE CHANGE: 0
COUGH: 1
DIARRHEA: 1
TROUBLE SWALLOWING: 0
NAUSEA: 0
VOMITING: 0
SINUS PAIN: 1
SINUS PRESSURE: 1
ABDOMINAL PAIN: 0

## 2022-10-27 ASSESSMENT — SOCIAL DETERMINANTS OF HEALTH (SDOH): HOW HARD IS IT FOR YOU TO PAY FOR THE VERY BASICS LIKE FOOD, HOUSING, MEDICAL CARE, AND HEATING?: NOT HARD AT ALL

## 2022-10-27 NOTE — PROGRESS NOTES
Subjective:      Patient ID: Joseph Perez is a 74 y.o. female who presents today for:  Chief Complaint   Patient presents with    Cough     Congestion, fever, body aches, ear pain sx started tuesday       HPI  Patient is here with c/o sore throat, body aches, loose stools, ear pain, and runny nose. Symptoms started Tuesday. Says sister is also ill but not sure what she had. Says she had fever 101.2 Tuesday and that has subsided. Reports loose stools yesterday. Reports she is not eating and drinking much . Denies chest congestion, SOB, or wheezing. Reports muscle aches and \"everything hurts\". Says she is taking Mucinex and Tylenol for sx but it is not helping. Past Medical History:   Diagnosis Date    Atrial fibrillation [facility])     Brain tumor ([facility] [address] 07/16/2018    Chest pain     Degenerative disc disease, lumbar 1/21/2016    Hx of colonic polyps     Hypertension 2/24/2015    Irritable bowel syndrome     Low back pain 10/16/2018    Lumbar radiculopathy 3/14/2016    Rapid heart rate     Stress incontinence      Past Surgical History:   Procedure Laterality Date    APPENDECTOMY      BACK SURGERY  07/14/2017    CARDIAC CATHETERIZATION  11/2/15    DR. PALMER    HYSTERECTOMY (CERVIX STATUS UNKNOWN)      VT CRANIECT EXCIS SKULL BONE LESN N/A 7/24/2018    CRANIOTOMY performed by Billy Hollis MD at [address] [facility] [address]  2009    UPPER GASTROINTESTINAL ENDOSCOPY  1/15/13    DR. MEEKS     Social History     Socioeconomic History    Marital status:      Spouse name: Not on file    Number of children: Not on file    Years of education: Not on file    Highest education level: Not on file   Occupational History    Not on file   Tobacco Use    Smoking status: Never    Smokeless tobacco: Never   Vaping Use    Vaping Use: Never used   Substance and Sexual Activity    Alcohol use: Not Currently     Alcohol/week: 0.0 standard drinks     Comment: socially Drug use: No    Sexual activity: Not Currently   Other Topics Concern    Not on file   Social History Narrative    Not on file     Social Determinants of Health     Financial Resource Strain: Not on file   Food Insecurity: Not on file   Transportation Needs: Not on file   Physical Activity: Not on file   Stress: Not on file   Social Connections: Not on file   Intimate Partner Violence: Not on file   Housing Stability: Not on file     Family History   Problem Relation Age of Onset    Cancer Father         COLON    High Blood Pressure Father     Heart Disease Father     Breast Cancer Neg Hx      Allergies   Allergen Reactions    Codeine     Demerol     Dye [Iodides] Nausea Only    Morphine     Penicillins Other (See Comments)    Sulfa Antibiotics Hives and Itching    Levofloxacin Itching and Rash     Current Outpatient Medications   Medication Sig Dispense Refill    doxycycline hyclate (VIBRA-TABS) 100 MG tablet       benzonatate (TESSALON) 200 MG capsule Take 1 capsule by mouth 3 times daily as needed for Cough 21 capsule 0    amLODIPine (NORVASC) 5 MG tablet Take 1 tablet by mouth 2 times daily 60 tablet 0    venlafaxine (EFFEXOR XR) 75 MG extended release capsule TAKE ONE CAPSULE BY MOUTH EVERY DAY 90 capsule 3    estradiol (VIVELLE) 0.05 MG/24HR Place 1 patch onto the skin once a week 24 patch 3    metoprolol succinate (TOPROL XL) 100 MG extended release tablet TAKE ONE TABLET DAILY 90 tablet 3    levocetirizine (XYZAL) 5 MG tablet Take 5 mg by mouth nightly      ondansetron (ZOFRAN-ODT) 4 MG disintegrating tablet DISSOLVE ONE TABLET BY MOUTH EVERY 8 HOURS AS NEEDED FOR NAUSEA/ VOMITING 20 tablet 1    LEVBID 0.375 MG extended release tablet TAKE 1 TABLET EVERY 12 HOURS AS NEEDED FOR CRAMPING 180 tablet 3    simethicone (MYLICON) 80 MG chewable tablet Take 1 tablet by mouth 4 times daily as needed for Flatulence 180 tablet 3    Handicap Placard MISC by Does not apply route Good from 8/9/21 to 8/9/26 1 each 0 traZODone (DESYREL) 50 MG tablet Take 1 tablet by mouth nightly 90 tablet 3    lactobacillus (CULTURELLE) capsule Take 1 capsule by mouth daily 30 capsule 1    fluticasone (FLONASE) 50 MCG/ACT nasal spray 2 sprays by Nasal route daily 1 Bottle 3     No current facility-administered medications for this visit. Review of Systems   Constitutional:  Positive for activity change, appetite change, chills, diaphoresis, fatigue and fever. HENT:  Positive for congestion, ear pain, postnasal drip, rhinorrhea, sinus pressure, sinus pain, sneezing and sore throat. Negative for ear discharge, tinnitus, trouble swallowing and voice change. Respiratory:  Positive for cough. Negative for chest tightness, shortness of breath and wheezing. Cardiovascular:  Negative for chest pain. Gastrointestinal:  Positive for diarrhea. Negative for abdominal pain, nausea and vomiting. Musculoskeletal:  Positive for arthralgias and myalgias. Skin:  Negative for rash. Neurological:  Positive for weakness and headaches. Negative for dizziness and light-headedness. Hematological:  Negative for adenopathy. Objective:   /80 (Site: Right Upper Arm, Position: Sitting, Cuff Size: Medium Adult)   Pulse 72   Temp 97.9 °F (36.6 °C) (Temporal)   Resp 16   Ht 5' 5\" (1.651 m)   Wt 207 lb (93.9 kg)   LMP  (LMP Unknown)   SpO2 99%   BMI 34.45 kg/m²     Physical Exam  Vitals reviewed. Constitutional:       General: She is awake. She is not in acute distress. Appearance: Normal appearance. She is well-developed, well-groomed and normal weight. She is not ill-appearing, toxic-appearing or diaphoretic. HENT:      Head: Normocephalic and atraumatic. Right Ear: Hearing, tympanic membrane, ear canal and external ear normal. There is no impacted cerumen. Left Ear: Hearing, tympanic membrane, ear canal and external ear normal. There is no impacted cerumen. Nose: Congestion present.       Right Turbinates: Swollen. Left Turbinates: Swollen. Right Sinus: Maxillary sinus tenderness and frontal sinus tenderness present. Left Sinus: Maxillary sinus tenderness and frontal sinus tenderness present. Mouth/Throat:      Lips: Pink. Mouth: Mucous membranes are moist. No oral lesions. Dentition: No gum lesions. Tongue: No lesions. Palate: No lesions. Pharynx: Oropharynx is clear. Uvula midline. No pharyngeal swelling, oropharyngeal exudate, posterior oropharyngeal erythema or uvula swelling. Tonsils: No tonsillar exudate or tonsillar abscesses. 0 on the right. 0 on the left. Eyes:      General: Lids are normal.      Extraocular Movements: Extraocular movements intact. Conjunctiva/sclera: Conjunctivae normal.   Neck:      Trachea: Trachea normal.   Cardiovascular:      Rate and Rhythm: Normal rate and regular rhythm. Pulses: Normal pulses. Heart sounds: Normal heart sounds, S1 normal and S2 normal.   Pulmonary:      Effort: Pulmonary effort is normal.      Breath sounds: Normal breath sounds and air entry. Musculoskeletal:         General: Normal range of motion. Cervical back: Normal range of motion and neck supple. No tenderness. Lymphadenopathy:      Cervical: No cervical adenopathy. Skin:     General: Skin is warm and dry. Capillary Refill: Capillary refill takes less than 2 seconds. Neurological:      General: No focal deficit present. Mental Status: She is alert and oriented to person, place, and time. Mental status is at baseline. Psychiatric:         Attention and Perception: Attention and perception normal.         Mood and Affect: Mood and affect normal.         Speech: Speech normal.         Behavior: Behavior normal. Behavior is cooperative. Thought Content: Thought content normal.         Cognition and Memory: Cognition and memory normal.         Judgment: Judgment normal.       Assessment:       Diagnosis Orders   1. URI with cough and congestion  POCT COVID-19, Antigen    POCT Influenza A/B    benzonatate (TESSALON) 200 MG capsule        Results for POC orders placed in visit on 10/27/22   POCT Influenza A/B   Result Value Ref Range    Influenza A Ab neg     Influenza B Ab neg       Plan:     Assessment & Plan   Dain Lugo was seen today for cough. Diagnoses and all orders for this visit:    URI with cough and congestion  -     POCT COVID-19, Antigen  -     POCT Influenza A/B  -     benzonatate (TESSALON) 200 MG capsule; Take 1 capsule by mouth 3 times daily as needed for Cough    Discussed with patient Covid and Flu testing both negative in office. Advised patient that illness is likely viral. Advised on typical sx and duration or viral illness. Advised can use OTC medications for sx along with increased rest and fluids. Discussed f/u if sx do not improve in 7-10 days or become severe. Advised good hand washing, covering cough/sneezes, and staying home until fever free for 24 hours without fever reducers. Advised will also given tessalon for cough and may cont Mucinex and Tylenol for symptoms. Advised follow up with any chest congestion or breathing sx. Orders Placed This Encounter   Procedures    POCT COVID-19, Antigen     Order Specific Question:   Is this test for diagnosis or screening? Answer:   Diagnosis of ill patient     Order Specific Question:   Symptomatic for COVID-19 as defined by CDC? Answer:   Yes     Order Specific Question:   Date of Symptom Onset     Answer:   10/24/2022     Order Specific Question:   Hospitalized for COVID-19? Answer:   No     Order Specific Question:   Admitted to ICU for COVID-19? Answer:   No     Order Specific Question:   Employed in healthcare setting? Answer:   No     Order Specific Question:   Resident in a congregate (group) care setting? Answer:   No     Order Specific Question:   Pregnant?      Answer:   No     Order Specific Question: Previously tested for COVID-19? Answer:   Yes    POCT Influenza A/B     Orders Placed This Encounter   Medications    benzonatate (TESSALON) 200 MG capsule     Sig: Take 1 capsule by mouth 3 times daily as needed for Cough     Dispense:  21 capsule     Refill:  0     There are no discontinued medications. Return for worsening of condition, if symptoms do not improve in 3-5 days. Reviewed with the patient/family: current clinical status & medications. Side effects of the medication prescribed today, as well as treatment plan/rationale and result expectations have been discussed with the patient/family who expresses understanding. Patient will be discharged home in stable condition. Follow up with PCP to evaluate treatment results or return if symptoms worsen or fail to improve. Discussed signs and symptoms which require immediate follow-up in ED/call to 911. Understanding verbalized. I have reviewed the patient's medical history in detail and updated the computerized patient record.     Tova Donohue, ALETA - CNP

## 2022-11-01 ENCOUNTER — APPOINTMENT (OUTPATIENT)
Dept: ULTRASOUND IMAGING | Age: 67
End: 2022-11-01
Payer: MEDICARE

## 2022-11-01 ENCOUNTER — HOSPITAL ENCOUNTER (OUTPATIENT)
Dept: WOMENS IMAGING | Age: 67
Discharge: HOME | End: 2022-11-03
Payer: MEDICARE

## 2022-11-01 DIAGNOSIS — R92.8 ABNORMAL MAMMOGRAM OF RIGHT BREAST: ICD-10-CM

## 2022-11-01 DIAGNOSIS — R92.8 ABNORMAL MAMMOGRAM OF RIGHT BREAST: Primary | ICD-10-CM

## 2022-11-01 PROCEDURE — G0279 TOMOSYNTHESIS, MAMMO: HCPCS

## 2022-11-01 NOTE — PROGRESS NOTES
Orders placed per Dr. Alejo Buenrostro request, at request of [address] department for 6 month follow up

## 2022-12-19 DIAGNOSIS — K58.0 IRRITABLE BOWEL SYNDROME WITH DIARRHEA: ICD-10-CM

## 2022-12-19 RX ORDER — LOPERAMIDE HYDROCHLORIDE 2 MG/1
CAPSULE ORAL
Qty: 60 CAPSULE | Refills: 2 | Status: SHIPPED | OUTPATIENT
Start: 2022-12-19

## 2022-12-19 RX ORDER — HYOSCYAMINE SULFATE EXTENDED-RELEASE 0.38 MG/1
TABLET ORAL
Qty: 60 TABLET | Refills: 3 | Status: SHIPPED | OUTPATIENT
Start: 2022-12-19

## 2022-12-23 DIAGNOSIS — K58.0 IRRITABLE BOWEL SYNDROME WITH DIARRHEA: ICD-10-CM

## 2022-12-27 ENCOUNTER — HOSPITAL ENCOUNTER (EMERGENCY)
Age: 67
Discharge: HOME | End: 2022-12-27
Payer: MEDICARE

## 2022-12-27 ENCOUNTER — APPOINTMENT (OUTPATIENT)
Dept: CT IMAGING | Age: 67
End: 2022-12-27
Payer: MEDICARE

## 2022-12-27 ENCOUNTER — OFFICE VISIT (OUTPATIENT)
Dept: FAMILY MEDICINE CLINIC | Age: 67
End: 2022-12-27
Payer: MEDICARE

## 2022-12-27 VITALS
WEIGHT: 210 LBS | SYSTOLIC BLOOD PRESSURE: 131 MMHG | BODY MASS INDEX: 34.99 KG/M2 | DIASTOLIC BLOOD PRESSURE: 67 MMHG | OXYGEN SATURATION: 98 % | HEIGHT: 65 IN | HEART RATE: 70 BPM | TEMPERATURE: 98.2 F

## 2022-12-27 VITALS
RESPIRATION RATE: 14 BRPM | TEMPERATURE: 97.5 F | BODY MASS INDEX: 34.49 KG/M2 | SYSTOLIC BLOOD PRESSURE: 128 MMHG | HEART RATE: 73 BPM | DIASTOLIC BLOOD PRESSURE: 72 MMHG | HEIGHT: 65 IN | OXYGEN SATURATION: 96 % | WEIGHT: 207 LBS

## 2022-12-27 DIAGNOSIS — N28.1 RENAL CYST: ICD-10-CM

## 2022-12-27 DIAGNOSIS — K58.0 IRRITABLE BOWEL SYNDROME WITH DIARRHEA: ICD-10-CM

## 2022-12-27 DIAGNOSIS — K59.00 CONSTIPATION, UNSPECIFIED CONSTIPATION TYPE: ICD-10-CM

## 2022-12-27 DIAGNOSIS — R10.9 BILATERAL FLANK PAIN: Primary | ICD-10-CM

## 2022-12-27 DIAGNOSIS — R10.9 LEFT FLANK PAIN: Primary | ICD-10-CM

## 2022-12-27 LAB
ALBUMIN SERPL-MCNC: 3.8 G/DL (ref 3.5–4.6)
ALP BLD-CCNC: 105 U/L (ref 40–130)
ALT SERPL-CCNC: 11 U/L (ref 0–33)
ANION GAP SERPL CALCULATED.3IONS-SCNC: 10 MEQ/L (ref 9–15)
AST SERPL-CCNC: 19 U/L (ref 0–35)
BASOPHILS ABSOLUTE: 0.1 K/UL (ref 0–0.2)
BASOPHILS RELATIVE PERCENT: 0.7 %
BILIRUB SERPL-MCNC: 0.4 MG/DL (ref 0.2–0.7)
BILIRUBIN URINE: NEGATIVE
BILIRUBIN, POC: NORMAL
BLOOD URINE, POC: NORMAL
BLOOD, URINE: NEGATIVE
BUN BLDV-MCNC: 13 MG/DL (ref 8–23)
CALCIUM SERPL-MCNC: 9 MG/DL (ref 8.5–9.9)
CHLORIDE BLD-SCNC: 103 MEQ/L (ref 95–107)
CLARITY, POC: CLEAR
CLARITY: CLEAR
CO2: 23 MEQ/L (ref 20–31)
COLOR, POC: YELLOW
COLOR: YELLOW
CREAT SERPL-MCNC: 0.48 MG/DL (ref 0.5–0.9)
EOSINOPHILS ABSOLUTE: 0.1 K/UL (ref 0–0.7)
EOSINOPHILS RELATIVE PERCENT: 1.1 %
GFR SERPL CREATININE-BSD FRML MDRD: >60 ML/MIN/{1.73_M2}
GLOBULIN: 2.7 G/DL (ref 2.3–3.5)
GLUCOSE BLD-MCNC: 102 MG/DL (ref 70–99)
GLUCOSE URINE, POC: NORMAL
GLUCOSE URINE: NEGATIVE MG/DL
HCT VFR BLD CALC: 47.8 % (ref 37–47)
HEMOGLOBIN: 16 G/DL (ref 12–16)
KETONES, POC: NORMAL
KETONES, URINE: ABNORMAL MG/DL
LEUKOCYTE EST, POC: NORMAL
LEUKOCYTE ESTERASE, URINE: NEGATIVE
LYMPHOCYTES ABSOLUTE: 1.9 K/UL (ref 1–4.8)
LYMPHOCYTES RELATIVE PERCENT: 23.8 %
MCH RBC QN AUTO: 30.9 PG (ref 27–31.3)
MCHC RBC AUTO-ENTMCNC: 33.4 % (ref 33–37)
MCV RBC AUTO: 92.3 FL (ref 79.4–94.8)
MONOCYTES ABSOLUTE: 0.7 K/UL (ref 0.2–0.8)
MONOCYTES RELATIVE PERCENT: 9 %
NEUTROPHILS ABSOLUTE: 5.3 K/UL (ref 1.4–6.5)
NEUTROPHILS RELATIVE PERCENT: 65.4 %
NITRITE, POC: NORMAL
NITRITE, URINE: NEGATIVE
PDW BLD-RTO: 13.6 % (ref 11.5–14.5)
PH UA: 5 (ref 5–9)
PH, POC: 6
PLATELET # BLD: 224 K/UL (ref 130–400)
POTASSIUM SERPL-SCNC: 4 MEQ/L (ref 3.4–4.9)
PROTEIN UA: NEGATIVE MG/DL
PROTEIN, POC: NORMAL
RBC # BLD: 5.17 M/UL (ref 4.2–5.4)
SODIUM BLD-SCNC: 136 MEQ/L (ref 135–144)
SPECIFIC GRAVITY UA: 1.02 (ref 1–1.03)
SPECIFIC GRAVITY, POC: 1.03
TOTAL PROTEIN: 6.5 G/DL (ref 6.3–8)
URINE REFLEX TO CULTURE: ABNORMAL
UROBILINOGEN, POC: NORMAL
UROBILINOGEN, URINE: 0.2 E.U./DL
WBC # BLD: 8.2 K/UL (ref 4.8–10.8)

## 2022-12-27 PROCEDURE — 6360000002 HC RX W HCPCS: Performed by: PHYSICIAN ASSISTANT

## 2022-12-27 PROCEDURE — 99214 OFFICE O/P EST MOD 30 MIN: CPT | Performed by: NURSE PRACTITIONER

## 2022-12-27 PROCEDURE — 96374 THER/PROPH/DIAG INJ IV PUSH: CPT

## 2022-12-27 PROCEDURE — 3078F DIAST BP <80 MM HG: CPT | Performed by: NURSE PRACTITIONER

## 2022-12-27 PROCEDURE — 80053 COMPREHEN METABOLIC PANEL: CPT

## 2022-12-27 PROCEDURE — 81003 URINALYSIS AUTO W/O SCOPE: CPT | Performed by: NURSE PRACTITIONER

## 2022-12-27 PROCEDURE — 85025 COMPLETE CBC W/AUTO DIFF WBC: CPT

## 2022-12-27 PROCEDURE — 81003 URINALYSIS AUTO W/O SCOPE: CPT

## 2022-12-27 PROCEDURE — 99284 EMERGENCY DEPT VISIT MOD MDM: CPT

## 2022-12-27 PROCEDURE — 96375 TX/PRO/DX INJ NEW DRUG ADDON: CPT

## 2022-12-27 PROCEDURE — 1123F ACP DISCUSS/DSCN MKR DOCD: CPT | Performed by: NURSE PRACTITIONER

## 2022-12-27 PROCEDURE — 74176 CT ABD & PELVIS W/O CONTRAST: CPT

## 2022-12-27 PROCEDURE — 36415 COLL VENOUS BLD VENIPUNCTURE: CPT

## 2022-12-27 PROCEDURE — 3074F SYST BP LT 130 MM HG: CPT | Performed by: NURSE PRACTITIONER

## 2022-12-27 RX ORDER — ONDANSETRON 2 MG/ML
4 INJECTION INTRAMUSCULAR; INTRAVENOUS ONCE
Status: COMPLETED | OUTPATIENT
Start: 2022-12-27 | End: 2022-12-27

## 2022-12-27 RX ORDER — NAPROXEN 500 MG/1
500 TABLET ORAL 2 TIMES DAILY
Qty: 14 TABLET | Refills: 0 | Status: SHIPPED | OUTPATIENT
Start: 2022-12-27 | End: 2023-01-03

## 2022-12-27 RX ORDER — POLYETHYLENE GLYCOL 3350 17 G/17G
17 POWDER, FOR SOLUTION ORAL DAILY PRN
Qty: 510 G | Refills: 0 | Status: SHIPPED | OUTPATIENT
Start: 2022-12-27 | End: 2023-01-26

## 2022-12-27 RX ORDER — FENTANYL CITRATE 50 UG/ML
50 INJECTION, SOLUTION INTRAMUSCULAR; INTRAVENOUS ONCE
Status: COMPLETED | OUTPATIENT
Start: 2022-12-27 | End: 2022-12-27

## 2022-12-27 RX ORDER — LACTOBACILLUS RHAMNOSUS GG 10B CELL
1 CAPSULE ORAL DAILY
Qty: 30 CAPSULE | Refills: 1 | Status: SHIPPED | OUTPATIENT
Start: 2022-12-27

## 2022-12-27 RX ADMIN — FENTANYL CITRATE 50 MCG: 0.05 INJECTION, SOLUTION INTRAMUSCULAR; INTRAVENOUS at 18:55

## 2022-12-27 RX ADMIN — ONDANSETRON 4 MG: 2 INJECTION INTRAMUSCULAR; INTRAVENOUS at 18:55

## 2022-12-27 ASSESSMENT — ENCOUNTER SYMPTOMS
ANAL BLEEDING: 0
NAUSEA: 1
COUGH: 0
DIARRHEA: 0
VOMITING: 0
SORE THROAT: 0
VOMITING: 0
VOICE CHANGE: 0
RHINORRHEA: 0
ABDOMINAL DISTENTION: 0
CONSTIPATION: 0
ABDOMINAL PAIN: 0
ABDOMINAL PAIN: 0
NAUSEA: 0
COUGH: 0
SHORTNESS OF BREATH: 0
WHEEZING: 0
EYE DISCHARGE: 0

## 2022-12-27 NOTE — PROGRESS NOTES
Subjective:      Patient ID: Maritza Levi is a 74 y.o. female who presents today for:  Chief Complaint   Patient presents with    Other     Patient presents today with both sides of her abdomen hurting which she is rating it a 10 in the pain scale. The pain has been for the last 2 weeks off and on. She feel 4 weeks ago forward but the pain started 2 weeks ago. Pain is worse on the left side. Sitting, laying and movements hurts. Other  Associated symptoms include chills, congestion and nausea. Pertinent negatives include no abdominal pain, coughing, fatigue, fever, headaches, myalgias, rash, sore throat or vomiting.    Pain in both of her sides   Shes tried ice/heat bengay, tylenol  She fell 4 weeks ago but was fine afterwards  Jose Enrique Saxena forevic on the concrete , not on her back   Hit her knees   She did not hit her back or sides   2 weeks ago  this pain started off and on   Sometimes no pain, xmas mariana really didn't do anything and didn't have pain the  xmas morning bad again and has not let up since   Hasnt slept in 3 days  The left side hurts worse   No matter what position shes in she cannot get comfrotable   Chills   She does have nausea   No trouble urinating   Constant sinus issue, nothing worse then her norm  No change in bowels   Painful to touch both sides   Last week was throwing up and nausea like a 24 hour thing   Pt standing in the room the whole time because shes uncomfortable   Walking slow and stiff due to pain   No incident besides the fall which was 4 weeks ago and did not have this pain at that time     Past Medical History:   Diagnosis Date    Atrial fibrillation ([facility] [address]     Brain tumor ([facility] [address] 07/16/2018    Chest pain     Degenerative disc disease, lumbar 1/21/2016    Hx of colonic polyps     Hypertension 2/24/2015    Irritable bowel syndrome     Low back pain 10/16/2018    Lumbar radiculopathy 3/14/2016    Rapid heart rate     Stress incontinence      Past Surgical History:   Procedure Laterality Date    APPENDECTOMY      BACK SURGERY  07/14/2017    CARDIAC CATHETERIZATION  11/02/2015    DR. PALMER    HYSTERECTOMY (CERVIX STATUS UNKNOWN)      OVARY REMOVAL      MO CRANIECT EXCIS SKULL BONE LESN N/A 07/24/2018    CRANIOTOMY performed by Diogo Rutherford MD at [address] [place] [address]  2009    UPPER GASTROINTESTINAL ENDOSCOPY  01/15/2013    DR. MEEKS     Social History     Socioeconomic History    Marital status:      Spouse name: Not on file    Number of children: Not on file    Years of education: Not on file    Highest education level: Not on file   Occupational History    Not on file   Tobacco Use    Smoking status: Never    Smokeless tobacco: Never   Vaping Use    Vaping Use: Never used   Substance and Sexual Activity    Alcohol use: Not Currently     Alcohol/week: 0.0 standard drinks     Comment: socially    Drug use: No    Sexual activity: Not Currently   Other Topics Concern    Not on file   Social History Narrative    Not on file     Social Determinants of Health     Financial Resource Strain: Low Risk     Difficulty of Paying Living Expenses: Not hard at all   Food Insecurity: No Food Insecurity    Worried About Running Out of Food in the Last Year: Never true    [address] in the Last Year: Never true   Transportation Needs: No Transportation Needs    Lack of Transportation (Medical): No    Lack of Transportation (Non-Medical):  No   Physical Activity: Not on file   Stress: Not on file   Social Connections: Not on file   Intimate Partner Violence: Not on file   Housing Stability: Not on file     Family History   Problem Relation Age of Onset    Cancer Father         COLON    High Blood Pressure Father     Heart Disease Father     Breast Cancer Neg Hx      Allergies   Allergen Reactions    Codeine     Demerol     Dye [Iodides] Nausea Only    Morphine     Penicillins Other (See Comments)    Sulfa Antibiotics Hives and Itching Levofloxacin Itching and Rash     Current Outpatient Medications   Medication Sig Dispense Refill    loperamide (IMODIUM) 2 MG capsule TAKE ONE CAPSULE BY MOUTH AS NEEDED FOR DIARRHEA (MAX OF 4 CAPSULES PER DAY) 60 capsule 2    hyoscyamine (LEVBID) 375 MCG extended release tablet TAKE ONE TABLET BY MOUTH EVERY 12 HOURS AS NEEDED FOR CRAMPING 60 tablet 3    doxycycline hyclate (VIBRA-TABS) 100 MG tablet       amLODIPine (NORVASC) 5 MG tablet Take 1 tablet by mouth 2 times daily 60 tablet 0    venlafaxine (EFFEXOR XR) 75 MG extended release capsule TAKE ONE CAPSULE BY MOUTH EVERY DAY 90 capsule 3    estradiol (VIVELLE) 0.05 MG/24HR Place 1 patch onto the skin once a week 24 patch 3    metoprolol succinate (TOPROL XL) 100 MG extended release tablet TAKE ONE TABLET DAILY 90 tablet 3    levocetirizine (XYZAL) 5 MG tablet Take 5 mg by mouth nightly      ondansetron (ZOFRAN-ODT) 4 MG disintegrating tablet DISSOLVE ONE TABLET BY MOUTH EVERY 8 HOURS AS NEEDED FOR NAUSEA/ VOMITING 20 tablet 1    simethicone (MYLICON) 80 MG chewable tablet Take 1 tablet by mouth 4 times daily as needed for Flatulence 180 tablet 3    Handicap Placard MISC by Does not apply route Good from 8/9/21 to 8/9/26 1 each 0    traZODone (DESYREL) 50 MG tablet Take 1 tablet by mouth nightly 90 tablet 3    lactobacillus (CULTURELLE) capsule Take 1 capsule by mouth daily 30 capsule 1    fluticasone (FLONASE) 50 MCG/ACT nasal spray 2 sprays by Nasal route daily 1 Bottle 3     No current facility-administered medications for this visit. Review of Systems   Constitutional:  Positive for appetite change and chills. Negative for fatigue and fever. HENT:  Positive for congestion. Negative for rhinorrhea and sore throat. Respiratory:  Negative for cough, shortness of breath and wheezing. Gastrointestinal:  Positive for nausea. Negative for abdominal pain, constipation, diarrhea and vomiting. Genitourinary:  Positive for flank pain.  Negative for difficulty urinating. Musculoskeletal:  Negative for myalgias. Skin:  Negative for rash. Neurological:  Negative for headaches. Psychiatric/Behavioral:  Negative for agitation, confusion and hallucinations. Objective:   /72 (Site: Right Upper Arm, Position: Standing, Cuff Size: Large Adult)   Pulse 73   Temp 97.5 °F (36.4 °C) (Temporal)   Resp 14   Ht 5' 5\" (1.651 m)   Wt 207 lb (93.9 kg)   LMP  (LMP Unknown)   SpO2 96%   BMI 34.45 kg/m²     Physical Exam  Constitutional:       General: She is in acute distress. Appearance: Normal appearance. She is obese. HENT:      Head: Normocephalic and atraumatic. Nose: No congestion or rhinorrhea. Mouth/Throat:      Mouth: Mucous membranes are moist.   Eyes:      Conjunctiva/sclera: Conjunctivae normal.   Cardiovascular:      Rate and Rhythm: Normal rate and regular rhythm. Heart sounds: Normal heart sounds, S1 normal and S2 normal.   Pulmonary:      Effort: Pulmonary effort is normal.      Breath sounds: Normal breath sounds. No decreased breath sounds, wheezing or rhonchi. Musculoskeletal:        Arms:       Lumbar back: No tenderness or bony tenderness. Comments: Tender to touch    Neurological:      Mental Status: She is alert. Assessment:       Diagnosis Orders   1. Bilateral flank pain  POCT Urinalysis No Micro (Auto)    Culture, Urine        Results for POC orders placed in visit on 12/27/22   POCT Urinalysis No Micro (Auto)   Result Value Ref Range    Color, UA yellow     Clarity, UA clear     Glucose, UA POC -     Bilirubin, UA -     Ketones, UA -     Spec Grav, UA 1.030     Blood, UA POC +-     pH, UA 6.0     Protein, UA POC +-     Urobilinogen, UA -     Leukocytes, UA -     Nitrite, UA -       Plan:   She states she has had small urine in urine samples before. Pt is too uncomfortable. Should go to the ER for further evaluation due to unclear etiology.    Assessment & Plan   John Onofre was seen today for other.    Diagnoses and all orders for this visit:    Bilateral flank pain  -     POCT Urinalysis No Micro (Auto)  -     Culture, Urine    Orders Placed This Encounter   Procedures    Culture, Urine     Order Specific Question:   Specify (ex-cath, midstream, cysto, etc)? Answer:   midstream    POCT Urinalysis No Micro (Auto)     No orders of the defined types were placed in this encounter. There are no discontinued medications. Return for To the ER . Reviewed with the patient/family: current clinical status & medications. Side effects of the medication prescribed today, as well as treatment plan/rationale and result expectations have been discussed with the patient/family who expresses understanding. Patient will be discharged home in stable condition. Follow up with PCP to evaluate treatment results or return if symptoms worsen or fail to improve. Discussed signs and symptoms which require immediate follow-up in ED/call to 911. Understanding verbalized. I have reviewed the patient's medical history in detail and updated the computerized patient record.     ALETA Kenney - CNP

## 2022-12-27 NOTE — TELEPHONE ENCOUNTER
Called patient to schedule appt lvm for patient to call the office.
Refill request approved. Patient is due for follow up. Please schedule. Thank you.
(4) no impairment

## 2022-12-28 NOTE — ED PROVIDER NOTES
[address] [facility] ED  eMERGENCY dEPARTMENT eNCOUnter      Pt Name: Jordi Keller  MRN: [id number]  [place] 1955  Date of evaluation: 12/27/2022  Provider: Brit Garza Dr       Chief Complaint   Patient presents with    Flank Pain         HISTORY OF PRESENT ILLNESS   (Location/Symptom, Timing/Onset,Context/Setting, Quality, Duration, Modifying Factors, Severity)  Note limiting factors. Jordi Keller is a 74 y.o. female who presents to the emergency department patient has 1 week history of flank pain noted she was seen at urgent care had small amount of blood in her urine denies nausea vomiting abdominal pain denies any additional symptoms states has had similar before no history of kidney stones mild to moderate severity    HPI    NursingNotes were reviewed. REVIEW OF SYSTEMS    (2-9 systems for level 4, 10 or more for level 5)     Review of Systems   Constitutional:  Negative for activity change, appetite change and unexpected weight change. HENT:  Negative for ear discharge, nosebleeds and voice change. Eyes:  Negative for discharge. Respiratory:  Negative for cough. Cardiovascular:  Negative for chest pain. Gastrointestinal:  Negative for abdominal distention, abdominal pain, anal bleeding, nausea and vomiting. Genitourinary:  Positive for flank pain and hematuria. Musculoskeletal:  Negative for joint swelling. Skin:  Negative for pallor. Neurological:  Negative for seizures and facial asymmetry. Hematological:  Does not bruise/bleed easily. Psychiatric/Behavioral:  Negative for behavioral problems, self-injury and sleep disturbance. All other systems reviewed and are negative. Except as noted above the remainder of the review of systems was reviewed and negative.        PAST MEDICAL HISTORY     Past Medical History:   Diagnosis Date    Atrial fibrillation [facility])     Brain tumor ([facility] [address] 07/16/2018    Chest pain     Degenerative disc disease, lumbar 1/21/2016    Hx of colonic polyps     Hypertension 2/24/2015    Irritable bowel syndrome     Low back pain 10/16/2018    Lumbar radiculopathy 3/14/2016    Rapid heart rate     Stress incontinence          SURGICALHISTORY       Past Surgical History:   Procedure Laterality Date    APPENDECTOMY      BACK SURGERY  07/14/2017    CARDIAC CATHETERIZATION  11/02/2015    DR. PALMER    HYSTERECTOMY (CERVIX STATUS UNKNOWN)      OVARY REMOVAL      MI CRANIECT EXCIS SKULL BONE LESN N/A 07/24/2018    CRANIOTOMY performed by Milo Villegas MD at [address]  2009    UPPER GASTROINTESTINAL ENDOSCOPY  01/15/2013    DR. MEEKS         CURRENT MEDICATIONS       Previous Medications    AMLODIPINE (NORVASC) 5 MG TABLET    Take 1 tablet by mouth 2 times daily    DOXYCYCLINE HYCLATE (VIBRA-TABS) 100 MG TABLET        ESTRADIOL (VIVELLE) 0.05 MG/24HR    Place 1 patch onto the skin once a week    FLUTICASONE (FLONASE) 50 MCG/ACT NASAL SPRAY    2 sprays by Nasal route daily    HANDICAP PLACARD MISC    by Does not apply route Good from 8/9/21 to 8/9/26    HYOSCYAMINE (LEVBID) 375 MCG EXTENDED RELEASE TABLET    TAKE ONE TABLET BY MOUTH EVERY 12 HOURS AS NEEDED FOR CRAMPING    LEVOCETIRIZINE (XYZAL) 5 MG TABLET    Take 5 mg by mouth nightly    LOPERAMIDE (IMODIUM) 2 MG CAPSULE    TAKE ONE CAPSULE BY MOUTH AS NEEDED FOR DIARRHEA (MAX OF 4 CAPSULES PER DAY)    METOPROLOL SUCCINATE (TOPROL XL) 100 MG EXTENDED RELEASE TABLET    TAKE ONE TABLET DAILY    ONDANSETRON (ZOFRAN-ODT) 4 MG DISINTEGRATING TABLET    DISSOLVE ONE TABLET BY MOUTH EVERY 8 HOURS AS NEEDED FOR NAUSEA/ VOMITING    SIMETHICONE (MYLICON) 80 MG CHEWABLE TABLET    Take 1 tablet by mouth 4 times daily as needed for Flatulence    TRAZODONE (DESYREL) 50 MG TABLET    Take 1 tablet by mouth nightly    VENLAFAXINE (EFFEXOR XR) 75 MG EXTENDED RELEASE CAPSULE    TAKE ONE CAPSULE BY MOUTH EVERY DAY Codeine, Demerol, Dye [iodides], Morphine, Penicillins, Sulfa antibiotics, and Levofloxacin    FAMILY HISTORY       Family History   Problem Relation Age of Onset    Cancer Father         COLON    High Blood Pressure Father     Heart Disease Father     Breast Cancer Neg Hx           SOCIAL HISTORY       Social History     Socioeconomic History    Marital status:      Spouse name: None    Number of children: None    Years of education: None    Highest education level: None   Tobacco Use    Smoking status: Never    Smokeless tobacco: Never   Vaping Use    Vaping Use: Never used   Substance and Sexual Activity    Alcohol use: Not Currently     Alcohol/week: 0.0 standard drinks     Comment: socially    Drug use: No    Sexual activity: Not Currently     Social Determinants of Health     Financial Resource Strain: Low Risk     Difficulty of Paying Living Expenses: Not hard at all   Food Insecurity: No Food Insecurity    Worried About Running Out of Food in the Last Year: Never true    Ran Out of Food in the Last Year: Never true   Transportation Needs: No Transportation Needs    Lack of Transportation (Medical): No    Lack of Transportation (Non-Medical): No       SCREENINGS   [place] Coma Scale  Eye Opening: Spontaneous  Best Verbal Response: Oriented  Best Motor Response: Obeys commands  Geoff Coma Scale Score: 15  Ebola Virus Disease (EVD) Screening   Temp: 98.2 °F (36.8 °C)  CIWA Assessment  BP: 131/67  Heart Rate: 70    PHYSICAL EXAM    (up to 7 for level 4, 8 or more for level 5)     ED Triage Vitals [12/27/22 1532]   BP Temp Temp Source Heart Rate Resp SpO2 Height Weight   131/67 98.2 °F (36.8 °C) Oral 70 -- 98 % 5' 5\" (1.651 m) 210 lb (95.3 kg)       Physical Exam  Vitals and nursing note reviewed. Constitutional:       General: She is not in acute distress. Appearance: She is well-developed. HENT:      Head: Normocephalic and atraumatic.       Right Ear: External ear normal.      Left Ear: External ear normal.      Nose: Nose normal.      Mouth/Throat:      Mouth: Mucous membranes are moist.   Eyes:      General:         Right eye: No discharge. Left eye: No discharge. Pupils: Pupils are equal, round, and reactive to light. Cardiovascular:      Rate and Rhythm: Normal rate and regular rhythm. Pulses: Normal pulses. Heart sounds: Normal heart sounds. Pulmonary:      Effort: Pulmonary effort is normal. No respiratory distress. Breath sounds: Normal breath sounds. No stridor. Abdominal:      General: Bowel sounds are normal. There is no distension. Palpations: Abdomen is soft. Tenderness: There is no abdominal tenderness. There is left CVA tenderness. Musculoskeletal:         General: Normal range of motion. Cervical back: Normal range of motion and neck supple. Skin:     General: Skin is warm. Findings: No erythema. Neurological:      Mental Status: She is alert and oriented to person, place, and time. Psychiatric:         Mood and Affect: Mood normal.       RESULTS     EKG: All EKG's are interpreted by the Emergency Department Physician who either signs or Co-signsthis chart in the absence of a cardiologist.         RADIOLOGY:   Daryel Spray such as CT, Ultrasound and MRI are read by the radiologist. Plain radiographic images are visualized and preliminarily interpreted by the emergency physician with the below findings:         Interpretation per the Radiologist below, if available at the time ofthis note:    CT ABDOMEN PELVIS WO CONTRAST Additional Contrast? None   Final Result   No urinary tract stones or hydronephrosis. Diffuse colonic fecal retention. Probable 3.5 cm exophytic cyst left kidney. Cholecystectomy.                ED BEDSIDE ULTRASOUND:   Performed by ED Physician - none    LABS:  Labs Reviewed   URINALYSIS WITH REFLEX TO CULTURE - Abnormal; Notable for the following components:       Result Value Ketones, Urine TRACE (*)     All other components within normal limits   CBC WITH AUTO DIFFERENTIAL - Abnormal; Notable for the following components:    Hematocrit 47.8 (*)     All other components within normal limits   COMPREHENSIVE METABOLIC PANEL - Abnormal; Notable for the following components:    Glucose 102 (*)     Creatinine 0.48 (*)     All other components within normal limits       All other labs were within normal range or not returned as of this dictation. EMERGENCY DEPARTMENT COURSE and DIFFERENTIAL DIAGNOSIS/MDM:   Vitals:    Vitals:    12/27/22 1532   BP: 131/67   Pulse: 70   Temp: 98.2 °F (36.8 °C)   TempSrc: Oral   SpO2: 98%   Weight: 210 lb (95.3 kg)   Height: 5' 5\" (1.651 m)            MDM  Number of Diagnoses or Management Options  Constipation, unspecified constipation type  Irritable bowel syndrome with diarrhea  Left flank pain  Renal cyst  Diagnosis management comments: Patient is 1 week history of flank pain was sent from urgent care. They note that there was some hematuria concern for kidney stones we will add CT lab work repeat urine with culture as directed patient follow-up with primary care physician patient is tried ibuprofen at home without relief. Reviewed CT reports no kidney stone noted she does have a cyst on kidney. Refer to primary care return to if any symptoms worsen or new symptoms well. We will increase fiber and add MiraLAX. As directed on packaging for constipation       Amount and/or Complexity of Data Reviewed  Clinical lab tests: reviewed and ordered  Tests in the radiology section of CPT®: reviewed and ordered        CRITICAL CARE TIME       CONSULTS:  None    PROCEDURES:  Unless otherwise noted below, none     Procedures    FINAL IMPRESSION      1. Left flank pain    2. Constipation, unspecified constipation type    3. Irritable bowel syndrome with diarrhea    4.  Renal cyst          DISPOSITION/PLAN   DISPOSITION Decision To Discharge 12/27/2022 08:24:08 PM      PATIENT REFERRED TO:  Ethan Swartz MD  [address] [facility]  [phone]    Call in 1 day      [facility] ED  [address]  [phone]  Go to   If symptoms worsen    DISCHARGE MEDICATIONS:  New Prescriptions    NAPROXEN (NAPROSYN) 500 MG TABLET    Take 1 tablet by mouth 2 times daily for 14 doses    POLYETHYLENE GLYCOL (GLYCOLAX) 17 GM/SCOOP POWDER    Take 17 g by mouth daily as needed (constipation)          (Please note that portions of this note were completed with a voice recognition program.  Efforts were made to edit the dictations but occasionally words are mis-transcribed.)    Hammad Han PA-C (electronically signed)  Attending Emergency Physician        Hammad Han PA-C  12/27/22 2027       Hammad Han PA-C  12/27/22 2031

## 2022-12-28 NOTE — DISCHARGE INSTRUCTIONS
Follow-up with primary care physician. Return to if any symptoms worsen or new symptoms develop  Increase fiber in diet. Clear liquid diet for 24 hours includes water, Jell-O, pops, clear broth, Gatorade.

## 2022-12-29 LAB
ORGANISM: ABNORMAL
URINE CULTURE, ROUTINE: ABNORMAL
URINE CULTURE, ROUTINE: ABNORMAL

## 2022-12-30 ENCOUNTER — TELEPHONE (OUTPATIENT)
Dept: FAMILY MEDICINE CLINIC | Age: 67
End: 2022-12-30

## 2022-12-30 DIAGNOSIS — N30.01 ACUTE CYSTITIS WITH HEMATURIA: Primary | ICD-10-CM

## 2022-12-30 RX ORDER — NITROFURANTOIN 25; 75 MG/1; MG/1
100 CAPSULE ORAL 2 TIMES DAILY
Qty: 20 CAPSULE | Refills: 0 | Status: SHIPPED | OUTPATIENT
Start: 2022-12-30 | End: 2023-01-09

## 2022-12-30 RX ORDER — TRAZODONE HYDROCHLORIDE 50 MG/1
TABLET ORAL
Qty: 90 TABLET | Refills: 0 | Status: SHIPPED | OUTPATIENT
Start: 2022-12-30

## 2022-12-30 NOTE — TELEPHONE ENCOUNTER
Attempted to call the pt. Her urine culture was positive for a UTI. Will send in an antibiotic  for her to start.

## 2023-02-07 ENCOUNTER — TELEPHONE (OUTPATIENT)
Dept: FAMILY MEDICINE CLINIC | Age: 68
End: 2023-02-07

## 2023-02-07 ENCOUNTER — TELEMEDICINE (OUTPATIENT)
Dept: FAMILY MEDICINE CLINIC | Age: 68
End: 2023-02-07

## 2023-02-07 DIAGNOSIS — Z00.00 INITIAL MEDICARE ANNUAL WELLNESS VISIT: Primary | ICD-10-CM

## 2023-02-07 ASSESSMENT — LIFESTYLE VARIABLES
HOW OFTEN DO YOU HAVE A DRINK CONTAINING ALCOHOL: NEVER
HOW MANY STANDARD DRINKS CONTAINING ALCOHOL DO YOU HAVE ON A TYPICAL DAY: PATIENT DOES NOT DRINK

## 2023-02-07 NOTE — PATIENT INSTRUCTIONS
Advance Directives: Care Instructions  Overview  An advance directive is a legal way to state your wishes at the end of your life. It tells your family and your doctor what to do if you can't say what you want. There are two main types of advance directives. You can change them any time your wishes change. Living will. This form tells your family and your doctor your wishes about life support and other treatment. The form is also called a declaration. Medical power of . This form lets you name a person to make treatment decisions for you when you can't speak for yourself. This person is called a health care agent (health care proxy, health care surrogate). The form is also called a durable power of  for health care. If you do not have an advance directive, decisions about your medical care may be made by a family member, or by a doctor or a  who doesn't know you. It may help to think of an advance directive as a gift to the people who care for you. If you have one, they won't have to make tough decisions by themselves. For more information, including forms for your state, see the [address] website (www.caringinfo.org/planning/advance-directives/). Follow-up care is a key part of your treatment and safety. Be sure to make and go to all appointments, and call your doctor if you are having problems. It's also a good idea to know your test results and keep a list of the medicines you take. What should you include in an advance directive? Many states have a unique advance directive form. (It may ask you to address specific issues.) Or you might use a universal form that's approved by many states. If your form doesn't tell you what to address, it may be hard to know what to include in your advance directive. Use the questions below to help you get started. Who do you want to make decisions about your medical care if you are not able to?   What life-support measures do you want if you have a serious illness that gets worse over time or can't be cured? What are you most afraid of that might happen? (Maybe you're afraid of having pain, losing your independence, or being kept alive by machines.)  Where would you prefer to die? (Your home? A hospital? A nursing home?)  Do you want to donate your organs when you die? Do you want certain Hoahaoism practices performed before you die? When should you call for help? Be sure to contact your doctor if you have any questions. Where can you learn more? Go to http://www.mitchell.com/ and enter R264 to learn more about \"Advance Directives: Care Instructions. \"  Current as of: June 16, 2022               Content Version: 13.5  © 9146-8038 Healthwise, [employer]. Care instructions adapted under license by [facility] ([facility]). If you have questions about a medical condition or this instruction, always ask your healthcare professional. [place] [address] any warranty or liability for your use of this information. Starting a Weight Loss Plan: Care Instructions  Overview     If you're thinking about losing weight, it can be hard to know where to start. Your doctor can help you set up a weight loss plan that best meets your needs. You may want to take a class on nutrition or exercise, or you could join a weight loss support group. If you have questions about how to make changes to your eating or exercise habits, ask your doctor about seeing a registered dietitian or an exercise specialist.  It can be a big challenge to lose weight. But you don't have to make huge changes at once. Make small changes, and stick with them. When those changes become habit, add a few more changes. If you don't think you're ready to make changes right now, try to pick a date in the future. Make an appointment to see your doctor to discuss whether the time is right for you to start a plan. Follow-up care is a key part of your treatment and safety.  Be sure to make and go to all appointments, and call your doctor if you are having problems. It's also a good idea to know your test results and keep a list of the medicines you take. How can you care for yourself at home? Set realistic goals. Many people expect to lose much more weight than is likely. A weight loss of 5% to 10% of your body weight may be enough to improve your health. Get family and friends involved to provide support. Talk to them about why you are trying to lose weight, and ask them to help. They can help by participating in exercise and having meals with you, even if they may be eating something different. Find what works best for you. If you do not have time or do not like to cook, a program that offers meal replacement bars or shakes may be better for you. Or if you like to prepare meals, finding a plan that includes daily menus and recipes may be best.  Ask your doctor about other health professionals who can help you achieve your weight loss goals. A dietitian can help you make healthy changes in your diet. An exercise specialist or  can help you develop a safe and effective exercise program.  A counselor or psychiatrist can help you cope with issues such as depression, anxiety, or family problems that can make it hard to focus on weight loss. Consider joining a support group for people who are trying to lose weight. Your doctor can suggest groups in your area. Where can you learn more? Go to http://www.woods.com/ and enter U357 to learn more about \"Starting a Weight Loss Plan: Care Instructions. \"  Current as of: August 25, 2022               Content Version: 13.5  © 2006-2022 Healthwise, Inc[employer]. Care instructions adapted under license by [facility] [employer] [facility] ([facility]).  If you have questions about a medical condition or this instruction, always ask your healthcare professional. [address] any warranty or liability for your use of this information. A Healthy Heart: Care Instructions  Your Care Instructions     Coronary artery disease, also called heart disease, occurs when a substance called plaque builds up in the vessels that supply oxygen-rich blood to your heart muscle. This can narrow the blood vessels and reduce blood flow. A heart attack happens when blood flow is completely blocked. A high-fat diet, smoking, and other factors increase the risk of heart disease. Your doctor has found that you have a chance of having heart disease. You can do lots of things to keep your heart healthy. It may not be easy, but you can change your diet, exercise more, and quit smoking. These steps really work to lower your chance of heart disease. Follow-up care is a key part of your treatment and safety. Be sure to make and go to all appointments, and call your doctor if you are having problems. It's also a good idea to know your test results and keep a list of the medicines you take. How can you care for yourself at home? Diet    Use less salt when you cook and eat. This helps lower your blood pressure. Taste food before salting. Add only a little salt when you think you need it. With time, your taste buds will adjust to less salt.     Eat fewer snack items, fast foods, canned soups, and other high-salt, high-fat, processed foods.     Read food labels and try to avoid saturated and trans fats. They increase your risk of heart disease by raising cholesterol levels.     Limit the amount of solid fat-butter, margarine, and shortening-you eat. Use olive, peanut, or canola oil when you cook. Bake, broil, and steam foods instead of frying them.     Eat a variety of fruit and vegetables every day. Dark green, deep orange, red, or yellow fruits and vegetables are especially good for you. Examples include spinach, carrots, peaches, and berries.     Foods high in fiber can reduce your cholesterol and provide important vitamins and minerals.  High-fiber foods include whole-grain cereals and breads, oatmeal, beans, brown rice, citrus fruits, and apples.     Eat lean proteins. Heart-healthy proteins include seafood, lean meats and poultry, eggs, beans, peas, nuts, seeds, and soy products.     Limit drinks and foods with added sugar. These include candy, desserts, and soda pop. Lifestyle changes    If your doctor recommends it, get more exercise. Walking is a good choice. Bit by bit, increase the amount you walk every day. Try for at least 30 minutes on most days of the week. You also may want to swim, bike, or do other activities.     Do not smoke. If you need help quitting, talk to your doctor about stop-smoking programs and medicines. These can increase your chances of quitting for good. Quitting smoking may be the most important step you can take to protect your heart. It is never too late to quit.     Limit alcohol to 2 drinks a day for men and 1 drink a day for women. Too much alcohol can cause health problems.     Manage other health problems such as diabetes, high blood pressure, and high cholesterol. If you think you may have a problem with alcohol or drug use, talk to your doctor. Medicines    Take your medicines exactly as prescribed. Call your doctor if you think you are having a problem with your medicine.     If your doctor recommends aspirin, take the amount directed each day. Make sure you take aspirin and not another kind of pain reliever, such as acetaminophen (Tylenol). When should you call for help? Call 911 if you have symptoms of a heart attack. These may include:    Chest pain or pressure, or a strange feeling in the chest.     Sweating.     Shortness of breath.     Pain, pressure, or a strange feeling in the back, neck, jaw, or upper belly or in one or both shoulders or arms.     Lightheadedness or sudden weakness.     A fast or irregular heartbeat.    After you call 911, the  may tell you to chew 1 adult-strength or 2 to 4 low-dose aspirin. Wait for an ambulance. Do not try to drive yourself. Watch closely for changes in your health, and be sure to contact your doctor if you have any problems. Where can you learn more? Go to http://www.mitchell.com/ and enter F075 to learn more about \"A Healthy Heart: Care Instructions. \"  Current as of: September 7, 2022               Content Version: 13.5  © 2006-2022 [employer]. Care instructions adapted under license by [facility] ([facility]). If you have questions about a medical condition or this instruction, always ask your healthcare professional. [place] [address] any warranty or liability for your use of this information. Personalized Preventive Plan for Farrah Franklin - 2/7/2023  Medicare offers a range of preventive health benefits. Some of the tests and screenings are paid in full while other may be subject to a deductible, co-insurance, and/or copay. Some of these benefits include a comprehensive review of your medical history including lifestyle, illnesses that may run in your family, and various assessments and screenings as appropriate. After reviewing your medical record and screening and assessments performed today your provider may have ordered immunizations, labs, imaging, and/or referrals for you. A list of these orders (if applicable) as well as your Preventive Care list are included within your After Visit Summary for your review. Other Preventive Recommendations:    A preventive eye exam performed by an eye specialist is recommended every 1-2 years to screen for glaucoma; cataracts, macular degeneration, and other eye disorders. A preventive dental visit is recommended every 6 months. Try to get at least 150 minutes of exercise per week or 10,000 steps per day on a pedometer . Order or download the FREE \"Exercise & Physical Activity: Your Everyday Guide\" from [employer].  Call 9-749.704.5563 or search [employer] online. You need 5142-3589 mg of calcium and 7504-9803 IU of vitamin D per day. It is possible to meet your calcium requirement with diet alone, but a vitamin D supplement is usually necessary to meet this goal.  When exposed to the sun, use a sunscreen that protects against both UVA and UVB radiation with an SPF of 30 or greater. Reapply every 2 to 3 hours or after sweating, drying off with a towel, or swimming. Always wear a seat belt when traveling in a car. Always wear a helmet when riding a bicycle or motorcycle.

## 2023-02-07 NOTE — PROGRESS NOTES
Medicare Annual Wellness Visit    Marce Franklin is here for Medicare AWV (2023/)    Assessment & Plan   Initial Medicare annual wellness visit      Recommendations for Preventive Services Due: see orders and patient instructions/AVS.  Recommended screening schedule for the next 5-10 years is provided to the patient in written form: see Patient Instructions/AVS.     Return for Medicare Annual Wellness Visit in 1 year. Subjective     Pt declines any issues are noted today. Patient's complete Health Risk Assessment and screening values have been reviewed and are found in Flowsheets. The following problems were reviewed today and where indicated follow up appointments were made and/or referrals ordered. Positive Risk Factor Screenings with Interventions:                 Weight and Activity:  Physical Activity: Insufficiently Active    Days of Exercise per Week: 1 day    Minutes of Exercise per Session: 20 min     On average, how many days per week do you engage in moderate to strenuous exercise (like a brisk walk)?: 1 day  Have you lost any weight without trying in the past 3 months?: No       Obesity Interventions:  Patient declines any further evaluation or treatment          Dentist Screen:  Have you seen the dentist within the past year?: (!) No    Intervention:  Advised to schedule with their dentist        Advanced Directives:  Do you have a Living Will?: (!) No    Intervention:  Pt was given the option to have me place an ACP order to assist her. Pt would rather wait to come into the office and get the packet of ACP material to assist her. Objective      Patient-Reported Vitals  BP Observations: No, remote/electronic monitoring device was not used or able to be verified  Patient-Reported Weight: 210 lbs  Patient-Reported Height: 5ft 5       No physical exam completed today as this was a VV/Telephone visit today.         Allergies   Allergen Reactions    Codeine Demerol     Dye [Iodides] Nausea Only    Morphine     Penicillins Other (See Comments)    Sulfa Antibiotics Hives and Itching    Levofloxacin Itching and Rash     Prior to Visit Medications    Medication Sig Taking?  Authorizing Provider   traZODone (DESYREL) 50 MG tablet TAKE ONE TABLET BY MOUTH EVERY DAY NIGHTLY Yes Feliz Dixon APRN - CNP   lactobacillus (CULTURELLE) capsule Take 1 capsule by mouth daily Yes Jackelyn Bryson PA-C   loperamide (IMODIUM) 2 MG capsule TAKE ONE CAPSULE BY MOUTH AS NEEDED FOR DIARRHEA (MAX OF 4 CAPSULES PER DAY) Yes Feliz Dixon APRN - CNP   hyoscyamine (LEVBID) 375 MCG extended release tablet TAKE ONE TABLET BY MOUTH EVERY 12 HOURS AS NEEDED FOR CRAMPING Yes ALETA Huffman - CNP   amLODIPine (NORVASC) 5 MG tablet Take 1 tablet by mouth 2 times daily Yes ALETA Faulkner - CNP   venlafaxine (EFFEXOR XR) 75 MG extended release capsule TAKE ONE CAPSULE BY MOUTH EVERY DAY Yes Wendy Sung, DO   estradiol (VIVELLE) 0.05 MG/24HR Place 1 patch onto the skin once a week Yes Maira Abernathy, DO   metoprolol succinate (TOPROL XL) 100 MG extended release tablet TAKE ONE TABLET DAILY Yes Kem Kessler, DO   levocetirizine (XYZAL) 5 MG tablet Take 5 mg by mouth nightly Yes Historical Provider, MD   ondansetron (ZOFRAN-ODT) 4 MG disintegrating tablet DISSOLVE ONE TABLET BY MOUTH EVERY 8 HOURS AS NEEDED FOR NAUSEA/ VOMITING Yes Girtha Essex L Lanzola,    simethicone (MYLICON) 80 MG chewable tablet Take 1 tablet by mouth 4 times daily as needed for Flatulence Yes Eden Ramires MD   Handicap Placard MISC by Does not apply route Good from 8/9/21 to 8/9/26 Yes Eden Ramires MD   fluticasone (FLONASE) 50 MCG/ACT nasal spray 2 sprays by Nasal route daily Yes ALETA Restrepo CNP   naproxen (NAPROSYN) 500 MG tablet Take 1 tablet by mouth 2 times daily for 14 doses  Jackelyn Bryson PA-C   doxycycline hyclate (VIBRA-TABS) 100 MG tablet   Historical Provider, MD Adams (Including outside providers/suppliers regularly involved in providing care):   Patient Care Team:  Charlotte Montalvo MD as PCP - General (Family Medicine)  Charlotte Montalvo MD as PCP - Empaneled Provider  Anne-Marie Oneal MD (Hematology and Oncology)  Pamela Potts MD (Internal Medicine Cardiovascular Disease)     Reviewed and updated this visit:  Tobacco  Allergies  Meds  Problems  Med Hx  Surg Hx  Soc Hx  Fam Hx           Florina Franklin, was evaluated through a synchronous (real-time) audio-video encounter. The patient (or guardian if applicable) is aware that this is a billable service, which includes applicable co-pays. This Virtual Visit was conducted with patient's (and/or legal guardian's) consent. The visit was conducted pursuant to the emergency declaration under the [address] [place] [address], [address] [place] [address] waiver authority and the [employer] Act. Patient identification was verified, and a caregiver was present when appropriate.    The patient was located at Home: [id number] [facility]  Provider was located at [facility] ([address] [place] [address] Dept): [address] [place] [address], [address] [place] [address], [address]  [place],  [address] [place] [address]        Maren Browne, ALETA - REGINA

## 2023-02-22 ENCOUNTER — OFFICE VISIT (OUTPATIENT)
Dept: OBGYN CLINIC | Age: 68
End: 2023-02-22
Payer: MEDICARE

## 2023-02-22 VITALS — WEIGHT: 213 LBS | DIASTOLIC BLOOD PRESSURE: 64 MMHG | BODY MASS INDEX: 35.45 KG/M2 | SYSTOLIC BLOOD PRESSURE: 136 MMHG

## 2023-02-22 DIAGNOSIS — R92.8 FOLLOW-UP EXAMINATION OF ABNORMAL MAMMOGRAM: Primary | ICD-10-CM

## 2023-02-22 PROCEDURE — 3075F SYST BP GE 130 - 139MM HG: CPT | Performed by: ADVANCED PRACTICE MIDWIFE

## 2023-02-22 PROCEDURE — 1123F ACP DISCUSS/DSCN MKR DOCD: CPT | Performed by: ADVANCED PRACTICE MIDWIFE

## 2023-02-22 PROCEDURE — 3078F DIAST BP <80 MM HG: CPT | Performed by: ADVANCED PRACTICE MIDWIFE

## 2023-02-22 PROCEDURE — 99213 OFFICE O/P EST LOW 20 MIN: CPT | Performed by: ADVANCED PRACTICE MIDWIFE

## 2023-02-22 ASSESSMENT — ENCOUNTER SYMPTOMS
VOMITING: 0
DIARRHEA: 0
SHORTNESS OF BREATH: 0
COUGH: 0
ABDOMINAL PAIN: 0
NAUSEA: 0
CONSTIPATION: 0

## 2023-02-24 RX ORDER — DIPHENOXYLATE HYDROCHLORIDE AND ATROPINE SULFATE 2.5; .025 MG/1; MG/1
TABLET ORAL
Qty: 120 TABLET | Refills: 2 | OUTPATIENT
Start: 2023-02-24

## 2023-02-28 ENCOUNTER — OFFICE VISIT (OUTPATIENT)
Dept: CARDIOLOGY CLINIC | Age: 68
End: 2023-02-28
Payer: MEDICARE

## 2023-02-28 VITALS
HEART RATE: 56 BPM | DIASTOLIC BLOOD PRESSURE: 84 MMHG | SYSTOLIC BLOOD PRESSURE: 118 MMHG | HEIGHT: 65 IN | WEIGHT: 210.7 LBS | BODY MASS INDEX: 35.1 KG/M2

## 2023-02-28 DIAGNOSIS — I45.10 RIGHT BUNDLE BRANCH BLOCK (RBBB) ON ELECTROCARDIOGRAPHY: ICD-10-CM

## 2023-02-28 DIAGNOSIS — I48.0 PAF (PAROXYSMAL ATRIAL FIBRILLATION) (HCC): Chronic | ICD-10-CM

## 2023-02-28 DIAGNOSIS — I10 PRIMARY HYPERTENSION: Primary | ICD-10-CM

## 2023-02-28 DIAGNOSIS — E66.01 SEVERE OBESITY (BMI 35.0-39.9) WITH COMORBIDITY (HCC): ICD-10-CM

## 2023-02-28 PROBLEM — Z79.01 LONG TERM (CURRENT) USE OF ANTICOAGULANTS: Status: RESOLVED | Noted: 2018-05-11 | Resolved: 2023-02-28

## 2023-02-28 PROCEDURE — 93000 ELECTROCARDIOGRAM COMPLETE: CPT | Performed by: INTERNAL MEDICINE

## 2023-02-28 PROCEDURE — 1123F ACP DISCUSS/DSCN MKR DOCD: CPT | Performed by: INTERNAL MEDICINE

## 2023-02-28 PROCEDURE — 99214 OFFICE O/P EST MOD 30 MIN: CPT | Performed by: INTERNAL MEDICINE

## 2023-02-28 PROCEDURE — 3079F DIAST BP 80-89 MM HG: CPT | Performed by: INTERNAL MEDICINE

## 2023-02-28 PROCEDURE — 3074F SYST BP LT 130 MM HG: CPT | Performed by: INTERNAL MEDICINE

## 2023-02-28 NOTE — PROGRESS NOTES
Chief Complaint   Patient presents with    Atrial Fibrillation    Hypertension    6 Month Follow-Up    Other     Pt states that she has been having dizziness, headaches, and nauseas. 8-30-33: Patient presents for initial medical evaluation. Patient is followed on a regular basis by Dr. Donna Redd MD. patient with history of paroxysmal atrial fibrillation since 2014. Currently not on any oral anticoagulation due to history of large rectus sheath hematoma. Patient with history of A. fib ablation x2 with Dr. Ina Angelo at [address] [place] [address]. She is not on any antiarrhythmic medications at this time. She was previously on flecainide. Currently on Toprol- mg daily. Patient can usually sense when she develops atrial fibrillation. Patient with history of benign meningioma status post resection in 2018. Last echocardiogram in 2018 with normal LV function no valvular rallies mild pulmonary retention. Status post normal nuclear stress test in 2017  EKG today with normal sinus rhythm. Normal QTc interval.    2/28/2023: Patient is doing well overall. She does feel some occasional irregular heartbeats palpitations. Patient with history of paroxysmal atrial fibrillation status post ablation x2 at [facility]. Does have history of rectus sheath hematoma has been off of anticoagulation since due to risk of bleeding. Patient with history of benign meningioma s/p resection in 2018. Last nuclear stress test was negative in 2017. Last echo in 2018 with normal LV function mild pulmonary hypertension. She does have history of paroxysmal atrial fibrillation since 2014.       Patient Active Problem List   Diagnosis    Irritable bowel syndrome    Hx of colonic polyps    Left flank pain    PAF (paroxysmal atrial fibrillation) (HCC)    Hypertension    Pulmonary vein stenosis    Idiopathic scoliosis of lumbar spine    Degenerative disc disease, lumbar    Lumbar radiculopathy    Brain mass    Benign meningioma of brain ([facility]    Meningioma ([facility] [facility]    Functional diarrhea    TMJ (temporomandibular joint syndrome)    Thyroid nodule    Right bundle branch block (RBBB) on electrocardiography    Presence of artificial knee joint, left    Allergy status to sulfonamides status    Allergy status to narcotic agent status    Low back pain    History of benign neoplasm of brain    Hashimoto's thyroiditis    Renal lesion    Urinary tract infection without hematuria    Dysuria    Nontoxic multinodular goiter       Past Surgical History:   Procedure Laterality Date    APPENDECTOMY      BACK SURGERY  07/14/2017    CARDIAC CATHETERIZATION  11/02/2015     [address] (CERVIX STATUS UNKNOWN)      OVARY REMOVAL      DC CRANIECTOMY W/EXCISION TUMOR/LESION SKULL N/A 07/24/2018    CRANIOTOMY performed by Magdi Zhong MD at [address]  2009    UPPER GASTROINTESTINAL ENDOSCOPY  01/15/2013    DR. MEEKS       Social History     Socioeconomic History    Marital status:    Tobacco Use    Smoking status: Never    Smokeless tobacco: Never   Vaping Use    Vaping Use: Never used   Substance and Sexual Activity    Alcohol use: Not Currently     Alcohol/week: 0.0 standard drinks     Comment: socially    Drug use: No    Sexual activity: Not Currently     Social Determinants of Health     Financial Resource Strain: Low Risk     Difficulty of Paying Living Expenses: Not hard at all   Food Insecurity: No Food Insecurity    Worried About Running Out of Food in the Last Year: Never true    Ran Out of Food in the Last Year: Never true   Transportation Needs: No Transportation Needs    Lack of Transportation (Medical): No    Lack of Transportation (Non-Medical):  No   Physical Activity: Insufficiently Active    Days of Exercise per Week: 1 day    Minutes of Exercise per Session: 20 min       Family History   Problem Relation Age of Onset    Cancer Father         COLON    High Blood Pressure Father     Heart Disease Father     Breast Cancer Neg Hx        Current Outpatient Medications   Medication Sig Dispense Refill    traZODone (DESYREL) 50 MG tablet TAKE ONE TABLET BY MOUTH EVERY DAY NIGHTLY 90 tablet 0    lactobacillus (CULTURELLE) capsule Take 1 capsule by mouth daily 30 capsule 1    naproxen (NAPROSYN) 500 MG tablet Take 1 tablet by mouth 2 times daily for 14 doses 14 tablet 0    loperamide (IMODIUM) 2 MG capsule TAKE ONE CAPSULE BY MOUTH AS NEEDED FOR DIARRHEA (MAX OF 4 CAPSULES PER DAY) 60 capsule 2    hyoscyamine (LEVBID) 375 MCG extended release tablet TAKE ONE TABLET BY MOUTH EVERY 12 HOURS AS NEEDED FOR CRAMPING 60 tablet 3    doxycycline hyclate (VIBRA-TABS) 100 MG tablet       amLODIPine (NORVASC) 5 MG tablet Take 1 tablet by mouth 2 times daily 60 tablet 0    venlafaxine (EFFEXOR XR) 75 MG extended release capsule TAKE ONE CAPSULE BY MOUTH EVERY DAY 90 capsule 3    estradiol (VIVELLE) 0.05 MG/24HR Place 1 patch onto the skin once a week 24 patch 3    metoprolol succinate (TOPROL XL) 100 MG extended release tablet TAKE ONE TABLET DAILY 90 tablet 3    levocetirizine (XYZAL) 5 MG tablet Take 5 mg by mouth nightly      ondansetron (ZOFRAN-ODT) 4 MG disintegrating tablet DISSOLVE ONE TABLET BY MOUTH EVERY 8 HOURS AS NEEDED FOR NAUSEA/ VOMITING 20 tablet 1    Handicap Placard MISC by Does not apply route Good from 8/9/21 to 8/9/26 1 each 0    fluticasone (FLONASE) 50 MCG/ACT nasal spray 2 sprays by Nasal route daily 1 Bottle 3    simethicone (MYLICON) 80 MG chewable tablet Take 1 tablet by mouth 4 times daily as needed for Flatulence 180 tablet 3     No current facility-administered medications for this visit. Codeine, Demerol, Dye [iodides], Morphine, Penicillins, Sulfa antibiotics, and Levofloxacin    Review of Systems:  General ROS: negative  Psychological ROS: negative  Hematological and Lymphatic ROS: No history of blood clots or bleeding disorder.    Respiratory ROS: no cough, shortness of breath, or wheezing  Cardiovascular ROS: no chest pain or dyspnea on exertion  Gastrointestinal ROS: no abdominal pain, change in bowel habits, or black or bloody stools  Genito-Urinary ROS: no dysuria, trouble voiding, or hematuria  Musculoskeletal ROS: negative  Neurological ROS: no TIA or stroke symptoms  Dermatological ROS: negative    VITALS:  Blood pressure 118/84, pulse 56, height 5' 5\" (1.651 m), weight 210 lb 11.2 oz (95.6 kg), not currently breastfeeding. Body mass index is 35.06 kg/m². Physical Examination:  General appearance - alert, well appearing, and in no distress  Mental status - alert, oriented to person, place, and time  Neck - Neck is supple, no JVD or carotid bruits. No thyromegaly or adenopathy. Chest - clear to auscultation, no wheezes, rales or rhonchi, symmetric air entry  Heart - normal rate, regular rhythm, normal S1, S2, no murmurs, rubs, clicks or gallops  Abdomen - soft, nontender, nondistended, no masses or organomegaly  Neurological - alert, oriented, normal speech, no focal findings or movement disorder noted  Extremities - peripheral pulses normal, no pedal edema, no clubbing or cyanosis  Skin - normal coloration and turgor, no rashes, no suspicious skin lesions noted      EKG: normal sinus rhythm, nonspecific ST and T waves changes    Orders Placed This Encounter   Procedures    External Referral to Cardiology    EKG 12 lead       ASSESSMENT:     Diagnosis Orders   1. Primary hypertension  EKG 12 lead      2. PAF (paroxysmal atrial fibrillation) ([facility]  External Referral to Cardiology      3. Severe obesity (BMI 35.0-39. 9) with comorbidity ([facility]        4. Right bundle branch block (RBBB) on electrocardiography              PLAN:         As always, aggressive risk factor modification is strongly recommended. We should adhere to the JNC VIII guidelines for HTN management and the NCEP ATP III guidelines for LDL-C management.     Cardiac diet is always recommended with low fat, cholesterol, calories and sodium. Continue medications at current doses. Check EKG    We will refer to [facility] for evaluation of watchman/left atrial appendage closure device given contraindication to oral anticoagulation. Patient does sense some episodes of atrial fibrillation/irregular heartbeats. Patient is at high risk for developing CVA. Consider antiarrhythmic drug loading. Consider implantable loop recorder insertion. Patient was advised and encouraged to check blood pressure at home or at a pharmacy, maintain a logbook, and also call us back if blood pressure are above the target ranges or if it is low. Patient clearly understands and agrees to the instructions. We will need to continue to monitor muscle and liver enzymes, BUN, CR, and electrolytes. Patient is to avoid any excessive caffeine, chocolate, or OTC stimulants.

## 2023-04-04 ENCOUNTER — PREP FOR PROCEDURE (OUTPATIENT)
Dept: GASTROENTEROLOGY | Age: 68
End: 2023-04-04

## 2023-04-04 ENCOUNTER — OFFICE VISIT (OUTPATIENT)
Dept: GASTROENTEROLOGY | Age: 68
End: 2023-04-04
Payer: MEDICARE

## 2023-04-04 VITALS — BODY MASS INDEX: 34.99 KG/M2 | HEIGHT: 65 IN | WEIGHT: 210 LBS | OXYGEN SATURATION: 99 % | HEART RATE: 62 BPM

## 2023-04-04 DIAGNOSIS — R13.19 ESOPHAGEAL DYSPHAGIA: Primary | ICD-10-CM

## 2023-04-04 DIAGNOSIS — R11.0 NAUSEA: ICD-10-CM

## 2023-04-04 DIAGNOSIS — R09.89 GLOBUS SENSATION: ICD-10-CM

## 2023-04-04 DIAGNOSIS — K58.2 IRRITABLE BOWEL SYNDROME WITH BOTH CONSTIPATION AND DIARRHEA: ICD-10-CM

## 2023-04-04 DIAGNOSIS — R10.13 EPIGASTRIC PAIN: ICD-10-CM

## 2023-04-04 PROBLEM — R09.A2 GLOBUS SENSATION: Status: ACTIVE | Noted: 2023-04-04

## 2023-04-04 PROCEDURE — 99214 OFFICE O/P EST MOD 30 MIN: CPT | Performed by: NURSE PRACTITIONER

## 2023-04-04 PROCEDURE — 1123F ACP DISCUSS/DSCN MKR DOCD: CPT | Performed by: NURSE PRACTITIONER

## 2023-04-04 RX ORDER — ONDANSETRON 4 MG/1
TABLET, ORALLY DISINTEGRATING ORAL
Qty: 20 TABLET | Refills: 1 | Status: SHIPPED | OUTPATIENT
Start: 2023-04-04

## 2023-04-04 RX ORDER — LOPERAMIDE HYDROCHLORIDE 2 MG/1
CAPSULE ORAL
Qty: 60 CAPSULE | Refills: 2 | Status: SHIPPED | OUTPATIENT
Start: 2023-04-04

## 2023-04-04 RX ORDER — HYOSCYAMINE SULFATE EXTENDED-RELEASE 0.38 MG/1
TABLET ORAL
Qty: 60 TABLET | Refills: 3 | Status: SHIPPED | OUTPATIENT
Start: 2023-04-04

## 2023-04-04 RX ORDER — LACTOBACILLUS RHAMNOSUS GG 10B CELL
1 CAPSULE ORAL DAILY
Qty: 30 CAPSULE | Refills: 1 | Status: SHIPPED | OUTPATIENT
Start: 2023-04-04

## 2023-04-04 NOTE — PATIENT INSTRUCTIONS
-Miralax Gatorade cleanse    -Start fiber (ex. Metamucil) 1/2 to 1 tablespoon daily. The dose should then be slowly titrated up based on response to treatment.

## 2023-04-04 NOTE — PROGRESS NOTES
retention, suspect IBS-C with possible overflow diarrhea. 1. Irritable bowel syndrome with both constipation and diarrhea  -Emphasized importance of increased fluid, fiber intake and physical activity.   -Discussed recommendation for MiraLAX Gatorade cleanse  -Start fiber (ex. Metamucil 3.4 g by mouth daily with a glass of water or juice) titrate up to 2-3 times per day as needed.  -Stool softener, Colace 100mg by mouth daily or 50mg by mouth 2 times per day as needed    2. Nausea  -Likely related to constipation, assess response after treatment of constipation. GERD in differential.  - ondansetron (ZOFRAN-ODT) 4 MG disintegrating tablet; DISSOLVE ONE TABLET BY MOUTH EVERY 8 HOURS AS NEEDED FOR NAUSEA/ VOMITING  Dispense: 20 tablet; Refill: 1    3. Esophageal dysphagia  4. Globus sensation  5. Epigastric pain  -Patient denies GERD symptoms. However, will proceed with EGD to examine for etiology of patient's esophageal dysphagia. Silent GERD in differential.  Will initiate PPI pending EGD results. Return if symptoms worsen or fail to improve, pending EGD results and response to fiber. ALETA Sanchez - CNP   Staff Gastroenterology Nurse Practitioner  [facility]    Please note this report has been partially produced using speech recognition software and contain errors related to that system including grammar, punctuation and spelling as well as words and phrases that may seem inappropriate. If there are questions or concerns please feel free to contact me to clarify.

## 2023-05-17 RX ORDER — VENLAFAXINE HYDROCHLORIDE 75 MG/1
CAPSULE, EXTENDED RELEASE ORAL
Qty: 90 CAPSULE | Refills: 0 | Status: SHIPPED | OUTPATIENT
Start: 2023-05-17

## 2023-05-31 DIAGNOSIS — K58.0 IRRITABLE BOWEL SYNDROME WITH DIARRHEA: Primary | ICD-10-CM

## 2023-06-01 RX ORDER — DIPHENOXYLATE HYDROCHLORIDE AND ATROPINE SULFATE 2.5; .025 MG/1; MG/1
1 TABLET ORAL 4 TIMES DAILY PRN
Qty: 60 TABLET | Refills: 3 | Status: SHIPPED | OUTPATIENT
Start: 2023-06-01 | End: 2023-06-11

## 2023-07-07 ENCOUNTER — HOSPITAL ENCOUNTER (OUTPATIENT)
Dept: WOMENS IMAGING | Age: 68
End: 2023-07-07
Attending: ADVANCED PRACTICE MIDWIFE
Payer: MEDICARE

## 2023-07-07 ENCOUNTER — APPOINTMENT (OUTPATIENT)
Dept: ULTRASOUND IMAGING | Age: 68
End: 2023-07-07
Attending: ADVANCED PRACTICE MIDWIFE
Payer: MEDICARE

## 2023-07-07 DIAGNOSIS — R92.8 FOLLOW-UP EXAMINATION OF ABNORMAL MAMMOGRAM: ICD-10-CM

## 2023-07-07 DIAGNOSIS — R92.8 ABNORMAL MAMMOGRAM OF RIGHT BREAST: ICD-10-CM

## 2023-07-07 PROCEDURE — G0279 TOMOSYNTHESIS, MAMMO: HCPCS

## 2023-09-07 ENCOUNTER — OFFICE VISIT (OUTPATIENT)
Dept: FAMILY MEDICINE CLINIC | Age: 68
End: 2023-09-07
Payer: MEDICARE

## 2023-09-07 ENCOUNTER — TELEPHONE (OUTPATIENT)
Dept: FAMILY MEDICINE CLINIC | Age: 68
End: 2023-09-07

## 2023-09-07 VITALS
TEMPERATURE: 97.6 F | SYSTOLIC BLOOD PRESSURE: 128 MMHG | HEART RATE: 60 BPM | HEIGHT: 65 IN | WEIGHT: 210 LBS | BODY MASS INDEX: 34.99 KG/M2 | DIASTOLIC BLOOD PRESSURE: 76 MMHG | OXYGEN SATURATION: 95 %

## 2023-09-07 DIAGNOSIS — D32.0 BENIGN MENINGIOMA OF BRAIN (HCC): ICD-10-CM

## 2023-09-07 DIAGNOSIS — J34.89 SINUS PRESSURE: Primary | ICD-10-CM

## 2023-09-07 LAB
Lab: NORMAL
PERFORMING INSTRUMENT: NORMAL
QC PASS/FAIL: NORMAL
SARS-COV-2, POC: NORMAL

## 2023-09-07 PROCEDURE — 3078F DIAST BP <80 MM HG: CPT | Performed by: NURSE PRACTITIONER

## 2023-09-07 PROCEDURE — 3075F SYST BP GE 130 - 139MM HG: CPT | Performed by: NURSE PRACTITIONER

## 2023-09-07 PROCEDURE — 87426 SARSCOV CORONAVIRUS AG IA: CPT | Performed by: NURSE PRACTITIONER

## 2023-09-07 PROCEDURE — 1123F ACP DISCUSS/DSCN MKR DOCD: CPT | Performed by: NURSE PRACTITIONER

## 2023-09-07 PROCEDURE — 99213 OFFICE O/P EST LOW 20 MIN: CPT | Performed by: NURSE PRACTITIONER

## 2023-09-07 RX ORDER — DOXYCYCLINE HYCLATE 100 MG
100 TABLET ORAL 2 TIMES DAILY
Qty: 14 TABLET | Refills: 0 | Status: SHIPPED | OUTPATIENT
Start: 2023-09-07 | End: 2023-09-14

## 2023-09-07 SDOH — ECONOMIC STABILITY: INCOME INSECURITY: HOW HARD IS IT FOR YOU TO PAY FOR THE VERY BASICS LIKE FOOD, HOUSING, MEDICAL CARE, AND HEATING?: NOT HARD AT ALL

## 2023-09-07 SDOH — ECONOMIC STABILITY: FOOD INSECURITY: WITHIN THE PAST 12 MONTHS, YOU WORRIED THAT YOUR FOOD WOULD RUN OUT BEFORE YOU GOT MONEY TO BUY MORE.: NEVER TRUE

## 2023-09-07 SDOH — ECONOMIC STABILITY: HOUSING INSECURITY
IN THE LAST 12 MONTHS, WAS THERE A TIME WHEN YOU DID NOT HAVE A STEADY PLACE TO SLEEP OR SLEPT IN A SHELTER (INCLUDING NOW)?: NO

## 2023-09-07 SDOH — ECONOMIC STABILITY: FOOD INSECURITY: WITHIN THE PAST 12 MONTHS, THE FOOD YOU BOUGHT JUST DIDN'T LAST AND YOU DIDN'T HAVE MONEY TO GET MORE.: NEVER TRUE

## 2023-09-07 ASSESSMENT — ENCOUNTER SYMPTOMS
BLOOD IN STOOL: 0
NAUSEA: 1
SORE THROAT: 1
SINUS PAIN: 1

## 2023-09-07 NOTE — PROGRESS NOTES
[address] [facility] [facility]  [address] [facility] [address]  Dept: [phone]  Dept Fax: [phone]  Loc: [address] [place] [address] [facility] 1955) is a 79 y.o. female, Established patient, here for evaluation of the following chief complaint(s):  Dizziness, Nausea, Cough (Dry cough), Congestion (Pressure/pain around sinuses ), and Chills (With night sweats)      PCP:  Bernard Chicas MD      URI   This is a new problem. The current episode started 1 to 4 weeks ago. The problem has been gradually worsening. There has been no fever. Associated symptoms include nausea. Pertinent negatives include no chest pain. Associated symptoms comments: Chills and dizziness. Treatments tried: Coricidin. The treatment provided mild relief. Review of Systems   Constitutional:  Positive for chills. Negative for fatigue and fever. Cardiovascular: Negative. Negative for chest pain, palpitations and leg swelling. Gastrointestinal:  Positive for nausea. Negative for blood in stool. Psychiatric/Behavioral: Negative. Negative for behavioral problems. The patient is not nervous/anxious. Past Medical History:   Diagnosis Date    Atrial fibrillation [facility])     Brain tumor ([address]) 2018    Chest pain     Degenerative disc disease, lumbar 2016    Hx of colonic polyps     Hypertension 2015    Irritable bowel syndrome     Low back pain 10/16/2018    Lumbar radiculopathy 3/14/2016    Rapid heart rate     Stress incontinence      Past Surgical History:   Procedure Laterality Date    APPENDECTOMY      BACK SURGERY  2017    CARDIAC CATHETERIZATION  2015    DR. PALMER    HYSTERECTOMY (CERVIX STATUS UNKNOWN)      OVARY REMOVAL      ND CRANIECTOMY W/EXCISION TUMOR/LESION SKULL N/A 2018    CRANIOTOMY performed by Brittany Hernández MD at [address]

## 2023-09-07 NOTE — TELEPHONE ENCOUNTER
PT was in and saw [facility] this morning.  She forgot to ask for something to help with her nausea, can be called into the [facility] in [place]

## 2023-09-09 DIAGNOSIS — R11.0 NAUSEA: ICD-10-CM

## 2023-09-09 RX ORDER — ONDANSETRON 4 MG/1
TABLET, ORALLY DISINTEGRATING ORAL
Qty: 20 TABLET | Refills: 1 | Status: CANCELLED | OUTPATIENT
Start: 2023-09-09

## 2023-09-11 DIAGNOSIS — R11.0 NAUSEA: ICD-10-CM

## 2023-09-11 RX ORDER — ONDANSETRON 4 MG/1
TABLET, ORALLY DISINTEGRATING ORAL
Qty: 20 TABLET | Refills: 1 | Status: SHIPPED | OUTPATIENT
Start: 2023-09-11

## 2023-09-15 ENCOUNTER — TELEPHONE (OUTPATIENT)
Dept: GASTROENTEROLOGY | Age: 68
End: 2023-09-15

## 2023-09-15 NOTE — TELEPHONE ENCOUNTER
----- Message from Tammy Chen MD sent at 9/14/2023 10:21 PM EDT -----  Please schedule clinic appt with me  Kam Pools

## 2023-09-29 ENCOUNTER — TELEPHONE (OUTPATIENT)
Dept: NEUROSURGERY | Age: 68
End: 2023-09-29

## 2023-09-29 DIAGNOSIS — D32.0 BENIGN MENINGIOMA OF BRAIN (HCC): Primary | ICD-10-CM

## 2023-09-29 RX ORDER — PREDNISONE 50 MG/1
50 TABLET ORAL ONCE
Qty: 1 TABLET | Refills: 0 | Status: SHIPPED | OUTPATIENT
Start: 2023-09-29 | End: 2023-09-29

## 2023-09-29 NOTE — TELEPHONE ENCOUNTER
I am working on Chart prep- Patient is scheduled for 10/10/23. Dr. Hoang Means last seen patient on 8/2/22- Advised recheck examination 1 to 2 years with MRI of the brain. Dr. Hoang Means, would you like patient to have MRI Brain prior to seeing you on 10/10/23? Or will you order it at that appointment?

## 2023-10-03 NOTE — TELEPHONE ENCOUNTER
Informed patient to take medicine 1 hr prior to the exam, MRI is scheduled for Friday, October 6, 2023.

## 2023-10-06 ENCOUNTER — HOSPITAL ENCOUNTER (OUTPATIENT)
Dept: MRI IMAGING | Age: 68
End: 2023-10-06
Attending: NEUROLOGICAL SURGERY
Payer: MEDICARE

## 2023-10-06 DIAGNOSIS — D32.0 BENIGN MENINGIOMA OF BRAIN (HCC): ICD-10-CM

## 2023-10-06 DIAGNOSIS — R11.0 NAUSEA: ICD-10-CM

## 2023-10-06 PROCEDURE — 70553 MRI BRAIN STEM W/O & W/DYE: CPT

## 2023-10-06 PROCEDURE — 6360000004 HC RX CONTRAST MEDICATION: Performed by: NEUROLOGICAL SURGERY

## 2023-10-06 PROCEDURE — A9577 INJ MULTIHANCE: HCPCS | Performed by: NEUROLOGICAL SURGERY

## 2023-10-06 RX ORDER — ONDANSETRON 4 MG/1
TABLET, ORALLY DISINTEGRATING ORAL
Qty: 20 TABLET | Refills: 1 | Status: SHIPPED | OUTPATIENT
Start: 2023-10-06

## 2023-10-06 RX ADMIN — GADOBENATE DIMEGLUMINE 20 ML: 529 INJECTION, SOLUTION INTRAVENOUS at 13:44

## 2023-10-10 ENCOUNTER — OFFICE VISIT (OUTPATIENT)
Dept: NEUROSURGERY | Age: 68
End: 2023-10-10
Payer: MEDICARE

## 2023-10-10 VITALS
BODY MASS INDEX: 34.99 KG/M2 | TEMPERATURE: 97.6 F | DIASTOLIC BLOOD PRESSURE: 70 MMHG | HEIGHT: 65 IN | SYSTOLIC BLOOD PRESSURE: 112 MMHG | WEIGHT: 210 LBS

## 2023-10-10 DIAGNOSIS — D32.0 BENIGN MENINGIOMA OF BRAIN (HCC): Primary | ICD-10-CM

## 2023-10-10 PROCEDURE — 3074F SYST BP LT 130 MM HG: CPT | Performed by: NEUROLOGICAL SURGERY

## 2023-10-10 PROCEDURE — 99213 OFFICE O/P EST LOW 20 MIN: CPT | Performed by: NEUROLOGICAL SURGERY

## 2023-10-10 PROCEDURE — 3078F DIAST BP <80 MM HG: CPT | Performed by: NEUROLOGICAL SURGERY

## 2023-10-17 NOTE — PROGRESS NOTES
NPV - Referred by Dr. Meehan for recurrent 5 year (2018) follow up for meningioma. 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 Initial surgery Meningioma resection in 2018 Dr. Aguilera. MRI done 10/6/23.    Franny Nascimento is a 68 y.o. year old female    HPI  Ms. 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    Since her surgery, she has loss of taste and smell.  No other symptoms including visual disturbances.    No headache, nausea, running, weakness, blurred vision and numbness.  Review of Systems         Past Medical History:   Diagnosis Date    Personal history of benign neoplasm of the brain     History of benign neoplasm of brain       Past Surgical History:   Procedure Laterality Date    OTHER SURGICAL HISTORY  10/01/2019    Neck surgery    OTHER SURGICAL HISTORY  10/01/2019    Back surgery    OTHER SURGICAL HISTORY  10/01/2019    Hysterectomy    OTHER SURGICAL HISTORY  10/01/2019    Appendectomy    US GUIDED FINE PERCUTANEOUS ASPIRATION  5/7/2020    US GUIDED FINE PERCUTANEOUS ASPIRATION           Current Outpatient Medications:     acetaminophen (Tylenol) 325 mg tablet, Take 2 tablets (650 mg) by mouth every 4 hours if needed., Disp: , Rfl:     amLODIPine (Norvasc) 5 mg tablet, Take 1 tablet (5 mg) by mouth 2 times a day., Disp: , Rfl:     azelastine (Astelin) 137 mcg (0.1 %) nasal spray, Administer into affected nostril(s)., Disp: , Rfl:     benzonatate (Tessalon) 200 mg capsule, Take by mouth., Disp: , Rfl:     CALCIUM ORAL, Take 630 mg by mouth once daily., Disp: , Rfl:     cetirizine-pseudoephedrine (ZyrTEC-D) 5-120 mg 12 hr tablet, Take by mouth., Disp: , Rfl:     cholecalciferol (Vitamin D-3) 50 MCG (2000 UT) tablet, Take 1 tablet (2,000 Units) by mouth once daily., Disp: , Rfl:     dicyclomine (Bentyl) 20 mg tablet, Take by mouth.,  Disp: , Rfl:     diphenhydrAMINE (BENADryl) 25 mg capsule, Take 1 capsule (25 mg) by mouth every 6 hours if needed., Disp: , Rfl:     estradiol (Vivelle-DOT) 0.05 mg/24 hr patch, Place 1 patch on the skin 1 (one) time per week., Disp: , Rfl:     fexofenadine HCl (ALLEGRA ORAL), Take by mouth. All Day Allegra-D 5-120 MG, Disp: , Rfl:     HYDROcodone-acetaminophen (Norco) 5-325 mg tablet, Take 1 tablet by mouth every 6 hours if needed.  for Pain., Disp: , Rfl:     hyoscyamine 0.125 mg disintegrating tablet, Place 1 tablet (0.125 mg) under the tongue every 4 hours if needed., Disp: , Rfl:     hyoscyamine ER (Levbid) 0.375 mg 12 hr tablet, Take 1 tablet (0.375 mg) by mouth every 12 hours if needed for cramping., Disp: , Rfl:     loperamide (Imodium) 2 mg capsule, Take 1 capsule (2 mg) by mouth if needed for diarrhea.  (MAX OF 4 CAPSULES PER DAY), Disp: , Rfl:     metoprolol succinate XL (Toprol XL) 50 mg 24 hr tablet, Take 1 tablet (50 mg) by mouth once daily., Disp: , Rfl:     metoprolol succinate XL (Toprol-XL) 100 mg 24 hr tablet, Take 1 tablet (100 mg) by mouth once daily., Disp: , Rfl:     naproxen (Naprosyn) 375 mg tablet, Take by mouth., Disp: , Rfl:     omeprazole (PriLOSEC) 40 mg DR capsule, Take 1 capsule (40 mg) by mouth once daily., Disp: , Rfl:     ondansetron ODT (Zofran-ODT) 4 mg disintegrating tablet, Take 1 tablet (4 mg) by mouth every 8 hours if needed for nausea or vomiting., Disp: , Rfl:     traZODone (Desyrel) 50 mg tablet, Take 1 tablet (50 mg) by mouth once daily at bedtime., Disp: , Rfl:     venlafaxine XR (Effexor-XR) 75 mg 24 hr capsule, Take 1 capsule (75 mg) by mouth 2 times a day., Disp: , Rfl:     azelaic acid (Finacea) 15 % gel, Finacea 15 % External Gel  Quantity: 50  Refills: 0      Start : 9-Oct-2015  Active, Disp: , Rfl:     cefdinir (Omnicef) 300 mg capsule, Take by mouth., Disp: , Rfl:     cholestyramine light (Prevalite) 4 gram packet, Take by mouth., Disp: , Rfl:     eluxadoline 75  mg tablet, Take 1 tablet (75 mg) by mouth twice a day., Disp: , Rfl:     estrogens, conjugated, (Premarin) 0.625 mg tablet, Take 1 tablet (0.625 mg) by mouth once daily., Disp: , Rfl:     fluticasone (Flonase) 50 mcg/actuation nasal spray, Administer 2 sprays into affected nostril(s) once daily., Disp: , Rfl:     Lactobacillus rhamnosus GG (CULTURELLE) 15 billion cell capsule, sprinkle capsule, Take 1 capsule by mouth once daily., Disp: , Rfl:     levocetirizine (Xyzal) 5 mg tablet, Take 1 tablet (5 mg) by mouth once daily at bedtime., Disp: , Rfl:     naloxone (Narcan) 4 mg/0.1 mL nasal spray, Administer 1 spray (4 mg) into affected nostril(s) if needed for opioid reversal. give and call 911) for up to 1 dose Indications: Opioid Overdose., Disp: , Rfl:     perflutren lipid microspheres (DEFINITY IV), Infuse into a venous catheter.  perflutren lipid microspheres 1.3 mL in NaCl (PF) 0.9% 10 mL injection (DEFINITY), Disp: , Rfl:     rifAXIMin (Xifaxan) 550 mg tablet, Take 1 tablet (550 mg) by mouth twice a day., Disp: , Rfl:     simethicone (Mylicon) 80 mg chewable tablet, Chew 1 tablet (80 mg) 4 times a day as needed for flatulence., Disp: , Rfl:     sodium chloride 0.9% (sodium chloride) flush, Infuse 2-10 mL into a venous catheter. For Echo procedure, Disp: , Rfl:     trospium (Sanctura XR) 60 mg 24 hour capsule, Take 1 capsule (60 mg) by mouth once daily in the morning.  Take on an empty stomach 1 hour before breakfast in a.m., Disp: , Rfl:         Objective   Vitals:    10/19/23 1036   BP: 138/76   Pulse: 55   Resp: 17       Neurological Exam  AAO x 3  PERRL, EOMI, TS, TML  5/5  Senorsy intact  No drift      [unfilled]  1. Status post surgical resection of a meningioma along the anterior right  frontal convexity.  2. Small, 1.3 x 0.6 x 1.4 cm recurrent meningioma along the medial roof of  the right orbit, MINIMALLY ENLARGED as of 06/20/2022.  3. 1.1 x 1.3 x 1.6 cm meningioma in the right cerebellopontine  cistern,  MILDLY ENLARGED as of 06/28/2022.  4.  No acute intracranial abnormality.  Narrative    EXAMINATION:  MRI OF THE BRAIN WITHOUT AND WITH CONTRAST  10/6/2023 1:20 pm    TECHNIQUE:  Multiplanar multisequence MRI of the head/brain was performed without and  with the administration of intravenous contrast.    COMPARISON:  MRI of the brain with and without contrast, 06/28/2022    HISTORY:  ORDERING SYSTEM PROVIDED HISTORY: Benign meningioma of brain (HCC)  TECHNOLOGIST PROVIDED HISTORY:  STAT Creatinine as needed:->No  Reason for exam:->Meningioma  Reason for exam:->Prednisone 50 mg p.o. before study  What is the sedation requirement?->None  What reading provider will be dictating this exam?->CRC    FINDINGS:  INTRACRANIAL STRUCTURES/VENTRICLES:  There is no acute infarct. A surgical  cavity is seen along the anterior aspect of the right frontal lobe.  There is  surrounding T2 hyperintensity likely representing gliosis which extends to  the medial aspect of the left frontal lobe.  These findings are consistent  with patient's history of a resected meningioma.    Along the posterolateral aspect of the surgical cavity, along the medial roof  of the right orbit, there is a 1.3 x 0.6 x 1.4 cm enhancing extra-axial mass  consistent with a recurrent meningioma.  It is minimally enlarged as of  06/28/2022 but better visualized on the current study due to slight  differences in technique.    Along the posterior right cerebellopontine cistern, there is another 1.1 x  1.3 x 1.6 cm meningioma.  It is mildly increased as of 06/28/2022 when it  measured 1.2 x 1.0 x 1.0 cm.    ORBITS: The visualized portion of the orbits demonstrate no acute abnormality.    SINUSES: The visualized paranasal sinuses and mastoid air cells demonstrate  no acute abnormality.    BONES/SOFT TISSUES: The bone marrow signal intensity appears normal.  Postoperative changes from craniotomies are noted in the bilateral frontal  bones.  The soft tissues  demonstrate no acute abnormality.  Exam End: 10/06/23 13:43    Specimen Collected: 10/09/23 18:15 Last Resulted: 10/09/23 18:29   Received From: [facility]  Result Received: 10/17/23 15:48           Assessment/Plan     I had the pleasure of seeing Ms. Gab Nascimento and her  and had a thorough discussion as well as review her MRI from 2022 in 2023.    She has a stable right posterior fossa meningioma that has not changed in size.  There is a questionable increase in size of this right orbital meningioma.  On comparison to the 2022 MRI, the right orbital meningioma appears to be present and stable compared to her recent MRI in 2023.  At this point, consider repeat the MRI with and without contrast in 6 months for further evaluation.  All question was answered to her satisfaction

## 2023-10-18 PROBLEM — R39.15 URGENCY OF URINATION: Status: ACTIVE | Noted: 2023-10-18

## 2023-10-18 PROBLEM — Z96.652 PRESENCE OF ARTIFICIAL KNEE JOINT, LEFT: Status: ACTIVE | Noted: 2018-05-11

## 2023-10-18 PROBLEM — K59.1 FUNCTIONAL DIARRHEA: Status: ACTIVE | Noted: 2019-04-03

## 2023-10-18 PROBLEM — E04.2 NONTOXIC MULTINODULAR GOITER: Status: ACTIVE | Noted: 2020-11-11

## 2023-10-18 PROBLEM — E66.811 OBESITY, CLASS I, BMI 30-34.9: Status: ACTIVE | Noted: 2023-10-12

## 2023-10-18 PROBLEM — E06.3 HASHIMOTO'S THYROIDITIS: Status: ACTIVE | Noted: 2020-12-14

## 2023-10-18 PROBLEM — H92.09 REFERRED OTALGIA: Status: ACTIVE | Noted: 2023-10-18

## 2023-10-18 PROBLEM — R10.2 PELVIC PAIN: Status: ACTIVE | Noted: 2023-10-18

## 2023-10-18 PROBLEM — N39.46 URGE AND STRESS INCONTINENCE: Status: ACTIVE | Noted: 2023-10-18

## 2023-10-18 PROBLEM — D32.9 MENINGIOMA (MULTI): Status: ACTIVE | Noted: 2018-07-24

## 2023-10-18 PROBLEM — R07.89 ATYPICAL CHEST PAIN: Status: ACTIVE | Noted: 2023-10-18

## 2023-10-18 PROBLEM — M54.2 NECK PAIN: Status: ACTIVE | Noted: 2023-10-18

## 2023-10-18 PROBLEM — E66.9 OBESITY, CLASS I, BMI 30-34.9: Status: ACTIVE | Noted: 2023-10-12

## 2023-10-18 PROBLEM — R13.19 ESOPHAGEAL DYSPHAGIA: Status: ACTIVE | Noted: 2023-04-04

## 2023-10-18 PROBLEM — Z86.011 HISTORY OF BENIGN NEOPLASM OF BRAIN: Status: ACTIVE | Noted: 2018-09-21

## 2023-10-18 PROBLEM — D32.0 BENIGN MENINGIOMA OF BRAIN (MULTI): Status: ACTIVE | Noted: 2018-07-19

## 2023-10-18 PROBLEM — E04.1 THYROID NODULE: Status: ACTIVE | Noted: 2019-12-06

## 2023-10-18 PROBLEM — N39.0 RECURRENT UTI: Status: ACTIVE | Noted: 2023-10-18

## 2023-10-18 PROBLEM — R35.0 INCREASED FREQUENCY OF URINATION: Status: ACTIVE | Noted: 2023-10-18

## 2023-10-18 PROBLEM — R11.0 NAUSEA: Status: ACTIVE | Noted: 2023-04-04

## 2023-10-18 PROBLEM — R30.0 DYSURIA: Status: ACTIVE | Noted: 2021-03-04

## 2023-10-18 PROBLEM — T14.8XXA HEMATOMA: Status: ACTIVE | Noted: 2019-06-06

## 2023-10-18 PROBLEM — H43.393 VITREOUS FLOATER, BILATERAL: Status: ACTIVE | Noted: 2018-09-21

## 2023-10-18 PROBLEM — M26.609 TMJ (TEMPOROMANDIBULAR JOINT SYNDROME): Status: ACTIVE | Noted: 2019-12-06

## 2023-10-18 PROBLEM — R09.A2 GLOBUS SENSATION: Status: ACTIVE | Noted: 2023-04-04

## 2023-10-18 PROBLEM — I45.10 RIGHT BUNDLE BRANCH BLOCK (RBBB) ON ELECTROCARDIOGRAPHY: Status: ACTIVE | Noted: 2019-12-06

## 2023-10-18 PROBLEM — R35.1 NOCTURIA: Status: ACTIVE | Noted: 2023-10-18

## 2023-10-18 PROBLEM — G93.89 BRAIN MASS: Status: ACTIVE | Noted: 2018-07-16

## 2023-10-18 PROBLEM — M19.90 ARTHRITIS: Status: ACTIVE | Noted: 2023-10-18

## 2023-10-18 PROBLEM — R00.2 PALPITATIONS: Status: ACTIVE | Noted: 2023-10-18

## 2023-10-18 PROBLEM — N28.9 RENAL LESION: Status: ACTIVE | Noted: 2020-12-14

## 2023-10-18 PROBLEM — M54.50 LOW BACK PAIN: Status: ACTIVE | Noted: 2018-10-16

## 2023-10-18 RX ORDER — CETIRIZINE HYDROCHLORIDE, PSEUDOEPHEDRINE HYDROCHLORIDE 5; 120 MG/1; MG/1
TABLET, FILM COATED, EXTENDED RELEASE ORAL
COMMUNITY
Start: 2020-01-07

## 2023-10-18 RX ORDER — DICYCLOMINE HYDROCHLORIDE 20 MG/1
TABLET ORAL
COMMUNITY
Start: 2015-01-29

## 2023-10-18 RX ORDER — BENZONATATE 200 MG/1
CAPSULE ORAL
COMMUNITY
Start: 2018-11-12

## 2023-10-18 RX ORDER — HYOSCYAMINE SULFATE 0.38 MG/1
0.38 TABLET, EXTENDED RELEASE ORAL EVERY 12 HOURS PRN
COMMUNITY
Start: 2019-03-14

## 2023-10-18 RX ORDER — AMLODIPINE BESYLATE 5 MG/1
1 TABLET ORAL 2 TIMES DAILY
COMMUNITY
Start: 2022-10-25

## 2023-10-18 RX ORDER — CHOLESTYRAMINE 4 G/4.8G
POWDER, FOR SUSPENSION ORAL
COMMUNITY
Start: 2015-03-05

## 2023-10-18 RX ORDER — DIPHENHYDRAMINE HCL 25 MG
25 CAPSULE ORAL EVERY 6 HOURS PRN
COMMUNITY
Start: 2023-08-19

## 2023-10-18 RX ORDER — ONDANSETRON 4 MG/1
4 TABLET, ORALLY DISINTEGRATING ORAL EVERY 8 HOURS PRN
COMMUNITY
Start: 2015-05-05

## 2023-10-18 RX ORDER — ESTRADIOL 0.05 MG/D
1 FILM, EXTENDED RELEASE TRANSDERMAL
COMMUNITY
Start: 2019-09-11

## 2023-10-18 RX ORDER — SODIUM CHLORIDE 0.9 % (FLUSH) 0.9 %
2-10 SYRINGE (ML) INJECTION
COMMUNITY
Start: 2023-06-16 | End: 2024-06-15

## 2023-10-18 RX ORDER — TRAZODONE HYDROCHLORIDE 50 MG/1
1 TABLET ORAL NIGHTLY
COMMUNITY
Start: 2019-05-21

## 2023-10-18 RX ORDER — CHOLECALCIFEROL (VITAMIN D3) 50 MCG
2000 TABLET ORAL DAILY
COMMUNITY

## 2023-10-18 RX ORDER — NALOXONE HYDROCHLORIDE 4 MG/.1ML
4 SPRAY NASAL AS NEEDED
COMMUNITY
Start: 2019-06-10

## 2023-10-18 RX ORDER — ACETAMINOPHEN 325 MG/1
2 TABLET ORAL EVERY 4 HOURS PRN
COMMUNITY
Start: 2019-06-10

## 2023-10-18 RX ORDER — METOPROLOL SUCCINATE 100 MG/1
1 TABLET, EXTENDED RELEASE ORAL DAILY
COMMUNITY
Start: 2022-08-30

## 2023-10-18 RX ORDER — FLUTICASONE PROPIONATE 50 MCG
2 SPRAY, SUSPENSION (ML) NASAL DAILY
COMMUNITY
Start: 2019-08-14

## 2023-10-18 RX ORDER — SIMETHICONE 80 MG
80 TABLET,CHEWABLE ORAL 4 TIMES DAILY PRN
COMMUNITY
Start: 2021-08-18

## 2023-10-18 RX ORDER — AZELASTINE 1 MG/ML
SPRAY, METERED NASAL
COMMUNITY
Start: 2015-11-16

## 2023-10-18 RX ORDER — NAPROXEN 375 MG/1
TABLET ORAL
COMMUNITY
Start: 2019-03-07 | End: 2024-05-18

## 2023-10-18 RX ORDER — HYOSCYAMINE SULFATE 0.12 MG/1
0.12 TABLET, ORALLY DISINTEGRATING ORAL EVERY 4 HOURS PRN
COMMUNITY
Start: 2015-03-18

## 2023-10-18 RX ORDER — TROSPIUM CHLORIDE ER 60 MG/1
60 CAPSULE ORAL EVERY MORNING
COMMUNITY
Start: 2019-10-01

## 2023-10-18 RX ORDER — OMEPRAZOLE 40 MG/1
1 CAPSULE, DELAYED RELEASE ORAL DAILY
COMMUNITY
Start: 2023-04-11

## 2023-10-18 RX ORDER — VENLAFAXINE HYDROCHLORIDE 75 MG/1
75 CAPSULE, EXTENDED RELEASE ORAL 2 TIMES DAILY
COMMUNITY
Start: 2014-12-31

## 2023-10-18 RX ORDER — CEFDINIR 300 MG/1
CAPSULE ORAL
COMMUNITY
Start: 2019-08-14

## 2023-10-18 RX ORDER — HYDROCODONE BITARTRATE AND ACETAMINOPHEN 5; 325 MG/1; MG/1
1 TABLET ORAL EVERY 6 HOURS PRN
COMMUNITY
Start: 2017-06-19

## 2023-10-18 RX ORDER — LOPERAMIDE HYDROCHLORIDE 2 MG/1
2 CAPSULE ORAL AS NEEDED
COMMUNITY
Start: 2023-04-04

## 2023-10-18 RX ORDER — LEVOCETIRIZINE DIHYDROCHLORIDE 5 MG/1
1 TABLET, FILM COATED ORAL NIGHTLY
COMMUNITY

## 2023-10-18 RX ORDER — AZELAIC ACID 0.15 G/G
GEL TOPICAL
COMMUNITY
Start: 2015-10-09

## 2023-10-18 RX ORDER — METOPROLOL SUCCINATE 50 MG/1
1 TABLET, EXTENDED RELEASE ORAL DAILY
COMMUNITY

## 2023-10-19 ENCOUNTER — OFFICE VISIT (OUTPATIENT)
Dept: NEUROSURGERY | Facility: HOSPITAL | Age: 68
End: 2023-10-19
Payer: MEDICARE

## 2023-10-19 VITALS
BODY MASS INDEX: 34.99 KG/M2 | HEART RATE: 55 BPM | WEIGHT: 210 LBS | SYSTOLIC BLOOD PRESSURE: 138 MMHG | RESPIRATION RATE: 17 BRPM | HEIGHT: 65 IN | DIASTOLIC BLOOD PRESSURE: 76 MMHG

## 2023-10-19 DIAGNOSIS — D32.0 CEREBRAL MENINGIOMA (MULTI): ICD-10-CM

## 2023-10-19 PROCEDURE — 1159F MED LIST DOCD IN RCRD: CPT | Performed by: NEUROLOGICAL SURGERY

## 2023-10-19 PROCEDURE — 3075F SYST BP GE 130 - 139MM HG: CPT | Performed by: NEUROLOGICAL SURGERY

## 2023-10-19 PROCEDURE — 3078F DIAST BP <80 MM HG: CPT | Performed by: NEUROLOGICAL SURGERY

## 2023-10-19 PROCEDURE — 99212 OFFICE O/P EST SF 10 MIN: CPT | Performed by: NEUROLOGICAL SURGERY

## 2023-10-19 PROCEDURE — 1036F TOBACCO NON-USER: CPT | Performed by: NEUROLOGICAL SURGERY

## 2023-10-19 PROCEDURE — 99202 OFFICE O/P NEW SF 15 MIN: CPT | Performed by: NEUROLOGICAL SURGERY

## 2023-10-19 ASSESSMENT — PATIENT HEALTH QUESTIONNAIRE - PHQ9
2. FEELING DOWN, DEPRESSED OR HOPELESS: NOT AT ALL
1. LITTLE INTEREST OR PLEASURE IN DOING THINGS: NOT AT ALL
SUM OF ALL RESPONSES TO PHQ9 QUESTIONS 1 AND 2: 0

## 2023-10-19 ASSESSMENT — COLUMBIA-SUICIDE SEVERITY RATING SCALE - C-SSRS
6. HAVE YOU EVER DONE ANYTHING, STARTED TO DO ANYTHING, OR PREPARED TO DO ANYTHING TO END YOUR LIFE?: NO
1. IN THE PAST MONTH, HAVE YOU WISHED YOU WERE DEAD OR WISHED YOU COULD GO TO SLEEP AND NOT WAKE UP?: NO
2. HAVE YOU ACTUALLY HAD ANY THOUGHTS OF KILLING YOURSELF?: NO

## 2023-10-19 ASSESSMENT — ENCOUNTER SYMPTOMS
LOSS OF SENSATION IN FEET: 0
OCCASIONAL FEELINGS OF UNSTEADINESS: 0
DEPRESSION: 0

## 2023-10-30 ENCOUNTER — TELEPHONE (OUTPATIENT)
Dept: OBGYN CLINIC | Age: 68
End: 2023-10-30

## 2023-10-30 DIAGNOSIS — I10 ESSENTIAL HYPERTENSION: ICD-10-CM

## 2023-10-30 RX ORDER — METOPROLOL SUCCINATE 100 MG/1
TABLET, EXTENDED RELEASE ORAL
Qty: 90 TABLET | Refills: 0 | Status: SHIPPED | OUTPATIENT
Start: 2023-10-30 | End: 2023-11-03 | Stop reason: SDUPTHER

## 2023-10-30 RX ORDER — VENLAFAXINE HYDROCHLORIDE 75 MG/1
CAPSULE, EXTENDED RELEASE ORAL
Qty: 90 CAPSULE | Refills: 0 | Status: SHIPPED | OUTPATIENT
Start: 2023-10-30

## 2023-10-30 NOTE — TELEPHONE ENCOUNTER
Pharmacy is requesting medication refill. Please approve or deny this request.    Rx requested:  Requested Prescriptions      No prescriptions requested or ordered in this encounter         Last Office Visit:   2/28/2023      Next Visit Date:  No future appointments.

## 2023-11-03 ENCOUNTER — PATIENT MESSAGE (OUTPATIENT)
Dept: CARDIOLOGY CLINIC | Age: 68
End: 2023-11-03

## 2023-11-03 DIAGNOSIS — I10 ESSENTIAL HYPERTENSION: ICD-10-CM

## 2023-11-03 RX ORDER — METOPROLOL SUCCINATE 100 MG/1
TABLET, EXTENDED RELEASE ORAL
Qty: 90 TABLET | Refills: 0 | Status: SHIPPED | OUTPATIENT
Start: 2023-11-03

## 2023-11-03 NOTE — TELEPHONE ENCOUNTER
From: aJden Franklin  To: Dr. Michael Bobo: 11/3/2023 2:38 AM EDT  Subject: Refill of a Rj Jaycee Dr Felicitas Kuo,  I need a refill of my Metoperal. I thought I had one refill left an when I called [facility], I found that I didn't. So if I could get a refill I'd appreciate it. I am seeing Dr Mckenzie Balderas per your request. I'm waiting to hear back from Dr Mckenzie Tracy as he had me were a 14 day monitor.     Thank you Very much  Jaden Franklin  [phone]

## 2023-11-03 NOTE — TELEPHONE ENCOUNTER
MESSAGED PT TO SCHEDULE FOLLOW UP ONCE SHE SEES DR DRESSING AND OWEN    Requesting medication refill. Please approve or deny this request.    Rx requested:  Requested Prescriptions     Pending Prescriptions Disp Refills    metoprolol succinate (TOPROL XL) 100 MG extended release tablet 90 tablet 0     Sig: TAKE ONE TABLET DAILY         Last Office Visit:   2/28/2023      Next Visit Date:  No future appointments. Last refill 10/30/23. Please approve or deny.

## 2023-11-29 ENCOUNTER — OFFICE VISIT (OUTPATIENT)
Dept: FAMILY MEDICINE CLINIC | Age: 68
End: 2023-11-29
Payer: MEDICARE

## 2023-11-29 VITALS
HEART RATE: 61 BPM | RESPIRATION RATE: 12 BRPM | DIASTOLIC BLOOD PRESSURE: 78 MMHG | BODY MASS INDEX: 35 KG/M2 | TEMPERATURE: 97.4 F | OXYGEN SATURATION: 98 % | SYSTOLIC BLOOD PRESSURE: 126 MMHG | HEIGHT: 65 IN | WEIGHT: 210.1 LBS

## 2023-11-29 DIAGNOSIS — J02.9 SORE THROAT: ICD-10-CM

## 2023-11-29 DIAGNOSIS — R05.8 OTHER COUGH: ICD-10-CM

## 2023-11-29 DIAGNOSIS — J06.9 VIRAL URI: Primary | ICD-10-CM

## 2023-11-29 LAB
Lab: NORMAL
PERFORMING INSTRUMENT: NORMAL
QC PASS/FAIL: NORMAL
S PYO AG THROAT QL: NORMAL
SARS-COV-2, POC: NORMAL

## 2023-11-29 PROCEDURE — 87426 SARSCOV CORONAVIRUS AG IA: CPT | Performed by: NURSE PRACTITIONER

## 2023-11-29 PROCEDURE — 87880 STREP A ASSAY W/OPTIC: CPT | Performed by: NURSE PRACTITIONER

## 2023-11-29 PROCEDURE — 3074F SYST BP LT 130 MM HG: CPT | Performed by: NURSE PRACTITIONER

## 2023-11-29 PROCEDURE — 1123F ACP DISCUSS/DSCN MKR DOCD: CPT | Performed by: NURSE PRACTITIONER

## 2023-11-29 PROCEDURE — 3078F DIAST BP <80 MM HG: CPT | Performed by: NURSE PRACTITIONER

## 2023-11-29 PROCEDURE — 99213 OFFICE O/P EST LOW 20 MIN: CPT | Performed by: NURSE PRACTITIONER

## 2023-11-29 RX ORDER — DEXTROMETHORPHAN HYDROBROMIDE AND PROMETHAZINE HYDROCHLORIDE 15; 6.25 MG/5ML; MG/5ML
5 SYRUP ORAL 4 TIMES DAILY PRN
Qty: 140 ML | Refills: 0 | Status: SHIPPED | OUTPATIENT
Start: 2023-11-29 | End: 2023-12-06

## 2023-11-29 RX ORDER — FLUTICASONE PROPIONATE 50 MCG
2 SPRAY, SUSPENSION (ML) NASAL DAILY
Qty: 16 G | Refills: 0 | Status: SHIPPED | OUTPATIENT
Start: 2023-11-29

## 2023-11-29 ASSESSMENT — ENCOUNTER SYMPTOMS
DIARRHEA: 1
WHEEZING: 0
COUGH: 1
EYE PAIN: 0
ABDOMINAL PAIN: 0
SHORTNESS OF BREATH: 1
SINUS PRESSURE: 0
NAUSEA: 1
EYE REDNESS: 0
RHINORRHEA: 0
VOMITING: 0
SINUS PAIN: 0
EYE DISCHARGE: 0
SORE THROAT: 1
CHEST TIGHTNESS: 0
STRIDOR: 0
EYE ITCHING: 0
TROUBLE SWALLOWING: 1

## 2023-12-27 ENCOUNTER — OFFICE VISIT (OUTPATIENT)
Dept: FAMILY MEDICINE CLINIC | Age: 68
End: 2023-12-27
Payer: MEDICARE

## 2023-12-27 ENCOUNTER — PATIENT MESSAGE (OUTPATIENT)
Dept: GASTROENTEROLOGY | Age: 68
End: 2023-12-27

## 2023-12-27 VITALS
TEMPERATURE: 97.6 F | DIASTOLIC BLOOD PRESSURE: 82 MMHG | HEART RATE: 60 BPM | SYSTOLIC BLOOD PRESSURE: 120 MMHG | OXYGEN SATURATION: 96 % | BODY MASS INDEX: 34.32 KG/M2 | WEIGHT: 206 LBS | HEIGHT: 65 IN

## 2023-12-27 DIAGNOSIS — E78.00 PURE HYPERCHOLESTEROLEMIA: ICD-10-CM

## 2023-12-27 DIAGNOSIS — R53.83 OTHER FATIGUE: Primary | ICD-10-CM

## 2023-12-27 DIAGNOSIS — R63.1 INCREASED THIRST: ICD-10-CM

## 2023-12-27 PROCEDURE — 3074F SYST BP LT 130 MM HG: CPT | Performed by: FAMILY MEDICINE

## 2023-12-27 PROCEDURE — 3079F DIAST BP 80-89 MM HG: CPT | Performed by: FAMILY MEDICINE

## 2023-12-27 PROCEDURE — 1123F ACP DISCUSS/DSCN MKR DOCD: CPT | Performed by: FAMILY MEDICINE

## 2023-12-27 PROCEDURE — 99214 OFFICE O/P EST MOD 30 MIN: CPT | Performed by: FAMILY MEDICINE

## 2023-12-27 NOTE — PROGRESS NOTES
Subjective:      Patient ID: Elida Hagen is a 79 y.o. female    Fatigue  Associated symptoms include fatigue. Pertinent negatives include no chest pain, chills, fever, rash or weakness. Hyperlipidemia  The current episode started more than 1 year ago. Pertinent negatives include no chest pain. Here with increased thirst and fatigue over the last 4 weeks. No changes with weight but has been having more problems with diarrhea of ibs for which she is seeing gi currently   Has tested her blood sugar with her husbands glucose unit and has read around 120 on fasting basis    Review of Systems   Constitutional:  Positive for fatigue. Negative for chills and fever. Cardiovascular:  Negative for chest pain. Gastrointestinal:  Positive for blood in stool. Skin:  Negative for rash. Neurological:  Negative for dizziness and weakness. Reviewed allergy, medical, social, surgical, family and med list changes and updated   Files     Social History     Socioeconomic History    Marital status:    Tobacco Use    Smoking status: Never    Smokeless tobacco: Never   Vaping Use    Vaping Use: Never used   Substance and Sexual Activity    Alcohol use: Not Currently     Alcohol/week: 0.0 standard drinks of alcohol     Comment: socially    Drug use: No    Sexual activity: Not Currently     Social Determinants of Health     Financial Resource Strain: Low Risk  (9/7/2023)    Overall Financial Resource Strain (CARDIA)     Difficulty of Paying Living Expenses: Not hard at all   Transportation Needs: Unknown (9/7/2023)    PRAPARE - Transportation     Lack of Transportation (Non-Medical):  No   Physical Activity: Insufficiently Active (2/7/2023)    Exercise Vital Sign     Days of Exercise per Week: 1 day     Minutes of Exercise per Session: 20 min   Housing Stability: Unknown (9/7/2023)    Housing Stability Vital Sign     Unstable Housing in the Last Year: No     Current Outpatient Medications   Medication Sig

## 2023-12-29 DIAGNOSIS — R63.1 INCREASED THIRST: ICD-10-CM

## 2023-12-29 DIAGNOSIS — E78.00 PURE HYPERCHOLESTEROLEMIA: ICD-10-CM

## 2023-12-29 DIAGNOSIS — R53.83 OTHER FATIGUE: ICD-10-CM

## 2023-12-29 LAB
ALBUMIN SERPL-MCNC: 3.9 G/DL (ref 3.5–4.6)
ALP SERPL-CCNC: 106 U/L (ref 40–130)
ALT SERPL-CCNC: 15 U/L (ref 0–33)
ANION GAP SERPL CALCULATED.3IONS-SCNC: 11 MEQ/L (ref 9–15)
AST SERPL-CCNC: 21 U/L (ref 0–35)
BASOPHILS # BLD: 0 K/UL (ref 0–0.2)
BASOPHILS NFR BLD: 0.7 %
BILIRUB SERPL-MCNC: 0.5 MG/DL (ref 0.2–0.7)
BUN SERPL-MCNC: 12 MG/DL (ref 8–23)
CALCIUM SERPL-MCNC: 9.2 MG/DL (ref 8.5–9.9)
CHLORIDE SERPL-SCNC: 108 MEQ/L (ref 95–107)
CHOLEST SERPL-MCNC: 167 MG/DL (ref 0–199)
CO2 SERPL-SCNC: 22 MEQ/L (ref 20–31)
CREAT SERPL-MCNC: 0.64 MG/DL (ref 0.5–0.9)
EOSINOPHIL # BLD: 0.1 K/UL (ref 0–0.7)
EOSINOPHIL NFR BLD: 1.9 %
ERYTHROCYTE [DISTWIDTH] IN BLOOD BY AUTOMATED COUNT: 13.2 % (ref 11.5–14.5)
GLOBULIN SER CALC-MCNC: 2.9 G/DL (ref 2.3–3.5)
GLUCOSE SERPL-MCNC: 108 MG/DL (ref 70–99)
HCT VFR BLD AUTO: 49.9 % (ref 37–47)
HDLC SERPL-MCNC: 52 MG/DL (ref 40–59)
HGB BLD-MCNC: 16.9 G/DL (ref 12–16)
LDLC SERPL CALC-MCNC: 91 MG/DL (ref 0–129)
LYMPHOCYTES # BLD: 1.9 K/UL (ref 1–4.8)
LYMPHOCYTES NFR BLD: 34.9 %
MCH RBC QN AUTO: 30.2 PG (ref 27–31.3)
MCHC RBC AUTO-ENTMCNC: 33.9 % (ref 33–37)
MCV RBC AUTO: 89.1 FL (ref 79.4–94.8)
MONOCYTES # BLD: 0.5 K/UL (ref 0.2–0.8)
MONOCYTES NFR BLD: 8.7 %
NEUTROPHILS # BLD: 2.9 K/UL (ref 1.4–6.5)
NEUTS SEG NFR BLD: 53.6 %
PLATELET # BLD AUTO: 270 K/UL (ref 130–400)
POTASSIUM SERPL-SCNC: 4.1 MEQ/L (ref 3.4–4.9)
PROT SERPL-MCNC: 6.8 G/DL (ref 6.3–8)
RBC # BLD AUTO: 5.6 M/UL (ref 4.2–5.4)
SODIUM SERPL-SCNC: 141 MEQ/L (ref 135–144)
TRIGL SERPL-MCNC: 120 MG/DL (ref 0–150)
TSH SERPL-MCNC: 0.68 UIU/ML (ref 0.44–3.86)
WBC # BLD AUTO: 5.4 K/UL (ref 4.8–10.8)

## 2024-01-31 ENCOUNTER — OFFICE VISIT (OUTPATIENT)
Dept: FAMILY MEDICINE CLINIC | Age: 69
End: 2024-01-31

## 2024-01-31 VITALS
SYSTOLIC BLOOD PRESSURE: 120 MMHG | TEMPERATURE: 97.9 F | DIASTOLIC BLOOD PRESSURE: 78 MMHG | HEART RATE: 63 BPM | BODY MASS INDEX: 28.56 KG/M2 | OXYGEN SATURATION: 99 % | WEIGHT: 204 LBS | HEIGHT: 71 IN

## 2024-01-31 VITALS
DIASTOLIC BLOOD PRESSURE: 78 MMHG | WEIGHT: 204 LBS | BODY MASS INDEX: 33.99 KG/M2 | HEIGHT: 65 IN | OXYGEN SATURATION: 99 % | TEMPERATURE: 97.9 F | HEART RATE: 63 BPM | SYSTOLIC BLOOD PRESSURE: 120 MMHG

## 2024-01-31 DIAGNOSIS — Z23 NEED FOR PROPHYLACTIC VACCINATION AGAINST STREPTOCOCCUS PNEUMONIAE (PNEUMOCOCCUS): ICD-10-CM

## 2024-01-31 DIAGNOSIS — I48.0 PAF (PAROXYSMAL ATRIAL FIBRILLATION) (HCC): ICD-10-CM

## 2024-01-31 DIAGNOSIS — D75.1 POLYCYTHEMIA: ICD-10-CM

## 2024-01-31 DIAGNOSIS — I10 PRIMARY HYPERTENSION: ICD-10-CM

## 2024-01-31 DIAGNOSIS — Z78.0 ASYMPTOMATIC MENOPAUSAL STATE: ICD-10-CM

## 2024-01-31 DIAGNOSIS — D32.0 BENIGN MENINGIOMA OF BRAIN (HCC): ICD-10-CM

## 2024-01-31 DIAGNOSIS — D32.9 MENINGIOMA (HCC): ICD-10-CM

## 2024-01-31 DIAGNOSIS — Z23 NEED FOR PROPHYLACTIC VACCINATION AND INOCULATION AGAINST VARICELLA: ICD-10-CM

## 2024-01-31 DIAGNOSIS — Z00.00 MEDICARE ANNUAL WELLNESS VISIT, SUBSEQUENT: Primary | ICD-10-CM

## 2024-01-31 DIAGNOSIS — R73.03 PRE-DIABETES: Primary | ICD-10-CM

## 2024-01-31 DIAGNOSIS — R73.03 PRE-DIABETES: ICD-10-CM

## 2024-01-31 ASSESSMENT — PATIENT HEALTH QUESTIONNAIRE - PHQ9
3. TROUBLE FALLING OR STAYING ASLEEP: 0
7. TROUBLE CONCENTRATING ON THINGS, SUCH AS READING THE NEWSPAPER OR WATCHING TELEVISION: 0
SUM OF ALL RESPONSES TO PHQ QUESTIONS 1-9: 0
9. THOUGHTS THAT YOU WOULD BE BETTER OFF DEAD, OR OF HURTING YOURSELF: 0
10. IF YOU CHECKED OFF ANY PROBLEMS, HOW DIFFICULT HAVE THESE PROBLEMS MADE IT FOR YOU TO DO YOUR WORK, TAKE CARE OF THINGS AT HOME, OR GET ALONG WITH OTHER PEOPLE: 0
5. POOR APPETITE OR OVEREATING: 0
SUM OF ALL RESPONSES TO PHQ QUESTIONS 1-9: 0
8. MOVING OR SPEAKING SO SLOWLY THAT OTHER PEOPLE COULD HAVE NOTICED. OR THE OPPOSITE, BEING SO FIGETY OR RESTLESS THAT YOU HAVE BEEN MOVING AROUND A LOT MORE THAN USUAL: 0
4. FEELING TIRED OR HAVING LITTLE ENERGY: 0
2. FEELING DOWN, DEPRESSED OR HOPELESS: 0
SUM OF ALL RESPONSES TO PHQ9 QUESTIONS 1 & 2: 0
1. LITTLE INTEREST OR PLEASURE IN DOING THINGS: 0
SUM OF ALL RESPONSES TO PHQ QUESTIONS 1-9: 0
6. FEELING BAD ABOUT YOURSELF - OR THAT YOU ARE A FAILURE OR HAVE LET YOURSELF OR YOUR FAMILY DOWN: 0
SUM OF ALL RESPONSES TO PHQ QUESTIONS 1-9: 0

## 2024-01-31 ASSESSMENT — LIFESTYLE VARIABLES
HOW OFTEN DO YOU HAVE A DRINK CONTAINING ALCOHOL: MONTHLY OR LESS
HOW MANY STANDARD DRINKS CONTAINING ALCOHOL DO YOU HAVE ON A TYPICAL DAY: 1 OR 2

## 2024-01-31 ASSESSMENT — ENCOUNTER SYMPTOMS
COUGH: 0
SHORTNESS OF BREATH: 0
VOMITING: 0

## 2024-01-31 NOTE — PROGRESS NOTES
capsule 0    ondansetron (ZOFRAN-ODT) 4 MG disintegrating tablet PLACE 1 TABLET ON THE TONGUE AND ALLOW TO DISSOLVE EVERY 8 HOURS AS NEEDED FOR NAUSEA/VOMITING 20 tablet 1    omeprazole (PRILOSEC) 40 MG delayed release capsule Take 1 capsule by mouth daily 30 capsule 3    hyoscyamine (LEVBID) 375 MCG extended release tablet TAKE ONE TABLET BY MOUTH EVERY 12 HOURS AS NEEDED FOR CRAMPING 60 tablet 3    traZODone (DESYREL) 50 MG tablet TAKE ONE TABLET BY MOUTH EVERY DAY NIGHTLY 90 tablet 0    amLODIPine (NORVASC) 5 MG tablet Take 1 tablet by mouth 2 times daily 60 tablet 0    estradiol (VIVELLE) 0.05 MG/24HR Place 1 patch onto the skin once a week 24 patch 3    Handicap Placard MISC by Does not apply route Good from 8/9/21 to 8/9/26 1 each 0     No current facility-administered medications for this visit.     Family History   Problem Relation Age of Onset    Cancer Father         COLON    High Blood Pressure Father     Heart Disease Father     Breast Cancer Neg Hx     Colon Cancer Neg Hx      Past Medical History:   Diagnosis Date    Atrial fibrillation (HCC)     Brain tumor (HCC) 07/16/2018    Chest pain     Degenerative disc disease, lumbar 1/21/2016    Hx of colonic polyps     Hypertension 2/24/2015    Irritable bowel syndrome     Low back pain 10/16/2018    Lumbar radiculopathy 3/14/2016    Rapid heart rate     Stress incontinence      Objective:   /78   Pulse 63   Temp 97.9 °F (36.6 °C)   Ht 1.803 m (5' 11\")   Wt 92.5 kg (204 lb)   LMP  (LMP Unknown)   SpO2 99%   BMI 28.45 kg/m²     Physical Exam  No exam done -reviewed vitals   Assessment:       Diagnosis Orders   1. Pre-diabetes        2. Polycythemia        3. PAF (paroxysmal atrial fibrillation) (HCC)        4. Benign meningioma of brain (HCC)               Plan:    Paf stable -followed by cardiology-continue current care   Meningioma -followed by neurology-continue current care   Patient will work on diet and exercise and weight loss   Orders

## 2024-01-31 NOTE — PROGRESS NOTES
Medicare Annual Wellness Visit    Florina Franklin is here for Medicare AWV    Assessment & Plan     Recommendations for Preventive Services Due: see orders and patient instructions/AVS.  Recommended screening schedule for the next 5-10 years is provided to the patient in written form: see Patient Instructions/AVS.      Diagnosis Orders   1. Medicare annual wellness visit, subsequent        2. Asymptomatic menopausal state  DEXA Bone Density Axial Skeleton      3. Need for prophylactic vaccination against Streptococcus pneumoniae (pneumococcus)  Pneumococcal Polysaccharide PPSV23, (Pneumovax 23)      4. Need for prophylactic vaccination and inoculation against varicella  zoster recombinant adjuvanted vaccine (SHINGRIX) 50 MCG/0.5ML SUSR injection      5. Pre-diabetes        6. PAF (paroxysmal atrial fibrillation) (HCC)        7. Benign meningioma of brain (HCC)        8. Polycythemia        9. Meningioma (HCC)        10. Primary hypertension           Orders Placed This Encounter   Procedures    DEXA Bone Density Axial Skeleton     Standing Status:   Future     Standing Expiration Date:   1/31/2025    Pneumococcal Polysaccharide PPSV23, (Pneumovax 23)    [facility] Referral to [facility] Clinical Specialist     Referral Priority:   Routine     Referral Type:   Other     Referral Reason:   Specialty Services Required     Number of Visits Requested:   1      Orders Placed This Encounter   Medications    zoster recombinant adjuvanted vaccine (SHINGRIX) 50 MCG/0.5ML SUSR injection     Sig: Inject 0.5 mLs into the muscle once for 1 dose     Dispense:  0.5 mL     Refill:  0         Subjective       Patient's complete Health Risk Assessment and screening values have been reviewed and are found in Flowsheets. The following problems were reviewed today and where indicated follow up appointments were made and/or referrals ordered.    Positive Risk Factor Screenings with Interventions:                Activity, Diet, and Weight:  On

## 2024-01-31 NOTE — PATIENT INSTRUCTIONS
professional. [employer] disclaims any warranty or liability for your use of this information.           Starting a Weight Loss Plan: Care Instructions  Overview     If you're thinking about losing weight, it can be hard to know where to start. Your doctor can help you set up a weight loss plan that best meets your needs. You may want to take a class on nutrition or exercise, or you could join a weight loss support group. If you have questions about how to make changes to your eating or exercise habits, ask your doctor about seeing a registered dietitian or an exercise specialist.  It can be a big challenge to lose weight. But you don't have to make huge changes at once. Make small changes, and stick with them. When those changes become habit, add a few more changes.  If you don't think you're ready to make changes right now, try to pick a date in the future. Make an appointment to see your doctor to discuss whether the time is right for you to start a plan.  Follow-up care is a key part of your treatment and safety. Be sure to make and go to all appointments, and call your doctor if you are having problems. It's also a good idea to know your test results and keep a list of the medicines you take.  How can you care for yourself at home?  Set realistic goals. Many people expect to lose much more weight than is likely. A weight loss of 5% to 10% of your body weight may be enough to improve your health.  Get family and friends involved to provide support. Talk to them about why you are trying to lose weight, and ask them to help. They can help by participating in exercise and having meals with you, even if they may be eating something different.  Find what works best for you. If you do not have time or do not like to cook, a program that offers meal replacement bars or shakes may be better for you. Or if you like to prepare meals, finding a plan that includes daily menus and recipes may be best.  Ask your

## 2024-02-01 ENCOUNTER — CLINICAL DOCUMENTATION (OUTPATIENT)
Dept: SPIRITUAL SERVICES | Age: 69
End: 2024-02-01

## 2024-02-01 NOTE — ACP (ADVANCE CARE PLANNING)
Advance Care Planning   Ambulatory ACP Specialist Patient Outreach    Date:  2/1/2024    ACP Specialist:  DANE Blum    Outreach call to patient in follow-up to ACP Specialist referral from:Mian Chin MD    [x] PCP  [x] Provider   [] Ambulatory Care Management [] Other     For:                  [x] Advance Directive Assistance              [] Complete Portable DNR order              [] Complete POST/POLST/MOST              [] Code Status Discussion             [] Discuss Goals of Care             [] Early ACP Decision-Making              [] Other (Specify)    Date Referral Received:1/31/2024    Next Step:   [x] ACP scheduled conversation  [] Outreach again in one week               [] Email / Mail ACP Info Sheets  [] Email / Mail Advance Directive   [] Closing referral.  Routing closure to referring provider/staff and to ACP Specialist .    [] Closure letter mailed to patient with invitation to contact ACP Specialist if / when ready.   [] Other (Specify here):         [x] At this time, Healthcare Decision Maker Is:  Advance Care Planning   Healthcare Decision Maker:    Primary Decision Maker: Barrie Mclain - Spouse - [phone]    Secondary Decision Maker: Ibis Willams - Child - [phone]    Click here to complete Healthcare Decision Makers including selection of the Healthcare Decision Maker Relationship (ie \"Primary\").         [] Primary agent named in scanned advance directive.    [x] Legal Next of Kin.     [] Unable to determine legal decision maker at this time.         Outreaches:       [x] 1st -  Date:  2/1/2024               Intervention:  [x] Spoke with Patient   [] Left Voice mail [] Email / Mail    [] FUELUPhart  [] Other (Specify) :     Outcomes: This Coordinator attempted to reach the pt at the listed home number ([phone]), where the pt was not reached. This Coordinator spoke with the pt at the listed mobile number ([phone]). The pt was offered an ACP

## 2024-02-05 ENCOUNTER — CLINICAL DOCUMENTATION (OUTPATIENT)
Dept: SPIRITUAL SERVICES | Age: 69
End: 2024-02-05

## 2024-02-05 NOTE — ACP (ADVANCE CARE PLANNING)
Advance Care Planning   Ambulatory ACP Specialist Patient Outreach    Date:  2/5/2024    ACP Specialist:  ANDREW Nieto    Outreach call to patient in follow-up to ACP Specialist referral from:Mian Chin MD    [x] PCP  [x] Provider   [] Ambulatory Care Management [] Other     For:                  [x] Advance Directive Assistance              [] Complete Portable DNR order              [] Complete POST/POLST/MOST              [] Code Status Discussion             [] Discuss Goals of Care             [] Early ACP Decision-Making              [] Other (Specify)    Date Referral Received: 01/31/2024    Next Step:   [] ACP scheduled conversation  [x] Outreach again in one week               [] Email / Mail ACP Info Sheets  [] Email / Mail Advance Directive   [] Closing referral.  Routing closure to referring provider/staff and to ACP Specialist .    [] Closure letter mailed to patient with invitation to contact ACP Specialist if / when ready.   [] Other (Specify here):         [x] At this time, Healthcare Decision Maker Is:        [] Primary agent named in scanned advance directive.    [x] Legal Next of Kin.     [] Unable to determine legal decision maker at this time.         Outreaches:       [x] 1st -  Date:   02/05/2024               Intervention:  [] Spoke with Patient   [x] Left Voice mail [] Email / Mail    [] [place]  [] Other (Specify) :     Outcomes: ACP specialist made pt's scheduled ACP conversation appt phone call to pt's number listed as [phone], per pt request. Pt did not answer this call, which resulted in a VM left encouraging this pt to return this call if she would liek to reschedule this appt. If no return call is rcvd, a f.u call will be made in 1 week.            [] 2nd -  Date:                 Intervention:  [] Spoke with Patient  [] Left Voice mail [] Email / Mail    [] [place]  [] Other (Specify) :              Outcomes:                [] 3rd -  Date:

## 2024-02-19 RX ORDER — ESTRADIOL 0.05 MG/D
1 PATCH, EXTENDED RELEASE TRANSDERMAL WEEKLY
Qty: 24 PATCH | Refills: 3 | Status: SHIPPED | OUTPATIENT
Start: 2024-02-19

## 2024-02-20 ENCOUNTER — APPOINTMENT (OUTPATIENT)
Dept: CT IMAGING | Age: 69
End: 2024-02-20
Payer: MEDICARE

## 2024-02-20 ENCOUNTER — HOSPITAL ENCOUNTER (EMERGENCY)
Age: 69
Discharge: HOME | End: 2024-02-20
Attending: EMERGENCY MEDICINE
Payer: MEDICARE

## 2024-02-20 VITALS
SYSTOLIC BLOOD PRESSURE: 129 MMHG | DIASTOLIC BLOOD PRESSURE: 56 MMHG | WEIGHT: 204 LBS | RESPIRATION RATE: 16 BRPM | TEMPERATURE: 98.8 F | HEART RATE: 59 BPM | BODY MASS INDEX: 33.99 KG/M2 | HEIGHT: 65 IN | OXYGEN SATURATION: 96 %

## 2024-02-20 DIAGNOSIS — R10.9 FLANK PAIN: Primary | ICD-10-CM

## 2024-02-20 LAB
ALBUMIN SERPL-MCNC: 4 G/DL (ref 3.5–4.6)
ALP SERPL-CCNC: 112 U/L (ref 40–130)
ALT SERPL-CCNC: 13 U/L (ref 0–33)
ANION GAP SERPL CALCULATED.3IONS-SCNC: 12 MEQ/L (ref 9–15)
AST SERPL-CCNC: 20 U/L (ref 0–35)
BASOPHILS # BLD: 0.1 K/UL (ref 0–0.2)
BASOPHILS NFR BLD: 0.9 %
BILIRUB SERPL-MCNC: 0.5 MG/DL (ref 0.2–0.7)
BILIRUB UR QL STRIP: NEGATIVE
BUN SERPL-MCNC: 10 MG/DL (ref 8–23)
CALCIUM SERPL-MCNC: 9.4 MG/DL (ref 8.5–9.9)
CHLORIDE SERPL-SCNC: 104 MEQ/L (ref 95–107)
CLARITY UR: CLEAR
CO2 SERPL-SCNC: 23 MEQ/L (ref 20–31)
COLOR UR: ABNORMAL
CREAT SERPL-MCNC: 0.49 MG/DL (ref 0.5–0.9)
EOSINOPHIL # BLD: 0.1 K/UL (ref 0–0.7)
EOSINOPHIL NFR BLD: 1.8 %
ERYTHROCYTE [DISTWIDTH] IN BLOOD BY AUTOMATED COUNT: 13.6 % (ref 11.5–14.5)
GLOBULIN SER CALC-MCNC: 3.1 G/DL (ref 2.3–3.5)
GLUCOSE SERPL-MCNC: 104 MG/DL (ref 70–99)
GLUCOSE UR STRIP-MCNC: NEGATIVE MG/DL
HCT VFR BLD AUTO: 50.7 % (ref 37–47)
HGB BLD-MCNC: 17 G/DL (ref 12–16)
HGB UR QL STRIP: NEGATIVE
KETONES UR STRIP-MCNC: ABNORMAL MG/DL
LEUKOCYTE ESTERASE UR QL STRIP: NEGATIVE
LIPASE SERPL-CCNC: 19 U/L (ref 12–95)
LYMPHOCYTES # BLD: 2.2 K/UL (ref 1–4.8)
LYMPHOCYTES NFR BLD: 28.5 %
MCH RBC QN AUTO: 29.9 PG (ref 27–31.3)
MCHC RBC AUTO-ENTMCNC: 33.5 % (ref 33–37)
MCV RBC AUTO: 89.3 FL (ref 79.4–94.8)
MONOCYTES # BLD: 0.6 K/UL (ref 0.2–0.8)
MONOCYTES NFR BLD: 7.8 %
NEUTROPHILS # BLD: 4.7 K/UL (ref 1.4–6.5)
NEUTS SEG NFR BLD: 60.7 %
NITRITE UR QL STRIP: NEGATIVE
PH UR STRIP: 5.5 [PH] (ref 5–9)
PLATELET # BLD AUTO: 273 K/UL (ref 130–400)
POTASSIUM SERPL-SCNC: 4.1 MEQ/L (ref 3.4–4.9)
PROT SERPL-MCNC: 7.1 G/DL (ref 6.3–8)
PROT UR STRIP-MCNC: ABNORMAL MG/DL
RBC # BLD AUTO: 5.68 M/UL (ref 4.2–5.4)
SODIUM SERPL-SCNC: 139 MEQ/L (ref 135–144)
SP GR UR STRIP: 1.03 (ref 1–1.03)
UROBILINOGEN UR STRIP-ACNC: 1 E.U./DL
WBC # BLD AUTO: 7.7 K/UL (ref 4.8–10.8)

## 2024-02-20 PROCEDURE — 83690 ASSAY OF LIPASE: CPT

## 2024-02-20 PROCEDURE — 36415 COLL VENOUS BLD VENIPUNCTURE: CPT

## 2024-02-20 PROCEDURE — 80053 COMPREHEN METABOLIC PANEL: CPT

## 2024-02-20 PROCEDURE — 85025 COMPLETE CBC W/AUTO DIFF WBC: CPT

## 2024-02-20 PROCEDURE — 99284 EMERGENCY DEPT VISIT MOD MDM: CPT

## 2024-02-20 PROCEDURE — 96374 THER/PROPH/DIAG INJ IV PUSH: CPT

## 2024-02-20 PROCEDURE — 6360000002 HC RX W HCPCS: Performed by: EMERGENCY MEDICINE

## 2024-02-20 PROCEDURE — 81003 URINALYSIS AUTO W/O SCOPE: CPT

## 2024-02-20 PROCEDURE — 74176 CT ABD & PELVIS W/O CONTRAST: CPT

## 2024-02-20 PROCEDURE — 2580000003 HC RX 258: Performed by: EMERGENCY MEDICINE

## 2024-02-20 PROCEDURE — 96375 TX/PRO/DX INJ NEW DRUG ADDON: CPT

## 2024-02-20 RX ORDER — 0.9 % SODIUM CHLORIDE 0.9 %
1000 INTRAVENOUS SOLUTION INTRAVENOUS ONCE
Status: COMPLETED | OUTPATIENT
Start: 2024-02-20 | End: 2024-02-20

## 2024-02-20 RX ORDER — ONDANSETRON 2 MG/ML
8 INJECTION INTRAMUSCULAR; INTRAVENOUS ONCE
Status: COMPLETED | OUTPATIENT
Start: 2024-02-20 | End: 2024-02-20

## 2024-02-20 RX ORDER — HYDROMORPHONE HYDROCHLORIDE 1 MG/ML
1 INJECTION, SOLUTION INTRAMUSCULAR; INTRAVENOUS; SUBCUTANEOUS
Status: COMPLETED | OUTPATIENT
Start: 2024-02-20 | End: 2024-02-20

## 2024-02-20 RX ORDER — ONDANSETRON 4 MG/1
4 TABLET, ORALLY DISINTEGRATING ORAL EVERY 4 HOURS PRN
Qty: 12 TABLET | Refills: 0 | Status: SHIPPED | OUTPATIENT
Start: 2024-02-20

## 2024-02-20 RX ORDER — OXYCODONE HYDROCHLORIDE AND ACETAMINOPHEN 5; 325 MG/1; MG/1
1 TABLET ORAL EVERY 4 HOURS PRN
Qty: 12 TABLET | Refills: 0 | Status: SHIPPED | OUTPATIENT
Start: 2024-02-20 | End: 2024-02-22

## 2024-02-20 RX ADMIN — HYDROMORPHONE HYDROCHLORIDE 1 MG: 1 INJECTION, SOLUTION INTRAMUSCULAR; INTRAVENOUS; SUBCUTANEOUS at 19:13

## 2024-02-20 RX ADMIN — ONDANSETRON 8 MG: 2 INJECTION INTRAMUSCULAR; INTRAVENOUS at 19:10

## 2024-02-20 RX ADMIN — SODIUM CHLORIDE 1000 ML: 9 INJECTION, SOLUTION INTRAVENOUS at 19:08

## 2024-02-20 ASSESSMENT — PAIN SCALES - GENERAL
PAINLEVEL_OUTOF10: 10
PAINLEVEL_OUTOF10: 10

## 2024-02-20 ASSESSMENT — PAIN DESCRIPTION - LOCATION
LOCATION: FLANK
LOCATION: ABDOMEN;FLANK

## 2024-02-20 ASSESSMENT — PAIN - FUNCTIONAL ASSESSMENT: PAIN_FUNCTIONAL_ASSESSMENT: 0-10

## 2024-02-20 ASSESSMENT — PAIN DESCRIPTION - ORIENTATION
ORIENTATION: RIGHT
ORIENTATION: RIGHT

## 2024-02-20 ASSESSMENT — PAIN DESCRIPTION - PAIN TYPE: TYPE: ACUTE PAIN

## 2024-02-20 ASSESSMENT — PAIN DESCRIPTION - DESCRIPTORS: DESCRIPTORS: SHARP

## 2024-02-20 ASSESSMENT — PAIN DESCRIPTION - ONSET: ONSET: ON-GOING

## 2024-02-20 NOTE — ED TRIAGE NOTES
Pt c/o flank and abd pain that started this morning. Pt states she had blood in her urine two days ago. Pt is unable to sit down in triage.

## 2024-02-21 NOTE — ED PROVIDER NOTES
the CT scan was negative for any acute finding.  My interpretation of the blood work was also unremarkable.  Patient did receive pain medication here in the emergency department but was here for at least 3 to 4 hours and the pain medication did wear off with the patient had complete resolution of all of her pain and did not come back.  Reevaluation of the abdomen pelvis revealed no pain to palpation no guarding or rebound tenderness.  I discussed all the findings with the patient and we are all in agreement with the outpatient management and treatment plan.    Patient advised to return for any worsening condition.  Patient stated she would    CRITICAL CARE TIME   Total Critical Care time was  minutes, excluding separately reportableprocedures.  There was a high probability of clinicallysignificant/life threatening deterioration in the patient's condition which required my urgent intervention.      CONSULTS:  None    PROCEDURES:  Unless otherwise noted below, none     Procedures    FINAL IMPRESSION      1. Flank pain          DISPOSITION/PLAN   DISPOSITION Decision To Discharge 02/20/2024 09:30:15 PM      PATIENT REFERRED TO:  Mian Chin MD  [facility] [id number]  [phone]            DISCHARGE MEDICATIONS:  New Prescriptions    ONDANSETRON (ZOFRAN-ODT) 4 MG DISINTEGRATING TABLET    Take 1 tablet by mouth every 4 hours as needed for Nausea or Vomiting    OXYCODONE-ACETAMINOPHEN (PERCOCET) 5-325 MG PER TABLET    Take 1 tablet by mouth every 4 hours as needed for Pain for up to 2 days. Intended supply: 3 days. Take lowest dose possible to manage pain Max Daily Amount: 6 tablets          (Please note that portions of this note were completed with a voice recognition program.  Efforts were made to edit the dictations but occasionally words are mis-transcribed.)    Alexsandra Joya MD (electronically signed)  Attending Emergency Physician         Alexsandra Joya MD  02/20/24 [id number]

## 2024-02-23 ENCOUNTER — OFFICE VISIT (OUTPATIENT)
Dept: FAMILY MEDICINE CLINIC | Age: 69
End: 2024-02-23

## 2024-02-23 ENCOUNTER — TELEPHONE (OUTPATIENT)
Dept: FAMILY MEDICINE CLINIC | Age: 69
End: 2024-02-23

## 2024-02-23 VITALS
HEIGHT: 65 IN | DIASTOLIC BLOOD PRESSURE: 82 MMHG | WEIGHT: 204 LBS | BODY MASS INDEX: 33.99 KG/M2 | SYSTOLIC BLOOD PRESSURE: 120 MMHG | TEMPERATURE: 97.8 F

## 2024-02-23 DIAGNOSIS — R31.0 GROSS HEMATURIA: Primary | ICD-10-CM

## 2024-02-23 DIAGNOSIS — R07.9 CHEST PAIN, UNSPECIFIED TYPE: ICD-10-CM

## 2024-02-23 DIAGNOSIS — R10.9 FLANK PAIN: ICD-10-CM

## 2024-02-23 ASSESSMENT — ENCOUNTER SYMPTOMS
NAUSEA: 1
DIARRHEA: 1

## 2024-02-23 NOTE — TELEPHONE ENCOUNTER
Patient was just in hospital Monday for flank pain.  Still having issues and would like to see Dr. Chin today.    First available is 2/26    Patient would like called if she can somehow get in today  [phone]. She is refusing to come into [facility].      LOV 1/31/24

## 2024-02-23 NOTE — PROGRESS NOTES
Subjective:      Patient ID: Florina Franklin is a 69 y.o. female    HPI  Here in follow up from recent er visit for gross hematuria followed by right flank pain.  No dysuria. Pain is much better since er visit but not resolved.  No fever or chills.  No bm last 24 hours.  No further problems with hematuria      Review of Systems   Constitutional:  Negative for chills and fever.   Gastrointestinal:  Positive for diarrhea and nausea.   Skin:  Negative for rash.   Neurological:  Negative for weakness.     Reviewed allergy, medical, social, surgical, family and med list changes and updated   Files     Social History     Socioeconomic History    Marital status:    Tobacco Use    Smoking status: Never    Smokeless tobacco: Never   Vaping Use    Vaping Use: Never used   Substance and Sexual Activity    Alcohol use: Not Currently     Alcohol/week: 0.0 standard drinks of alcohol     Comment: socially    Drug use: No    Sexual activity: Not Currently     Social Determinants of Health     Financial Resource Strain: Low Risk  (9/7/2023)    Overall Financial Resource Strain (CARDIA)     Difficulty of Paying Living Expenses: Not hard at all   Transportation Needs: Unknown (9/7/2023)    PRAPARE - Transportation     Lack of Transportation (Non-Medical): No   Physical Activity: Insufficiently Active (1/31/2024)    Exercise Vital Sign     Days of Exercise per Week: 2 days     Minutes of Exercise per Session: 20 min   Housing Stability: Unknown (9/7/2023)    Housing Stability Vital Sign     Unstable Housing in the Last Year: No     Current Outpatient Medications   Medication Sig Dispense Refill    ondansetron (ZOFRAN-ODT) 4 MG disintegrating tablet Take 1 tablet by mouth every 4 hours as needed for Nausea or Vomiting 12 tablet 0    estradiol (VIVELLE) 0.05 MG/24HR Place 1 patch onto the skin once a week 24 patch 3    fluticasone (FLONASE) 50 MCG/ACT nasal spray 2 sprays by Each Nostril route daily 16 g 0    metoprolol

## 2024-02-27 ENCOUNTER — OFFICE VISIT (OUTPATIENT)
Dept: GASTROENTEROLOGY | Age: 69
End: 2024-02-27
Payer: MEDICARE

## 2024-02-27 VITALS — WEIGHT: 204 LBS | OXYGEN SATURATION: 92 % | HEIGHT: 65 IN | BODY MASS INDEX: 33.99 KG/M2 | HEART RATE: 61 BPM

## 2024-02-27 DIAGNOSIS — R10.31 BILATERAL LOWER ABDOMINAL PAIN: ICD-10-CM

## 2024-02-27 DIAGNOSIS — K58.0 IRRITABLE BOWEL SYNDROME WITH DIARRHEA: Primary | ICD-10-CM

## 2024-02-27 DIAGNOSIS — R10.32 BILATERAL LOWER ABDOMINAL PAIN: ICD-10-CM

## 2024-02-27 DIAGNOSIS — R11.0 NAUSEA: ICD-10-CM

## 2024-02-27 PROCEDURE — 99213 OFFICE O/P EST LOW 20 MIN: CPT | Performed by: NURSE PRACTITIONER

## 2024-02-27 PROCEDURE — 1123F ACP DISCUSS/DSCN MKR DOCD: CPT | Performed by: NURSE PRACTITIONER

## 2024-02-27 RX ORDER — LOPERAMIDE HYDROCHLORIDE 2 MG/1
2 CAPSULE ORAL PRN
COMMUNITY
Start: 2023-04-04 | End: 2024-02-27 | Stop reason: SDUPTHER

## 2024-02-27 RX ORDER — HYOSCYAMINE SULFATE 0.38 MG/1
TABLET, EXTENDED RELEASE ORAL
Qty: 180 TABLET | Refills: 3 | Status: SHIPPED | OUTPATIENT
Start: 2024-02-27

## 2024-02-27 RX ORDER — LOPERAMIDE HYDROCHLORIDE 2 MG/1
2 CAPSULE ORAL 2 TIMES DAILY PRN
Qty: 180 CAPSULE | Refills: 0 | Status: SHIPPED | OUTPATIENT
Start: 2024-02-27 | End: 2024-05-27

## 2024-02-27 NOTE — PROGRESS NOTES
work up/Endoscopic investigations:  EGD 4/11/2023, Yadira Craig Z-line irregular, 38 cm from the incisors.   A few gastric polyps.  No specimens collected. Normal examined duodenum.    Colonoscopy 4/3/19- Normal colonic mucosa throughout, small internal hemorrhoids     EGD 2013- nothing pertinent per pt    Review of Systems     Past medical history, past surgical history, medication list, social and familyhistory reviewed    Pulse 61, height 1.651 m (5' 5\"), weight 92.5 kg (204 lb), SpO2 92 %, not currently breastfeeding.    Physical Exam    Laboratory, Pathology, Radiology reviewed in detail with relevantimportant investigations summarized below:    Recent Labs     02/20/24  1845   WBC 7.7   HGB 17.0*   HCT 50.7*   MCV 89.3         Lab Results   Component Value Date    ALT 13 02/20/2024    AST 20 02/20/2024    ALKPHOS 112 02/20/2024    BILITOT 0.5 02/20/2024        CT ABDOMEN PELVIS WO CONTRAST Additional Contrast? None    Result Date: 2/20/2024  EXAMINATION: CT OF THE ABDOMEN AND PELVIS WITHOUT CONTRAST 2/20/2024 7:22 pm TECHNIQUE: CT of the abdomen and pelvis was performed without the administration of intravenous contrast. Multiplanar reformatted images are provided for review. Automated exposure control, iterative reconstruction, and/or weight based adjustment of the mA/kV was utilized to reduce the radiation dose to as low as reasonably achievable. COMPARISON: December 27, 2022 HISTORY: ORDERING SYSTEM PROVIDED HISTORY: right upper abdominal pain TECHNOLOGIST PROVIDED HISTORY: Reason for exam:->right upper abdominal pain Additional Contrast?->None Decision Support Exception - unselect if not a suspected or confirmed emergency medical condition->Emergency Medical Condition (MA) What reading provider will be dictating this exam?->CRC FINDINGS: No bowel obstruction, free air, or free fluid.  The appendix is not definitively visualized, however no focal inflammatory changes in its expected location.

## 2024-03-08 ENCOUNTER — HOSPITAL ENCOUNTER (OUTPATIENT)
Dept: CT IMAGING | Age: 69
End: 2024-03-08
Payer: MEDICARE

## 2024-03-08 DIAGNOSIS — R07.9 CHEST PAIN, UNSPECIFIED TYPE: ICD-10-CM

## 2024-03-08 PROCEDURE — 71260 CT THORAX DX C+: CPT

## 2024-03-08 PROCEDURE — 6360000004 HC RX CONTRAST MEDICATION: Performed by: FAMILY MEDICINE

## 2024-03-08 RX ADMIN — IOPAMIDOL 75 ML: 755 INJECTION, SOLUTION INTRAVENOUS at 14:59

## 2024-03-19 ENCOUNTER — HOSPITAL ENCOUNTER (OUTPATIENT)
Dept: RADIOLOGY | Facility: CLINIC | Age: 69
Discharge: HOME | End: 2024-03-19
Payer: MEDICARE

## 2024-03-19 DIAGNOSIS — D32.0 CEREBRAL MENINGIOMA (MULTI): ICD-10-CM

## 2024-03-19 PROCEDURE — 70553 MRI BRAIN STEM W/O & W/DYE: CPT

## 2024-03-19 PROCEDURE — 70553 MRI BRAIN STEM W/O & W/DYE: CPT | Performed by: RADIOLOGY

## 2024-03-19 PROCEDURE — 2550000001 HC RX 255 CONTRASTS: Performed by: NEUROLOGICAL SURGERY

## 2024-03-19 PROCEDURE — A9575 INJ GADOTERATE MEGLUMI 0.1ML: HCPCS | Performed by: NEUROLOGICAL SURGERY

## 2024-03-19 RX ORDER — GADOTERATE MEGLUMINE 376.9 MG/ML
19 INJECTION INTRAVENOUS
Status: COMPLETED | OUTPATIENT
Start: 2024-03-19 | End: 2024-03-19

## 2024-03-19 RX ADMIN — GADOTERATE MEGLUMINE 19 ML: 376.9 INJECTION INTRAVENOUS at 15:53

## 2024-03-20 ENCOUNTER — HOSPITAL ENCOUNTER (OUTPATIENT)
Dept: WOMENS IMAGING | Age: 69
Discharge: HOME | End: 2024-03-22
Payer: MEDICARE

## 2024-03-20 DIAGNOSIS — Z78.0 ASYMPTOMATIC MENOPAUSAL STATE: ICD-10-CM

## 2024-03-20 PROCEDURE — 77080 DXA BONE DENSITY AXIAL: CPT

## 2024-03-20 NOTE — RESULT ENCOUNTER NOTE
I spoke with the patient over the phone and informed the patient of the result.  Recommend MRI with and w/o ISRAEL in 2 years, meningioma

## 2024-03-21 ENCOUNTER — OFFICE VISIT (OUTPATIENT)
Dept: CARDIOLOGY CLINIC | Age: 69
End: 2024-03-21
Payer: MEDICARE

## 2024-03-21 VITALS
OXYGEN SATURATION: 95 % | SYSTOLIC BLOOD PRESSURE: 132 MMHG | WEIGHT: 205 LBS | BODY MASS INDEX: 34.11 KG/M2 | HEART RATE: 60 BPM | DIASTOLIC BLOOD PRESSURE: 84 MMHG

## 2024-03-21 DIAGNOSIS — I45.10 RIGHT BUNDLE BRANCH BLOCK (RBBB) ON ELECTROCARDIOGRAPHY: ICD-10-CM

## 2024-03-21 DIAGNOSIS — I10 PRIMARY HYPERTENSION: ICD-10-CM

## 2024-03-21 DIAGNOSIS — Q26.8 PULMONARY VEIN STENOSIS: ICD-10-CM

## 2024-03-21 DIAGNOSIS — I10 ESSENTIAL HYPERTENSION: ICD-10-CM

## 2024-03-21 DIAGNOSIS — I48.0 PAF (PAROXYSMAL ATRIAL FIBRILLATION) (HCC): Primary | ICD-10-CM

## 2024-03-21 PROCEDURE — 3075F SYST BP GE 130 - 139MM HG: CPT | Performed by: INTERNAL MEDICINE

## 2024-03-21 PROCEDURE — 99213 OFFICE O/P EST LOW 20 MIN: CPT | Performed by: INTERNAL MEDICINE

## 2024-03-21 PROCEDURE — 1123F ACP DISCUSS/DSCN MKR DOCD: CPT | Performed by: INTERNAL MEDICINE

## 2024-03-21 PROCEDURE — 3079F DIAST BP 80-89 MM HG: CPT | Performed by: INTERNAL MEDICINE

## 2024-03-21 PROCEDURE — 93000 ELECTROCARDIOGRAM COMPLETE: CPT | Performed by: INTERNAL MEDICINE

## 2024-03-21 RX ORDER — AMLODIPINE BESYLATE 5 MG/1
5 TABLET ORAL 2 TIMES DAILY
Qty: 180 TABLET | Refills: 3 | Status: SHIPPED | OUTPATIENT
Start: 2024-03-21

## 2024-03-21 RX ORDER — METOPROLOL SUCCINATE 100 MG/1
TABLET, EXTENDED RELEASE ORAL
Qty: 90 TABLET | Refills: 3 | Status: SHIPPED | OUTPATIENT
Start: 2024-03-21

## 2024-03-21 NOTE — PROGRESS NOTES
management and the NCEP ATP III guidelines for LDL-C management.    Cardiac diet is always recommended with low fat, cholesterol, calories and sodium.    Continue medications at current doses.     Check EKG    Patient status post implantable loop recorder.  If she has significant A-fib burden she will need reevaluation for Watchman device again.  At this time they advised conservative medical therapy and loop recorder    Consider antiarrhythmic drug loading.    Patient was advised and encouraged to check blood pressure at home or at a pharmacy, maintain a logbook, and also call us back if blood pressure are above the target ranges or if it is low. Patient clearly understands and agrees to the instructions.     We will need to continue to monitor muscle and liver enzymes, BUN, CR, and electrolytes.    Patient is to avoid any excessive caffeine, chocolate, or OTC stimulants.

## 2024-04-19 ENCOUNTER — APPOINTMENT (OUTPATIENT)
Dept: RADIOLOGY | Facility: CLINIC | Age: 69
End: 2024-04-19
Payer: MEDICARE

## 2024-04-23 ENCOUNTER — TELEPHONE (OUTPATIENT)
Dept: OBGYN CLINIC | Age: 69
End: 2024-04-23

## 2024-04-23 RX ORDER — VENLAFAXINE HYDROCHLORIDE 75 MG/1
CAPSULE, EXTENDED RELEASE ORAL
Qty: 90 CAPSULE | Refills: 3 | Status: SHIPPED | OUTPATIENT
Start: 2024-04-23

## 2024-05-07 NOTE — PROGRESS NOTES
Problem: Respiratory  Goal: Patent airway maintained this shift  Outcome: Progressing  Goal: Verbalize decreased shortness of breath this shift  Outcome: Progressing  Goal: Wean oxygen to maintain O2 saturation per order/standard this shift  Outcome: Progressing      time.    Review of Systems   Constitutional: Negative for chills, fatigue, fever and unexpected weight change. HENT: Negative for congestion, rhinorrhea and sore throat. Eyes: Negative for discharge. Respiratory: Negative for cough, shortness of breath and wheezing. Cardiovascular: Negative for chest pain, palpitations and leg swelling. Gastrointestinal: Positive for diarrhea and nausea. Negative for abdominal pain and vomiting. Genitourinary: Negative for difficulty urinating. Musculoskeletal: Negative for arthralgias and joint swelling. Skin: Negative for rash. Neurological: Positive for dizziness. Negative for weakness, light-headedness, numbness and headaches. Psychiatric/Behavioral: Negative for confusion and suicidal ideas. The patient is not nervous/anxious. Past Medical History:   Diagnosis Date    Atrial [facility] ([facility] [address]     Brain tumor ([facility] [address] 07/16/2018    Chest pain     Degenerative disc disease, lumbar 1/21/2016    Hx of colonic polyps     Hypertension 2/24/2015    Irritable bowel syndrome     Low back pain 10/16/2018    Lumbar radiculopathy 3/14/2016    Rapid heart rate     Stress incontinence      Past Surgical History:   Procedure Laterality Date    APPENDECTOMY      BACK SURGERY  07/14/2017    CARDIAC CATHETERIZATION  11/2/15    DR. PALMER    HYSTERECTOMY      VT CRANIECT EXCIS SKULL BONE LESN N/A 7/24/2018    CRANIOTOMY performed by Emi Moody MD at [address] [place] [address] TOTAL KNEE ARTHROPLASTY  2009    UPPER GASTROINTESTINAL ENDOSCOPY  1/15/13    DR. MEEKS     Current Outpatient Medications   Medication Sig Dispense Refill    levocetirizine (XYZAL ALLERGY 24HR) 5 MG tablet Take 5 mg by mouth nightly      ondansetron (ZOFRAN-ODT) 4 MG disintegrating tablet DISSOLVE ONE TABLET BY MOUTH EVERY 8 HOURS AS NEEDED FOR NAUSEA/ VOMITING 20 tablet 1    meclizine (ANTIVERT) 25 MG tablet Take 1 tablet by mouth 3 times daily as needed for Dizziness 42 tablet 1    metoprolol succinate (TOPROL XL) 100 MG extended release tablet TAKE 1 TABLET DAILY 30 tablet 0    venlafaxine (EFFEXOR XR) 75 MG extended release capsule TAKE ONE CAPSULE BY MOUTH EVERY DAY 90 capsule 3    estradiol (VIVELLE) 0.05 MG/24HR Place 1 patch onto the skin once a week 24 patch 3    LEVBID 0.375 MG extended release tablet TAKE 1 TABLET EVERY 12 HOURS AS NEEDED FOR CRAMPING 180 tablet 3    simethicone (MYLICON) 80 MG chewable tablet Take 1 tablet by mouth 4 times daily as needed for Flatulence 180 tablet 3    Handicap Placard MISC by Does not apply route Good from 8/9/21 to 8/9/26 1 each 0    traZODone (DESYREL) 50 MG tablet Take 1 tablet by mouth nightly 90 tablet 3    lactobacillus (CULTURELLE) capsule Take 1 capsule by mouth daily 30 capsule 1    fluticasone (FLONASE) 50 MCG/ACT nasal spray 2 sprays by Nasal route daily 1 Bottle 3    cetirizine-psuedoephedrine (ZYRTEC-D) 5-120 MG per extended release tablet Take by mouth (Patient not taking: Reported on 4/13/2022)       No current facility-administered medications for this visit. PMH, Surgical Hx, Family Hx, and Social Hx reviewed and updated. Health Maintenance reviewed. Objective    Vitals:    04/13/22 1420   BP: 120/78   Pulse: 80   Temp: 96.5 °F (35.8 °C)   SpO2: 98%   Weight: 204 lb 6.4 oz (92.7 kg)   Height: 5' 5\" (1.651 m)       Physical Exam  Vitals reviewed. Constitutional:       General: She is not in acute distress. Appearance: Normal appearance. HENT:      Head: Normocephalic and atraumatic. Right Ear: Tympanic membrane, ear canal and external ear normal.      Left Ear: Tympanic membrane, ear canal and external ear normal.      Nose: Nose normal. No congestion or rhinorrhea. Comments: Nonedematous nasal turbinates     Mouth/Throat:      Mouth: Mucous membranes are moist.      Pharynx: Oropharynx is clear. No oropharyngeal exudate or posterior oropharyngeal erythema. Eyes:      Extraocular Movements: Extraocular movements intact. Conjunctiva/sclera: Conjunctivae normal.      Pupils: Pupils are equal, round, and reactive to light. Neck:      Thyroid: No thyroid mass, thyromegaly or thyroid tenderness. Cardiovascular:      Rate and Rhythm: Normal rate and regular rhythm. Pulses: Normal pulses. Heart sounds: No murmur heard. No friction rub. No gallop. Pulmonary:      Effort: Pulmonary effort is normal.      Breath sounds: Normal breath sounds. No wheezing, rhonchi or rales. Musculoskeletal:         General: No swelling, tenderness or deformity. Cervical back: Normal, normal range of motion and neck supple. No tenderness. Right lower leg: No edema. Left lower leg: No edema. Lymphadenopathy:      Cervical: No cervical adenopathy. Skin:     General: Skin is warm and dry. Findings: No rash. Neurological:      General: No focal deficit present. Mental Status: She is alert. Cranial Nerves: No cranial nerve deficit. Sensory: No sensory deficit. Motor: No weakness. Gait: Gait is intact. Deep Tendon Reflexes: Reflexes are normal and symmetric. Comments: Paulette hallpike negative    Psychiatric:         Mood and Affect: Mood normal.         Behavior: Behavior normal. Behavior is cooperative. Assessment & Plan     1. Nausea  Patient with nausea with history of IBS with diarrhea. Zofran refilled. Recommend eating small frequent meals that are not too heavy. Continue supportive care. If symptoms worsen or change, return to care sooner. Patient voiced understanding. All questions answered. Follow-up as scheduled  - ondansetron (ZOFRAN-ODT) 4 MG disintegrating tablet; DISSOLVE ONE TABLET BY MOUTH EVERY 8 HOURS AS NEEDED FOR NAUSEA/ VOMITING  Dispense: 20 tablet; Refill: 1    2.  Irritable bowel syndrome with diarrhea  As above  - ondansetron (ZOFRAN-ODT) 4 MG disintegrating tablet; DISSOLVE ONE TABLET BY MOUTH EVERY 8 HOURS AS NEEDED FOR NAUSEA/ VOMITING  Dispense: 20 tablet; Refill: 1    3. Vertigo  Patient with likely vertigo. Vital signs stable. Paulette-Hallpike negative. I do suspect there is likely a vertigo component to it. We discussed Epley maneuvers. These were printed for her to do. We will also start meclizine as this is worked well for her in the past.  She is instructed if the dizziness and vertigo sensation is not improving that she should return to care immediately. If it becomes way worse, seek care in emergency room right away. She voiced understanding. All questions answered. Follow-up as scheduled. - meclizine (ANTIVERT) 25 MG tablet; Take 1 tablet by mouth 3 times daily as needed for Dizziness  Dispense: 42 tablet; Refill: 1    No orders of the defined types were placed in this encounter. Orders Placed This Encounter   Medications    ondansetron (ZOFRAN-ODT) 4 MG disintegrating tablet     Sig: DISSOLVE ONE TABLET BY MOUTH EVERY 8 HOURS AS NEEDED FOR NAUSEA/ VOMITING     Dispense:  20 tablet     Refill:  1    meclizine (ANTIVERT) 25 MG tablet     Sig: Take 1 tablet by mouth 3 times daily as needed for Dizziness     Dispense:  42 tablet     Refill:  1     Medications Discontinued During This Encounter   Medication Reason    ondansetron (ZOFRAN) 4 MG tablet Therapy completed    ondansetron (ZOFRAN-ODT) 4 MG disintegrating tablet Therapy completed    psyllium (METAMUCIL SMOOTH TEXTURE) 58.6 % powder Therapy completed    naproxen (NAPROSYN) 500 MG tablet Therapy completed     Return if symptoms worsen or fail to improve. Reviewed with the patient: current clinical status,medications, activities and diet. Side effects, adverse effects of themedication prescribed today, as well as treatment plan/ rationale and result expectations have been discussed with the patient who expresses understanding and desires to proceed.     Close follow up to evaluate treatment results and for coordination of care. I have reviewed the patient's medical history in detail and updated the computerized patient record. Please note, this report has been partially produced using speech recognition software and may cause  and /or contain errors related to that system including grammar, punctuation and spelling as well as words and phrases that may seem inappropriate. If there are questions or concerns please feel free to contact me to clarify.     Kayla Collier, DO

## 2024-05-14 DIAGNOSIS — K58.0 IRRITABLE BOWEL SYNDROME WITH DIARRHEA: ICD-10-CM

## 2024-05-18 ENCOUNTER — APPOINTMENT (OUTPATIENT)
Dept: RADIOLOGY | Facility: HOSPITAL | Age: 69
End: 2024-05-18
Payer: MEDICARE

## 2024-05-18 ENCOUNTER — HOSPITAL ENCOUNTER (EMERGENCY)
Facility: HOSPITAL | Age: 69
Discharge: HOME | End: 2024-05-18
Payer: MEDICARE

## 2024-05-18 VITALS
OXYGEN SATURATION: 97 % | HEART RATE: 79 BPM | WEIGHT: 200 LBS | RESPIRATION RATE: 17 BRPM | BODY MASS INDEX: 33.32 KG/M2 | HEIGHT: 65 IN | SYSTOLIC BLOOD PRESSURE: 142 MMHG | TEMPERATURE: 96.8 F | DIASTOLIC BLOOD PRESSURE: 69 MMHG

## 2024-05-18 DIAGNOSIS — M54.41 ACUTE RIGHT-SIDED LOW BACK PAIN WITH RIGHT-SIDED SCIATICA: Primary | ICD-10-CM

## 2024-05-18 PROCEDURE — 2500000005 HC RX 250 GENERAL PHARMACY W/O HCPCS: Performed by: PHYSICIAN ASSISTANT

## 2024-05-18 PROCEDURE — 72131 CT LUMBAR SPINE W/O DYE: CPT | Performed by: RADIOLOGY

## 2024-05-18 PROCEDURE — 2500000004 HC RX 250 GENERAL PHARMACY W/ HCPCS (ALT 636 FOR OP/ED): Performed by: PHYSICIAN ASSISTANT

## 2024-05-18 PROCEDURE — 2500000006 HC RX 250 W HCPCS SELF ADMINISTERED DRUGS (ALT 637 FOR ALL PAYERS): Mod: MUE | Performed by: PHYSICIAN ASSISTANT

## 2024-05-18 PROCEDURE — 72131 CT LUMBAR SPINE W/O DYE: CPT

## 2024-05-18 PROCEDURE — 99284 EMERGENCY DEPT VISIT MOD MDM: CPT | Mod: 25

## 2024-05-18 RX ORDER — LIDOCAINE 560 MG/1
1 PATCH PERCUTANEOUS; TOPICAL; TRANSDERMAL ONCE
Status: DISCONTINUED | OUTPATIENT
Start: 2024-05-18 | End: 2024-05-19 | Stop reason: HOSPADM

## 2024-05-18 RX ORDER — BACLOFEN 10 MG/1
10 TABLET ORAL 3 TIMES DAILY
Qty: 15 TABLET | Refills: 0 | Status: SHIPPED | OUTPATIENT
Start: 2024-05-18 | End: 2024-05-23

## 2024-05-18 RX ORDER — NAPROXEN 500 MG/1
500 TABLET ORAL
Qty: 30 TABLET | Refills: 0 | Status: SHIPPED | OUTPATIENT
Start: 2024-05-18 | End: 2024-06-02

## 2024-05-18 RX ORDER — LIDOCAINE 50 MG/G
1 PATCH TOPICAL DAILY
Qty: 10 PATCH | Refills: 0 | Status: SHIPPED | OUTPATIENT
Start: 2024-05-18

## 2024-05-18 RX ORDER — PREDNISONE 50 MG/1
50 TABLET ORAL DAILY
Qty: 7 TABLET | Refills: 0 | Status: SHIPPED | OUTPATIENT
Start: 2024-05-18 | End: 2024-05-25

## 2024-05-18 RX ORDER — DIAZEPAM 5 MG/1
5 TABLET ORAL ONCE
Status: COMPLETED | OUTPATIENT
Start: 2024-05-18 | End: 2024-05-18

## 2024-05-18 RX ADMIN — DEXAMETHASONE 10 MG: 6 TABLET ORAL at 20:46

## 2024-05-18 RX ADMIN — DIAZEPAM 5 MG: 5 TABLET ORAL at 20:46

## 2024-05-18 RX ADMIN — LIDOCAINE 1 PATCH: 4 PATCH TOPICAL at 23:14

## 2024-05-18 ASSESSMENT — COLUMBIA-SUICIDE SEVERITY RATING SCALE - C-SSRS
2. HAVE YOU ACTUALLY HAD ANY THOUGHTS OF KILLING YOURSELF?: NO
1. IN THE PAST MONTH, HAVE YOU WISHED YOU WERE DEAD OR WISHED YOU COULD GO TO SLEEP AND NOT WAKE UP?: NO
6. HAVE YOU EVER DONE ANYTHING, STARTED TO DO ANYTHING, OR PREPARED TO DO ANYTHING TO END YOUR LIFE?: NO

## 2024-05-18 ASSESSMENT — PAIN DESCRIPTION - PAIN TYPE: TYPE: ACUTE PAIN

## 2024-05-18 ASSESSMENT — LIFESTYLE VARIABLES
EVER FELT BAD OR GUILTY ABOUT YOUR DRINKING: NO
EVER HAD A DRINK FIRST THING IN THE MORNING TO STEADY YOUR NERVES TO GET RID OF A HANGOVER: NO
HAVE PEOPLE ANNOYED YOU BY CRITICIZING YOUR DRINKING: NO
TOTAL SCORE: 0
HAVE YOU EVER FELT YOU SHOULD CUT DOWN ON YOUR DRINKING: NO

## 2024-05-18 ASSESSMENT — PAIN SCALES - GENERAL
PAINLEVEL_OUTOF10: 8
PAINLEVEL_OUTOF10: 8
PAINLEVEL_OUTOF10: 5 - MODERATE PAIN

## 2024-05-18 ASSESSMENT — PAIN DESCRIPTION - LOCATION
LOCATION: BACK
LOCATION: LEG

## 2024-05-18 ASSESSMENT — PAIN - FUNCTIONAL ASSESSMENT
PAIN_FUNCTIONAL_ASSESSMENT: 0-10
PAIN_FUNCTIONAL_ASSESSMENT: 0-10

## 2024-05-18 ASSESSMENT — PAIN DESCRIPTION - DESCRIPTORS: DESCRIPTORS: ACHING;SHOOTING

## 2024-05-18 ASSESSMENT — PAIN DESCRIPTION - ORIENTATION: ORIENTATION: LOWER

## 2024-05-19 NOTE — ED PROVIDER NOTES
HPI   Chief Complaint   Patient presents with   • Leg Pain     Feels like a pinched nerve       Patient is a 69-year-old female who presents emergency room with chief complaint of an abrupt onset of right lower back pain that radiates down her right leg.  She reports on Wednesday she bent over and felt a sharp stabbing pain in the right lower back.  The pain radiates into the right buttock.  She denies any leg weakness, denies any bladder or bowel dysfunction or saddle paresthesias.  Patient denies any history of chronic back pain.  No recent falls or other injuries, denies any history of IV drug use, alcohol abuse.  She has been taken over-the-counter analgesics with little relief of symptoms.  She rates her back pain a 9 out of 10.  Is worse with movement alleviated with rest.      History provided by:  Patient and medical records                      No data recorded                   Patient History   Past Medical History:   Diagnosis Date   • Personal history of benign neoplasm of the brain     History of benign neoplasm of brain     Past Surgical History:   Procedure Laterality Date   • OTHER SURGICAL HISTORY  10/01/2019    Neck surgery   • OTHER SURGICAL HISTORY  10/01/2019    Back surgery   • OTHER SURGICAL HISTORY  10/01/2019    Hysterectomy   • OTHER SURGICAL HISTORY  10/01/2019    Appendectomy   • US GUIDED FINE PERCUTANEOUS ASPIRATION  5/7/2020    US GUIDED FINE PERCUTANEOUS ASPIRATION     Family History   Problem Relation Name Age of Onset   • No Known Problems Mother       Social History     Tobacco Use   • Smoking status: Never     Passive exposure: Never   • Smokeless tobacco: Never   Substance Use Topics   • Alcohol use: Yes     Comment: SOCIAL   • Drug use: Never       Physical Exam   ED Triage Vitals [05/18/24 2002]   Temperature Heart Rate Respirations BP   36 °C (96.8 °F) 74 18 154/70      Pulse Ox Temp Source Heart Rate Source Patient Position   96 % Temporal -- --      BP Location FiO2 (%)      Right arm --       Physical Exam  Vitals and nursing note reviewed. Exam conducted with a chaperone present.   Constitutional:       Appearance: Normal appearance. She is normal weight.   HENT:      Head: Normocephalic and atraumatic.      Right Ear: Tympanic membrane, ear canal and external ear normal.      Left Ear: Tympanic membrane, ear canal and external ear normal.      Nose: Nose normal.      Mouth/Throat:      Mouth: Mucous membranes are moist.      Pharynx: Oropharynx is clear.   Eyes:      Extraocular Movements: Extraocular movements intact.      Conjunctiva/sclera: Conjunctivae normal.      Pupils: Pupils are equal, round, and reactive to light.   Cardiovascular:      Rate and Rhythm: Normal rate and regular rhythm.      Pulses: Normal pulses.      Heart sounds: Normal heart sounds.   Pulmonary:      Effort: Pulmonary effort is normal.      Breath sounds: Normal breath sounds. No wheezing, rhonchi or rales.   Abdominal:      General: Bowel sounds are normal.      Palpations: Abdomen is soft. There is no mass.      Tenderness: There is no abdominal tenderness. There is no guarding.   Musculoskeletal:         General: Tenderness present. No swelling.      Cervical back: Normal, normal range of motion and neck supple.      Thoracic back: Normal.      Lumbar back: Spasms, tenderness and bony tenderness present. Decreased range of motion.        Back:       Comments: Tenderness to the right SI joint with palpation, no erythema or swelling no signs of trauma limited flexion extension at the waist due to the pain.   Skin:     General: Skin is warm and dry.      Capillary Refill: Capillary refill takes less than 2 seconds.      Findings: No rash.   Neurological:      General: No focal deficit present.      Mental Status: She is alert and oriented to person, place, and time. Mental status is at baseline.   Psychiatric:         Mood and Affect: Mood normal.         Behavior: Behavior normal.         Thought  Content: Thought content normal.         Judgment: Judgment normal.         ED Course & MDM   Diagnoses as of 05/18/24 2309   Acute right-sided low back pain with right-sided sciatica       Medical Decision Making  Temperature 36 heart rate 74, respirations 18, blood pressure 154/70, pulse ox was 96% on room air    CT scan lumbar spine shows no acute fracture there is multilevel degenerative changes and postsurgical changes, there is also a 4.4 cm cyst at the caudate lobe of the liver as well as a 3.9 cm cortical exophytic cyst of the left kidney and mild atherosclerotic calcifications of the abdominal aorta no evidence of aneurysm.  I discussed results of workup with the patient.  At this point, I do not suspect AAA, cauda equina syndrome or epidural abscess.  Symptoms are more consistent with acute sciatica.  The patient will be discharged home with prescription for naproxen, Robaxin and topical lidocaine patches and [facility].  She was advised to return back to the ER sooner with any concerns or worsening of any symptoms all questions answered prior to discharge        Procedure  Procedures     Haile Sullivan PA-C  05/18/24 3547

## 2024-05-20 RX ORDER — LOPERAMIDE HYDROCHLORIDE 2 MG/1
CAPSULE ORAL
Qty: 180 CAPSULE | Refills: 0 | Status: SHIPPED | OUTPATIENT
Start: 2024-05-20

## 2024-06-10 ENCOUNTER — OFFICE VISIT (OUTPATIENT)
Dept: FAMILY MEDICINE CLINIC | Age: 69
End: 2024-06-10
Payer: MEDICARE

## 2024-06-10 VITALS
DIASTOLIC BLOOD PRESSURE: 86 MMHG | HEART RATE: 58 BPM | RESPIRATION RATE: 12 BRPM | OXYGEN SATURATION: 98 % | BODY MASS INDEX: 34.16 KG/M2 | HEIGHT: 65 IN | SYSTOLIC BLOOD PRESSURE: 126 MMHG | TEMPERATURE: 98.2 F | WEIGHT: 205 LBS

## 2024-06-10 DIAGNOSIS — M25.511 ACUTE PAIN OF RIGHT SHOULDER: ICD-10-CM

## 2024-06-10 DIAGNOSIS — M79.621 PAIN IN RIGHT UPPER ARM: Primary | ICD-10-CM

## 2024-06-10 PROCEDURE — 99213 OFFICE O/P EST LOW 20 MIN: CPT | Performed by: NURSE PRACTITIONER

## 2024-06-10 PROCEDURE — 3079F DIAST BP 80-89 MM HG: CPT | Performed by: NURSE PRACTITIONER

## 2024-06-10 PROCEDURE — 3074F SYST BP LT 130 MM HG: CPT | Performed by: NURSE PRACTITIONER

## 2024-06-10 PROCEDURE — 1123F ACP DISCUSS/DSCN MKR DOCD: CPT | Performed by: NURSE PRACTITIONER

## 2024-06-10 RX ORDER — TIZANIDINE 2 MG/1
2 TABLET ORAL EVERY 8 HOURS PRN
Qty: 15 TABLET | Refills: 0 | Status: SHIPPED | OUTPATIENT
Start: 2024-06-10 | End: 2024-06-15

## 2024-06-10 RX ORDER — IBUPROFEN 600 MG/1
600 TABLET ORAL 3 TIMES DAILY PRN
Qty: 30 TABLET | Refills: 0 | Status: SHIPPED | OUTPATIENT
Start: 2024-06-10

## 2024-06-10 ASSESSMENT — ENCOUNTER SYMPTOMS
COLOR CHANGE: 0
BACK PAIN: 0

## 2024-06-10 NOTE — PROGRESS NOTES
Subjective:      Patient ID: Florina Franklin is a 69 y.o. female who presents today for:  Chief Complaint   Patient presents with    Arm Pain     Patient present today with her right upper arm pain that is radiating up her shoulder across her back of her shoulder blade past 2 weeks. She tried tylenol, bio-freeze and other ointment with no relief.       HPI  Patient is her with right upper arm pain that radiates to shoulder and now to the scapula.   Denies any pain with shoulder movement or elbow movement.   Says she is taking Tylenol and using bio freeze on the area.   Denies any numbness.   Past Medical History:   Diagnosis Date    Atrial fibrillation (HCC)     Brain tumor (HCC) 07/16/2018    Chest pain     Degenerative disc disease, lumbar 1/21/2016    Hx of colonic polyps     Hypertension 2/24/2015    Irritable bowel syndrome     Low back pain 10/16/2018    Lumbar radiculopathy 3/14/2016    Rapid heart rate     Stress incontinence      Past Surgical History:   Procedure Laterality Date    APPENDECTOMY      BACK SURGERY  07/14/2017    CARDIAC CATHETERIZATION  11/02/2015    DR. PALMER    HYSTERECTOMY (CERVIX STATUS UNKNOWN)      OVARY REMOVAL      MD CRANIECTOMY W/EXCISION TUMOR/LESION SKULL N/A 07/24/2018    CRANIOTOMY performed by Noah Nowak MD at [facility]    SPINE SURGERY      TONSILLECTOMY      TOTAL KNEE ARTHROPLASTY  2009    UPPER GASTROINTESTINAL ENDOSCOPY  01/15/2013    DR. MEEKS    UPPER GASTROINTESTINAL ENDOSCOPY N/A 4/11/2023    EGD DIAGNOSTIC ONLY performed by Jimmy Olmos MD at [facility]     Social History     Socioeconomic History    Marital status:      Spouse name: Not on file    Number of children: Not on file    Years of education: Not on file    Highest education level: Not on file   Occupational History    Not on file   Tobacco Use    Smoking status: Never    Smokeless tobacco: Never   Vaping Use    Vaping Use: Never used   Substance and Sexual Activity    Alcohol use:

## 2024-06-21 ENCOUNTER — OFFICE VISIT (OUTPATIENT)
Dept: ORTHOPEDIC SURGERY | Facility: CLINIC | Age: 69
End: 2024-06-21
Payer: MEDICARE

## 2024-06-21 ENCOUNTER — HOSPITAL ENCOUNTER (OUTPATIENT)
Dept: RADIOLOGY | Facility: HOSPITAL | Age: 69
Discharge: HOME | End: 2024-06-21
Payer: MEDICARE

## 2024-06-21 DIAGNOSIS — M54.12 CERVICAL RADICULOPATHY: ICD-10-CM

## 2024-06-21 PROCEDURE — 72040 X-RAY EXAM NECK SPINE 2-3 VW: CPT

## 2024-06-21 RX ORDER — CYCLOBENZAPRINE HCL 10 MG
10 TABLET ORAL NIGHTLY PRN
Qty: 15 TABLET | Refills: 0 | Status: SHIPPED | OUTPATIENT
Start: 2024-06-21 | End: 2024-07-06

## 2024-06-21 RX ORDER — BETAMETHASONE SODIUM PHOSPHATE AND BETAMETHASONE ACETATE 3; 3 MG/ML; MG/ML
6 INJECTION, SUSPENSION INTRA-ARTICULAR; INTRALESIONAL; INTRAMUSCULAR; SOFT TISSUE
Status: COMPLETED | OUTPATIENT
Start: 2024-06-21 | End: 2024-06-21

## 2024-06-21 RX ORDER — GABAPENTIN 300 MG/1
300 CAPSULE ORAL 3 TIMES DAILY
Qty: 30 CAPSULE | Refills: 0 | Status: SHIPPED | OUTPATIENT
Start: 2024-06-21 | End: 2024-07-01

## 2024-06-21 RX ORDER — LIDOCAINE HYDROCHLORIDE 10 MG/ML
1 INJECTION INFILTRATION; PERINEURAL
Status: COMPLETED | OUTPATIENT
Start: 2024-06-21 | End: 2024-06-21

## 2024-06-21 RX ORDER — CELECOXIB 200 MG/1
200 CAPSULE ORAL DAILY
Qty: 30 CAPSULE | Refills: 0 | Status: SHIPPED | OUTPATIENT
Start: 2024-06-21 | End: 2024-07-21

## 2024-06-21 NOTE — PROGRESS NOTES
"  Acute Injury New Patient Visit    CC:   Chief Complaint   Patient presents with    Right Arm - Pain     Patient woke up having pain. Xrays @ [facility] 6/140/24. [facility]       HPI: Franny \"Tonie" is a 69 y.o.female who presents today with new complaints of complaints of worsening pain discomfort to the lateral side of her neck on the right with radiation of symptoms going down the upper back and down the right arm.  She is intermittently noticing numbness tingling and burning.  She recently woke up with this pain denies any recent injury slip trip or fall.  She did state she had a bad accident which resulted in a cervical fusion with 4 screws and a plate to her cervical spine.  She does not recall which level the injury occurred.  She recently had some oral steroids which did provide moderate to significant amount of relief however the pain has returned and she presents here today for further evaluation as a new patient to the practice.  Previous workups included x-rays of the shoulder but no recent cervical spine films.        Review of Systems   GENERAL: Negative for malaise, significant weight loss, fever  MUSCULOSKELETAL: See HPI  NEURO: Positive for numbness / tingling     Past Medical History  Past Medical History:   Diagnosis Date    Personal history of benign neoplasm of the brain     History of benign neoplasm of brain       Medication review  Medication Documentation Review Audit       Reviewed by LINDSAY Sarmiento (Nurse Practitioner) on 05/18/24 at 2002      Medication Order Taking? Sig Documenting Provider Last Dose Status   acetaminophen (Tylenol) 325 mg tablet [id number]  Take 2 tablets (650 mg) by mouth every 4 hours if needed. Historical Provider, MD  Active   amLODIPine (Norvasc) 5 mg tablet [id number]  Take 1 tablet (5 mg) by mouth 2 times a day. Historical Provider, MD  Active   azelaic acid (Finacea) 15 % gel [id number]  Finacea 15 % External Gel   Quantity: 50  Refills: 0      "   Start : 9-Oct-2015   Active Historical MD Ulises  Active   azelastine (Astelin) 137 mcg (0.1 %) nasal spray [id number]  Administer into affected nostril(s). Sharifa Montgomery MD  Active   benzonatate (Tessalon) 200 mg capsule [id number]  Take by mouth. Sharifa Montgomery MD  Active   CALCIUM ORAL [id number]  Take 630 mg by mouth once daily. Sharifa Montgomery MD  Active   cefdinir (Omnicef) 300 mg capsule [id number]  Take by mouth. Sharifa Montgomery MD  Active   cetirizine-pseudoephedrine (ZyrTEC-D) 5-120 mg 12 hr tablet [id number]  Take by mouth. Historical MD Ulises  Active   cholecalciferol (Vitamin D-3) 50 MCG (2000 UT) tablet [id number]  Take 1 tablet (2,000 Units) by mouth once daily. Historical MD Ulises  Active   cholestyramine light (Prevalite) 4 gram packet [id number]  Take by mouth. Sharifa Montgomery MD  Active   dicyclomine (Bentyl) 20 mg tablet [id number]  Take by mouth. Historical MD Ulises  Active   diphenhydrAMINE (BENADryl) 25 mg capsule [id number]  Take 1 capsule (25 mg) by mouth every 6 hours if needed. Historical MD Ulises  Active   eluxadoline 75 mg tablet [id number]  Take 1 tablet (75 mg) by mouth twice a day. Sharifa Montgomery MD  Active   estradiol (Vivelle-DOT) 0.05 mg/24 hr patch [id number]  Place 1 patch on the skin 1 (one) time per week. Sharifa Montgomery MD  Active   estrogens, conjugated, (Premarin) 0.625 mg tablet [id number]  Take 1 tablet (0.625 mg) by mouth once daily. Sharifa Montgomery MD  Active   fexofenadine HCl (ALLEGRA ORAL) [id number]  Take by mouth. All Day Allegra-D 5-120 MG Sharifa Montgomery MD  Active   fluticasone (Flonase) 50 mcg/actuation nasal spray [id number]  Administer 2 sprays into affected nostril(s) once daily. Sharifa Montgomery MD  Active   HYDROcodone-acetaminophen (Norco) 5-325 mg tablet [id number]  Take 1 tablet by mouth every 6 hours if needed.  for Pain. Historical MD Ulises  Active   hyoscyamine 0.125 mg disintegrating  tablet [id number]  Place 1 tablet (0.125 mg) under the tongue every 4 hours if needed. Historical Provider, MD  Active   hyoscyamine ER (Levbid) 0.375 mg 12 hr tablet [id number]  Take 1 tablet (0.375 mg) by mouth every 12 hours if needed for cramping. Historical Provider, MD  Active   Lactobacillus rhamnosus GG (CULTURELLE) 15 billion cell capsule, sprinkle capsule [id number]  Take 1 capsule by mouth once daily. Historical Provider, MD  Active   levocetirizine (Xyzal) 5 mg tablet [id number]  Take 1 tablet (5 mg) by mouth once daily at bedtime. Historical Provider, MD  Active   loperamide (Imodium) 2 mg capsule [id number]  Take 1 capsule (2 mg) by mouth if needed for diarrhea.  (MAX OF 4 CAPSULES PER DAY) Historical Provider, MD  Active   metoprolol succinate XL (Toprol XL) 50 mg 24 hr tablet [id number]  Take 1 tablet (50 mg) by mouth once daily. Historical Provider, MD  Active   metoprolol succinate XL (Toprol-XL) 100 mg 24 hr tablet [id number]  Take 1 tablet (100 mg) by mouth once daily. Historical Provider, MD  Active   naloxone (Narcan) 4 mg/0.1 mL nasal spray [id number]  Administer 1 spray (4 mg) into affected nostril(s) if needed for opioid reversal. give and call 911) for up to 1 dose Indications: Opioid Overdose. Historical Provider, MD  Active   naproxen (Naprosyn) 375 mg tablet [id number]  Take by mouth. Historical Provider, MD  Active   omeprazole (PriLOSEC) 40 mg DR capsule [id number]  Take 1 capsule (40 mg) by mouth once daily. Historical Provider, MD  Active   ondansetron ODT (Zofran-ODT) 4 mg disintegrating tablet [id number]  Take 1 tablet (4 mg) by mouth every 8 hours if needed for nausea or vomiting. Historical Provider, MD  Active   perflutren lipid microspheres (DEFINITY IV) [id number]  Infuse into a venous catheter.  perflutren lipid microspheres 1.3 mL in NaCl (PF) 0.9% 10 mL injection (DEFINITY) Historical Provider, MD  Active   rifAXIMin (Xifaxan) 550 mg tablet [id number]  Take 1 tablet (550 mg) by mouth  twice a day. Historical Provider, MD  Active   simethicone (Mylicon) 80 mg chewable tablet [id number]  Chew 1 tablet (80 mg) 4 times a day as needed for flatulence. Historical Provider, MD  Active   sodium chloride 0.9% (sodium chloride) flush [id number]  Infuse 2-10 mL into a venous catheter. For Echo procedure Historical Provider, MD  Active   traZODone (Desyrel) 50 mg tablet [id number]  Take 1 tablet (50 mg) by mouth once daily at bedtime. Historical Provider, MD  Active   trospium (Sanctura XR) 60 mg 24 hour capsule [id number]  Take 1 capsule (60 mg) by mouth once daily in the morning.  Take on an empty stomach 1 hour before breakfast in a.m. Historical Provider, MD  Active   venlafaxine XR (Effexor-XR) 75 mg 24 hr capsule [id number]  Take 1 capsule (75 mg) by mouth 2 times a day. Historical Provider, MD  Active                    Allergies  Allergies   Allergen Reactions    Codeine Hives and Rash     red streaks up arms    Morphine Hives, Itching and Other     get red streaks up arms at IV  injection site    Sulfa (Sulfonamide Antibiotics) Hives and Itching    Iodides Nausea Only    Iodinated Contrast Media GI Upset and Other    Iodine Itching    Meperidine Unknown    Penicillins Other    Levofloxacin Itching and Rash       Social History  Social History     Socioeconomic History    Marital status:      Spouse name: Not on file    Number of children: Not on file    Years of education: Not on file    Highest education level: Not on file   Occupational History    Not on file   Tobacco Use    Smoking status: Never     Passive exposure: Never    Smokeless tobacco: Never   Substance and Sexual Activity    Alcohol use: Yes     Comment: SOCIAL    Drug use: Never    Sexual activity: Not on file   Other Topics Concern    Not on file   Social History Narrative    Not on file     Social Determinants of Health     Financial Resource Strain: Low Risk  (9/7/2023)    Received from [facility]     Overall Financial Resource Strain (CARDIA)     Difficulty of Paying Living Expenses: Not hard at all   Food Insecurity: Not on file (9/7/2023)   Transportation Needs: Unknown (9/7/2023)    Received from [facility]    PRAPARE - Transportation     Lack of Transportation (Medical): Not on file     Lack of Transportation (Non-Medical): No   Physical Activity: Insufficiently Active (1/31/2024)    Received from [facility]    Exercise Vital Sign     Days of Exercise per Week: 2 days     Minutes of Exercise per Session: 20 min   Stress: Not on file   Social Connections: Not on file   Intimate Partner Violence: Not on file   Housing Stability: Unknown (9/7/2023)    Received from [facility]    Housing Stability Vital Sign     Unable to Pay for Housing in the Last Year: Not on file     Number of Places Lived in the Last Year: Not on file     Unstable Housing in the Last Year: No       Surgical History  Past Surgical History:   Procedure Laterality Date    OTHER SURGICAL HISTORY  10/01/2019    Neck surgery    OTHER SURGICAL HISTORY  10/01/2019    Back surgery    OTHER SURGICAL HISTORY  10/01/2019    Hysterectomy    OTHER SURGICAL HISTORY  10/01/2019    Appendectomy    US GUIDED FINE PERCUTANEOUS ASPIRATION  5/7/2020    US GUIDED FINE PERCUTANEOUS ASPIRATION       Physical Exam:  GENERAL:  Patient is awake, alert, and oriented to person place and time.  Patient appears well nourished and well kept.  Affect Calm, Not Acutely Distressed.  HEENT:  Normocephalic, Atraumatic, EOMI  CARDIOVASCULAR:  Hemodynamically stable.  RESPIRATORY:  Normal respirations with unlabored breathing.  NEURO: Gross sensation intact to the upper extremities bilaterally.  Extremity: Head and neck exam demonstrates no tenderness down the midline there is a little bit of decreased flexion and extension with mild decreased rotation and sidebending.  She has a negative Spurling's bilaterally  she does have some limited left-sided head rotation with right-sided neck pain posteriorly.  She has moderate trigger points over the trapezius and rhomboid.  She essentially has full range of motion about the shoulder with 4+ out of 5 strength equal and symmetric bilaterally  strength equal and symmetric as well.  Of note the patient did not have any soft tissue tenderness to palpation over the anterior insertion of the biceps at the AC joint or at the subacromial bursa.  Very limited ability to recreate any pain or discomfort about the right shoulder with testing here today.      Diagnostics: X-ray of the cervical spine today demonstrate stable appearing cervical fusion anteriorly at C4-C5.  There is significant ventral osteophytes and degenerative disc disease at C5-C6 there is no presence for listhesis or fracture.        Procedure:  Trigger Point Injection: right upper trapezius, right lower trapezius on 6/21/2024 6:02 PM  Indications: myalgia  Details: 25 G needle  Medications: 1 mL lidocaine 10 mg/mL (1 %); 6 mg betamethasone acet,sod phos 6 mg/mL  Outcome: tolerated well, no immediate complications    Routine postprocedural precautions were provided.  Procedure, treatment alternatives, risks and benefits explained, specific risks discussed. Consent was given by the patient. Immediately prior to procedure a time out was called to verify the correct patient, procedure, equipment, support staff and site/side marked as required. Patient was prepped and draped in the usual sterile fashion.           Assessment:   Problem List Items Addressed This Visit    None  Visit Diagnoses       Cervical radiculopathy        Relevant Medications    celecoxib (CeleBREX) 200 mg capsule    gabapentin (Neurontin) 300 mg capsule    cyclobenzaprine (Flexeril) 10 mg tablet    Other Relevant Orders    XR cervical spine 2-3 views    MR cervical spine w and wo IV contrast    Referral to Physical Therapy             Plan: Patient  tolerated trigger point injections well today with mild modest immediate symptomatic relief.  She was provided with a number of medications as ordered.  Also recommended ice and/or heat in addition to topical muscle rubs and creams.  Will hold off on oral steroid as she recently had 2 steroid packs.  Will get her involved with some physical therapy and update an MRI of her cervical spine with and without contrast given previous cervical fusion at C4-C5.  Will anticipate obtaining MRI with and without contrast due to the presence of hardware.  If for what ever reason she is unable to obtain the contrast we will proceed forward with routine MRI given concern for contrast allergy.  Uncertain if this is related to CT dye or MRI dye.  Patient will follow-up with Dr. Haile Ronquillo on her spine team going forward to discuss her symptoms and results of the MRI once completed.  Orders Placed This Encounter    Trigger Point Injection    XR cervical spine 2-3 views    MR cervical spine w and wo IV contrast    Referral to Physical Therapy    celecoxib (CeleBREX) 200 mg capsule    gabapentin (Neurontin) 300 mg capsule    cyclobenzaprine (Flexeril) 10 mg tablet      At the conclusion of the visit there were no further questions by the patient/family regarding their plan of care.  Patient was instructed to call or return with any issues, questions, or concerns regarding their injury and/or treatment plan described above.     06/21/24 at 6:05 PM - Cole C Budinsky, MD    Office: [phone]    This note was prepared using voice recognition software.  The details of this note are correct and have been reviewed, and corrected to the best of my ability.  Some grammatical errors may persist related to the Dragon software.

## 2024-07-10 ENCOUNTER — HOSPITAL ENCOUNTER (OUTPATIENT)
Dept: RADIOLOGY | Facility: HOSPITAL | Age: 69
Discharge: HOME | End: 2024-07-10
Payer: MEDICARE

## 2024-07-10 DIAGNOSIS — M54.12 CERVICAL RADICULOPATHY: ICD-10-CM

## 2024-07-10 PROCEDURE — A9575 INJ GADOTERATE MEGLUMI 0.1ML: HCPCS | Performed by: FAMILY MEDICINE

## 2024-07-10 PROCEDURE — 2550000001 HC RX 255 CONTRASTS: Performed by: FAMILY MEDICINE

## 2024-07-10 PROCEDURE — 72156 MRI NECK SPINE W/O & W/DYE: CPT

## 2024-07-10 RX ORDER — GADOTERATE MEGLUMINE 376.9 MG/ML
0.2 INJECTION INTRAVENOUS
Status: COMPLETED | OUTPATIENT
Start: 2024-07-10 | End: 2024-07-10

## 2024-07-23 ENCOUNTER — APPOINTMENT (OUTPATIENT)
Dept: ORTHOPEDIC SURGERY | Facility: CLINIC | Age: 69
End: 2024-07-23
Payer: MEDICARE

## 2024-07-23 DIAGNOSIS — M54.12 CERVICAL RADICULOPATHY: Primary | ICD-10-CM

## 2024-07-23 PROCEDURE — 99215 OFFICE O/P EST HI 40 MIN: CPT | Performed by: PHYSICIAN ASSISTANT

## 2024-07-23 NOTE — PROGRESS NOTES
Established patient the practice new patient to us.  Actually patient states she thinks Dr. Ronquillo did a back fusion on her but does not remember when or how long ago.  She complains of neck pain radiating to the right arm and is also now going to the left arm is in the trapezius into the shoulder area into the scapular regions.  She denies bowel or bladder complaints saddle anesthesia.  She had a prior surgery with Dr. Peralta  2010 maybe earlier and did well after surgery with no complications with swallowing voice etc.  She denies bowel or bladder complaints saddle anesthesia gait or balance changes denies trauma accident or fall to cause it.  She has had physical therapy and over-the-counter meds and prescription meds and not helping.  Affecting her daily functioning and would like to get back to doing the things she needs to do.    We looked at patient's recent blood work.  Checked a metabolic panel sodium 141 potassium 4.1 glucose was 108.  We checked the last hemoglobin A1c that was back in 2001 that was 5.4.  We read primary doctors note we checked medication list see if she is on a statin medication to cause muscular aches and pains I do not see that she has she is however on some vitamin D and not currently on any type of blood thinner medications for her atrial fib    Physical exam: Well-nourished, well kept.  No lymphangitis or lymphadenopathy in the examined extremities.  Good perfusion to the extremities ×4.  Radial and dorsalis pedis pulses 2+.  Capillary refill to all 4 digits brisk.  No distal edema x 4.  Gait normal.  Can walk on heels and toes.  There is decreased range of motion flexion-extension rotation cervical spine tender good strength no instability.  Examination of the back reveals no swelling, step-off, or point tenderness.  Range of motion with flexion, extension, side bending and rotation is well maintained without crepitance, instability, or exacerbation of pain.  Strength is  within normal limits.  Examination of the upper extremities reveals no point tenderness, swelling, or deformity.  Range of motion of the shoulders, elbows, wrists, and fingers are all full without crepitance, instability, or exacerbation of pain.  Strength is 5/5 throughout.  Examination of the lower extremities reveals no point tenderness, swelling, or deformity.  Range of motion of the hips, knees, and ankles are full without crepitance, instability, or exacerbation of pain.  Strength is 5/5 throughout.  No redness, abrasions, or lesions on all 4 extremities, head and neck, or trunk.  Gross sensation intact in the extremities ×4.  Deep tendon reflexes 2+ and symmetric bilaterally.  Gabe, clonus, and Babinski were negative.  Straight leg raise negative.  Affect normal.  Alert and oriented ×3.  Coordination normal.    X-ray: X-ray cervical spine that was taken prior report reviewed as well showing anterior cervical discectomy and cervical 5 6.  Looks like a solid fusion a lot of anterior osteophytes at the cervical 6 7 level otherwise no fractures dislocations or bony lytic lesions    MRI: MRI cervical spine was reviewed report is reviewed as well showing disc bulge herniations at C3-4 and C4-5 C4-5 is the worst there is moderate foraminal narrowing on the right at C3-4 moderate to severe bilateral foraminal foraminal narrowing at C4-5    Assessment: This a patient with neck pain arm pain more so on the right but now getting symptoms over into the left into the left trapezius down the left shoulder into the arm scapular regions also.  That is affecting her function and she would like to have something done about this.  I explained to her that we do not do cervical injections up until his high levels.  Due to the risks of strokes etc. and complications she understands surgery is elective she understands she does not have to surgery she understands she might not get better and might get worse after surgery.  We  discussed all risks and benefits the options of the surgery.  All the risks, benefits, and potential complications for operative and nonoperative treatment were discussed at length the patient. The patient understands that surgery is elective.  The patient understands that I do not have to do surgery.  The patient understands that surgery comes with more risks than not doing surgery. Risks of operative intervention include, but are not limited to: Anesthesia complications, excessive blood loss, infection, damaged uninjured structures including, but not limited to: The dural sac, nerve roots, spinal cord, vertebral artery, trachea, esophagus, and carotid artery, blood clots and lack of improvement or worsening of symptoms.  These complications could result in death, permanent disability including paralysis, swallowing or speech difficulty, or need for reoperation.  The patient completely understood these risks and wished to proceed with operative intervention.    Plan: I will get the patient to see Dr. Ronquillo for final plan.  I think a surgery for her would be an anterior cervical discectomy with fusion and instrumentation C3-4 C4-5 we would probably take that plate off at the C5-6 level as the fusion looks solid.  We will find out what instrumentation was placed in by Dr. Peralta with the surgery was done at [facility].  Patient has an incision on the left side of her neck so advised her we would perform this procedure from the right side of her neck.

## 2024-07-26 ENCOUNTER — OFFICE VISIT (OUTPATIENT)
Dept: ORTHOPEDIC SURGERY | Facility: CLINIC | Age: 69
End: 2024-07-26
Payer: MEDICARE

## 2024-07-26 DIAGNOSIS — M54.12 CERVICAL RADICULOPATHY: Primary | ICD-10-CM

## 2024-07-26 PROCEDURE — 99215 OFFICE O/P EST HI 40 MIN: CPT | Performed by: ORTHOPAEDIC SURGERY

## 2024-07-26 NOTE — PROGRESS NOTES
"Franny Nascimento \"Tonie" is a 69 y.o. female who presents for New Patient Visit of the Neck (Talk sx seen gg mri done at ).    HPI:  69-year-old female here for surgical discussion.  New to Dr. Ronquillo.  Has been seen by Kai galan.  She denies any fever chills nausea vomiting night sweats.  She has no bowel or bladder complaints.    Physical exam:  Well-nourished, well kept.  No lymphangitis or lymphadenopathy in the examined extremities. Affect normal.  Alert and oriented X 3.  Coordination normal.  Patient can rise from a seated position, can sit from a standing position. Can stand on heels and toes.  Patient is tender in the paraspinal musculature of the cervical spine. range of motion is mildly decreased secondary to some pain and stiffness no weakness no instability to muscle strength. examination of the upper extremities reveals no point tenderness, swelling, or deformity.  Range of motion of the shoulders, elbows, wrists, and fingers are full without crepitance, instability, or exacerbation of pain. Strength is 5/5 throughout. no redness, abrasions, or lesions on the upper extremities bilaterally.  Gross sensation intact to the extremities.  Deep tendon reflexes 1+ and symmetric bilaterally.  Vasquez negative.     Assessment:  69-year-old female here for surgical discussion.  Sent by Kai Galan.  She has a several month history of neck pain and right greater than left arm pain going down into the hands.  For about the past 2 weeks she has been having increasing left arm symptoms as well.  Overall her arms bother her worse than the neck right greater than left arm 60% versus neck 40%.  She had a previous C5-6 fusion in 2001 with Dr. Peralta, who did a left-sided approach.  She has not done any recent physical therapy or chiropractic care.  She has been recommended a C3-4, and a C4-5 ACDF with removal of plate at C5-6.  She is here to discuss surgery and options.    We have reviewed tests today, " x-rays, MRI.  Her  is here and he is being a helpful and necessary historian.  We did discuss and consider surgery today.    For complete plan and/or surgical details, please refer to Dr. Ronquillo's portion of this split dictation.    -Sergio Esquivel PA-C    In a face-to-face encounter, I performed a history and physical examination, discussed pertinent diagnostic studies if indicated, and discussed diagnosis and management strategies with both the patient and the midlevel provider.  I reviewed the midlevel's note and agree with the documented findings and plan of care.    Patient with neck pain and some right arm radicular symptoms down to her all of her fingers in her right hand.  Now the left arm is starting.  She had surgery in 2001 by Dr. Peralta.  She has not had an EMG nerve conduction study she has not done any physical therapy.  The symptoms been going on for a month.  I think we should continue to work this up and give this some time to see how things evolve.  She has an MRI that shows stenosis C3-4 right greater than left C4-5 bilaterally and no real stenosis at C5-6 or C6-7 or C7-T1.  It is possible the C3-4 and C4-5 levels are giving her her symptoms but I do not know why she be having entire right hand symptoms with those upper levels like that.  Organ to get an EMG nerve conduction study get her into physical therapy see her back after the EMG and see how she is feeling at that point.  She already has had a left-sided approach.  We talked today that if it went up into the high cervical anterior fusion on her she has a greater chance of having swallowing and voice issues with this approach than she did last time.  She is currently severely inhibited with bodily function.    Haile Ronquillo MD  Orthopedic surgery

## 2024-07-31 ENCOUNTER — APPOINTMENT (OUTPATIENT)
Dept: ORTHOPEDIC SURGERY | Facility: CLINIC | Age: 69
End: 2024-07-31
Payer: MEDICARE

## 2024-07-31 ENCOUNTER — TELEPHONE (OUTPATIENT)
Dept: ORTHOPEDIC SURGERY | Facility: CLINIC | Age: 69
End: 2024-07-31
Payer: MEDICARE

## 2024-07-31 NOTE — TELEPHONE ENCOUNTER
Patient called wanting to know if there was a place they preferred their patients to do PT . Let patient know that yes they prefer any [facility] facility.

## 2024-08-06 ENCOUNTER — APPOINTMENT (OUTPATIENT)
Dept: PHYSICAL THERAPY | Facility: HOSPITAL | Age: 69
End: 2024-08-06
Payer: MEDICARE

## 2024-08-12 DIAGNOSIS — K58.0 IRRITABLE BOWEL SYNDROME WITH DIARRHEA: ICD-10-CM

## 2024-08-13 RX ORDER — LOPERAMIDE HYDROCHLORIDE 2 MG/1
CAPSULE ORAL
Qty: 180 CAPSULE | Refills: 0 | Status: SHIPPED | OUTPATIENT
Start: 2024-08-13

## 2024-08-20 DIAGNOSIS — Z12.31 SCREENING MAMMOGRAM FOR BREAST CANCER: Primary | ICD-10-CM

## 2024-08-26 ENCOUNTER — APPOINTMENT (OUTPATIENT)
Dept: NEUROLOGY | Facility: HOSPITAL | Age: 69
End: 2024-08-26
Payer: MEDICARE

## 2024-08-26 DIAGNOSIS — M54.12 CERVICAL RADICULOPATHY: ICD-10-CM

## 2024-08-26 PROCEDURE — 95910 NRV CNDJ TEST 7-8 STUDIES: CPT

## 2024-09-10 ENCOUNTER — APPOINTMENT (OUTPATIENT)
Dept: NEUROLOGY | Facility: HOSPITAL | Age: 69
End: 2024-09-10
Payer: MEDICARE

## 2024-09-17 ENCOUNTER — OFFICE VISIT (OUTPATIENT)
Dept: ORTHOPEDIC SURGERY | Facility: CLINIC | Age: 69
End: 2024-09-17
Payer: MEDICARE

## 2024-09-17 DIAGNOSIS — M54.12 CERVICAL RADICULOPATHY: Primary | ICD-10-CM

## 2024-09-17 PROCEDURE — 1036F TOBACCO NON-USER: CPT | Performed by: ORTHOPAEDIC SURGERY

## 2024-09-17 PROCEDURE — 1159F MED LIST DOCD IN RCRD: CPT | Performed by: ORTHOPAEDIC SURGERY

## 2024-09-17 PROCEDURE — 99213 OFFICE O/P EST LOW 20 MIN: CPT | Performed by: ORTHOPAEDIC SURGERY

## 2024-09-17 NOTE — PROGRESS NOTES
"Franny Nascimento \"Tonie" is a 69 y.o. female who presents for Follow-up of the Neck (EMG results from /Says that physical therapy has been helping).    HPI:  69-year-old female here for follow-up of neck issues.  EMG results and therapy.  She denies any fever chills nausea vomiting night sweats.  She has no bowel or bladder complaints.    Physical exam:  Well-nourished, well kept.  No lymphangitis or lymphadenopathy in the examined extremities. Affect normal.  Alert and oriented X 3.  Coordination normal.  Patient can rise from a seated position, can sit from a standing position. Can stand on heels and toes.  Patient is non-tender in the paraspinal musculature of the cervical spine. range of motion is intact. examination of the upper extremities reveals no point tenderness, swelling, or deformity.  Range of motion of the shoulders, elbows, wrists, and fingers are full without crepitance, instability, or exacerbation of pain. Strength is 5/5 throughout. no redness, abrasions, or lesions on the upper extremities bilaterally.  Gross sensation intact to the extremities.  Deep tendon reflexes 1+ and symmetric bilaterally.  Vasquez negative.     Assessment:  69-year-old female here for follow-up of neck pain right arm and hand numbness and tingling after physical therapy and EMG.  Her EMG showed a mild right C5-6 radiculopathy.  She did a full course of physical therapy and she feels 85% better.  The numbness and tingling in her right upper extremity is nearly completely resolved.  She is happy with where she is at.    For complete plan and/or surgical details, please refer to Dr. Ronquillo's portion of this split dictation.    -Sergio Esquivel PA-C    In a face-to-face encounter, I performed a history and physical examination, discussed pertinent diagnostic studies if indicated, and discussed diagnosis and management strategies with both the patient and the midlevel provider.  I reviewed the midlevel's note " and agree with the documented findings and plan of care.    85% better now compared to last visit.  Physical therapy helped a lot.  She is done with formal therapy now doing exercises at home.  EMG showed there may be some C5-6 involvement.  Last time we saw her she was having neck pain and right arm radicular symptoms with stenosis at C3-4 and C4-5.  However she was getting symptoms down to her hand and it did not make sense with the levels that showed stenosis.  At this point she is improved enough where it really does not matter because she can live with her symptoms.  This is a nonsurgical problem.  I would recommend she continue with physical therapy for this chronic stable problem of right arm radiculopathy which is almost completely improved with physical therapy.  We have reviewed the results of an EMG nerve conduction study today as well.  She can follow-up with us if her symptoms worsen.    Haile Ronquillo MD  Orthopedic surgery

## 2024-09-19 DIAGNOSIS — K58.0 IRRITABLE BOWEL SYNDROME WITH DIARRHEA: ICD-10-CM

## 2024-09-19 RX ORDER — HYOSCYAMINE SULFATE 0.38 MG/1
TABLET, EXTENDED RELEASE ORAL
Qty: 180 TABLET | Refills: 3 | Status: SHIPPED | OUTPATIENT
Start: 2024-09-19

## 2024-10-19 ENCOUNTER — HOSPITAL ENCOUNTER (EMERGENCY)
Age: 69
Discharge: HOME | End: 2024-10-19
Payer: MEDICARE

## 2024-10-19 ENCOUNTER — APPOINTMENT (OUTPATIENT)
Dept: CT IMAGING | Age: 69
End: 2024-10-19
Payer: MEDICARE

## 2024-10-19 VITALS
HEIGHT: 65 IN | WEIGHT: 200 LBS | SYSTOLIC BLOOD PRESSURE: 129 MMHG | TEMPERATURE: 98.2 F | RESPIRATION RATE: 16 BRPM | BODY MASS INDEX: 33.32 KG/M2 | DIASTOLIC BLOOD PRESSURE: 69 MMHG | OXYGEN SATURATION: 97 % | HEART RATE: 68 BPM

## 2024-10-19 DIAGNOSIS — N30.01 ACUTE CYSTITIS WITH HEMATURIA: Primary | ICD-10-CM

## 2024-10-19 LAB
ALBUMIN SERPL-MCNC: 3.9 G/DL (ref 3.5–4.6)
ALP SERPL-CCNC: 117 U/L (ref 40–130)
ALT SERPL-CCNC: 13 U/L (ref 0–33)
ANION GAP SERPL CALCULATED.3IONS-SCNC: 11 MEQ/L (ref 9–15)
AST SERPL-CCNC: 18 U/L (ref 0–35)
BACTERIA URNS QL MICRO: ABNORMAL /HPF
BASOPHILS # BLD: 0.1 K/UL (ref 0–0.2)
BASOPHILS NFR BLD: 0.6 %
BILIRUB SERPL-MCNC: 0.4 MG/DL (ref 0.2–0.7)
BILIRUB UR QL STRIP: NEGATIVE
BUN SERPL-MCNC: 17 MG/DL (ref 8–23)
CALCIUM SERPL-MCNC: 9.2 MG/DL (ref 8.5–9.9)
CHLORIDE SERPL-SCNC: 105 MEQ/L (ref 95–107)
CLARITY UR: ABNORMAL
CO2 SERPL-SCNC: 24 MEQ/L (ref 20–31)
COLOR UR: YELLOW
CREAT SERPL-MCNC: 0.51 MG/DL (ref 0.5–0.9)
CRYSTALS URNS MICRO: ABNORMAL /HPF
EOSINOPHIL # BLD: 0.2 K/UL (ref 0–0.7)
EOSINOPHIL NFR BLD: 1.9 %
EPI CELLS #/AREA URNS AUTO: ABNORMAL /HPF (ref 0–5)
ERYTHROCYTE [DISTWIDTH] IN BLOOD BY AUTOMATED COUNT: 12.8 % (ref 11.5–14.5)
GLOBULIN SER CALC-MCNC: 2.6 G/DL (ref 2.3–3.5)
GLUCOSE SERPL-MCNC: 118 MG/DL (ref 70–99)
GLUCOSE UR STRIP-MCNC: NEGATIVE MG/DL
HCT VFR BLD AUTO: 46.9 % (ref 37–47)
HGB BLD-MCNC: 16.2 G/DL (ref 12–16)
HGB UR QL STRIP: ABNORMAL
HYALINE CASTS #/AREA URNS AUTO: ABNORMAL /HPF (ref 0–5)
KETONES UR STRIP-MCNC: ABNORMAL MG/DL
LEUKOCYTE ESTERASE UR QL STRIP: ABNORMAL
LYMPHOCYTES # BLD: 2.6 K/UL (ref 1–4.8)
LYMPHOCYTES NFR BLD: 31.4 %
MCH RBC QN AUTO: 30.7 PG (ref 27–31.3)
MCHC RBC AUTO-ENTMCNC: 34.5 % (ref 33–37)
MCV RBC AUTO: 89 FL (ref 79.4–94.8)
MONOCYTES # BLD: 0.8 K/UL (ref 0.2–0.8)
MONOCYTES NFR BLD: 9.9 %
NEUTROPHILS # BLD: 4.7 K/UL (ref 1.4–6.5)
NEUTS SEG NFR BLD: 56 %
NITRITE UR QL STRIP: POSITIVE
PERFORMED ON: NORMAL
PH UR STRIP: 5.5 [PH] (ref 5–9)
PLATELET # BLD AUTO: 231 K/UL (ref 130–400)
POC CREATININE WHOLE BLOOD: 0.6
POC CREATININE: 0.6 MG/DL (ref 0.6–1.2)
POC SAMPLE TYPE: NORMAL
POTASSIUM SERPL-SCNC: 3.9 MEQ/L (ref 3.4–4.9)
PROCALCITONIN SERPL IA-MCNC: 0.04 NG/ML (ref 0–0.15)
PROT SERPL-MCNC: 6.5 G/DL (ref 6.3–8)
PROT UR STRIP-MCNC: 100 MG/DL
RBC # BLD AUTO: 5.27 M/UL (ref 4.2–5.4)
RBC #/AREA URNS AUTO: >100 /HPF (ref 0–5)
SODIUM SERPL-SCNC: 140 MEQ/L (ref 135–144)
SP GR UR STRIP: 1.03 (ref 1–1.03)
URINE REFLEX TO CULTURE: YES
UROBILINOGEN UR STRIP-ACNC: 1 E.U./DL
WBC # BLD AUTO: 8.3 K/UL (ref 4.8–10.8)
WBC #/AREA URNS AUTO: >100 /HPF (ref 0–5)

## 2024-10-19 PROCEDURE — 96365 THER/PROPH/DIAG IV INF INIT: CPT

## 2024-10-19 PROCEDURE — 74177 CT ABD & PELVIS W/CONTRAST: CPT

## 2024-10-19 PROCEDURE — 6360000004 HC RX CONTRAST MEDICATION

## 2024-10-19 PROCEDURE — 87088 URINE BACTERIA CULTURE: CPT

## 2024-10-19 PROCEDURE — 85025 COMPLETE CBC W/AUTO DIFF WBC: CPT

## 2024-10-19 PROCEDURE — 6360000002 HC RX W HCPCS

## 2024-10-19 PROCEDURE — 87086 URINE CULTURE/COLONY COUNT: CPT

## 2024-10-19 PROCEDURE — 99285 EMERGENCY DEPT VISIT HI MDM: CPT

## 2024-10-19 PROCEDURE — 80053 COMPREHEN METABOLIC PANEL: CPT

## 2024-10-19 PROCEDURE — 96375 TX/PRO/DX INJ NEW DRUG ADDON: CPT

## 2024-10-19 PROCEDURE — 2580000003 HC RX 258

## 2024-10-19 PROCEDURE — 87186 SC STD MICRODIL/AGAR DIL: CPT

## 2024-10-19 PROCEDURE — 84145 PROCALCITONIN (PCT): CPT

## 2024-10-19 PROCEDURE — 81001 URINALYSIS AUTO W/SCOPE: CPT

## 2024-10-19 RX ORDER — KETOROLAC TROMETHAMINE 15 MG/ML
15 INJECTION, SOLUTION INTRAMUSCULAR; INTRAVENOUS ONCE
Status: COMPLETED | OUTPATIENT
Start: 2024-10-19 | End: 2024-10-19

## 2024-10-19 RX ORDER — CEPHALEXIN 500 MG/1
500 CAPSULE ORAL 2 TIMES DAILY
Qty: 14 CAPSULE | Refills: 0 | Status: SHIPPED | OUTPATIENT
Start: 2024-10-19 | End: 2024-10-26

## 2024-10-19 RX ORDER — IOPAMIDOL 755 MG/ML
75 INJECTION, SOLUTION INTRAVASCULAR
Status: COMPLETED | OUTPATIENT
Start: 2024-10-19 | End: 2024-10-19

## 2024-10-19 RX ORDER — ONDANSETRON 2 MG/ML
4 INJECTION INTRAMUSCULAR; INTRAVENOUS ONCE
Status: COMPLETED | OUTPATIENT
Start: 2024-10-19 | End: 2024-10-19

## 2024-10-19 RX ADMIN — KETOROLAC TROMETHAMINE 15 MG: 15 INJECTION, SOLUTION INTRAMUSCULAR; INTRAVENOUS at 02:11

## 2024-10-19 RX ADMIN — CEFTRIAXONE SODIUM 1000 MG: 1 INJECTION, POWDER, FOR SOLUTION INTRAMUSCULAR; INTRAVENOUS at 04:31

## 2024-10-19 RX ADMIN — IOPAMIDOL 75 ML: 755 INJECTION, SOLUTION INTRAVENOUS at 03:02

## 2024-10-19 RX ADMIN — ONDANSETRON 4 MG: 2 INJECTION, SOLUTION INTRAMUSCULAR; INTRAVENOUS at 02:11

## 2024-10-19 ASSESSMENT — ENCOUNTER SYMPTOMS
VOMITING: 0
SHORTNESS OF BREATH: 0
ABDOMINAL PAIN: 1
PHOTOPHOBIA: 0
DIARRHEA: 0
NAUSEA: 1
COUGH: 0

## 2024-10-19 ASSESSMENT — LIFESTYLE VARIABLES
HOW MANY STANDARD DRINKS CONTAINING ALCOHOL DO YOU HAVE ON A TYPICAL DAY: PATIENT DOES NOT DRINK
HOW OFTEN DO YOU HAVE A DRINK CONTAINING ALCOHOL: NEVER

## 2024-10-19 ASSESSMENT — PAIN - FUNCTIONAL ASSESSMENT: PAIN_FUNCTIONAL_ASSESSMENT: ACTIVITIES ARE NOT PREVENTED

## 2024-10-19 ASSESSMENT — PAIN SCALES - GENERAL: PAINLEVEL_OUTOF10: 8

## 2024-10-19 ASSESSMENT — PAIN DESCRIPTION - LOCATION: LOCATION: ABDOMEN;FLANK

## 2024-10-19 ASSESSMENT — PAIN DESCRIPTION - ORIENTATION: ORIENTATION: RIGHT;LEFT;LOWER

## 2024-10-19 ASSESSMENT — PAIN DESCRIPTION - DESCRIPTORS: DESCRIPTORS: STABBING

## 2024-10-19 NOTE — ED NOTES
Patient ambulatory from ED with no distress observed.  Gait is upright and steady.  Respirations are even and non-labored.

## 2024-10-19 NOTE — ED NOTES
Antibiotic infusion completed.  No s/s of adverse reaction.  IV discontinued.  D/C instructions explained to patient who voices understanding.

## 2024-10-19 NOTE — ED PROVIDER NOTES
Vomiting    TRAZODONE (DESYREL) 50 MG TABLET    TAKE ONE TABLET BY MOUTH EVERY DAY NIGHTLY    VENLAFAXINE (EFFEXOR XR) 75 MG EXTENDED RELEASE CAPSULE    TAKE 1 CAPSULE DAILY       ALLERGIES     Codeine, Demerol, Dye [iodides], Iodinated contrast media, Iodine, Morphine, Penicillins, Sulfa antibiotics, and Levofloxacin    FAMILY HISTORY       Family History   Problem Relation Age of Onset    Cancer Father         COLON    High Blood Pressure Father     Heart Disease Father     Breast Cancer Neg Hx     Colon Cancer Neg Hx           SOCIAL HISTORY       Social History     Socioeconomic History    Marital status:    Tobacco Use    Smoking status: Never    Smokeless tobacco: Never   Vaping Use    Vaping status: Never Used   Substance and Sexual Activity    Alcohol use: Not Currently     Alcohol/week: 0.0 standard drinks of alcohol     Comment: socially    Drug use: No    Sexual activity: Not Currently     Social Determinants of Health     Financial Resource Strain: Low Risk  (9/7/2023)    Overall Financial Resource Strain (CARDIA)     Difficulty of Paying Living Expenses: Not hard at all    Received from [facility]    Received from [facility]   Physical Activity: Insufficiently Active (1/31/2024)    Exercise Vital Sign     Days of Exercise per Week: 2 days     Minutes of Exercise per Session: 20 min    Received from [facility]       SCREENINGS                        PHYSICAL EXAM    (up to 7 for level 4, 8 or more for level 5)     ED Triage Vitals [10/19/24 0133]   BP Systolic BP Percentile Diastolic BP Percentile Temp Temp Source Pulse Respirations SpO2   (!) 170/73 -- -- 98.2 °F (36.8 °C) Oral 63 17 96 %      Height Weight - Scale         1.651 m (5' 5\") 90.7 kg (200 lb)             Physical Exam  Constitutional:       General: She is not in acute distress.     Appearance: Normal appearance. She is not  [CA]      ED Course User Index  [CA] Wendy Payne PA       Medical Decision Making  Amount and/or Complexity of Data Reviewed  Labs: ordered. Decision-making details documented in ED Course.  Radiology: ordered.    Risk  Prescription drug management.      69-year-old female patient presents to the ED for evaluation of dysuria, hematuria, bilateral flank pain, urinary frequency, suprapubic pain, myalgias, chills, symptoms for the past 2 days.  States history of UTI/pyelonephritis.  Patient presents afebrile.  Nontoxic.  Not acutely ill-appearing.  She was given 1 dose of 50 mg IV Toradol in the ED with ultimate symptom improvement.  Reassessed on several occasions and resting comfortably denies need for further symptom management.  Urinalysis does demonstrate sign of obvious infection.  Reviewed patient's prior cultures she does have several antibiotic allergies to antibiotics that would have been used based on her prior urine cultures.  She does have an allergy to penicillin however she has done well with different classes of cephalosporins in the past.   Minor workup is not consistent with a septic infection not consistent with an WEST, no evidence of pyelonephritis on CT, CT overall unremarkable save for cystitis findings.  No acute anemia.  Given patient's diffuse symptoms including flank pain and tenderness on exam, will treat as a complicated UTI though, given 1 dose IV Rocephin in the ED and will transition to p.o. Keflex for home, discussed prompt follow-up and return precautions.    REASSESSMENT     Is this patient to be included in the SEP-1 core measure? No Exclusion criteria - the patient is NOT to be included for SEP-1 Core Measure due to: 2+ SIRS criteria are not met     CRITICAL CARE TIME   Total Critical Care time was 0 minutes, excluding separately reportable procedures.  There was a high probability of clinically significant/life threatening deterioration in the patient's condition which

## 2024-10-19 NOTE — ED TRIAGE NOTES
Pt arrived to triage via private vehicle.  Pt c/o abdominal pain.  Pt states its been going on for the past 2 days.  Pt also c/o hematuria.  Pt denies hx of kidney stones.

## 2024-10-20 LAB
BACTERIA UR CULT: ABNORMAL
BACTERIA UR CULT: ABNORMAL
ORGANISM: ABNORMAL

## 2024-11-10 DIAGNOSIS — K58.0 IRRITABLE BOWEL SYNDROME WITH DIARRHEA: ICD-10-CM

## 2024-11-14 RX ORDER — LOPERAMIDE HYDROCHLORIDE 2 MG/1
CAPSULE ORAL
Qty: 180 CAPSULE | Refills: 3 | Status: SHIPPED | OUTPATIENT
Start: 2024-11-14

## 2025-01-05 NOTE — TELEPHONE ENCOUNTER
1 tablet 50 mg prednisone ordered to take prior to MRI of brain with and without contrast both orders done PAST SURGICAL HISTORY:  S/P cholecystectomy

## 2025-02-04 RX ORDER — ESTRADIOL 0.05 MG/D
PATCH, EXTENDED RELEASE TRANSDERMAL
Qty: 16 PATCH | Refills: 0 | Status: SHIPPED | OUTPATIENT
Start: 2025-02-04

## 2025-02-05 ENCOUNTER — OFFICE VISIT (OUTPATIENT)
Dept: URGENT CARE | Age: 70
End: 2025-02-05
Payer: MEDICARE

## 2025-02-05 VITALS
TEMPERATURE: 98.3 F | BODY MASS INDEX: 32.49 KG/M2 | HEIGHT: 65 IN | DIASTOLIC BLOOD PRESSURE: 72 MMHG | RESPIRATION RATE: 18 BRPM | WEIGHT: 195 LBS | SYSTOLIC BLOOD PRESSURE: 136 MMHG | HEART RATE: 57 BPM | OXYGEN SATURATION: 95 %

## 2025-02-05 DIAGNOSIS — J06.9 UPPER RESPIRATORY TRACT INFECTION, UNSPECIFIED TYPE: ICD-10-CM

## 2025-02-05 LAB
POC RAPID INFLUENZA A: NEGATIVE
POC RAPID INFLUENZA B: NEGATIVE
POC SARS-COV-2 AG BINAX: NORMAL

## 2025-02-05 RX ORDER — PREDNISONE 10 MG/1
10 TABLET ORAL
Qty: 6 TABLET | Refills: 0 | Status: SHIPPED | OUTPATIENT
Start: 2025-02-05 | End: 2025-02-08

## 2025-02-05 RX ORDER — AZITHROMYCIN 250 MG/1
TABLET, FILM COATED ORAL
Qty: 6 TABLET | Refills: 0 | Status: SHIPPED | OUTPATIENT
Start: 2025-02-05

## 2025-02-05 NOTE — PROGRESS NOTES
"Subjective   Patient ID: Franny Nascimento \"Tonie" is a 69 y.o. female who presents for Cough (Cough, chest congestion, body aches, nausea, fever, headache since Sunday evening. ).  HPI  Presents for evaluation of URI.  Symptoms including cough, congestion, body aches, malaise, and headache have been present for several days and refractory to OTC meds.  No fever, chills, vomiting,  CP, or SOB.  No exacerbating factors    Review of Systems    Constitutional:  See HPI   Eye:  No recent visual problem.   ENT: See HPI  Respiratory: See HPI.    Neurologic:  Alert and oriented X4, No numbness, No tingling.    All other systems are negative     Objective     /72   Pulse 57   Temp 36.8 °C (98.3 °F) (Oral)   Resp 18   Ht 1.651 m (5' 5\")   Wt 88.5 kg (195 lb)   SpO2 95%   BMI 32.45 kg/m²     Physical Exam    General:  Alert and oriented, No acute distress.    Eye:  Pupils are equal, round and reactive to light, Normal conjunctiva.    HENT:  Normocephalic, unremarkable oropharynx; no cervical adenopathy; no sinus tenderness; nasal congestion noted  Neck:  Supple    Respiratory: Respirations are non-labored; LCTA bilaterally  Musculoskeletal: Normal ROM and strength  Integumentary:  Warm, Dry, Intact, No pallor, No rash.    Neurologic:  Alert, Oriented, Normal sensory, Cranial Nerves II-XII are grossly intact  Psychiatric:  Cooperative, Appropriate mood & affect.    Assessment/Plan   All swabs negative.  Patient presents with upper respiratory infection.  Prescriptions for Zithromax and low-dose prednisone.  Patient's clinical presentation is otherwise unremarkable at this time. Patient is discharged with instructions to follow-up with primary care or seek emergency medical attention for worsening symptoms or any new concerns.  Problem List Items Addressed This Visit    None  Visit Diagnoses       Upper respiratory tract infection, unspecified type        Relevant Medications    predniSONE (Deltasone) 10 mg " tablet    azithromycin (Zithromax) 250 mg tablet    Other Relevant Orders    POCT Covid-19 Rapid Antigen    POCT Influenza A/B manually resulted            Final diagnoses:   [J06.9] Upper respiratory tract infection, unspecified type

## 2025-02-06 NOTE — TELEPHONE ENCOUNTER
Refill request approved. Patient is due for follow up. Please schedule. Thank you. When Should The Patient Follow-Up For Their Next Full-Body Skin Exam?: 6 Months Quality 137: Melanoma: Continuity Of Care - Recall System: Patient information entered into a recall system that includes: target date for the next exam specified AND a process to follow up with patients regarding missed or unscheduled appointments Detail Level: Simple Detail Level: Generalized Detail Level: Zone Detail Level: Detailed

## 2025-02-12 DIAGNOSIS — K58.0 IRRITABLE BOWEL SYNDROME WITH DIARRHEA: ICD-10-CM

## 2025-02-13 RX ORDER — TRAZODONE HYDROCHLORIDE 50 MG/1
50 TABLET, FILM COATED ORAL NIGHTLY
Qty: 90 TABLET | Refills: 0 | OUTPATIENT
Start: 2025-02-13

## 2025-02-17 DIAGNOSIS — R14.0 ABDOMINAL BLOATING: Primary | ICD-10-CM

## 2025-02-17 DIAGNOSIS — K58.0 IRRITABLE BOWEL SYNDROME WITH DIARRHEA: ICD-10-CM

## 2025-02-17 RX ORDER — HYOSCYAMINE SULFATE 0.12 MG/1
0.12 TABLET SUBLINGUAL EVERY 4 HOURS PRN
Qty: 120 TABLET | Refills: 2 | Status: SHIPPED | OUTPATIENT
Start: 2025-02-17

## 2025-02-17 NOTE — PROGRESS NOTES
Patient had contacted office to discuss refill for trazodone.  Patient reports it helps her sleep with IBS symptoms.  She reports taking it on an intermittent basis.  According to charting last refill of trazodone was in 12/2022.  Lengthy discussion with patient regarding symptoms.  Refill of Levsin was sent to pharmacy for patient to trial nightly.  Advised patient to contact office to schedule follow up appointment.

## 2025-02-21 ENCOUNTER — OFFICE VISIT (OUTPATIENT)
Dept: ORTHOPEDIC SURGERY | Facility: CLINIC | Age: 70
End: 2025-02-21
Payer: MEDICARE

## 2025-02-21 ENCOUNTER — HOSPITAL ENCOUNTER (OUTPATIENT)
Dept: RADIOLOGY | Facility: CLINIC | Age: 70
Discharge: HOME | End: 2025-02-21
Payer: MEDICARE

## 2025-02-21 VITALS — WEIGHT: 197 LBS | HEIGHT: 65 IN | BODY MASS INDEX: 32.82 KG/M2

## 2025-02-21 DIAGNOSIS — M54.16 LUMBAR RADICULOPATHY: ICD-10-CM

## 2025-02-21 DIAGNOSIS — M25.551 RIGHT HIP PAIN: ICD-10-CM

## 2025-02-21 DIAGNOSIS — R29.898 LEG WEAKNESS, BILATERAL: ICD-10-CM

## 2025-02-21 DIAGNOSIS — M54.50 LUMBAR SPINE PAIN: ICD-10-CM

## 2025-02-21 PROCEDURE — 99214 OFFICE O/P EST MOD 30 MIN: CPT | Performed by: FAMILY MEDICINE

## 2025-02-21 PROCEDURE — 3008F BODY MASS INDEX DOCD: CPT | Performed by: FAMILY MEDICINE

## 2025-02-21 PROCEDURE — 1159F MED LIST DOCD IN RCRD: CPT | Performed by: FAMILY MEDICINE

## 2025-02-21 PROCEDURE — 73502 X-RAY EXAM HIP UNI 2-3 VIEWS: CPT | Mod: RT

## 2025-02-21 PROCEDURE — 72100 X-RAY EXAM L-S SPINE 2/3 VWS: CPT

## 2025-02-21 PROCEDURE — 1160F RVW MEDS BY RX/DR IN RCRD: CPT | Performed by: FAMILY MEDICINE

## 2025-02-21 PROCEDURE — 1036F TOBACCO NON-USER: CPT | Performed by: FAMILY MEDICINE

## 2025-02-21 RX ORDER — CYCLOBENZAPRINE HCL 10 MG
10 TABLET ORAL NIGHTLY PRN
Qty: 14 TABLET | Refills: 0 | Status: SHIPPED | OUTPATIENT
Start: 2025-02-21 | End: 2025-03-07

## 2025-02-21 RX ORDER — METHYLPREDNISOLONE 4 MG/1
TABLET ORAL
Qty: 1 EACH | Refills: 0 | Status: SHIPPED | OUTPATIENT
Start: 2025-02-21

## 2025-02-21 RX ORDER — NAPROXEN 500 MG/1
500 TABLET ORAL
Qty: 28 TABLET | Refills: 0 | Status: SHIPPED | OUTPATIENT
Start: 2025-02-21 | End: 2025-03-07

## 2025-02-21 ASSESSMENT — PATIENT HEALTH QUESTIONNAIRE - PHQ9
1. LITTLE INTEREST OR PLEASURE IN DOING THINGS: NOT AT ALL
2. FEELING DOWN, DEPRESSED OR HOPELESS: NOT AT ALL
SUM OF ALL RESPONSES TO PHQ9 QUESTIONS 1 AND 2: 0

## 2025-02-22 LAB
CREAT SERPL-MCNC: 0.47 MG/DL (ref 0.5–1.05)
EGFRCR SERPLBLD CKD-EPI 2021: 104 ML/MIN/1.73M2
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
## 2025-02-24 NOTE — PROGRESS NOTES
appearance.   Eyes:      General: No scleral icterus.     Pupils: Pupils are equal, round, and reactive to light.   Cardiovascular:      Rate and Rhythm: Normal rate and regular rhythm.   Pulmonary:      Effort: Pulmonary effort is normal.      Breath sounds: Normal breath sounds.   Abdominal:      General: Bowel sounds are normal. There is no distension.      Palpations: Abdomen is soft. There is no mass.      Tenderness: There is no abdominal tenderness. There is no guarding or rebound.      Hernia: No hernia is present.   Musculoskeletal:         General: Normal range of motion.   Skin:     General: Skin is warm and dry.   Neurological:      Mental Status: She is alert and oriented to person, place, and time.   Psychiatric:         Behavior: Behavior normal.         Thought Content: Thought content normal.         Judgment: Judgment normal.       Laboratory, Pathology, Radiology reviewed in detail with relevantimportant investigations summarized below:    No results for input(s): \"WBC\", \"HGB\", \"HCT\", \"MCV\", \"PLT\" in the last 720 hours.   Lab Results   Component Value Date    ALT 13 10/19/2024    AST 18 10/19/2024    ALKPHOS 117 10/19/2024    BILITOT 0.4 10/19/2024        CT Result (most recent):  CT ABDOMEN PELVIS W IV CONTRAST 10/19/2024    FINDINGS:  Lower Chest: No infiltrate or effusion.    Organs: Cholecystectomy.  Benign liver cysts measuring up to 3.9 cm segment  1. benign 3.9 cm cyst left renal upper pole.  Otherwise, the Liver,  biliary  tree, bilateral adrenal glands, bilateral kidneys, spleen and pancreas are  normal.    GI/Bowel:  No ileus or obstruction. No inflammatory bowel process. Appendix  not visible.  No pericecal inflammatory change to suggest occult appendicitis.    Pelvis: Hysterectomy.  No adnexal mass or pelvic fluid.  Mild wall thickening  of the minimally distended urinary bladder.  Question possible cystitis/UTI.    Peritoneum/Retroperitoneum: Small, fatty umbilical hernia.  Normal

## 2025-02-25 ENCOUNTER — OFFICE VISIT (OUTPATIENT)
Dept: GASTROENTEROLOGY | Age: 70
End: 2025-02-25
Payer: MEDICARE

## 2025-02-25 VITALS — HEIGHT: 65 IN | BODY MASS INDEX: 32.82 KG/M2 | OXYGEN SATURATION: 96 % | WEIGHT: 197 LBS | HEART RATE: 57 BPM

## 2025-02-25 DIAGNOSIS — R10.31 BILATERAL LOWER ABDOMINAL PAIN: ICD-10-CM

## 2025-02-25 DIAGNOSIS — K58.2 IRRITABLE BOWEL SYNDROME WITH BOTH CONSTIPATION AND DIARRHEA: Primary | ICD-10-CM

## 2025-02-25 DIAGNOSIS — R10.32 BILATERAL LOWER ABDOMINAL PAIN: ICD-10-CM

## 2025-02-25 PROCEDURE — 1123F ACP DISCUSS/DSCN MKR DOCD: CPT | Performed by: NURSE PRACTITIONER

## 2025-02-25 PROCEDURE — 1159F MED LIST DOCD IN RCRD: CPT | Performed by: NURSE PRACTITIONER

## 2025-02-25 PROCEDURE — 99213 OFFICE O/P EST LOW 20 MIN: CPT | Performed by: NURSE PRACTITIONER

## 2025-02-25 RX ORDER — PREDNISONE 10 MG/1
TABLET ORAL
COMMUNITY
Start: 2025-02-05

## 2025-02-25 ASSESSMENT — ENCOUNTER SYMPTOMS
TROUBLE SWALLOWING: 0
CONSTIPATION: 1
ABDOMINAL PAIN: 0
VOMITING: 0
NAUSEA: 0
BLOOD IN STOOL: 0
DIARRHEA: 1
ABDOMINAL DISTENTION: 0
ANAL BLEEDING: 0
RECTAL PAIN: 0

## 2025-03-04 DIAGNOSIS — I10 ESSENTIAL HYPERTENSION: ICD-10-CM

## 2025-03-04 RX ORDER — METOPROLOL SUCCINATE 100 MG/1
TABLET, EXTENDED RELEASE ORAL
Qty: 30 TABLET | Refills: 0 | Status: SHIPPED | OUTPATIENT
Start: 2025-03-04

## 2025-03-04 NOTE — TELEPHONE ENCOUNTER
Requesting medication refill. Please approve or deny this request.    Rx requested:  Requested Prescriptions     Pending Prescriptions Disp Refills    metoprolol succinate (TOPROL XL) 100 MG extended release tablet [Pharmacy Med Name: METOPROL SUC TAB 100MG ER] 90 tablet 3     Sig: TAKE 1 TABLET DAILY         Last Office Visit:   3/21/2024      Next Visit Date:  Future Appointments   Date Time Provider Department Center   3/7/2025  3:00 PM Mian Chin MD [facility]   4/8/2025  1:30 PM Slavik-Bosworth, Kelly J, APRN - CNP [facility]

## 2025-03-12 ENCOUNTER — HOSPITAL ENCOUNTER (OUTPATIENT)
Dept: RADIOLOGY | Facility: HOSPITAL | Age: 70
Discharge: HOME | End: 2025-03-12
Payer: MEDICARE

## 2025-03-12 DIAGNOSIS — R29.898 LEG WEAKNESS, BILATERAL: ICD-10-CM

## 2025-03-12 DIAGNOSIS — M54.16 LUMBAR RADICULOPATHY: ICD-10-CM

## 2025-03-12 PROCEDURE — 2550000001 HC RX 255 CONTRASTS: Performed by: FAMILY MEDICINE

## 2025-03-12 PROCEDURE — 72158 MRI LUMBAR SPINE W/O & W/DYE: CPT

## 2025-03-12 PROCEDURE — A9575 INJ GADOTERATE MEGLUMI 0.1ML: HCPCS | Performed by: FAMILY MEDICINE

## 2025-03-12 RX ORDER — GADOTERATE MEGLUMINE 376.9 MG/ML
0.2 INJECTION INTRAVENOUS
Status: COMPLETED | OUTPATIENT
Start: 2025-03-12 | End: 2025-03-12

## 2025-03-12 RX ADMIN — GADOTERATE MEGLUMINE 18 ML: 376.9 INJECTION INTRAVENOUS at 13:47

## 2025-03-14 ENCOUNTER — HOSPITAL ENCOUNTER (OUTPATIENT)
Dept: RADIOLOGY | Facility: CLINIC | Age: 70
Discharge: HOME | End: 2025-03-14
Payer: MEDICARE

## 2025-03-14 ENCOUNTER — OFFICE VISIT (OUTPATIENT)
Dept: ORTHOPEDIC SURGERY | Facility: CLINIC | Age: 70
End: 2025-03-14
Payer: MEDICARE

## 2025-03-14 DIAGNOSIS — M54.16 LUMBAR RADICULOPATHY: ICD-10-CM

## 2025-03-14 DIAGNOSIS — M70.71 OTHER BURSITIS OF HIP, RIGHT HIP: ICD-10-CM

## 2025-03-14 PROCEDURE — 2500000004 HC RX 250 GENERAL PHARMACY W/ HCPCS (ALT 636 FOR OP/ED): Performed by: ORTHOPAEDIC SURGERY

## 2025-03-14 PROCEDURE — 99215 OFFICE O/P EST HI 40 MIN: CPT | Performed by: ORTHOPAEDIC SURGERY

## 2025-03-14 PROCEDURE — 20610 DRAIN/INJ JOINT/BURSA W/O US: CPT | Mod: RT | Performed by: ORTHOPAEDIC SURGERY

## 2025-03-14 PROCEDURE — 72100 X-RAY EXAM L-S SPINE 2/3 VWS: CPT

## 2025-03-14 RX ORDER — LIDOCAINE HYDROCHLORIDE 10 MG/ML
9 INJECTION, SOLUTION INFILTRATION; PERINEURAL
Status: COMPLETED | OUTPATIENT
Start: 2025-03-14 | End: 2025-03-14

## 2025-03-14 RX ORDER — TRIAMCINOLONE ACETONIDE 40 MG/ML
40 INJECTION, SUSPENSION INTRA-ARTICULAR; INTRAMUSCULAR
Status: COMPLETED | OUTPATIENT
Start: 2025-03-14 | End: 2025-03-14

## 2025-03-14 RX ADMIN — LIDOCAINE HYDROCHLORIDE 9 ML: 10 INJECTION, SOLUTION INFILTRATION; PERINEURAL at 15:06

## 2025-03-14 RX ADMIN — TRIAMCINOLONE ACETONIDE 40 MG: 40 INJECTION, SUSPENSION INTRA-ARTICULAR; INTRAMUSCULAR at 15:06

## 2025-03-14 NOTE — PROGRESS NOTES
Chief complaint right leg pain    HPI: Patient is status post an L3-4 lateral lumbar fusion with unilateral screws by me in July 2017.  She was doing well up until a couple months ago when she developed right leg symptoms that started in her lateral hip and go all the way down to her foot on the right side.  She went to the cast room and saw Dr. Budinski who thought this was a radiculopathy.  She has no left-sided symptoms.  She notices that right leg will give way every now and then.  She has no fevers chills nausea vomiting.  No bowel or bladder changes.  No formal treatment for this.  An MRI was ordered and she followed up with me.  She has a history of surgery with Dr. Peralta in the past.  It is unclear what levels but it looks like it was L5-S1.    Physical exam: Well-nourished, well kept.  Good perfusion to the extremities ×4.  Radial and dorsalis pedis pulses 2+.  Capillary refill to all 4 digits brisk.  No distal edema x 4.  No lymphangitis or lymphadenopathy in the examined extremities.  Gait normal.  Can walk on heels and toes.  Thoracic spine is nontender.  Examination of the back shows tenderness in the paraspinous musculature.  There is decreased range of motion in all directions due to guarding/muscle spasms and pain at extremes.  There is good strength and no instability.  Examination of the lower extremities reveals no point tenderness, swelling, or deformity except the patient has exquisite tenderness over her right hip bursa which does reproduce a lot of that right hip and iliotibial band pain..  Range of motion of the hips, knees, and ankles are full without crepitance, instability, or exacerbation of pain.  Strength is 5/5 throughout.  No redness, abrasions, or lesions on all 4 extremities, head and neck, or trunk.  Gross sensation intact in the extremities ×4.  Deep tendon reflexes 2+ and symmetric bilaterally.  Gabe and clonus were negative.  Straight leg raise negative.  Affect normal.   Alert and oriented ×3.  Coordination normal.    MRI and x-rays were reviewed.  She has what looks like a scoliosis and a nice looking lateral fusion at L3-4.  She has unilateral left-sided screws.  The MRI shows at L2-3 a large osteophyte and possible cyst on the right side causing impingement on the right.  I do not see any impingement at the fusion level of L3-4.  At L4-5 I see moderate to severe central stenosis and lateral recess stenosis on the right that could be giving right radicular type symptoms.  I do not see much at L5-S1 and it looks as if there is probably a laminectomy window bilaterally if not at least on the right at that level.    Assessment/plan: Patient with right hip bursitis as well as right leg radiculopathy.  I think this is much of a component of both going on here.  I am not sure how much of this is radiculopathy.  She is severely inhibited bodily function given her pain as she ended up in our walk-in clinic and saw Dr. Budinski.    Risks/benefits of a cortisone injection including infection, local skin irritation, skin atrophy, calcification, continued pain/discomfort, elevated blood sugar, burning, failure to relieve pain, and possible leg infection.  Postop discomfort can be alleviated with additional medications/ice/elevation/rest over the first 24 hours as recommended.  After explaining these issues to the patient, it was understood.  The injection site was sprayed with ethyl chloride.    L Inj/Asp: R hip joint on 3/14/2025 3:06 PM  Indications: pain  Details: 22 G needle, lateral approach  Medications: 40 mg triamcinolone acetonide 40 mg/mL; 9 mL lidocaine 10 mg/mL (1 %)      After the injection she has 60 to 70% improvement of the right leg pain.  She  still does have some symptoms down past her knee to her foot so it is unclear if there is still a radicular component going on.  We are going to get her into physical therapy and see her back in 6 weeks and see how she is doing.  If we  do an operation on her it is going to be an L2-3 and L4-5 laminectomy above and below her prior L3-4 lateral lumbar fusion.  We put screws into L2 and screws into  L5 and hook those screws into the L3-4 screws on the left and put screws entirely in on the right from L2-L5.  We probably do an interbody cage at L4-5 because her spondylolisthesis at that level.  We have discussed and considered that surgery with her today but will try it and see how physical therapy and the shot does before we proceed with surgery.

## 2025-03-23 ASSESSMENT — PATIENT HEALTH QUESTIONNAIRE - PHQ9
SUM OF ALL RESPONSES TO PHQ QUESTIONS 1-9: 0
2. FEELING DOWN, DEPRESSED OR HOPELESS: NOT AT ALL
SUM OF ALL RESPONSES TO PHQ QUESTIONS 1-9: 0
SUM OF ALL RESPONSES TO PHQ QUESTIONS 1-9: 0
1. LITTLE INTEREST OR PLEASURE IN DOING THINGS: NOT AT ALL
SUM OF ALL RESPONSES TO PHQ QUESTIONS 1-9: 0

## 2025-04-03 DIAGNOSIS — I10 ESSENTIAL HYPERTENSION: ICD-10-CM

## 2025-04-03 RX ORDER — VENLAFAXINE HYDROCHLORIDE 75 MG/1
75 CAPSULE, EXTENDED RELEASE ORAL DAILY
Qty: 90 CAPSULE | Refills: 3 | OUTPATIENT
Start: 2025-04-03

## 2025-04-03 RX ORDER — METOPROLOL SUCCINATE 100 MG/1
100 TABLET, EXTENDED RELEASE ORAL DAILY
Qty: 90 TABLET | Refills: 0 | Status: SHIPPED | OUTPATIENT
Start: 2025-04-03

## 2025-04-03 NOTE — TELEPHONE ENCOUNTER
Requesting medication refill. Please approve or deny this request.    Rx requested:  Requested Prescriptions     Pending Prescriptions Disp Refills    metoprolol succinate (TOPROL XL) 100 MG extended release tablet [Pharmacy Med Name: METOPROL SUC TAB 100MG ER] 30 tablet 0     Sig: TAKE 1 TABLET DAILY         Last Office Visit:   3/21/2024      Next Visit Date:  Future Appointments   Date Time Provider Department Center   4/8/2025  1:30 PM Slavik-Bosworth, Kelly J, ALETA - CNP [place] [facility] [place]   4/22/2025  3:30 PM Mian Chin MD [facility]   4/23/2025  2:30 PM Sandra Stover APRN - CNM [facility] [place]   4/25/2025  9:30 AM Kem Kessler DO [facility]

## 2025-04-21 RX ORDER — VENLAFAXINE HYDROCHLORIDE 75 MG/1
75 CAPSULE, EXTENDED RELEASE ORAL DAILY
Qty: 90 CAPSULE | Refills: 3 | Status: SHIPPED | OUTPATIENT
Start: 2025-04-21 | End: 2025-04-23 | Stop reason: SDUPTHER

## 2025-04-22 ENCOUNTER — APPOINTMENT (OUTPATIENT)
Dept: URGENT CARE | Age: 70
End: 2025-04-22
Payer: MEDICARE

## 2025-04-23 ENCOUNTER — OFFICE VISIT (OUTPATIENT)
Dept: OBGYN CLINIC | Age: 70
End: 2025-04-23
Payer: MEDICARE

## 2025-04-23 VITALS
WEIGHT: 203 LBS | BODY MASS INDEX: 33.82 KG/M2 | HEIGHT: 65 IN | SYSTOLIC BLOOD PRESSURE: 126 MMHG | DIASTOLIC BLOOD PRESSURE: 82 MMHG

## 2025-04-23 DIAGNOSIS — Z01.419 ENCOUNTER FOR WELL WOMAN EXAM WITH ROUTINE GYNECOLOGICAL EXAM: Primary | ICD-10-CM

## 2025-04-23 DIAGNOSIS — Z79.890 HORMONE REPLACEMENT THERAPY (HRT): ICD-10-CM

## 2025-04-23 DIAGNOSIS — Z12.31 SCREENING MAMMOGRAM FOR BREAST CANCER: ICD-10-CM

## 2025-04-23 DIAGNOSIS — F32.89 OTHER DEPRESSION: ICD-10-CM

## 2025-04-23 PROCEDURE — 3079F DIAST BP 80-89 MM HG: CPT | Performed by: MIDWIFE

## 2025-04-23 PROCEDURE — 3074F SYST BP LT 130 MM HG: CPT | Performed by: MIDWIFE

## 2025-04-23 PROCEDURE — 1159F MED LIST DOCD IN RCRD: CPT | Performed by: MIDWIFE

## 2025-04-23 PROCEDURE — 99397 PER PM REEVAL EST PAT 65+ YR: CPT | Performed by: MIDWIFE

## 2025-04-23 RX ORDER — VENLAFAXINE HYDROCHLORIDE 75 MG/1
75 CAPSULE, EXTENDED RELEASE ORAL DAILY
Qty: 90 CAPSULE | Refills: 3 | Status: SHIPPED | OUTPATIENT
Start: 2025-04-23

## 2025-04-23 RX ORDER — ESTRADIOL 0.05 MG/D
1 PATCH, EXTENDED RELEASE TRANSDERMAL
Qty: 24 PATCH | Refills: 3 | Status: SHIPPED | OUTPATIENT
Start: 2025-04-24

## 2025-04-23 SDOH — ECONOMIC STABILITY: FOOD INSECURITY: WITHIN THE PAST 12 MONTHS, THE FOOD YOU BOUGHT JUST DIDN'T LAST AND YOU DIDN'T HAVE MONEY TO GET MORE.: NEVER TRUE

## 2025-04-23 SDOH — ECONOMIC STABILITY: FOOD INSECURITY: WITHIN THE PAST 12 MONTHS, YOU WORRIED THAT YOUR FOOD WOULD RUN OUT BEFORE YOU GOT MONEY TO BUY MORE.: NEVER TRUE

## 2025-04-23 ASSESSMENT — ENCOUNTER SYMPTOMS
RHINORRHEA: 0
COUGH: 0
ABDOMINAL PAIN: 0
NAUSEA: 0
TROUBLE SWALLOWING: 0
CONSTIPATION: 0
SORE THROAT: 0
VOICE CHANGE: 0
VOMITING: 0
SHORTNESS OF BREATH: 0
DIARRHEA: 0

## 2025-04-23 NOTE — PROGRESS NOTES
Chief Complaint:     Florina Franklin is a 70 y.o. female who presents here today for:      Chief Complaint   Patient presents with    Annual Exam     No c/o     History of Present Illness:     Florina Franklin is a 70 y.o. female who presents for her annual exam.      Depression  Has been taking Effexor 75 mg with great symptom relief. States she does not experience any side effects. Would like to continue taking at this time.    Hormone Replacement Therapy  Has been using Vivelle and has not experienced any side effects. She was experiencing hot flashes and is no not experiencing any. She would like to continue treatment at this time.     Past Medical History:     Allergies:  Codeine, Demerol, Dye [iodides], Iodinated contrast media, Iodine, Morphine, Penicillins, Sulfa antibiotics, and Levofloxacin  No LMP recorded (lmp unknown). Patient has had a hysterectomy.  Obstetrical History:       Past Medical History:   Diagnosis Date    Atrial fibrillation (HCC)     Brain tumor (HCC) 2018    Chest pain     Degenerative disc disease, lumbar 2016    Hx of colonic polyps     Hypertension 2015    Irritable bowel syndrome     Low back pain 10/16/2018    Lumbar radiculopathy 3/14/2016    Rapid heart rate     Stress incontinence      Medications:     Current Outpatient Medications on File Prior to Visit   Medication Sig Dispense Refill    metoprolol succinate (TOPROL XL) 100 MG extended release tablet TAKE 1 TABLET DAILY 90 tablet 0    predniSONE (DELTASONE) 10 MG tablet       hyoscyamine (LEVSIN/SL) 0.125 MG sublingual tablet Place 1 tablet under the tongue every 4 hours as needed (pain) 120 tablet 2    loperamide (IMODIUM) 2 MG capsule TAKE 1 CAPSULE TWO TIMES A DAY AS NEEDED FOR DIARRHEA 180 capsule 3    ibuprofen (ADVIL;MOTRIN) 600 MG tablet Take 1 tablet by mouth 3 times daily as needed for Pain 30 tablet 0    amLODIPine (NORVASC) 5 MG tablet Take 1 tablet by mouth 2 times daily 180

## 2025-04-25 ENCOUNTER — OFFICE VISIT (OUTPATIENT)
Dept: ORTHOPEDIC SURGERY | Facility: CLINIC | Age: 70
End: 2025-04-25
Payer: MEDICARE

## 2025-04-25 DIAGNOSIS — M54.16 LUMBAR RADICULOPATHY: ICD-10-CM

## 2025-04-25 DIAGNOSIS — M70.71 OTHER BURSITIS OF HIP, RIGHT HIP: ICD-10-CM

## 2025-04-25 DIAGNOSIS — M25.551 RIGHT HIP PAIN: Primary | ICD-10-CM

## 2025-04-25 PROCEDURE — 99212 OFFICE O/P EST SF 10 MIN: CPT | Performed by: ORTHOPAEDIC SURGERY

## 2025-04-25 NOTE — PROGRESS NOTES
"Franny Nascimento \"Tonie" is a 70 y.o. female who presents for Follow-up of the Lower Back (After PT and hip bursa injection/Pain isn't any better/Shot helped for 4 days).    HPI:  70-year-old female here for follow-up of low back and right leg pain and right hip bursitis after physical therapy.  She denies any fever chills nausea vomiting night sweats.  She has no bowel or bladder complaints.    Physical exam:  Well-nourished, well kept.No lymphangitis or lymphadenopathy in the examined extremities.  Gait normal.  Can stand on heels and toes.   Examination of the back shows tenderness in the paraspinous musculature more so off to the right lumbosacral area.  There is decreased range of motion in all directions due to guarding/muscle spasms and pain at extremes.  There is good strength and no instability.  Examination of the lower extremities reveals no point tenderness, swelling, or deformity.  Range of motion of the hips, knees, and ankles are full without crepitance, instability, or exacerbation of pain except she is moderate to severely tender again over her right greater trochanteric bursa.  Strength is 5/5 throughout.  No redness, abrasions, or lesions on extremities  Gross sensation intact in the extremities.    Affect normal.  Alert and oriented ×3.  Coordination normal.    Assessment:  70-year-old female here for follow-up of low back and mostly right leg buttock and hip pain.  At her last visit in March 2025 we injected her right greater trochanteric bursa, she got nearly 100% relief for about 4 days.  She is still getting some radiating pain down into the calf and ankle as well, she does have some pathology in her back above and below the level of her L3-4 lateral lumbar fusion.  Please see previous notes for details.  She says that she would be happy if we could take away her buttock hip and lateral thigh upper pain on the right.  Therapy did help somewhat.    This is an exacerbation of her chronic " problem it is affecting her bodily function.    For complete plan and/or surgical details, please refer to Dr. Ronquillo's portion of this split dictation.    -Sergio Esquivel PA-C    In a face-to-face encounter, I performed a history and physical examination, discussed pertinent diagnostic studies if indicated, and discussed diagnosis and management strategies with both the patient and the midlevel provider.  I reviewed the midlevel's note and agree with the documented findings and plan of care.    Patient here for follow-up.  The injection we gave her in her right hip bursa last time gave her 100% relief for 4 days and then wore off.  She is now exquisitely tender over that right hip bursa.  However, she has pain that goes all the way down to her right leg to the foot on the right side.  She has an MRI that shows nice decompression at L3-4 from the lateral lumbar fusion with unilateral screws that I did.  She has a prior laminectomy window I think at L5-S1.  She has stenosis in the right lateral recess and foramen at L2-3 and central stenosis at L4-5 with a lateral recess stenosis on the right as well.  It is unclear if this is a spine issue or a hip issue.  Clinically it seems to be a hip issue because she is exquisitely tender over her bursa and got great relief from the injection.  However, she has pain that goes down to her ankle and foot on the right side with numbness and tingling.  We are going to send her to pain management for epidural injections at L2-3 and L4-5 on the right to see if it gives her any relief.  Will also get a give her a referral to my [facility].  If she ever decides to proceed with surgery she certainly can follow-up with me.  She is severely inhibited with bodily function with this pain right now.  We did discuss and consider an operation on her which be an L2-3 and L4-5 laminectomies with instrumentation at L2 and L5 on the left side and L2-3-4 and 5 on the right side and  probably an interbody spacer at L2-3 coming in from the right as we would take that facet down.  It looks to me that she is only had a laminectomy at L5-S1 which was done by Dr. Hines 2 years ago.  I do not think she has had a laminectomy in other levels.    Haile Ronquillo MD  Orthopedic surgery

## 2025-05-09 ENCOUNTER — OFFICE VISIT (OUTPATIENT)
Dept: ORTHOPEDIC SURGERY | Facility: CLINIC | Age: 70
End: 2025-05-09
Payer: MEDICARE

## 2025-05-09 DIAGNOSIS — E78.00 PURE HYPERCHOLESTEROLEMIA: ICD-10-CM

## 2025-05-09 DIAGNOSIS — M70.71 OTHER BURSITIS OF HIP, RIGHT HIP: Primary | ICD-10-CM

## 2025-05-09 DIAGNOSIS — R53.83 OTHER FATIGUE: ICD-10-CM

## 2025-05-09 DIAGNOSIS — R73.03 PRE-DIABETES: Primary | ICD-10-CM

## 2025-05-09 DIAGNOSIS — I10 PRIMARY HYPERTENSION: ICD-10-CM

## 2025-05-09 DIAGNOSIS — M54.16 LUMBAR RADICULOPATHY: ICD-10-CM

## 2025-05-09 DIAGNOSIS — R73.03 PRE-DIABETES: ICD-10-CM

## 2025-05-09 LAB
ALBUMIN SERPL-MCNC: 3.7 G/DL (ref 3.5–4.6)
ALP SERPL-CCNC: 94 U/L (ref 40–130)
ALT SERPL-CCNC: 8 U/L (ref 0–33)
ANION GAP SERPL CALCULATED.3IONS-SCNC: 9 MEQ/L (ref 9–15)
AST SERPL-CCNC: 17 U/L (ref 0–35)
BASOPHILS # BLD: 0.1 K/UL (ref 0–0.2)
BASOPHILS NFR BLD: 0.7 %
BILIRUB SERPL-MCNC: 0.5 MG/DL (ref 0.2–0.7)
BUN SERPL-MCNC: 12 MG/DL (ref 8–23)
CALCIUM SERPL-MCNC: 9.1 MG/DL (ref 8.5–9.9)
CHLORIDE SERPL-SCNC: 102 MEQ/L (ref 95–107)
CHOLEST SERPL-MCNC: 190 MG/DL (ref 0–199)
CO2 SERPL-SCNC: 26 MEQ/L (ref 20–31)
CREAT SERPL-MCNC: 0.52 MG/DL (ref 0.5–0.9)
EOSINOPHIL # BLD: 0.1 K/UL (ref 0–0.7)
EOSINOPHIL NFR BLD: 1 %
ERYTHROCYTE [DISTWIDTH] IN BLOOD BY AUTOMATED COUNT: 13.3 % (ref 11.5–14.5)
GLOBULIN SER CALC-MCNC: 2.9 G/DL (ref 2.3–3.5)
GLUCOSE SERPL-MCNC: 89 MG/DL (ref 70–99)
HCT VFR BLD AUTO: 49.4 % (ref 37–47)
HDLC SERPL-MCNC: 49 MG/DL (ref 40–59)
HGB BLD-MCNC: 16.4 G/DL (ref 12–16)
LDLC SERPL CALC-MCNC: 107 MG/DL (ref 0–129)
LYMPHOCYTES # BLD: 1.5 K/UL (ref 1–4.8)
LYMPHOCYTES NFR BLD: 21.7 %
MCH RBC QN AUTO: 31.3 PG (ref 27–31.3)
MCHC RBC AUTO-ENTMCNC: 33.2 % (ref 33–37)
MCV RBC AUTO: 94.3 FL (ref 79.4–94.8)
MONOCYTES # BLD: 0.5 K/UL (ref 0.2–0.8)
MONOCYTES NFR BLD: 7.8 %
NEUTROPHILS # BLD: 4.7 K/UL (ref 1.4–6.5)
NEUTS SEG NFR BLD: 68.4 %
PLATELET # BLD AUTO: 277 K/UL (ref 130–400)
POTASSIUM SERPL-SCNC: 4.7 MEQ/L (ref 3.4–4.9)
PROT SERPL-MCNC: 6.6 G/DL (ref 6.3–8)
RBC # BLD AUTO: 5.24 M/UL (ref 4.2–5.4)
SODIUM SERPL-SCNC: 137 MEQ/L (ref 135–144)
TRIGL SERPL-MCNC: 169 MG/DL (ref 0–150)
TSH SERPL-MCNC: 1.28 UIU/ML (ref 0.44–3.86)
WBC # BLD AUTO: 6.8 K/UL (ref 4.8–10.8)

## 2025-05-09 RX ORDER — TRAMADOL HYDROCHLORIDE 50 MG/1
50 TABLET ORAL EVERY 8 HOURS PRN
Qty: 15 TABLET | Refills: 0 | Status: SHIPPED | OUTPATIENT
Start: 2025-05-09 | End: 2025-05-14

## 2025-05-09 RX ORDER — PREDNISONE 20 MG/1
20 TABLET ORAL DAILY
Qty: 5 TABLET | Refills: 0 | Status: SHIPPED | OUTPATIENT
Start: 2025-05-09 | End: 2025-05-14

## 2025-05-09 NOTE — PROGRESS NOTES
"  Acute Injury Established Patient Visit    Patient ID: Franny Nascimento \"Tonie" is a 70 y.o. female  History of Present Illness  The patient is a 70-year-old female presenting with right leg and hip pain.    Right Leg and Hip Pain  - Pain originates from the right hip, radiates down the leg to the foot.  - No radiographic imaging of the leg, but x-rays of the back were done.  - Seen by Dr. Baird on 02/21/2025; MRI of lumbar spine suggested lumbar radiculopathy.  - Steroids and muscle relaxers prescribed, but ineffective.  - Consulted Dr. Brar, who recommended epidural injections, pending at month's end.  - Hip bursa injection on 03/14/2025 by Dr. Brar provided temporary relief for 4 days.  - Steroids, muscle relaxers, and oxycodone were ineffective.    Supplemental information: No changes in bowel/bladder control, leg weakness, groin pain, or numbness. No diabetes history. No ice/heat application or therapy sessions since last visit, awaiting pain management advice. Massage therapy provides some relief.       Assessment & Plan  1. Right leg and hip pain:  - Initiate prednisone 20 mg BID and tramadol for pain control until pain management appointment  - Continue therapy with core and hip strengthening exercises  - Recommend ice/heat applications, gentle stretches, and ROM exercises  - Prescriptions for prednisone and tramadol sent to pharmacy    Follow-up:  - Pain management appointment at month's end       Assessment:   Problem List Items Addressed This Visit       Lumbar radiculopathy     Other Visit Diagnoses         Other bursitis of hip, right hip    -  Primary            Diagnostics: Reviewed all relevant imaging including x-ray, MRI, CT, and US.    Results  MRI of lumbar spine (02/21/2025): Likely lumbar radiculopathy.       Procedure:  Procedures    Physical Exam  - General: Severe pain, inhibited body function  - Musculoskeletal:    - Right Hip: Exquisitely tender over the bursa       No " orders of the defined types were placed in this encounter.     At the conclusion of the visit there were no further questions by the patient/family regarding their plan of care.  Patient was instructed to call or return with any issues, questions, or concerns regarding their injury and/or treatment plan described above.     05/09/25 at 4:17 PM - Hari Ochoa DO    Office: [phone]    This note was prepared using voice recognition software.  The details of this note are correct and have been reviewed, and corrected to the best of my ability.  Some grammatical errors may persist related to the Dragon software.  This medical note was created with the assistance of artificial intelligence (AI) for documentation purposes. The content has been reviewed and confirmed by the healthcare provider for accuracy and completeness. Patient consented to the use of audio recording and use of AI during their visit.

## 2025-05-10 ENCOUNTER — RESULTS FOLLOW-UP (OUTPATIENT)
Age: 70
End: 2025-05-10

## 2025-05-10 LAB
ESTIMATED AVERAGE GLUCOSE: 105 MG/DL
HBA1C MFR BLD: 5.3 % (ref 4–6)

## 2025-05-15 ENCOUNTER — OFFICE VISIT (OUTPATIENT)
Age: 70
End: 2025-05-15
Payer: MEDICARE

## 2025-05-15 VITALS
OXYGEN SATURATION: 98 % | DIASTOLIC BLOOD PRESSURE: 82 MMHG | BODY MASS INDEX: 33.61 KG/M2 | SYSTOLIC BLOOD PRESSURE: 118 MMHG | HEART RATE: 59 BPM | RESPIRATION RATE: 16 BRPM | WEIGHT: 202 LBS

## 2025-05-15 DIAGNOSIS — I10 PRIMARY HYPERTENSION: Primary | ICD-10-CM

## 2025-05-15 DIAGNOSIS — I48.0 PAF (PAROXYSMAL ATRIAL FIBRILLATION) (HCC): ICD-10-CM

## 2025-05-15 DIAGNOSIS — I28.8: ICD-10-CM

## 2025-05-15 DIAGNOSIS — I45.10 RIGHT BUNDLE BRANCH BLOCK (RBBB) ON ELECTROCARDIOGRAPHY: ICD-10-CM

## 2025-05-15 DIAGNOSIS — I10 ESSENTIAL HYPERTENSION: ICD-10-CM

## 2025-05-15 PROCEDURE — 3079F DIAST BP 80-89 MM HG: CPT | Performed by: INTERNAL MEDICINE

## 2025-05-15 PROCEDURE — 99214 OFFICE O/P EST MOD 30 MIN: CPT | Performed by: INTERNAL MEDICINE

## 2025-05-15 PROCEDURE — 3074F SYST BP LT 130 MM HG: CPT | Performed by: INTERNAL MEDICINE

## 2025-05-15 PROCEDURE — 1123F ACP DISCUSS/DSCN MKR DOCD: CPT | Performed by: INTERNAL MEDICINE

## 2025-05-15 PROCEDURE — 1159F MED LIST DOCD IN RCRD: CPT | Performed by: INTERNAL MEDICINE

## 2025-05-15 PROCEDURE — 93000 ELECTROCARDIOGRAM COMPLETE: CPT | Performed by: INTERNAL MEDICINE

## 2025-05-15 RX ORDER — METOPROLOL SUCCINATE 100 MG/1
100 TABLET, EXTENDED RELEASE ORAL DAILY
Qty: 90 TABLET | Refills: 3 | Status: SHIPPED | OUTPATIENT
Start: 2025-05-15

## 2025-05-15 NOTE — PROGRESS NOTES
Chief Complaint   Patient presents with    1 Year Follow Up       8-30[birth year]33: Patient presents for initial medical evaluation. Patient is followed on a regular basis by Mian Gary MD. patient with history of paroxysmal atrial fibrillation since 2014.  Currently not on any oral anticoagulation due to history of large rectus sheath hematoma.  Patient with history of A. fib ablation x2 with Dr. Lindo at [facility].  She is not on any antiarrhythmic medications at this time.  She was previously on flecainide.  Currently on Toprol- mg daily.  Patient can usually sense when she develops atrial fibrillation.  Patient with history of benign meningioma status post resection in 2018.  Last echocardiogram in 2018 with normal LV function no valvular rallies mild pulmonary retention.  Status post normal nuclear stress test in 2017  EKG today with normal sinus rhythm.  Normal QTc interval.    2/28/2023: Patient is doing well overall.  She does feel some occasional irregular heartbeats palpitations.  Patient with history of paroxysmal atrial fibrillation status post ablation x2 at [facility].  Does have history of rectus sheath hematoma has been off of anticoagulation since due to risk of bleeding.  Patient with history of benign meningioma s/p resection in 2018.  Last nuclear stress test was negative in 2017.  Last echo in 2018 with normal LV function mild pulmonary hypertension.  She does have history of paroxysmal atrial fibrillation since 2014.    3-21-24: Patient status post implantable loop recorder at [facility].  History of paroxysmal A-fib status post ablation x 2.  She is not on any anticoagulation at this time.  Last tress test was negative in 2017.  Most recent echo in 2018 with normal LV function no valve abnormalities.  Blood pressure is very well-controlled  EKG with first-degree AV block, right bundle branch block.    5-15-25: follows with [facility]. Hx of implantable loop recorder at [facility].  History of

## 2025-05-16 ENCOUNTER — TELEMEDICINE (OUTPATIENT)
Age: 70
End: 2025-05-16
Payer: MEDICARE

## 2025-05-16 DIAGNOSIS — Z71.89 ACP (ADVANCE CARE PLANNING): ICD-10-CM

## 2025-05-16 DIAGNOSIS — Z00.00 MEDICARE ANNUAL WELLNESS VISIT, SUBSEQUENT: Primary | ICD-10-CM

## 2025-05-16 PROCEDURE — 1159F MED LIST DOCD IN RCRD: CPT | Performed by: NURSE PRACTITIONER

## 2025-05-16 PROCEDURE — G0439 PPPS, SUBSEQ VISIT: HCPCS | Performed by: NURSE PRACTITIONER

## 2025-05-16 PROCEDURE — 1123F ACP DISCUSS/DSCN MKR DOCD: CPT | Performed by: NURSE PRACTITIONER

## 2025-05-16 ASSESSMENT — PATIENT HEALTH QUESTIONNAIRE - PHQ9
9. THOUGHTS THAT YOU WOULD BE BETTER OFF DEAD, OR OF HURTING YOURSELF: NOT AT ALL
SUM OF ALL RESPONSES TO PHQ QUESTIONS 1-9: 0
10. IF YOU CHECKED OFF ANY PROBLEMS, HOW DIFFICULT HAVE THESE PROBLEMS MADE IT FOR YOU TO DO YOUR WORK, TAKE CARE OF THINGS AT HOME, OR GET ALONG WITH OTHER PEOPLE: NOT DIFFICULT AT ALL
6. FEELING BAD ABOUT YOURSELF - OR THAT YOU ARE A FAILURE OR HAVE LET YOURSELF OR YOUR FAMILY DOWN: NOT AT ALL
4. FEELING TIRED OR HAVING LITTLE ENERGY: NOT AT ALL
SUM OF ALL RESPONSES TO PHQ QUESTIONS 1-9: 0
5. POOR APPETITE OR OVEREATING: NOT AT ALL
SUM OF ALL RESPONSES TO PHQ QUESTIONS 1-9: 0
1. LITTLE INTEREST OR PLEASURE IN DOING THINGS: NOT AT ALL
7. TROUBLE CONCENTRATING ON THINGS, SUCH AS READING THE NEWSPAPER OR WATCHING TELEVISION: NOT AT ALL
SUM OF ALL RESPONSES TO PHQ QUESTIONS 1-9: 0
2. FEELING DOWN, DEPRESSED OR HOPELESS: NOT AT ALL
8. MOVING OR SPEAKING SO SLOWLY THAT OTHER PEOPLE COULD HAVE NOTICED. OR THE OPPOSITE, BEING SO FIGETY OR RESTLESS THAT YOU HAVE BEEN MOVING AROUND A LOT MORE THAN USUAL: NOT AT ALL
3. TROUBLE FALLING OR STAYING ASLEEP: NOT AT ALL

## 2025-05-16 NOTE — PATIENT INSTRUCTIONS
Learning About Being Active as an Older Adult  Why is being active important as you get older?     Being active is one of the best things you can do for your health. And it's never too late to start. Being active--or getting active, if you aren't already--has definite benefits. It can:  Give you more energy,  Keep your mind sharp.  Improve balance to reduce your risk of falls.  Help you manage chronic illness with fewer medicines.  No matter how old you are, how fit you are, or what health problems you have, there is a form of activity that will work for you. And the more physical activity you can do, the better your overall health will be.  What kinds of activity can help you stay healthy?  Being more active will make your daily activities easier. Physical activity includes planned exercise and things you do in daily life. There are four types of activity:  Aerobic.  Doing aerobic activity makes your heart and lungs strong.  Includes walking, dancing, and gardening.  Aim for at least 2½ hours spread throughout the week.  It improves your energy and can help you sleep better.  Muscle-strengthening.  This type of activity can help maintain muscle and strengthen bones.  Includes climbing stairs, using resistance bands, and lifting or carrying heavy loads.  Aim for at least twice a week.  It can help protect the knees and other joints.  Stretching.  Stretching gives you better range of motion in joints and muscles.  Includes upper arm stretches, calf stretches, and gentle yoga.  Aim for at least twice a week, preferably after your muscles are warmed up from other activities.  It can help you function better in daily life.  Balancing.  This helps you stay coordinated and have good posture.  Includes heel-to-toe walking, jl chi, and certain types of yoga.  Aim for at least 3 days a week.  It can reduce your risk of falling.  Even if you have a hard time meeting the recommendations, it's better to be more active

## 2025-05-16 NOTE — PROGRESS NOTES
Medicare Annual Wellness Visit    Florina Franklin is here for Medicare AWV    Assessment & Plan   Medicare annual wellness visit, subsequent  ACP (advance care planning)  -     [facility] Referral to ACP Clinical Specialist       No follow-ups on file.     Subjective       Patient's complete Health Risk Assessment and screening values have been reviewed and are found in Flowsheets. The following problems were reviewed today and where indicated follow up appointments were made and/or referrals ordered.    Positive Risk Factor Screenings with Interventions:              Inactivity:  On average, how many days per week do you engage in moderate to strenuous exercise (like a brisk walk)?: 2 days (!) Abnormal  On average, how many minutes do you engage in exercise at this level?: 30 min  Interventions:  Patient declined any further interventions or treatment     Abnormal BMI (obese):  There is no height or weight on file to calculate BMI. (!) Abnormal  Interventions:  Patient declines any further evaluation or treatment             Advanced Directives:  Do you have a Living Will?: (!) No    Intervention:  has NO advanced directive - not interested in additional information                     Objective    Patient-Reported Vitals  No data recorded               Allergies   Allergen Reactions    Codeine     Demerol     Dye [Iodides] Nausea Only    Iodinated Contrast Media Other (See Comments)     Patient states nausea only, does fine with antiemetic premedication.    Iodine Itching    Morphine     Penicillins Other (See Comments)    Sulfa Antibiotics Hives and Itching    Levofloxacin Itching and Rash     Prior to Visit Medications    Medication Sig Taking? Authorizing Provider   metoprolol succinate (TOPROL XL) 100 MG extended release tablet Take 1 tablet by mouth daily  Kem Kessler,    estradiol (VIVELLE) 0.05 MG/24HR Place 1 patch onto the skin Twice a Week  Sandra Stover APRN - CNM   venlafaxine (EFFEXOR XR) 75

## 2025-05-19 ENCOUNTER — CLINICAL DOCUMENTATION (OUTPATIENT)
Dept: SPIRITUAL SERVICES | Age: 70
End: 2025-05-19

## 2025-05-19 ASSESSMENT — PATIENT HEALTH QUESTIONNAIRE - PHQ9
1. LITTLE INTEREST OR PLEASURE IN DOING THINGS: NOT AT ALL
SUM OF ALL RESPONSES TO PHQ9 QUESTIONS 1 & 2: 0
2. FEELING DOWN, DEPRESSED OR HOPELESS: NOT AT ALL

## 2025-05-19 NOTE — ACP (ADVANCE CARE PLANNING)
Advance Care Planning   Ambulatory ACP Specialist Patient Outreach    Date:  5/19/2025    ACP Specialist:  Gracie Haas LPN    Outreach call to patient in follow-up to ACP Specialist referral from:Mian Chin MD    [x] PCP  [] Provider   [] Ambulatory Care Management [] Other     For:                  [x] Advance Directive Assistance              [] Complete Portable DNR order              [] Complete POST/POLST/MOST              [] Code Status Discussion             [] Discuss Goals of Care             [] Early ACP Decision-Making              [] Other (Specify)    Date Referral Received:5/16/25    Next Step:   [] ACP scheduled conversation  [x] Outreach again in one week               [] Email / Mail ACP Info Sheets  [] Email / Mail Advance Directive   [] Closing referral.  Routing closure to referring provider/staff and to ACP Specialist .    [] Closure letter mailed to patient with invitation to contact ACP Specialist if / when ready.   [] Other (Specify here):       [x] At this time, Healthcare Decision Maker Is:      Primary Decision Maker: RayneBarrie - Spouse - [phone]    Secondary Decision Maker: Ibis Willams - Child - [phone]      [] Primary agent named in scanned advance directive.    [x] Legal Next of Kin.     [] Unable to determine legal decision maker at this time.    Outreaches:       [x] 1st -  Date:  5/19/25               Intervention:  [] Spoke with Patient   [x] Left Voice mail [] Email / Mail    [x] MyChart  [] Other (Specify) :     Outcomes:ACP Referral received. Placed call to home/preferred number [phone]. Left VM with ACP Coordinator contact information and encouraged patient to return call.  MyChart message sent.  If no return call, outreach will be attempted in about one week.             [] 2nd -  Date:                 Intervention:  [] Spoke with Patient  [] Left Voice mail [] Email / Mail    [] MyChart  [] Other (Specify) :              Outcomes:

## 2025-05-21 ENCOUNTER — OFFICE VISIT (OUTPATIENT)
Age: 70
End: 2025-05-21
Payer: MEDICARE

## 2025-05-21 VITALS
SYSTOLIC BLOOD PRESSURE: 118 MMHG | TEMPERATURE: 97.4 F | DIASTOLIC BLOOD PRESSURE: 82 MMHG | BODY MASS INDEX: 33.66 KG/M2 | WEIGHT: 202 LBS | HEIGHT: 65 IN

## 2025-05-21 DIAGNOSIS — D32.9 MENINGIOMA (HCC): ICD-10-CM

## 2025-05-21 DIAGNOSIS — E78.00 PURE HYPERCHOLESTEROLEMIA: ICD-10-CM

## 2025-05-21 DIAGNOSIS — R31.29 MICROSCOPIC HEMATURIA: ICD-10-CM

## 2025-05-21 DIAGNOSIS — I10 PRIMARY HYPERTENSION: Primary | ICD-10-CM

## 2025-05-21 PROCEDURE — 3074F SYST BP LT 130 MM HG: CPT | Performed by: FAMILY MEDICINE

## 2025-05-21 PROCEDURE — 1160F RVW MEDS BY RX/DR IN RCRD: CPT | Performed by: FAMILY MEDICINE

## 2025-05-21 PROCEDURE — 1159F MED LIST DOCD IN RCRD: CPT | Performed by: FAMILY MEDICINE

## 2025-05-21 PROCEDURE — 3079F DIAST BP 80-89 MM HG: CPT | Performed by: FAMILY MEDICINE

## 2025-05-21 PROCEDURE — 1123F ACP DISCUSS/DSCN MKR DOCD: CPT | Performed by: FAMILY MEDICINE

## 2025-05-21 PROCEDURE — 81002 URINALYSIS NONAUTO W/O SCOPE: CPT | Performed by: FAMILY MEDICINE

## 2025-05-21 PROCEDURE — 99214 OFFICE O/P EST MOD 30 MIN: CPT | Performed by: FAMILY MEDICINE

## 2025-05-21 NOTE — PROGRESS NOTES
fibrillation (HCC)     Brain tumor (HCC) 07/16/2018    Chest pain     Degenerative disc disease, lumbar 1/21/2016    Hx of colonic polyps     Hypertension 2/24/2015    Irritable bowel syndrome     Low back pain 10/16/2018    Lumbar radiculopathy 3/14/2016    Rapid heart rate     Stress incontinence      Objective:   /82   Temp 97.4 °F (36.3 °C)   Ht 1.651 m (5' 5\")   Wt 91.6 kg (202 lb)   LMP  (LMP Unknown)   BMI 33.61 kg/m²     Physical Exam  Neck:no carotid bruits.  No masses.  No adenopathy. No thyroid asymmetry.    Lungs:clear and equal breath sounds.  No wheezes or rales.    Heart:rate reg. No murmur.  No gallops   Pulses:Radials 2+ equal               Poster tib 1+ equal  Extremities:no edema in either leg  Gen:   In no acute distress  Abdomen;  B.S present. Soft  Non tender. No hepatosplenomegaly. No masses    Assessment:          Diagnosis Orders   1. Primary hypertension  POCT Urinalysis no Micro      2. Pure hypercholesterolemia        3. Meningioma (HCC)        4. Microscopic hematuria  Abdifatah Avila MD, Urology, [facility]             Plan:      Orders Placed This Encounter   Procedures    Abdifatah Avila MD, Urology, [place]     Referral Priority:   Routine     Referral Type:   Eval and Treat     Referral Reason:   Specialty Services Required     Referred to Provider:   Abdifatah Tolentino MD     Requested Specialty:   Urology     Number of Visits Requested:   1    POCT Urinalysis no Micro       Continue toprol and norvasc for htn and continue with diet control for lipids   Meningioma stable and followed by neurology-continue current tx   F/u in 12 months

## 2025-05-23 DIAGNOSIS — Z79.890 HORMONE REPLACEMENT THERAPY (HRT): ICD-10-CM

## 2025-05-23 RX ORDER — ESTRADIOL 0.05 MG/D
PATCH, EXTENDED RELEASE TRANSDERMAL
Qty: 16 PATCH | Refills: 0 | Status: SHIPPED | OUTPATIENT
Start: 2025-05-23

## 2025-07-17 ENCOUNTER — OFFICE VISIT (OUTPATIENT)
Age: 70
End: 2025-07-17

## 2025-07-17 VITALS
WEIGHT: 197 LBS | DIASTOLIC BLOOD PRESSURE: 78 MMHG | HEART RATE: 62 BPM | SYSTOLIC BLOOD PRESSURE: 140 MMHG | BODY MASS INDEX: 32.82 KG/M2 | HEIGHT: 65 IN

## 2025-07-17 DIAGNOSIS — R31.0 GROSS HEMATURIA: ICD-10-CM

## 2025-07-17 DIAGNOSIS — R31.29 MICROSCOPIC HEMATURIA: Primary | ICD-10-CM

## 2025-07-17 DIAGNOSIS — R11.0 NAUSEA: ICD-10-CM

## 2025-07-17 LAB
BILIRUBIN, POC: ABNORMAL
BLOOD URINE, POC: ABNORMAL
CLARITY, POC: CLEAR
COLOR, POC: YELLOW
GLUCOSE URINE, POC: ABNORMAL MG/DL
KETONES, POC: ABNORMAL MG/DL
LEUKOCYTE EST, POC: ABNORMAL
NITRITE, POC: ABNORMAL
PH, POC: 5.5
PROTEIN, POC: ABNORMAL MG/DL
SPECIFIC GRAVITY, POC: >=1.03
UROBILINOGEN, POC: 0.2 MG/DL

## 2025-07-17 RX ORDER — ONDANSETRON 4 MG/1
4 TABLET, ORALLY DISINTEGRATING ORAL 3 TIMES DAILY PRN
Qty: 21 TABLET | Refills: 0 | Status: SHIPPED | OUTPATIENT
Start: 2025-07-17

## 2025-07-17 ASSESSMENT — ENCOUNTER SYMPTOMS: APNEA: 0

## 2025-07-21 ENCOUNTER — OFFICE VISIT (OUTPATIENT)
Age: 70
End: 2025-07-21
Payer: MEDICARE

## 2025-07-21 VITALS
DIASTOLIC BLOOD PRESSURE: 88 MMHG | WEIGHT: 197 LBS | TEMPERATURE: 97.2 F | HEIGHT: 65 IN | SYSTOLIC BLOOD PRESSURE: 128 MMHG | BODY MASS INDEX: 32.82 KG/M2

## 2025-07-21 DIAGNOSIS — L23.9 ALLERGIC CONTACT DERMATITIS, UNSPECIFIED TRIGGER: Primary | ICD-10-CM

## 2025-07-21 PROCEDURE — 1159F MED LIST DOCD IN RCRD: CPT | Performed by: NURSE PRACTITIONER

## 2025-07-21 PROCEDURE — 3079F DIAST BP 80-89 MM HG: CPT | Performed by: NURSE PRACTITIONER

## 2025-07-21 PROCEDURE — 1123F ACP DISCUSS/DSCN MKR DOCD: CPT | Performed by: NURSE PRACTITIONER

## 2025-07-21 PROCEDURE — 99213 OFFICE O/P EST LOW 20 MIN: CPT | Performed by: NURSE PRACTITIONER

## 2025-07-21 PROCEDURE — 3074F SYST BP LT 130 MM HG: CPT | Performed by: NURSE PRACTITIONER

## 2025-07-21 RX ORDER — PREDNISONE 10 MG/1
TABLET ORAL
Qty: 42 TABLET | Refills: 0 | Status: SHIPPED | OUTPATIENT
Start: 2025-07-21

## 2025-07-21 ASSESSMENT — ENCOUNTER SYMPTOMS
STRIDOR: 0
WHEEZING: 0
SHORTNESS OF BREATH: 0
COUGH: 0
CHEST TIGHTNESS: 0

## 2025-07-21 NOTE — PROGRESS NOTES
appearance. She is well-developed, well-groomed and normal weight. She is not ill-appearing, toxic-appearing or diaphoretic.   HENT:      Head: Normocephalic and atraumatic.      Right Ear: Hearing normal.      Left Ear: Hearing normal.      Mouth/Throat:      Lips: Pink.   Eyes:      General: Lids are normal.   Cardiovascular:      Rate and Rhythm: Normal rate.      Pulses: Normal pulses.   Pulmonary:      Effort: Pulmonary effort is normal.   Musculoskeletal:         General: Normal range of motion.   Skin:     Capillary Refill: Capillary refill takes less than 2 seconds.      Findings: Rash present. Rash is papular and vesicular.          Neurological:      General: No focal deficit present.      Mental Status: She is alert and oriented to person, place, and time. Mental status is at baseline.   Psychiatric:         Attention and Perception: Attention and perception normal.         Mood and Affect: Mood and affect normal.         Speech: Speech normal.         Behavior: Behavior normal. Behavior is cooperative.         Thought Content: Thought content normal.         Cognition and Memory: Cognition and memory normal.         Judgment: Judgment normal.         Assessment:       Diagnosis Orders   1. Allergic contact dermatitis, unspecified trigger  predniSONE (DELTASONE) 10 MG tablet        No results found for this visit on 07/21/25.   Plan:     Assessment & Plan   Florina \"Oma\" was seen today for rash.    Diagnoses and all orders for this visit:    Allergic contact dermatitis, unspecified trigger  -     predniSONE (DELTASONE) 10 MG tablet; Take 5 tabs for 3 days, then 4 tabs for 3 days, then 3 tabs for 3 days, then 2 tabs for 2 days, then 1 tab for 2 days.    Discussed options with patient and will start oral steroid. Advised on use and SE and she has taken steroid in the past.   Advised may cont with antihistamine for itching as well as topical agent to help with blisters.   Advised to return if sx do not

## 2025-07-22 ENCOUNTER — HOSPITAL ENCOUNTER (OUTPATIENT)
Dept: CT IMAGING | Age: 70
Discharge: HOME | End: 2025-07-24
Payer: MEDICARE

## 2025-07-22 DIAGNOSIS — R31.29 MICROSCOPIC HEMATURIA: ICD-10-CM

## 2025-07-22 DIAGNOSIS — R31.0 GROSS HEMATURIA: ICD-10-CM

## 2025-07-22 LAB
PERFORMED ON: NORMAL
POC CREATININE: 0.7 MG/DL (ref 0.6–1.2)
POC SAMPLE TYPE: NORMAL

## 2025-07-22 PROCEDURE — 6360000004 HC RX CONTRAST MEDICATION: Performed by: PHYSICIAN ASSISTANT

## 2025-07-22 PROCEDURE — 74178 CT ABD&PLV WO CNTR FLWD CNTR: CPT | Performed by: PHYSICIAN ASSISTANT

## 2025-07-22 RX ORDER — IOPAMIDOL 755 MG/ML
75 INJECTION, SOLUTION INTRAVASCULAR
Status: COMPLETED | OUTPATIENT
Start: 2025-07-22 | End: 2025-07-22

## 2025-07-22 RX ADMIN — IOPAMIDOL 75 ML: 755 INJECTION, SOLUTION INTRAVENOUS at 15:39

## 2025-08-19 ENCOUNTER — OFFICE VISIT (OUTPATIENT)
Age: 70
End: 2025-08-19
Payer: MEDICARE

## 2025-08-19 VITALS
HEART RATE: 61 BPM | HEIGHT: 65 IN | BODY MASS INDEX: 34.49 KG/M2 | DIASTOLIC BLOOD PRESSURE: 82 MMHG | WEIGHT: 207 LBS | SYSTOLIC BLOOD PRESSURE: 120 MMHG

## 2025-08-19 DIAGNOSIS — R31.9 HEMATURIA, UNSPECIFIED TYPE: Primary | ICD-10-CM

## 2025-08-19 PROCEDURE — 1159F MED LIST DOCD IN RCRD: CPT | Performed by: PHYSICIAN ASSISTANT

## 2025-08-19 PROCEDURE — 3079F DIAST BP 80-89 MM HG: CPT | Performed by: PHYSICIAN ASSISTANT

## 2025-08-19 PROCEDURE — 99213 OFFICE O/P EST LOW 20 MIN: CPT | Performed by: PHYSICIAN ASSISTANT

## 2025-08-19 PROCEDURE — 1123F ACP DISCUSS/DSCN MKR DOCD: CPT | Performed by: PHYSICIAN ASSISTANT

## 2025-08-19 PROCEDURE — 3074F SYST BP LT 130 MM HG: CPT | Performed by: PHYSICIAN ASSISTANT

## 2025-08-19 RX ORDER — BETAMETHASONE DIPROPIONATE 0.5 MG/G
CREAM TOPICAL
COMMUNITY
Start: 2025-08-12

## 2025-08-19 RX ORDER — GABAPENTIN 300 MG/1
CAPSULE ORAL
COMMUNITY
Start: 2025-08-10

## 2025-08-19 ASSESSMENT — ENCOUNTER SYMPTOMS: APNEA: 0

## 2025-08-25 ENCOUNTER — OFFICE VISIT (OUTPATIENT)
Dept: ORTHOPEDIC SURGERY | Facility: CLINIC | Age: 70
End: 2025-08-25
Payer: MEDICARE

## 2025-08-25 ENCOUNTER — HOSPITAL ENCOUNTER (OUTPATIENT)
Dept: RADIOLOGY | Facility: CLINIC | Age: 70
Discharge: HOME | End: 2025-08-25
Payer: MEDICARE

## 2025-08-25 DIAGNOSIS — S70.02XA CONTUSION OF LEFT HIP, INITIAL ENCOUNTER: ICD-10-CM

## 2025-08-25 DIAGNOSIS — M54.50 LOW BACK PAIN, UNSPECIFIED BACK PAIN LATERALITY, UNSPECIFIED CHRONICITY, UNSPECIFIED WHETHER SCIATICA PRESENT: ICD-10-CM

## 2025-08-25 DIAGNOSIS — M25.552 HIP PAIN, ACUTE, LEFT: ICD-10-CM

## 2025-08-25 PROCEDURE — 99214 OFFICE O/P EST MOD 30 MIN: CPT | Performed by: INTERNAL MEDICINE

## 2025-08-25 PROCEDURE — 72110 X-RAY EXAM L-2 SPINE 4/>VWS: CPT | Performed by: INTERNAL MEDICINE

## 2025-08-25 PROCEDURE — 1159F MED LIST DOCD IN RCRD: CPT | Performed by: INTERNAL MEDICINE

## 2025-08-25 PROCEDURE — 1036F TOBACCO NON-USER: CPT | Performed by: INTERNAL MEDICINE

## 2025-08-25 PROCEDURE — 73502 X-RAY EXAM HIP UNI 2-3 VIEWS: CPT | Mod: LEFT SIDE | Performed by: INTERNAL MEDICINE

## 2025-08-25 PROCEDURE — 99212 OFFICE O/P EST SF 10 MIN: CPT | Performed by: INTERNAL MEDICINE

## 2025-08-25 PROCEDURE — 73502 X-RAY EXAM HIP UNI 2-3 VIEWS: CPT | Mod: LT

## 2025-08-25 PROCEDURE — 72110 X-RAY EXAM L-2 SPINE 4/>VWS: CPT

## 2025-08-25 RX ORDER — METHYLPREDNISOLONE 4 MG/1
TABLET ORAL
Qty: 21 TABLET | Refills: 0 | Status: SHIPPED | OUTPATIENT
Start: 2025-08-25

## 2025-08-27 ENCOUNTER — OFFICE VISIT (OUTPATIENT)
Age: 70
End: 2025-08-27
Payer: MEDICARE

## 2025-08-27 VITALS
SYSTOLIC BLOOD PRESSURE: 120 MMHG | OXYGEN SATURATION: 97 % | TEMPERATURE: 97.5 F | HEIGHT: 65 IN | DIASTOLIC BLOOD PRESSURE: 70 MMHG | BODY MASS INDEX: 33.82 KG/M2 | HEART RATE: 53 BPM | WEIGHT: 203 LBS

## 2025-08-27 DIAGNOSIS — R10.12 ABDOMINAL PAIN, LEFT UPPER QUADRANT: Primary | ICD-10-CM

## 2025-08-27 DIAGNOSIS — R11.0 NAUSEA: ICD-10-CM

## 2025-08-27 DIAGNOSIS — R19.7 DIARRHEA, UNSPECIFIED TYPE: ICD-10-CM

## 2025-08-27 PROCEDURE — 1160F RVW MEDS BY RX/DR IN RCRD: CPT | Performed by: FAMILY MEDICINE

## 2025-08-27 PROCEDURE — 3078F DIAST BP <80 MM HG: CPT | Performed by: FAMILY MEDICINE

## 2025-08-27 PROCEDURE — 3074F SYST BP LT 130 MM HG: CPT | Performed by: FAMILY MEDICINE

## 2025-08-27 PROCEDURE — 1123F ACP DISCUSS/DSCN MKR DOCD: CPT | Performed by: FAMILY MEDICINE

## 2025-08-27 PROCEDURE — 99213 OFFICE O/P EST LOW 20 MIN: CPT | Performed by: FAMILY MEDICINE

## 2025-08-27 PROCEDURE — 1159F MED LIST DOCD IN RCRD: CPT | Performed by: FAMILY MEDICINE

## 2025-08-27 ASSESSMENT — ENCOUNTER SYMPTOMS: NAUSEA: 1

## 2025-08-28 ENCOUNTER — HOSPITAL ENCOUNTER (OUTPATIENT)
Dept: CT IMAGING | Age: 70
Discharge: HOME | End: 2025-08-30
Attending: FAMILY MEDICINE
Payer: MEDICARE

## 2025-08-28 DIAGNOSIS — R11.0 NAUSEA: ICD-10-CM

## 2025-08-28 DIAGNOSIS — R10.12 ABDOMINAL PAIN, LEFT UPPER QUADRANT: ICD-10-CM

## 2025-08-28 DIAGNOSIS — R19.7 DIARRHEA, UNSPECIFIED TYPE: ICD-10-CM

## 2025-08-28 PROCEDURE — 74176 CT ABD & PELVIS W/O CONTRAST: CPT

## 2025-09-05 DIAGNOSIS — Z79.890 HORMONE REPLACEMENT THERAPY (HRT): ICD-10-CM

## 2025-09-05 RX ORDER — ESTRADIOL 0.05 MG/D
1 PATCH, EXTENDED RELEASE TRANSDERMAL WEEKLY
Qty: 16 PATCH | Refills: 0 | Status: SHIPPED | OUTPATIENT
Start: 2025-09-05

## (undated) DEVICE — BIPOLAR IRRIGATOR INTEGRATED TUBING AND BIPOLAR CORD SET, DISPOSABLE

## (undated) DEVICE — STOCKINETTE ORTH W6XL48IN OFF WHT SGL PLY UNBLEACHED COT RIB

## (undated) DEVICE — DIFFUSER: Brand: CORE, MAESTRO

## (undated) DEVICE — CODMAN® SURGICAL PATTIES 1/2" X 1/2" (1.27CM X 1.27CM): Brand: CODMAN®

## (undated) DEVICE — SHEET,DRAPE,53X77,STERILE: Brand: MEDLINE

## (undated) DEVICE — SYRINGE MED 10ML LUERLOCK TIP W/O SFTY DISP

## (undated) DEVICE — COUNTER NDL 40 COUNT HLD 70 FOAM BLK ADH W/ MAG

## (undated) DEVICE — SUTURE ETHBND EXCEL SZ 0 L30IN NONABSORBABLE GRN L26MM CT-2 X412H

## (undated) DEVICE — GUN CG8900 RANEY CLIP KIT 1PK: Brand: CLIP GUN

## (undated) DEVICE — BANDAGE,GAUZE,BULKEE II,4.5"X4.1YD,STRL: Brand: MEDLINE

## (undated) DEVICE — STERILE COTTON BALLS LARGE 5/P: Brand: MEDLINE

## (undated) DEVICE — WAX SURG 2.5GM HEMSTAT BNE BEESWAX PARAFFIN ISO PALMITATE

## (undated) DEVICE — COVER,MAYO STAND,STERILE: Brand: MEDLINE

## (undated) DEVICE — SPONGE,NEURO,1"X3",XR,STRL,LF,10/PK: Brand: MEDLINE

## (undated) DEVICE — D-58 TAPERED ROUTER

## (undated) DEVICE — CODMAN® SURGICAL PATTIES 1/2" X 3" (1.27CM X 7.62CM): Brand: CODMAN®

## (undated) DEVICE — PAD N ADH W3XL4IN POLY COT SFT PERF FLM EASILY CUT ABSRB

## (undated) DEVICE — COTTON BALL ST

## (undated) DEVICE — CRANIOTOMY DRAPE, STERILE: Brand: MEDLINE

## (undated) DEVICE — DISPOSABLE TUBING SET AND EXTENDER FILTER TUBING

## (undated) DEVICE — NEEDLE HYPO 22GA L1 1/2IN PIVOTING SHLD FOR LUERLOCK SYR

## (undated) DEVICE — GOWN,AURORA,NONREINFORCED,LARGE: Brand: MEDLINE

## (undated) DEVICE — Z DISCONTINUED APPLICATOR SURG PREP 0.35OZ 2% CHG 70% ISO ALC W/ HI LT

## (undated) DEVICE — Z DISCONTINUED PATTY SURG 1X1 IN COTTONOID

## (undated) DEVICE — 9.0MM PRECISION ACORN

## (undated) DEVICE — KIT CATH 16FR 5ML URIN M INDWL INDWL STR TIP INF CTRL

## (undated) DEVICE — OIL CARTRIDGE: Brand: CORE, MAESTRO

## (undated) DEVICE — 2.3MM TAPERED ROUTER

## (undated) DEVICE — GLOVE SURG SZ 75 L12IN FNGR THK83MIL CRM POLYISOPRENE

## (undated) DEVICE — SUTURE VCRL SZ 2-0 L36IN ABSRB VLT L36MM CT-1 1/2 CIR J345H

## (undated) DEVICE — SUTURE NRLN 2-0 L18IN NONABSORBABLE BLK TF L13MM 1/2 CIR N104T

## (undated) DEVICE — MARKER SURG SKIN GENTIAN VLT REG TIP W/ 6IN RUL

## (undated) DEVICE — TUBING, SUCTION, 1/4" X 10', STRAIGHT: Brand: MEDLINE

## (undated) DEVICE — GLOVE SURG SZ 8 L12IN FNGR THK94MIL TRNSLUC YEL LTX HYDRGEL

## (undated) DEVICE — LABEL MED MINI W/ MARKER

## (undated) DEVICE — PIN ADLT MAYFIELD RIGID MOLD FINGER

## (undated) DEVICE — INTENDED FOR TISSUE SEPARATION, AND OTHER PROCEDURES THAT REQUIRE A SHARP SURGICAL BLADE TO PUNCTURE OR CUT.: Brand: BARD-PARKER ® CARBON RIB-BACK BLADES

## (undated) DEVICE — CODMAN® SURGICAL PATTIES 1/2" X1 1/2" (1.27CM X 3.81CM): Brand: CODMAN®

## (undated) DEVICE — GAUZE,SPONGE,4"X4",16PLY,XRAY,STRL,LF: Brand: MEDLINE

## (undated) DEVICE — ELECTRODE PT RET AD L9FT HI MOIST COND ADH HYDRGEL CORDED

## (undated) DEVICE — TOWEL,OR,DSP,ST,BLUE,STD,4/PK,20PK/CS: Brand: MEDLINE

## (undated) DEVICE — CODMAN® MICROSENSOR® BASIC KIT FOR SUBDURAL/INTRAPARENCHYMAL PROCEDURE: Brand: CODMAN MICROSENSOR®

## (undated) DEVICE — ELECTROSURGICAL PENCIL BUTTON SWITCH E-Z CLEAN COATED BLADE ELECTRODE 10 FT (3 M) CORD HOLSTER: Brand: MEGADYNE

## (undated) DEVICE — Device

## (undated) DEVICE — PACK,SET UP,DRAPE: Brand: MEDLINE

## (undated) DEVICE — STRAIGHT TIP, UNIVERSAL